# Patient Record
Sex: FEMALE | Race: BLACK OR AFRICAN AMERICAN | Employment: UNEMPLOYED | URBAN - METROPOLITAN AREA
[De-identification: names, ages, dates, MRNs, and addresses within clinical notes are randomized per-mention and may not be internally consistent; named-entity substitution may affect disease eponyms.]

---

## 2017-01-01 ENCOUNTER — PATIENT OUTREACH (OUTPATIENT)
Dept: FAMILY MEDICINE CLINIC | Facility: CLINIC | Age: 62
End: 2017-01-01

## 2017-01-01 ENCOUNTER — APPOINTMENT (OUTPATIENT)
Dept: GENERAL RADIOLOGY | Age: 62
DRG: 853 | End: 2017-01-01
Attending: PHYSICIAN ASSISTANT
Payer: MEDICARE

## 2017-01-01 ENCOUNTER — ANESTHESIA EVENT (OUTPATIENT)
Dept: EMERGENCY DEPT | Age: 62
DRG: 853 | End: 2017-01-01
Payer: MEDICARE

## 2017-01-01 ENCOUNTER — ANESTHESIA (OUTPATIENT)
Dept: EMERGENCY DEPT | Age: 62
DRG: 853 | End: 2017-01-01
Payer: MEDICARE

## 2017-01-01 ENCOUNTER — HOSPITAL ENCOUNTER (INPATIENT)
Age: 62
LOS: 2 days | Discharge: HOME | DRG: 565 | End: 2017-01-22
Attending: EMERGENCY MEDICINE | Admitting: HOSPITALIST
Payer: MEDICARE

## 2017-01-01 ENCOUNTER — APPOINTMENT (OUTPATIENT)
Dept: GENERAL RADIOLOGY | Age: 62
DRG: 565 | End: 2017-01-01
Attending: NURSE PRACTITIONER
Payer: MEDICARE

## 2017-01-01 ENCOUNTER — PATIENT OUTREACH (OUTPATIENT)
Dept: INTERNAL MEDICINE CLINIC | Age: 62
End: 2017-01-01

## 2017-01-01 ENCOUNTER — APPOINTMENT (OUTPATIENT)
Dept: CT IMAGING | Age: 62
DRG: 853 | End: 2017-01-01
Attending: INTERNAL MEDICINE
Payer: MEDICARE

## 2017-01-01 ENCOUNTER — APPOINTMENT (OUTPATIENT)
Dept: GENERAL RADIOLOGY | Age: 62
DRG: 853 | End: 2017-01-01
Attending: INTERNAL MEDICINE
Payer: MEDICARE

## 2017-01-01 ENCOUNTER — HOSPITAL ENCOUNTER (INPATIENT)
Age: 62
LOS: 25 days | Discharge: NURSING, REHAB OR LONG-TERM CARE | DRG: 853 | End: 2017-03-07
Attending: EMERGENCY MEDICINE | Admitting: HOSPITALIST
Payer: MEDICARE

## 2017-01-01 ENCOUNTER — ANESTHESIA (OUTPATIENT)
Dept: SURGERY | Age: 62
DRG: 853 | End: 2017-01-01
Payer: MEDICARE

## 2017-01-01 ENCOUNTER — APPOINTMENT (OUTPATIENT)
Dept: GENERAL RADIOLOGY | Age: 62
DRG: 853 | End: 2017-01-01
Attending: EMERGENCY MEDICINE
Payer: MEDICARE

## 2017-01-01 ENCOUNTER — ANESTHESIA EVENT (OUTPATIENT)
Dept: SURGERY | Age: 62
DRG: 853 | End: 2017-01-01
Payer: MEDICARE

## 2017-01-01 VITALS
OXYGEN SATURATION: 98 % | BODY MASS INDEX: 45.99 KG/M2 | TEMPERATURE: 99.1 F | WEIGHT: 293 LBS | HEART RATE: 90 BPM | HEIGHT: 67 IN | SYSTOLIC BLOOD PRESSURE: 120 MMHG | RESPIRATION RATE: 20 BRPM | DIASTOLIC BLOOD PRESSURE: 88 MMHG

## 2017-01-01 VITALS
RESPIRATION RATE: 16 BRPM | DIASTOLIC BLOOD PRESSURE: 50 MMHG | TEMPERATURE: 98.8 F | BODY MASS INDEX: 48.82 KG/M2 | WEIGHT: 293 LBS | HEIGHT: 65 IN | HEART RATE: 104 BPM | SYSTOLIC BLOOD PRESSURE: 130 MMHG | OXYGEN SATURATION: 99 %

## 2017-01-01 DIAGNOSIS — R65.21 SEPTIC SHOCK (HCC): ICD-10-CM

## 2017-01-01 DIAGNOSIS — D72.829 LEUKOCYTOSIS, UNSPECIFIED TYPE: ICD-10-CM

## 2017-01-01 DIAGNOSIS — E66.01 MORBID OBESITY, UNSPECIFIED OBESITY TYPE (HCC): ICD-10-CM

## 2017-01-01 DIAGNOSIS — A41.9 SEPTIC SHOCK (HCC): ICD-10-CM

## 2017-01-01 DIAGNOSIS — A41.9 SEPSIS, DUE TO UNSPECIFIED ORGANISM: Primary | ICD-10-CM

## 2017-01-01 DIAGNOSIS — R53.81 DEBILITY: ICD-10-CM

## 2017-01-01 DIAGNOSIS — L89.150 SACRAL DECUBITUS ULCER, UNSTAGEABLE: ICD-10-CM

## 2017-01-01 DIAGNOSIS — T79.6XXA TRAUMATIC RHABDOMYOLYSIS, INITIAL ENCOUNTER (HCC): Primary | ICD-10-CM

## 2017-01-01 DIAGNOSIS — J96.00 ACUTE RESPIRATORY FAILURE, UNSPECIFIED WHETHER WITH HYPOXIA OR HYPERCAPNIA (HCC): ICD-10-CM

## 2017-01-01 LAB
ABO + RH BLD: NORMAL
ALBUMIN SERPL BCP-MCNC: 1.3 G/DL (ref 3.4–5)
ALBUMIN SERPL BCP-MCNC: 1.3 G/DL (ref 3.4–5)
ALBUMIN SERPL BCP-MCNC: 1.4 G/DL (ref 3.4–5)
ALBUMIN SERPL BCP-MCNC: 1.5 G/DL (ref 3.4–5)
ALBUMIN SERPL BCP-MCNC: 1.6 G/DL (ref 3.4–5)
ALBUMIN SERPL BCP-MCNC: 1.7 G/DL (ref 3.4–5)
ALBUMIN SERPL BCP-MCNC: 2.1 G/DL (ref 3.4–5)
ALBUMIN SERPL BCP-MCNC: 2.3 G/DL (ref 3.4–5)
ALBUMIN SERPL BCP-MCNC: 2.5 G/DL (ref 3.4–5)
ALBUMIN/GLOB SERPL: 0.3 {RATIO} (ref 0.8–1.7)
ALBUMIN/GLOB SERPL: 0.4 {RATIO} (ref 0.8–1.7)
ALBUMIN/GLOB SERPL: 0.6 {RATIO} (ref 0.8–1.7)
ALBUMIN/GLOB SERPL: 0.7 {RATIO} (ref 0.8–1.7)
ALBUMIN/GLOB SERPL: 0.7 {RATIO} (ref 0.8–1.7)
ALP SERPL-CCNC: 114 U/L (ref 45–117)
ALP SERPL-CCNC: 141 U/L (ref 45–117)
ALP SERPL-CCNC: 143 U/L (ref 45–117)
ALP SERPL-CCNC: 80 U/L (ref 45–117)
ALP SERPL-CCNC: 83 U/L (ref 45–117)
ALP SERPL-CCNC: 85 U/L (ref 45–117)
ALP SERPL-CCNC: 87 U/L (ref 45–117)
ALP SERPL-CCNC: 89 U/L (ref 45–117)
ALP SERPL-CCNC: 92 U/L (ref 45–117)
ALP SERPL-CCNC: 93 U/L (ref 45–117)
ALP SERPL-CCNC: 93 U/L (ref 45–117)
ALP SERPL-CCNC: 94 U/L (ref 45–117)
ALP SERPL-CCNC: 94 U/L (ref 45–117)
ALT SERPL-CCNC: 17 U/L (ref 13–56)
ALT SERPL-CCNC: 17 U/L (ref 13–56)
ALT SERPL-CCNC: 18 U/L (ref 13–56)
ALT SERPL-CCNC: 21 U/L (ref 13–56)
ALT SERPL-CCNC: 23 U/L (ref 13–56)
ALT SERPL-CCNC: 24 U/L (ref 13–56)
ALT SERPL-CCNC: 26 U/L (ref 13–56)
ALT SERPL-CCNC: 26 U/L (ref 13–56)
ALT SERPL-CCNC: 27 U/L (ref 13–56)
ALT SERPL-CCNC: 27 U/L (ref 13–56)
ALT SERPL-CCNC: 85 U/L (ref 13–56)
AMMONIA PLAS-SCNC: 13 UMOL/L (ref 11–32)
AMMONIA PLAS-SCNC: 168 UMOL/L (ref 11–32)
AMMONIA PLAS-SCNC: 41 UMOL/L (ref 11–32)
AMORPH CRY URNS QL MICRO: ABNORMAL
AMPHET UR QL SCN: NEGATIVE
AMPHET UR QL SCN: NEGATIVE
AMYLASE SERPL-CCNC: 66 U/L (ref 25–115)
ANION GAP BLD CALC-SCNC: 10 MMOL/L (ref 3–18)
ANION GAP BLD CALC-SCNC: 11 MMOL/L (ref 3–18)
ANION GAP BLD CALC-SCNC: 12 MMOL/L (ref 3–18)
ANION GAP BLD CALC-SCNC: 12 MMOL/L (ref 3–18)
ANION GAP BLD CALC-SCNC: 13 MMOL/L (ref 3–18)
ANION GAP BLD CALC-SCNC: 15 MMOL/L (ref 3–18)
ANION GAP BLD CALC-SCNC: 16 MMOL/L (ref 3–18)
ANION GAP BLD CALC-SCNC: 21 MMOL/L (ref 10–20)
ANION GAP BLD CALC-SCNC: 8 MMOL/L (ref 3–18)
ANION GAP BLD CALC-SCNC: 8 MMOL/L (ref 3–18)
ANION GAP BLD CALC-SCNC: 9 MMOL/L (ref 3–18)
APPEARANCE UR: ABNORMAL
APPEARANCE UR: CLEAR
ARTERIAL PATENCY WRIST A: ABNORMAL
ARTERIAL PATENCY WRIST A: YES
AST SERPL W P-5'-P-CCNC: 18 U/L (ref 15–37)
AST SERPL W P-5'-P-CCNC: 18 U/L (ref 15–37)
AST SERPL W P-5'-P-CCNC: 19 U/L (ref 15–37)
AST SERPL W P-5'-P-CCNC: 22 U/L (ref 15–37)
AST SERPL W P-5'-P-CCNC: 24 U/L (ref 15–37)
AST SERPL W P-5'-P-CCNC: 250 U/L (ref 15–37)
AST SERPL W P-5'-P-CCNC: 27 U/L (ref 15–37)
AST SERPL W P-5'-P-CCNC: 31 U/L (ref 15–37)
AST SERPL W P-5'-P-CCNC: 31 U/L (ref 15–37)
AST SERPL W P-5'-P-CCNC: 32 U/L (ref 15–37)
AST SERPL W P-5'-P-CCNC: 32 U/L (ref 15–37)
AST SERPL W P-5'-P-CCNC: 34 U/L (ref 15–37)
AST SERPL W P-5'-P-CCNC: 42 U/L (ref 15–37)
ATRIAL RATE: 105 BPM
ATRIAL RATE: 109 BPM
ATRIAL RATE: 110 BPM
ATRIAL RATE: 111 BPM
ATRIAL RATE: 113 BPM
ATRIAL RATE: 140 BPM
ATRIAL RATE: 163 BPM
BACTERIA SPEC CULT: ABNORMAL
BACTERIA SPEC CULT: NORMAL
BACTERIA URNS QL MICRO: ABNORMAL /HPF
BACTERIA URNS QL MICRO: NEGATIVE /HPF
BARBITURATES UR QL SCN: NEGATIVE
BARBITURATES UR QL SCN: NEGATIVE
BASE DEFICIT BLD-SCNC: 10 MMOL/L
BASE DEFICIT BLD-SCNC: 11 MMOL/L
BASE DEFICIT BLD-SCNC: 13 MMOL/L
BASE DEFICIT BLD-SCNC: 14 MMOL/L
BASE DEFICIT BLD-SCNC: 6 MMOL/L
BASE DEFICIT BLD-SCNC: 7 MMOL/L
BASE DEFICIT BLD-SCNC: 8 MMOL/L
BASE DEFICIT BLD-SCNC: 9 MMOL/L
BASE DEFICIT BLD-SCNC: 9 MMOL/L
BASOPHILS # BLD AUTO: 0 K/UL (ref 0–0.06)
BASOPHILS # BLD AUTO: 0 K/UL (ref 0–0.1)
BASOPHILS # BLD AUTO: 0.1 K/UL (ref 0–0.06)
BASOPHILS # BLD AUTO: 0.1 K/UL (ref 0–0.1)
BASOPHILS # BLD AUTO: 0.1 K/UL (ref 0–0.1)
BASOPHILS # BLD: 0 % (ref 0–2)
BASOPHILS # BLD: 0 % (ref 0–3)
BASOPHILS # BLD: 1 % (ref 0–2)
BASOPHILS # BLD: 1 % (ref 0–3)
BASOPHILS # BLD: 2 % (ref 0–3)
BDY SITE: ABNORMAL
BENZODIAZ UR QL: NEGATIVE
BENZODIAZ UR QL: NEGATIVE
BILIRUB DIRECT SERPL-MCNC: 0.2 MG/DL (ref 0–0.2)
BILIRUB SERPL-MCNC: 0.3 MG/DL (ref 0.2–1)
BILIRUB SERPL-MCNC: 0.4 MG/DL (ref 0.2–1)
BILIRUB SERPL-MCNC: 0.4 MG/DL (ref 0.2–1)
BILIRUB SERPL-MCNC: 0.6 MG/DL (ref 0.2–1)
BILIRUB SERPL-MCNC: 1 MG/DL (ref 0.2–1)
BILIRUB SERPL-MCNC: 1.2 MG/DL (ref 0.2–1)
BILIRUB SERPL-MCNC: 1.5 MG/DL (ref 0.2–1)
BILIRUB SERPL-MCNC: 1.6 MG/DL (ref 0.2–1)
BILIRUB SERPL-MCNC: 1.7 MG/DL (ref 0.2–1)
BILIRUB UR QL: ABNORMAL
BILIRUB UR QL: ABNORMAL
BLD PROD TYP BPU: NORMAL
BLOOD GROUP ANTIBODIES SERPL: NORMAL
BNP SERPL-MCNC: 935 PG/ML (ref 0–900)
BNP SERPL-MCNC: 97 PG/ML (ref 0–900)
BODY TEMPERATURE: 37.4
BODY TEMPERATURE: 93.2
BODY TEMPERATURE: 97
BODY TEMPERATURE: 97.2
BODY TEMPERATURE: 98
BODY TEMPERATURE: 98.5
BODY TEMPERATURE: 98.8
BODY TEMPERATURE: 98.8
BODY TEMPERATURE: 99.7
BPU ID: NORMAL
BUN BLD-MCNC: 71 MG/DL (ref 7–18)
BUN SERPL-MCNC: 12 MG/DL (ref 7–18)
BUN SERPL-MCNC: 16 MG/DL (ref 7–18)
BUN SERPL-MCNC: 17 MG/DL (ref 7–18)
BUN SERPL-MCNC: 18 MG/DL (ref 7–18)
BUN SERPL-MCNC: 19 MG/DL (ref 7–18)
BUN SERPL-MCNC: 20 MG/DL (ref 7–18)
BUN SERPL-MCNC: 21 MG/DL (ref 7–18)
BUN SERPL-MCNC: 21 MG/DL (ref 7–18)
BUN SERPL-MCNC: 22 MG/DL (ref 7–18)
BUN SERPL-MCNC: 22 MG/DL (ref 7–18)
BUN SERPL-MCNC: 24 MG/DL (ref 7–18)
BUN SERPL-MCNC: 29 MG/DL (ref 7–18)
BUN SERPL-MCNC: 42 MG/DL (ref 7–18)
BUN SERPL-MCNC: 5 MG/DL (ref 7–18)
BUN SERPL-MCNC: 7 MG/DL (ref 7–18)
BUN SERPL-MCNC: 79 MG/DL (ref 7–18)
BUN SERPL-MCNC: 86 MG/DL (ref 7–18)
BUN SERPL-MCNC: 92 MG/DL (ref 7–18)
BUN/CREAT SERPL: 10 (ref 12–20)
BUN/CREAT SERPL: 14 (ref 12–20)
BUN/CREAT SERPL: 15 (ref 12–20)
BUN/CREAT SERPL: 16 (ref 12–20)
BUN/CREAT SERPL: 17 (ref 12–20)
BUN/CREAT SERPL: 17 (ref 12–20)
BUN/CREAT SERPL: 18 (ref 12–20)
BUN/CREAT SERPL: 19 (ref 12–20)
BUN/CREAT SERPL: 20 (ref 12–20)
BUN/CREAT SERPL: 25 (ref 12–20)
BUN/CREAT SERPL: 28 (ref 12–20)
BUN/CREAT SERPL: 28 (ref 12–20)
BUN/CREAT SERPL: 34 (ref 12–20)
BUN/CREAT SERPL: 36 (ref 12–20)
BUN/CREAT SERPL: 40 (ref 12–20)
BUN/CREAT SERPL: 51 (ref 12–20)
BUN/CREAT SERPL: 59 (ref 12–20)
BUN/CREAT SERPL: 59 (ref 12–20)
BUN/CREAT SERPL: 67 (ref 12–20)
CA-I BLD-MCNC: 1.17 MMOL/L (ref 1.12–1.32)
CA-I SERPL-SCNC: 1.08 MMOL/L (ref 1.12–1.32)
CA-I SERPL-SCNC: 1.1 MMOL/L (ref 1.12–1.32)
CA-I SERPL-SCNC: 1.1 MMOL/L (ref 1.12–1.32)
CA-I SERPL-SCNC: 1.19 MMOL/L (ref 1.12–1.32)
CALCIUM SERPL-MCNC: 7.2 MG/DL (ref 8.5–10.1)
CALCIUM SERPL-MCNC: 7.3 MG/DL (ref 8.5–10.1)
CALCIUM SERPL-MCNC: 7.6 MG/DL (ref 8.5–10.1)
CALCIUM SERPL-MCNC: 7.7 MG/DL (ref 8.5–10.1)
CALCIUM SERPL-MCNC: 7.7 MG/DL (ref 8.5–10.1)
CALCIUM SERPL-MCNC: 7.8 MG/DL (ref 8.5–10.1)
CALCIUM SERPL-MCNC: 7.9 MG/DL (ref 8.5–10.1)
CALCIUM SERPL-MCNC: 8 MG/DL (ref 8.5–10.1)
CALCIUM SERPL-MCNC: 8.1 MG/DL (ref 8.5–10.1)
CALCIUM SERPL-MCNC: 8.2 MG/DL (ref 8.5–10.1)
CALCIUM SERPL-MCNC: 8.3 MG/DL (ref 8.5–10.1)
CALCIUM SERPL-MCNC: 8.4 MG/DL (ref 8.5–10.1)
CALCIUM SERPL-MCNC: 8.4 MG/DL (ref 8.5–10.1)
CALCIUM SERPL-MCNC: 8.8 MG/DL (ref 8.5–10.1)
CALCULATED P AXIS, ECG09: 29 DEGREES
CALCULATED P AXIS, ECG09: 59 DEGREES
CALCULATED P AXIS, ECG09: 66 DEGREES
CALCULATED P AXIS, ECG09: 70 DEGREES
CALCULATED P AXIS, ECG09: 77 DEGREES
CALCULATED P AXIS, ECG09: 80 DEGREES
CALCULATED R AXIS, ECG10: 45 DEGREES
CALCULATED R AXIS, ECG10: 46 DEGREES
CALCULATED R AXIS, ECG10: 56 DEGREES
CALCULATED R AXIS, ECG10: 56 DEGREES
CALCULATED R AXIS, ECG10: 68 DEGREES
CALCULATED R AXIS, ECG10: 70 DEGREES
CALCULATED R AXIS, ECG10: 72 DEGREES
CALCULATED T AXIS, ECG11: -28 DEGREES
CALCULATED T AXIS, ECG11: -63 DEGREES
CALCULATED T AXIS, ECG11: -84 DEGREES
CALCULATED T AXIS, ECG11: -95 DEGREES
CALCULATED T AXIS, ECG11: -97 DEGREES
CALCULATED T AXIS, ECG11: 0 DEGREES
CALCULATED T AXIS, ECG11: 26 DEGREES
CALLED TO:,BCALL1: NORMAL
CALLED TO:,BCALL1: NORMAL
CALLED TO:,BCALL2: NORMAL
CANNABINOIDS UR QL SCN: NEGATIVE
CANNABINOIDS UR QL SCN: NEGATIVE
CHLORIDE BLD-SCNC: 117 MMOL/L (ref 100–108)
CHLORIDE SERPL-SCNC: 105 MMOL/L (ref 100–108)
CHLORIDE SERPL-SCNC: 106 MMOL/L (ref 100–108)
CHLORIDE SERPL-SCNC: 110 MMOL/L (ref 100–108)
CHLORIDE SERPL-SCNC: 112 MMOL/L (ref 100–108)
CHLORIDE SERPL-SCNC: 115 MMOL/L (ref 100–108)
CHLORIDE SERPL-SCNC: 115 MMOL/L (ref 100–108)
CHLORIDE SERPL-SCNC: 116 MMOL/L (ref 100–108)
CHLORIDE SERPL-SCNC: 117 MMOL/L (ref 100–108)
CHLORIDE SERPL-SCNC: 118 MMOL/L (ref 100–108)
CHLORIDE SERPL-SCNC: 118 MMOL/L (ref 100–108)
CHLORIDE SERPL-SCNC: 119 MMOL/L (ref 100–108)
CHLORIDE SERPL-SCNC: 119 MMOL/L (ref 100–108)
CHLORIDE SERPL-SCNC: 120 MMOL/L (ref 100–108)
CHLORIDE SERPL-SCNC: 121 MMOL/L (ref 100–108)
CHLORIDE SERPL-SCNC: 122 MMOL/L (ref 100–108)
CHLORIDE SERPL-SCNC: 122 MMOL/L (ref 100–108)
CHLORIDE SERPL-SCNC: 124 MMOL/L (ref 100–108)
CHLORIDE SERPL-SCNC: 125 MMOL/L (ref 100–108)
CHLORIDE SERPL-SCNC: 126 MMOL/L (ref 100–108)
CHLORIDE SERPL-SCNC: 127 MMOL/L (ref 100–108)
CHLORIDE SERPL-SCNC: 128 MMOL/L (ref 100–108)
CHLORIDE SERPL-SCNC: 128 MMOL/L (ref 100–108)
CHLORIDE SERPL-SCNC: 129 MMOL/L (ref 100–108)
CK MB CFR SERPL CALC: 0.2 % (ref 0–4)
CK MB CFR SERPL CALC: 3 % (ref 0–4)
CK MB SERPL-MCNC: 15.5 NG/ML (ref 0.5–3.6)
CK MB SERPL-MCNC: 5.4 NG/ML (ref 0.5–3.6)
CK SERPL-CCNC: 178 U/L (ref 26–192)
CK SERPL-CCNC: 5313 U/L (ref 26–192)
CK SERPL-CCNC: 9190 U/L (ref 26–192)
CK SERPL-CCNC: 9269 U/L (ref 26–192)
CO2 BLD-SCNC: 13 MMOL/L (ref 19–24)
CO2 SERPL-SCNC: 13 MMOL/L (ref 21–32)
CO2 SERPL-SCNC: 13 MMOL/L (ref 21–32)
CO2 SERPL-SCNC: 15 MMOL/L (ref 21–32)
CO2 SERPL-SCNC: 16 MMOL/L (ref 21–32)
CO2 SERPL-SCNC: 17 MMOL/L (ref 21–32)
CO2 SERPL-SCNC: 18 MMOL/L (ref 21–32)
CO2 SERPL-SCNC: 19 MMOL/L (ref 21–32)
CO2 SERPL-SCNC: 20 MMOL/L (ref 21–32)
CO2 SERPL-SCNC: 20 MMOL/L (ref 21–32)
CO2 SERPL-SCNC: 23 MMOL/L (ref 21–32)
CO2 SERPL-SCNC: 26 MMOL/L (ref 21–32)
CO2 SERPL-SCNC: 27 MMOL/L (ref 21–32)
COCAINE UR QL SCN: NEGATIVE
COCAINE UR QL SCN: NEGATIVE
COLOR UR: ABNORMAL
COLOR UR: ABNORMAL
CORTIS SERPL-MCNC: 29.6 UG/DL (ref 3.09–22.4)
CREAT SERPL-MCNC: 0.48 MG/DL (ref 0.6–1.3)
CREAT SERPL-MCNC: 0.49 MG/DL (ref 0.6–1.3)
CREAT SERPL-MCNC: 0.61 MG/DL (ref 0.6–1.3)
CREAT SERPL-MCNC: 0.67 MG/DL (ref 0.6–1.3)
CREAT SERPL-MCNC: 0.68 MG/DL (ref 0.6–1.3)
CREAT SERPL-MCNC: 0.72 MG/DL (ref 0.6–1.3)
CREAT SERPL-MCNC: 0.76 MG/DL (ref 0.6–1.3)
CREAT SERPL-MCNC: 0.78 MG/DL (ref 0.6–1.3)
CREAT SERPL-MCNC: 0.93 MG/DL (ref 0.6–1.3)
CREAT SERPL-MCNC: 0.93 MG/DL (ref 0.6–1.3)
CREAT SERPL-MCNC: 0.99 MG/DL (ref 0.6–1.3)
CREAT SERPL-MCNC: 1 MG/DL (ref 0.6–1.3)
CREAT SERPL-MCNC: 1.05 MG/DL (ref 0.6–1.3)
CREAT SERPL-MCNC: 1.08 MG/DL (ref 0.6–1.3)
CREAT SERPL-MCNC: 1.12 MG/DL (ref 0.6–1.3)
CREAT SERPL-MCNC: 1.14 MG/DL (ref 0.6–1.3)
CREAT SERPL-MCNC: 1.15 MG/DL (ref 0.6–1.3)
CREAT SERPL-MCNC: 1.16 MG/DL (ref 0.6–1.3)
CREAT SERPL-MCNC: 1.21 MG/DL (ref 0.6–1.3)
CREAT SERPL-MCNC: 1.23 MG/DL (ref 0.6–1.3)
CREAT SERPL-MCNC: 1.25 MG/DL (ref 0.6–1.3)
CREAT SERPL-MCNC: 1.25 MG/DL (ref 0.6–1.3)
CREAT SERPL-MCNC: 1.29 MG/DL (ref 0.6–1.3)
CREAT SERPL-MCNC: 1.29 MG/DL (ref 0.6–1.3)
CREAT SERPL-MCNC: 1.33 MG/DL (ref 0.6–1.3)
CREAT SERPL-MCNC: 1.57 MG/DL (ref 0.6–1.3)
CREAT UR-MCNC: 1.3 MG/DL (ref 0.6–1.3)
CROSSMATCH RESULT,%XM: NORMAL
DATE LAST DOSE: 0
DATE LAST DOSE: ABNORMAL
DATE LAST DOSE: NORMAL
DIAGNOSIS, 93000: NORMAL
DIFFERENTIAL METHOD BLD: ABNORMAL
EOSINOPHIL # BLD: 0 K/UL (ref 0–0.4)
EOSINOPHIL # BLD: 0.1 K/UL (ref 0–0.4)
EOSINOPHIL # BLD: 0.3 K/UL (ref 0–0.4)
EOSINOPHIL # BLD: 0.5 K/UL (ref 0–0.4)
EOSINOPHIL # BLD: 0.6 K/UL (ref 0–0.4)
EOSINOPHIL # BLD: 0.7 K/UL (ref 0–0.4)
EOSINOPHIL # BLD: 0.8 K/UL (ref 0–0.4)
EOSINOPHIL # BLD: 0.9 K/UL (ref 0–0.4)
EOSINOPHIL # BLD: 0.9 K/UL (ref 0–0.4)
EOSINOPHIL # BLD: 1 K/UL (ref 0–0.4)
EOSINOPHIL # BLD: 1 K/UL (ref 0–0.4)
EOSINOPHIL # BLD: 1.1 K/UL (ref 0–0.4)
EOSINOPHIL # BLD: 1.2 K/UL (ref 0–0.4)
EOSINOPHIL NFR BLD: 0 % (ref 0–5)
EOSINOPHIL NFR BLD: 1 % (ref 0–5)
EOSINOPHIL NFR BLD: 10 % (ref 0–5)
EOSINOPHIL NFR BLD: 12 % (ref 0–5)
EOSINOPHIL NFR BLD: 12 % (ref 0–5)
EOSINOPHIL NFR BLD: 15 % (ref 0–5)
EOSINOPHIL NFR BLD: 16 % (ref 0–5)
EOSINOPHIL NFR BLD: 17 % (ref 0–5)
EOSINOPHIL NFR BLD: 2 % (ref 0–5)
EOSINOPHIL NFR BLD: 3 % (ref 0–5)
EOSINOPHIL NFR BLD: 3 % (ref 0–5)
EOSINOPHIL NFR BLD: 4 % (ref 0–5)
EOSINOPHIL NFR BLD: 4 % (ref 0–5)
EOSINOPHIL NFR BLD: 5 % (ref 0–5)
EOSINOPHIL NFR BLD: 5 % (ref 0–5)
EOSINOPHIL NFR BLD: 6 % (ref 0–5)
EOSINOPHIL NFR BLD: 7 % (ref 0–5)
EOSINOPHIL NFR BLD: 8 % (ref 0–5)
EOSINOPHIL NFR BLD: 8 % (ref 0–5)
EPITH CASTS URNS QL MICRO: ABNORMAL /LPF (ref 0–5)
EPITH CASTS URNS QL MICRO: NORMAL /LPF (ref 0–5)
ERYTHROCYTE [DISTWIDTH] IN BLOOD BY AUTOMATED COUNT: 13.9 % (ref 11.6–14.5)
ERYTHROCYTE [DISTWIDTH] IN BLOOD BY AUTOMATED COUNT: 14.3 % (ref 11.6–14.5)
ERYTHROCYTE [DISTWIDTH] IN BLOOD BY AUTOMATED COUNT: 14.3 % (ref 11.6–14.5)
ERYTHROCYTE [DISTWIDTH] IN BLOOD BY AUTOMATED COUNT: 14.5 % (ref 11.6–14.5)
ERYTHROCYTE [DISTWIDTH] IN BLOOD BY AUTOMATED COUNT: 14.5 % (ref 11.6–14.5)
ERYTHROCYTE [DISTWIDTH] IN BLOOD BY AUTOMATED COUNT: 14.6 % (ref 11.6–14.5)
ERYTHROCYTE [DISTWIDTH] IN BLOOD BY AUTOMATED COUNT: 14.8 % (ref 11.6–14.5)
ERYTHROCYTE [DISTWIDTH] IN BLOOD BY AUTOMATED COUNT: 14.9 % (ref 11.6–14.5)
ERYTHROCYTE [DISTWIDTH] IN BLOOD BY AUTOMATED COUNT: 15.1 % (ref 11.6–14.5)
ERYTHROCYTE [DISTWIDTH] IN BLOOD BY AUTOMATED COUNT: 15.2 % (ref 11.6–14.5)
ERYTHROCYTE [DISTWIDTH] IN BLOOD BY AUTOMATED COUNT: 15.3 % (ref 11.6–14.5)
ERYTHROCYTE [DISTWIDTH] IN BLOOD BY AUTOMATED COUNT: 15.6 % (ref 11.6–14.5)
ERYTHROCYTE [DISTWIDTH] IN BLOOD BY AUTOMATED COUNT: 16.4 % (ref 11.6–14.5)
ERYTHROCYTE [DISTWIDTH] IN BLOOD BY AUTOMATED COUNT: 16.7 % (ref 11.6–14.5)
ERYTHROCYTE [DISTWIDTH] IN BLOOD BY AUTOMATED COUNT: 16.9 % (ref 11.6–14.5)
ERYTHROCYTE [DISTWIDTH] IN BLOOD BY AUTOMATED COUNT: 18 % (ref 11.6–14.5)
ERYTHROCYTE [DISTWIDTH] IN BLOOD BY AUTOMATED COUNT: 18.5 % (ref 11.6–14.5)
ERYTHROCYTE [DISTWIDTH] IN BLOOD BY AUTOMATED COUNT: 19 % (ref 11.6–14.5)
ERYTHROCYTE [DISTWIDTH] IN BLOOD BY AUTOMATED COUNT: 20 % (ref 11.6–14.5)
ERYTHROCYTE [DISTWIDTH] IN BLOOD BY AUTOMATED COUNT: 20.3 % (ref 11.6–14.5)
ERYTHROCYTE [DISTWIDTH] IN BLOOD BY AUTOMATED COUNT: 20.3 % (ref 11.6–14.5)
ERYTHROCYTE [DISTWIDTH] IN BLOOD BY AUTOMATED COUNT: 20.4 % (ref 11.6–14.5)
ERYTHROCYTE [DISTWIDTH] IN BLOOD BY AUTOMATED COUNT: 20.7 % (ref 11.6–14.5)
ERYTHROCYTE [DISTWIDTH] IN BLOOD BY AUTOMATED COUNT: 20.8 % (ref 11.6–14.5)
ERYTHROCYTE [DISTWIDTH] IN BLOOD BY AUTOMATED COUNT: 20.8 % (ref 11.6–14.5)
ERYTHROCYTE [DISTWIDTH] IN BLOOD BY AUTOMATED COUNT: 21.2 % (ref 11.6–14.5)
EST. AVERAGE GLUCOSE BLD GHB EST-MCNC: 111 MG/DL
EST. AVERAGE GLUCOSE BLD GHB EST-MCNC: 111 MG/DL
GAS FLOW.O2 O2 DELIVERY SYS: ABNORMAL L/MIN
GAS FLOW.O2 SETTING OXYMISER: 10 BPM
GAS FLOW.O2 SETTING OXYMISER: 10 L/M
GAS FLOW.O2 SETTING OXYMISER: 15 BPM
GLOBULIN SER CALC-MCNC: 3.1 G/DL (ref 2–4)
GLOBULIN SER CALC-MCNC: 3.4 G/DL (ref 2–4)
GLOBULIN SER CALC-MCNC: 3.6 G/DL (ref 2–4)
GLOBULIN SER CALC-MCNC: 3.8 G/DL (ref 2–4)
GLOBULIN SER CALC-MCNC: 3.8 G/DL (ref 2–4)
GLOBULIN SER CALC-MCNC: 3.9 G/DL (ref 2–4)
GLOBULIN SER CALC-MCNC: 3.9 G/DL (ref 2–4)
GLOBULIN SER CALC-MCNC: 4 G/DL (ref 2–4)
GLOBULIN SER CALC-MCNC: 4.2 G/DL (ref 2–4)
GLOBULIN SER CALC-MCNC: 4.3 G/DL (ref 2–4)
GLOBULIN SER CALC-MCNC: 4.4 G/DL (ref 2–4)
GLOBULIN SER CALC-MCNC: 4.4 G/DL (ref 2–4)
GLOBULIN SER CALC-MCNC: 5.1 G/DL (ref 2–4)
GLUCOSE BLD STRIP.AUTO-MCNC: 100 MG/DL (ref 70–110)
GLUCOSE BLD STRIP.AUTO-MCNC: 101 MG/DL (ref 70–110)
GLUCOSE BLD STRIP.AUTO-MCNC: 103 MG/DL (ref 70–110)
GLUCOSE BLD STRIP.AUTO-MCNC: 104 MG/DL (ref 70–110)
GLUCOSE BLD STRIP.AUTO-MCNC: 105 MG/DL (ref 70–110)
GLUCOSE BLD STRIP.AUTO-MCNC: 107 MG/DL (ref 70–110)
GLUCOSE BLD STRIP.AUTO-MCNC: 107 MG/DL (ref 70–110)
GLUCOSE BLD STRIP.AUTO-MCNC: 108 MG/DL (ref 70–110)
GLUCOSE BLD STRIP.AUTO-MCNC: 109 MG/DL (ref 70–110)
GLUCOSE BLD STRIP.AUTO-MCNC: 111 MG/DL (ref 70–110)
GLUCOSE BLD STRIP.AUTO-MCNC: 112 MG/DL (ref 70–110)
GLUCOSE BLD STRIP.AUTO-MCNC: 112 MG/DL (ref 70–110)
GLUCOSE BLD STRIP.AUTO-MCNC: 113 MG/DL (ref 70–110)
GLUCOSE BLD STRIP.AUTO-MCNC: 113 MG/DL (ref 70–110)
GLUCOSE BLD STRIP.AUTO-MCNC: 115 MG/DL (ref 70–110)
GLUCOSE BLD STRIP.AUTO-MCNC: 117 MG/DL (ref 70–110)
GLUCOSE BLD STRIP.AUTO-MCNC: 118 MG/DL (ref 70–110)
GLUCOSE BLD STRIP.AUTO-MCNC: 119 MG/DL (ref 70–110)
GLUCOSE BLD STRIP.AUTO-MCNC: 120 MG/DL (ref 70–110)
GLUCOSE BLD STRIP.AUTO-MCNC: 122 MG/DL (ref 70–110)
GLUCOSE BLD STRIP.AUTO-MCNC: 123 MG/DL (ref 70–110)
GLUCOSE BLD STRIP.AUTO-MCNC: 124 MG/DL (ref 70–110)
GLUCOSE BLD STRIP.AUTO-MCNC: 124 MG/DL (ref 70–110)
GLUCOSE BLD STRIP.AUTO-MCNC: 125 MG/DL (ref 70–110)
GLUCOSE BLD STRIP.AUTO-MCNC: 126 MG/DL (ref 70–110)
GLUCOSE BLD STRIP.AUTO-MCNC: 127 MG/DL (ref 70–110)
GLUCOSE BLD STRIP.AUTO-MCNC: 128 MG/DL (ref 70–110)
GLUCOSE BLD STRIP.AUTO-MCNC: 132 MG/DL (ref 70–110)
GLUCOSE BLD STRIP.AUTO-MCNC: 133 MG/DL (ref 70–110)
GLUCOSE BLD STRIP.AUTO-MCNC: 135 MG/DL (ref 70–110)
GLUCOSE BLD STRIP.AUTO-MCNC: 136 MG/DL (ref 70–110)
GLUCOSE BLD STRIP.AUTO-MCNC: 136 MG/DL (ref 70–110)
GLUCOSE BLD STRIP.AUTO-MCNC: 138 MG/DL (ref 70–110)
GLUCOSE BLD STRIP.AUTO-MCNC: 139 MG/DL (ref 70–110)
GLUCOSE BLD STRIP.AUTO-MCNC: 142 MG/DL (ref 70–110)
GLUCOSE BLD STRIP.AUTO-MCNC: 144 MG/DL (ref 70–110)
GLUCOSE BLD STRIP.AUTO-MCNC: 145 MG/DL (ref 70–110)
GLUCOSE BLD STRIP.AUTO-MCNC: 145 MG/DL (ref 70–110)
GLUCOSE BLD STRIP.AUTO-MCNC: 149 MG/DL (ref 70–110)
GLUCOSE BLD STRIP.AUTO-MCNC: 149 MG/DL (ref 70–110)
GLUCOSE BLD STRIP.AUTO-MCNC: 150 MG/DL (ref 70–110)
GLUCOSE BLD STRIP.AUTO-MCNC: 150 MG/DL (ref 70–110)
GLUCOSE BLD STRIP.AUTO-MCNC: 150 MG/DL (ref 74–106)
GLUCOSE BLD STRIP.AUTO-MCNC: 154 MG/DL (ref 70–110)
GLUCOSE BLD STRIP.AUTO-MCNC: 161 MG/DL (ref 70–110)
GLUCOSE BLD STRIP.AUTO-MCNC: 176 MG/DL (ref 70–110)
GLUCOSE BLD STRIP.AUTO-MCNC: 66 MG/DL (ref 70–110)
GLUCOSE BLD STRIP.AUTO-MCNC: 68 MG/DL (ref 70–110)
GLUCOSE BLD STRIP.AUTO-MCNC: 68 MG/DL (ref 70–110)
GLUCOSE BLD STRIP.AUTO-MCNC: 71 MG/DL (ref 70–110)
GLUCOSE BLD STRIP.AUTO-MCNC: 71 MG/DL (ref 70–110)
GLUCOSE BLD STRIP.AUTO-MCNC: 72 MG/DL (ref 70–110)
GLUCOSE BLD STRIP.AUTO-MCNC: 72 MG/DL (ref 70–110)
GLUCOSE BLD STRIP.AUTO-MCNC: 73 MG/DL (ref 70–110)
GLUCOSE BLD STRIP.AUTO-MCNC: 73 MG/DL (ref 70–110)
GLUCOSE BLD STRIP.AUTO-MCNC: 75 MG/DL (ref 70–110)
GLUCOSE BLD STRIP.AUTO-MCNC: 75 MG/DL (ref 70–110)
GLUCOSE BLD STRIP.AUTO-MCNC: 76 MG/DL (ref 70–110)
GLUCOSE BLD STRIP.AUTO-MCNC: 77 MG/DL (ref 70–110)
GLUCOSE BLD STRIP.AUTO-MCNC: 80 MG/DL (ref 70–110)
GLUCOSE BLD STRIP.AUTO-MCNC: 80 MG/DL (ref 70–110)
GLUCOSE BLD STRIP.AUTO-MCNC: 81 MG/DL (ref 70–110)
GLUCOSE BLD STRIP.AUTO-MCNC: 82 MG/DL (ref 70–110)
GLUCOSE BLD STRIP.AUTO-MCNC: 84 MG/DL (ref 70–110)
GLUCOSE BLD STRIP.AUTO-MCNC: 85 MG/DL (ref 70–110)
GLUCOSE BLD STRIP.AUTO-MCNC: 86 MG/DL (ref 70–110)
GLUCOSE BLD STRIP.AUTO-MCNC: 87 MG/DL (ref 70–110)
GLUCOSE BLD STRIP.AUTO-MCNC: 88 MG/DL (ref 70–110)
GLUCOSE BLD STRIP.AUTO-MCNC: 89 MG/DL (ref 70–110)
GLUCOSE BLD STRIP.AUTO-MCNC: 89 MG/DL (ref 70–110)
GLUCOSE BLD STRIP.AUTO-MCNC: 90 MG/DL (ref 70–110)
GLUCOSE BLD STRIP.AUTO-MCNC: 90 MG/DL (ref 70–110)
GLUCOSE BLD STRIP.AUTO-MCNC: 92 MG/DL (ref 70–110)
GLUCOSE BLD STRIP.AUTO-MCNC: 93 MG/DL (ref 70–110)
GLUCOSE BLD STRIP.AUTO-MCNC: 93 MG/DL (ref 70–110)
GLUCOSE BLD STRIP.AUTO-MCNC: 94 MG/DL (ref 70–110)
GLUCOSE BLD STRIP.AUTO-MCNC: 95 MG/DL (ref 70–110)
GLUCOSE BLD STRIP.AUTO-MCNC: 96 MG/DL (ref 70–110)
GLUCOSE BLD STRIP.AUTO-MCNC: 96 MG/DL (ref 70–110)
GLUCOSE BLD STRIP.AUTO-MCNC: 97 MG/DL (ref 70–110)
GLUCOSE BLD STRIP.AUTO-MCNC: 97 MG/DL (ref 70–110)
GLUCOSE BLD STRIP.AUTO-MCNC: 98 MG/DL (ref 70–110)
GLUCOSE BLD STRIP.AUTO-MCNC: 99 MG/DL (ref 70–110)
GLUCOSE SERPL-MCNC: 100 MG/DL (ref 74–99)
GLUCOSE SERPL-MCNC: 106 MG/DL (ref 74–99)
GLUCOSE SERPL-MCNC: 108 MG/DL (ref 74–99)
GLUCOSE SERPL-MCNC: 112 MG/DL (ref 74–99)
GLUCOSE SERPL-MCNC: 116 MG/DL (ref 74–99)
GLUCOSE SERPL-MCNC: 117 MG/DL (ref 74–99)
GLUCOSE SERPL-MCNC: 122 MG/DL (ref 74–99)
GLUCOSE SERPL-MCNC: 130 MG/DL (ref 74–99)
GLUCOSE SERPL-MCNC: 135 MG/DL (ref 74–99)
GLUCOSE SERPL-MCNC: 136 MG/DL (ref 74–99)
GLUCOSE SERPL-MCNC: 138 MG/DL (ref 74–99)
GLUCOSE SERPL-MCNC: 152 MG/DL (ref 74–99)
GLUCOSE SERPL-MCNC: 172 MG/DL (ref 74–99)
GLUCOSE SERPL-MCNC: 351 MG/DL (ref 74–99)
GLUCOSE SERPL-MCNC: 72 MG/DL (ref 74–99)
GLUCOSE SERPL-MCNC: 75 MG/DL (ref 74–99)
GLUCOSE SERPL-MCNC: 75 MG/DL (ref 74–99)
GLUCOSE SERPL-MCNC: 76 MG/DL (ref 74–99)
GLUCOSE SERPL-MCNC: 76 MG/DL (ref 74–99)
GLUCOSE SERPL-MCNC: 80 MG/DL (ref 74–99)
GLUCOSE SERPL-MCNC: 80 MG/DL (ref 74–99)
GLUCOSE SERPL-MCNC: 81 MG/DL (ref 74–99)
GLUCOSE SERPL-MCNC: 82 MG/DL (ref 74–99)
GLUCOSE SERPL-MCNC: 87 MG/DL (ref 74–99)
GLUCOSE SERPL-MCNC: 93 MG/DL (ref 74–99)
GLUCOSE SERPL-MCNC: 94 MG/DL (ref 74–99)
GLUCOSE SERPL-MCNC: 95 MG/DL (ref 74–99)
GLUCOSE SERPL-MCNC: 97 MG/DL (ref 74–99)
GLUCOSE UR STRIP.AUTO-MCNC: NEGATIVE MG/DL
GLUCOSE UR STRIP.AUTO-MCNC: NEGATIVE MG/DL
GRAM STN SPEC: ABNORMAL
GRAM STN SPEC: NORMAL
HBA1C MFR BLD: 5.5 % (ref 4.2–5.6)
HBA1C MFR BLD: 5.5 % (ref 4.2–5.6)
HCO3 BLD-SCNC: 11.7 MMOL/L (ref 22–26)
HCO3 BLD-SCNC: 13.3 MMOL/L (ref 22–26)
HCO3 BLD-SCNC: 14.8 MMOL/L (ref 22–26)
HCO3 BLD-SCNC: 15.5 MMOL/L (ref 22–26)
HCO3 BLD-SCNC: 15.6 MMOL/L (ref 22–26)
HCO3 BLD-SCNC: 15.7 MMOL/L (ref 22–26)
HCO3 BLD-SCNC: 16.5 MMOL/L (ref 22–26)
HCO3 BLD-SCNC: 16.9 MMOL/L (ref 22–26)
HCO3 BLD-SCNC: 16.9 MMOL/L (ref 22–26)
HCO3 BLD-SCNC: 17.4 MMOL/L (ref 22–26)
HCO3 BLD-SCNC: 18.3 MMOL/L (ref 22–26)
HCT VFR BLD AUTO: 19.6 % (ref 35–45)
HCT VFR BLD AUTO: 20 % (ref 35–45)
HCT VFR BLD AUTO: 20.7 % (ref 35–45)
HCT VFR BLD AUTO: 21.3 % (ref 35–45)
HCT VFR BLD AUTO: 21.6 % (ref 35–45)
HCT VFR BLD AUTO: 21.6 % (ref 35–45)
HCT VFR BLD AUTO: 21.7 % (ref 35–45)
HCT VFR BLD AUTO: 21.8 % (ref 35–45)
HCT VFR BLD AUTO: 22.1 % (ref 35–45)
HCT VFR BLD AUTO: 22.1 % (ref 35–45)
HCT VFR BLD AUTO: 22.3 % (ref 35–45)
HCT VFR BLD AUTO: 22.7 % (ref 35–45)
HCT VFR BLD AUTO: 22.9 % (ref 35–45)
HCT VFR BLD AUTO: 23 % (ref 35–45)
HCT VFR BLD AUTO: 23.2 % (ref 35–45)
HCT VFR BLD AUTO: 23.3 % (ref 35–45)
HCT VFR BLD AUTO: 23.9 % (ref 35–45)
HCT VFR BLD AUTO: 24.3 % (ref 35–45)
HCT VFR BLD AUTO: 24.6 % (ref 35–45)
HCT VFR BLD AUTO: 24.8 % (ref 35–45)
HCT VFR BLD AUTO: 25.1 % (ref 35–45)
HCT VFR BLD AUTO: 25.3 % (ref 35–45)
HCT VFR BLD AUTO: 28.2 % (ref 35–45)
HCT VFR BLD AUTO: 29.1 % (ref 35–45)
HCT VFR BLD AUTO: 31.2 % (ref 35–45)
HCT VFR BLD AUTO: 33.8 % (ref 35–45)
HCT VFR BLD CALC: 33 % (ref 36–49)
HDSCOM,HDSCOM: NORMAL
HDSCOM,HDSCOM: NORMAL
HGB BLD-MCNC: 10 G/DL (ref 12–16)
HGB BLD-MCNC: 10 G/DL (ref 12–16)
HGB BLD-MCNC: 11.1 G/DL (ref 12–16)
HGB BLD-MCNC: 11.2 G/DL (ref 12–16)
HGB BLD-MCNC: 11.9 G/DL (ref 12–16)
HGB BLD-MCNC: 6.6 G/DL (ref 12–16)
HGB BLD-MCNC: 6.6 G/DL (ref 12–16)
HGB BLD-MCNC: 6.7 G/DL (ref 12–16)
HGB BLD-MCNC: 6.9 G/DL (ref 12–16)
HGB BLD-MCNC: 7.1 G/DL (ref 12–16)
HGB BLD-MCNC: 7.2 G/DL (ref 12–16)
HGB BLD-MCNC: 7.3 G/DL (ref 12–16)
HGB BLD-MCNC: 7.4 G/DL (ref 12–16)
HGB BLD-MCNC: 7.5 G/DL (ref 12–16)
HGB BLD-MCNC: 7.5 G/DL (ref 12–16)
HGB BLD-MCNC: 7.6 G/DL (ref 12–16)
HGB BLD-MCNC: 7.7 G/DL (ref 12–16)
HGB BLD-MCNC: 7.7 G/DL (ref 12–16)
HGB BLD-MCNC: 7.8 G/DL (ref 12–16)
HGB BLD-MCNC: 7.8 G/DL (ref 12–16)
HGB BLD-MCNC: 7.9 G/DL (ref 12–16)
HGB BLD-MCNC: 7.9 G/DL (ref 12–16)
HGB BLD-MCNC: 8 G/DL (ref 12–16)
HGB UR QL STRIP: ABNORMAL
HGB UR QL STRIP: NEGATIVE
INR PPP: 1.3 (ref 0.8–1.2)
INR PPP: 1.4 (ref 0.8–1.2)
INSPIRATION.DURATION SETTING TIME VENT: 1 SEC
KETONES UR QL STRIP.AUTO: ABNORMAL MG/DL
KETONES UR QL STRIP.AUTO: ABNORMAL MG/DL
LACTATE BLD-SCNC: 1.3 MMOL/L (ref 0.4–2)
LACTATE BLD-SCNC: 4.8 MMOL/L (ref 0.4–2)
LACTATE BLD-SCNC: 4.9 MMOL/L (ref 0.4–2)
LACTATE SERPL-SCNC: 1.6 MMOL/L (ref 0.4–2)
LACTATE SERPL-SCNC: 3.5 MMOL/L (ref 0.4–2)
LEUKOCYTE ESTERASE UR QL STRIP.AUTO: ABNORMAL
LEUKOCYTE ESTERASE UR QL STRIP.AUTO: ABNORMAL
LIPASE SERPL-CCNC: 147 U/L (ref 73–393)
LIPASE SERPL-CCNC: 171 U/L (ref 73–393)
LYMPHOCYTES # BLD AUTO: 11 % (ref 20–51)
LYMPHOCYTES # BLD AUTO: 12 % (ref 21–52)
LYMPHOCYTES # BLD AUTO: 13 % (ref 21–52)
LYMPHOCYTES # BLD AUTO: 14 % (ref 20–51)
LYMPHOCYTES # BLD AUTO: 15 % (ref 20–51)
LYMPHOCYTES # BLD AUTO: 15 % (ref 21–52)
LYMPHOCYTES # BLD AUTO: 16 % (ref 21–52)
LYMPHOCYTES # BLD AUTO: 17 % (ref 21–52)
LYMPHOCYTES # BLD AUTO: 17 % (ref 21–52)
LYMPHOCYTES # BLD AUTO: 18 % (ref 20–51)
LYMPHOCYTES # BLD AUTO: 19 % (ref 20–51)
LYMPHOCYTES # BLD AUTO: 19 % (ref 21–52)
LYMPHOCYTES # BLD AUTO: 19 % (ref 21–52)
LYMPHOCYTES # BLD AUTO: 22 % (ref 20–51)
LYMPHOCYTES # BLD AUTO: 24 % (ref 20–51)
LYMPHOCYTES # BLD AUTO: 25 % (ref 21–52)
LYMPHOCYTES # BLD AUTO: 26 % (ref 21–52)
LYMPHOCYTES # BLD AUTO: 5 % (ref 20–51)
LYMPHOCYTES # BLD AUTO: 9 % (ref 21–52)
LYMPHOCYTES # BLD: 1.1 K/UL (ref 0.9–3.6)
LYMPHOCYTES # BLD: 1.2 K/UL (ref 0.8–3.5)
LYMPHOCYTES # BLD: 1.2 K/UL (ref 0.9–3.6)
LYMPHOCYTES # BLD: 1.3 K/UL (ref 0.8–3.5)
LYMPHOCYTES # BLD: 1.3 K/UL (ref 0.9–3.6)
LYMPHOCYTES # BLD: 1.4 K/UL (ref 0.8–3.5)
LYMPHOCYTES # BLD: 1.6 K/UL (ref 0.9–3.6)
LYMPHOCYTES # BLD: 1.7 K/UL (ref 0.9–3.6)
LYMPHOCYTES # BLD: 1.7 K/UL (ref 0.9–3.6)
LYMPHOCYTES # BLD: 1.8 K/UL (ref 0.8–3.5)
LYMPHOCYTES # BLD: 1.8 K/UL (ref 0.9–3.6)
LYMPHOCYTES # BLD: 1.9 K/UL (ref 0.9–3.6)
LYMPHOCYTES # BLD: 2 K/UL (ref 0.9–3.6)
LYMPHOCYTES # BLD: 2.1 K/UL (ref 0.8–3.5)
LYMPHOCYTES # BLD: 2.1 K/UL (ref 0.8–3.5)
LYMPHOCYTES # BLD: 2.2 K/UL (ref 0.9–3.6)
LYMPHOCYTES # BLD: 2.5 K/UL (ref 0.8–3.5)
LYMPHOCYTES # BLD: 2.5 K/UL (ref 0.9–3.6)
LYMPHOCYTES # BLD: 2.7 K/UL (ref 0.8–3.5)
MAGNESIUM SERPL-MCNC: 1.7 MG/DL (ref 1.8–2.4)
MAGNESIUM SERPL-MCNC: 1.8 MG/DL (ref 1.8–2.4)
MAGNESIUM SERPL-MCNC: 1.8 MG/DL (ref 1.8–2.4)
MAGNESIUM SERPL-MCNC: 1.9 MG/DL (ref 1.8–2.4)
MAGNESIUM SERPL-MCNC: 2 MG/DL (ref 1.8–2.4)
MAGNESIUM SERPL-MCNC: 2.1 MG/DL (ref 1.8–2.4)
MAGNESIUM SERPL-MCNC: 2.2 MG/DL (ref 1.8–2.4)
MAGNESIUM SERPL-MCNC: 2.7 MG/DL (ref 1.8–2.4)
MAGNESIUM SERPL-MCNC: 3.3 MG/DL (ref 1.8–2.4)
MCH RBC QN AUTO: 27.7 PG (ref 24–34)
MCH RBC QN AUTO: 27.7 PG (ref 24–34)
MCH RBC QN AUTO: 27.9 PG (ref 24–34)
MCH RBC QN AUTO: 27.9 PG (ref 24–34)
MCH RBC QN AUTO: 28 PG (ref 24–34)
MCH RBC QN AUTO: 28 PG (ref 24–34)
MCH RBC QN AUTO: 28.1 PG (ref 24–34)
MCH RBC QN AUTO: 28.1 PG (ref 24–34)
MCH RBC QN AUTO: 28.2 PG (ref 24–34)
MCH RBC QN AUTO: 28.2 PG (ref 24–34)
MCH RBC QN AUTO: 28.3 PG (ref 24–34)
MCH RBC QN AUTO: 28.5 PG (ref 24–34)
MCH RBC QN AUTO: 28.7 PG (ref 24–34)
MCH RBC QN AUTO: 28.7 PG (ref 24–34)
MCH RBC QN AUTO: 28.8 PG (ref 24–34)
MCH RBC QN AUTO: 28.8 PG (ref 24–34)
MCH RBC QN AUTO: 28.9 PG (ref 24–34)
MCH RBC QN AUTO: 28.9 PG (ref 24–34)
MCH RBC QN AUTO: 29 PG (ref 24–34)
MCH RBC QN AUTO: 29.2 PG (ref 24–34)
MCH RBC QN AUTO: 29.2 PG (ref 24–34)
MCH RBC QN AUTO: 29.3 PG (ref 24–34)
MCH RBC QN AUTO: 29.5 PG (ref 24–34)
MCHC RBC AUTO-ENTMCNC: 30.4 G/DL (ref 31–37)
MCHC RBC AUTO-ENTMCNC: 31.5 G/DL (ref 31–37)
MCHC RBC AUTO-ENTMCNC: 31.8 G/DL (ref 31–37)
MCHC RBC AUTO-ENTMCNC: 31.9 G/DL (ref 31–37)
MCHC RBC AUTO-ENTMCNC: 31.9 G/DL (ref 31–37)
MCHC RBC AUTO-ENTMCNC: 32.1 G/DL (ref 31–37)
MCHC RBC AUTO-ENTMCNC: 32.2 G/DL (ref 31–37)
MCHC RBC AUTO-ENTMCNC: 32.3 G/DL (ref 31–37)
MCHC RBC AUTO-ENTMCNC: 32.5 G/DL (ref 31–37)
MCHC RBC AUTO-ENTMCNC: 32.6 G/DL (ref 31–37)
MCHC RBC AUTO-ENTMCNC: 33 G/DL (ref 31–37)
MCHC RBC AUTO-ENTMCNC: 33.3 G/DL (ref 31–37)
MCHC RBC AUTO-ENTMCNC: 33.3 G/DL (ref 31–37)
MCHC RBC AUTO-ENTMCNC: 33.5 G/DL (ref 31–37)
MCHC RBC AUTO-ENTMCNC: 33.5 G/DL (ref 31–37)
MCHC RBC AUTO-ENTMCNC: 33.7 G/DL (ref 31–37)
MCHC RBC AUTO-ENTMCNC: 33.9 G/DL (ref 31–37)
MCHC RBC AUTO-ENTMCNC: 34.4 G/DL (ref 31–37)
MCHC RBC AUTO-ENTMCNC: 35.2 G/DL (ref 31–37)
MCHC RBC AUTO-ENTMCNC: 35.5 G/DL (ref 31–37)
MCHC RBC AUTO-ENTMCNC: 35.6 G/DL (ref 31–37)
MCV RBC AUTO: 80.2 FL (ref 74–97)
MCV RBC AUTO: 80.8 FL (ref 74–97)
MCV RBC AUTO: 82.6 FL (ref 74–97)
MCV RBC AUTO: 82.8 FL (ref 74–97)
MCV RBC AUTO: 83.3 FL (ref 74–97)
MCV RBC AUTO: 83.7 FL (ref 74–97)
MCV RBC AUTO: 83.8 FL (ref 74–97)
MCV RBC AUTO: 83.9 FL (ref 74–97)
MCV RBC AUTO: 84.8 FL (ref 74–97)
MCV RBC AUTO: 84.8 FL (ref 74–97)
MCV RBC AUTO: 85.8 FL (ref 74–97)
MCV RBC AUTO: 86.4 FL (ref 74–97)
MCV RBC AUTO: 86.6 FL (ref 74–97)
MCV RBC AUTO: 87 FL (ref 74–97)
MCV RBC AUTO: 87.7 FL (ref 74–97)
MCV RBC AUTO: 89.2 FL (ref 74–97)
MCV RBC AUTO: 89.3 FL (ref 74–97)
MCV RBC AUTO: 89.5 FL (ref 74–97)
MCV RBC AUTO: 89.5 FL (ref 74–97)
MCV RBC AUTO: 89.7 FL (ref 74–97)
MCV RBC AUTO: 90.5 FL (ref 74–97)
MCV RBC AUTO: 90.6 FL (ref 74–97)
MCV RBC AUTO: 90.8 FL (ref 74–97)
MCV RBC AUTO: 90.8 FL (ref 74–97)
MCV RBC AUTO: 91 FL (ref 74–97)
MCV RBC AUTO: 92.3 FL (ref 74–97)
METAMYELOCYTES NFR BLD MANUAL: 1 %
METAMYELOCYTES NFR BLD MANUAL: 4 %
METHADONE UR QL: NEGATIVE
METHADONE UR QL: NEGATIVE
MONOCYTES # BLD: 0.2 K/UL (ref 0–1)
MONOCYTES # BLD: 0.3 K/UL (ref 0–1)
MONOCYTES # BLD: 0.4 K/UL (ref 0–1)
MONOCYTES # BLD: 0.6 K/UL (ref 0.05–1.2)
MONOCYTES # BLD: 0.6 K/UL (ref 0.05–1.2)
MONOCYTES # BLD: 0.6 K/UL (ref 0–1)
MONOCYTES # BLD: 0.7 K/UL (ref 0.05–1.2)
MONOCYTES # BLD: 0.7 K/UL (ref 0.05–1.2)
MONOCYTES # BLD: 0.8 K/UL (ref 0.05–1.2)
MONOCYTES # BLD: 0.8 K/UL (ref 0.05–1.2)
MONOCYTES # BLD: 0.8 K/UL (ref 0–1)
MONOCYTES # BLD: 0.9 K/UL (ref 0.05–1.2)
MONOCYTES # BLD: 0.9 K/UL (ref 0.05–1.2)
MONOCYTES # BLD: 1 K/UL (ref 0.05–1.2)
MONOCYTES # BLD: 1 K/UL (ref 0.05–1.2)
MONOCYTES # BLD: 1 K/UL (ref 0–1)
MONOCYTES # BLD: 1.1 K/UL (ref 0–1)
MONOCYTES # BLD: 1.2 K/UL (ref 0.05–1.2)
MONOCYTES # BLD: 1.2 K/UL (ref 0.05–1.2)
MONOCYTES # BLD: 1.4 K/UL (ref 0.05–1.2)
MONOCYTES # BLD: 1.7 K/UL (ref 0–1)
MONOCYTES NFR BLD AUTO: 10 % (ref 2–9)
MONOCYTES NFR BLD AUTO: 10 % (ref 3–10)
MONOCYTES NFR BLD AUTO: 11 % (ref 3–10)
MONOCYTES NFR BLD AUTO: 12 % (ref 3–10)
MONOCYTES NFR BLD AUTO: 12 % (ref 3–10)
MONOCYTES NFR BLD AUTO: 13 % (ref 3–10)
MONOCYTES NFR BLD AUTO: 3 % (ref 2–9)
MONOCYTES NFR BLD AUTO: 4 % (ref 2–9)
MONOCYTES NFR BLD AUTO: 6 % (ref 3–10)
MONOCYTES NFR BLD AUTO: 7 % (ref 2–9)
MONOCYTES NFR BLD AUTO: 7 % (ref 2–9)
MONOCYTES NFR BLD AUTO: 7 % (ref 3–10)
MONOCYTES NFR BLD AUTO: 7 % (ref 3–10)
MONOCYTES NFR BLD AUTO: 8 % (ref 2–9)
MONOCYTES NFR BLD AUTO: 8 % (ref 3–10)
MONOCYTES NFR BLD AUTO: 8 % (ref 3–10)
MONOCYTES NFR BLD AUTO: 9 % (ref 3–10)
MYELOCYTES NFR BLD MANUAL: 1 %
NEUTS BAND NFR BLD MANUAL: 1 % (ref 0–5)
NEUTS BAND NFR BLD MANUAL: 1 % (ref 0–5)
NEUTS BAND NFR BLD MANUAL: 2 % (ref 0–5)
NEUTS BAND NFR BLD MANUAL: 3 % (ref 0–5)
NEUTS BAND NFR BLD MANUAL: 5 % (ref 0–5)
NEUTS BAND NFR BLD MANUAL: 8 % (ref 0–5)
NEUTS BAND NFR BLD MANUAL: 9 % (ref 0–5)
NEUTS SEG # BLD: 10 K/UL (ref 1.8–8)
NEUTS SEG # BLD: 11.2 K/UL (ref 1.8–8)
NEUTS SEG # BLD: 11.5 K/UL (ref 1.8–8)
NEUTS SEG # BLD: 12.5 K/UL (ref 1.8–8)
NEUTS SEG # BLD: 13.1 K/UL (ref 1.8–8)
NEUTS SEG # BLD: 25 K/UL (ref 1.8–8)
NEUTS SEG # BLD: 3.1 K/UL (ref 1.8–8)
NEUTS SEG # BLD: 3.1 K/UL (ref 1.8–8)
NEUTS SEG # BLD: 3.3 K/UL (ref 1.8–8)
NEUTS SEG # BLD: 3.8 K/UL (ref 1.8–8)
NEUTS SEG # BLD: 4.7 K/UL (ref 1.8–8)
NEUTS SEG # BLD: 4.8 K/UL (ref 1.8–8)
NEUTS SEG # BLD: 7 K/UL (ref 1.8–8)
NEUTS SEG # BLD: 7 K/UL (ref 1.8–8)
NEUTS SEG # BLD: 7.1 K/UL (ref 1.8–8)
NEUTS SEG # BLD: 7.2 K/UL (ref 1.8–8)
NEUTS SEG # BLD: 7.4 K/UL (ref 1.8–8)
NEUTS SEG # BLD: 7.6 K/UL (ref 1.8–8)
NEUTS SEG # BLD: 8 K/UL (ref 1.8–8)
NEUTS SEG # BLD: 8.4 K/UL (ref 1.8–8)
NEUTS SEG # BLD: 9.4 K/UL (ref 1.8–8)
NEUTS SEG NFR BLD AUTO: 46 % (ref 40–73)
NEUTS SEG NFR BLD AUTO: 48 % (ref 40–73)
NEUTS SEG NFR BLD AUTO: 48 % (ref 42–75)
NEUTS SEG NFR BLD AUTO: 52 % (ref 40–73)
NEUTS SEG NFR BLD AUTO: 60 % (ref 40–73)
NEUTS SEG NFR BLD AUTO: 63 % (ref 40–73)
NEUTS SEG NFR BLD AUTO: 63 % (ref 42–75)
NEUTS SEG NFR BLD AUTO: 64 % (ref 42–75)
NEUTS SEG NFR BLD AUTO: 66 % (ref 40–73)
NEUTS SEG NFR BLD AUTO: 66 % (ref 40–73)
NEUTS SEG NFR BLD AUTO: 68 % (ref 42–75)
NEUTS SEG NFR BLD AUTO: 69 % (ref 40–73)
NEUTS SEG NFR BLD AUTO: 69 % (ref 40–73)
NEUTS SEG NFR BLD AUTO: 71 % (ref 42–75)
NEUTS SEG NFR BLD AUTO: 73 % (ref 40–73)
NEUTS SEG NFR BLD AUTO: 73 % (ref 42–75)
NEUTS SEG NFR BLD AUTO: 75 % (ref 40–73)
NEUTS SEG NFR BLD AUTO: 75 % (ref 42–75)
NEUTS SEG NFR BLD AUTO: 77 % (ref 40–73)
NEUTS SEG NFR BLD AUTO: 82 % (ref 40–73)
NEUTS SEG NFR BLD AUTO: 87 % (ref 42–75)
NITRIC:PPM ISTAT,INITR: 0 PPM
NITRITE UR QL STRIP.AUTO: NEGATIVE
NITRITE UR QL STRIP.AUTO: NEGATIVE
NRBC BLD-RTO: 1 PER 100 WBC
NRBC BLD-RTO: 2 PER 100 WBC
NRBC BLD-RTO: 4 PER 100 WBC
O2/TOTAL GAS SETTING VFR VENT: 0.21 %
O2/TOTAL GAS SETTING VFR VENT: 100 %
O2/TOTAL GAS SETTING VFR VENT: 100 %
O2/TOTAL GAS SETTING VFR VENT: 21 %
O2/TOTAL GAS SETTING VFR VENT: 30 %
O2/TOTAL GAS SETTING VFR VENT: 45 %
O2/TOTAL GAS SETTING VFR VENT: 60 %
OPIATES UR QL: NEGATIVE
OPIATES UR QL: NEGATIVE
P-R INTERVAL, ECG05: 132 MS
P-R INTERVAL, ECG05: 132 MS
P-R INTERVAL, ECG05: 136 MS
P-R INTERVAL, ECG05: 138 MS
P-R INTERVAL, ECG05: 140 MS
P-R INTERVAL, ECG05: 146 MS
PCO2 BLD: 20.3 MMHG (ref 35–45)
PCO2 BLD: 24.5 MMHG (ref 35–45)
PCO2 BLD: 26 MMHG (ref 35–45)
PCO2 BLD: 26.2 MMHG (ref 35–45)
PCO2 BLD: 26.2 MMHG (ref 35–45)
PCO2 BLD: 26.9 MMHG (ref 35–45)
PCO2 BLD: 26.9 MMHG (ref 35–45)
PCO2 BLD: 27.1 MMHG (ref 35–45)
PCO2 BLD: 28.7 MMHG (ref 35–45)
PCO2 BLD: 31.7 MMHG (ref 35–45)
PCO2 BLD: 32.5 MMHG (ref 35–45)
PCP UR QL: NEGATIVE
PCP UR QL: NEGATIVE
PEEP RESPIRATORY: 5 CMH2O
PH BLD: 7.32 [PH] (ref 7.35–7.45)
PH BLD: 7.33 [PH] (ref 7.35–7.45)
PH BLD: 7.33 [PH] (ref 7.35–7.45)
PH BLD: 7.35 [PH] (ref 7.35–7.45)
PH BLD: 7.37 [PH] (ref 7.35–7.45)
PH BLD: 7.38 [PH] (ref 7.35–7.45)
PH BLD: 7.38 [PH] (ref 7.35–7.45)
PH BLD: 7.39 [PH] (ref 7.35–7.45)
PH BLD: 7.41 [PH] (ref 7.35–7.45)
PH UR STRIP: 5 [PH] (ref 5–8)
PH UR STRIP: 5.5 [PH] (ref 5–8)
PHOSPHATE SERPL-MCNC: 1.7 MG/DL (ref 2.5–4.9)
PHOSPHATE SERPL-MCNC: 2.3 MG/DL (ref 2.5–4.9)
PHOSPHATE SERPL-MCNC: 3.1 MG/DL (ref 2.5–4.9)
PHOSPHATE SERPL-MCNC: 3.5 MG/DL (ref 2.5–4.9)
PHOSPHATE SERPL-MCNC: 3.5 MG/DL (ref 2.5–4.9)
PHOSPHATE SERPL-MCNC: 3.8 MG/DL (ref 2.5–4.9)
PHOSPHATE SERPL-MCNC: 3.9 MG/DL (ref 2.5–4.9)
PHOSPHATE SERPL-MCNC: 4.2 MG/DL (ref 2.5–4.9)
PHOSPHATE SERPL-MCNC: 4.2 MG/DL (ref 2.5–4.9)
PHOSPHATE SERPL-MCNC: 6.8 MG/DL (ref 2.5–4.9)
PIP ISTAT,IPIP: 14
PIP ISTAT,IPIP: 17
PIP ISTAT,IPIP: 20
PIP ISTAT,IPIP: 20
PLATELET # BLD AUTO: 147 K/UL (ref 135–420)
PLATELET # BLD AUTO: 159 K/UL (ref 135–420)
PLATELET # BLD AUTO: 166 K/UL (ref 135–420)
PLATELET # BLD AUTO: 181 K/UL (ref 135–420)
PLATELET # BLD AUTO: 184 K/UL (ref 135–420)
PLATELET # BLD AUTO: 207 K/UL (ref 135–420)
PLATELET # BLD AUTO: 220 K/UL (ref 135–420)
PLATELET # BLD AUTO: 231 K/UL (ref 135–420)
PLATELET # BLD AUTO: 235 K/UL (ref 135–420)
PLATELET # BLD AUTO: 243 K/UL (ref 135–420)
PLATELET # BLD AUTO: 246 K/UL (ref 135–420)
PLATELET # BLD AUTO: 268 K/UL (ref 135–420)
PLATELET # BLD AUTO: 274 K/UL (ref 135–420)
PLATELET # BLD AUTO: 278 K/UL (ref 135–420)
PLATELET # BLD AUTO: 283 K/UL (ref 135–420)
PLATELET # BLD AUTO: 292 K/UL (ref 135–420)
PLATELET # BLD AUTO: 299 K/UL (ref 135–420)
PLATELET # BLD AUTO: 300 K/UL (ref 135–420)
PLATELET # BLD AUTO: 315 K/UL (ref 135–420)
PLATELET # BLD AUTO: 346 K/UL (ref 135–420)
PLATELET # BLD AUTO: 394 K/UL (ref 135–420)
PLATELET # BLD AUTO: 397 K/UL (ref 135–420)
PLATELET # BLD AUTO: 405 K/UL (ref 135–420)
PLATELET # BLD AUTO: 496 K/UL (ref 135–420)
PLATELET # BLD AUTO: 504 K/UL (ref 135–420)
PLATELET # BLD AUTO: 93 K/UL (ref 135–420)
PLATELET COMMENTS,PCOM: ABNORMAL
PLATELET COMMENTS,PCOM: ABNORMAL
PLATELET COMMENTS,PCOM: ADEQUATE
PMV BLD AUTO: 10.1 FL (ref 9.2–11.8)
PMV BLD AUTO: 10.5 FL (ref 9.2–11.8)
PMV BLD AUTO: 8.5 FL (ref 9.2–11.8)
PMV BLD AUTO: 8.7 FL (ref 9.2–11.8)
PMV BLD AUTO: 8.8 FL (ref 9.2–11.8)
PMV BLD AUTO: 8.8 FL (ref 9.2–11.8)
PMV BLD AUTO: 8.9 FL (ref 9.2–11.8)
PMV BLD AUTO: 8.9 FL (ref 9.2–11.8)
PMV BLD AUTO: 9 FL (ref 9.2–11.8)
PMV BLD AUTO: 9 FL (ref 9.2–11.8)
PMV BLD AUTO: 9.1 FL (ref 9.2–11.8)
PMV BLD AUTO: 9.2 FL (ref 9.2–11.8)
PMV BLD AUTO: 9.3 FL (ref 9.2–11.8)
PMV BLD AUTO: 9.4 FL (ref 9.2–11.8)
PMV BLD AUTO: 9.5 FL (ref 9.2–11.8)
PMV BLD AUTO: 9.8 FL (ref 9.2–11.8)
PO2 BLD: 102 MMHG (ref 80–100)
PO2 BLD: 104 MMHG (ref 80–100)
PO2 BLD: 107 MMHG (ref 80–100)
PO2 BLD: 110 MMHG (ref 80–100)
PO2 BLD: 111 MMHG (ref 80–100)
PO2 BLD: 137 MMHG (ref 80–100)
PO2 BLD: 192 MMHG (ref 80–100)
PO2 BLD: 204 MMHG (ref 80–100)
PO2 BLD: 321 MMHG (ref 80–100)
PO2 BLD: 470 MMHG (ref 80–100)
PO2 BLD: 89 MMHG (ref 80–100)
POTASSIUM BLD-SCNC: 6.1 MMOL/L (ref 3.5–5.5)
POTASSIUM SERPL-SCNC: 2.9 MMOL/L (ref 3.5–5.5)
POTASSIUM SERPL-SCNC: 3.1 MMOL/L (ref 3.5–5.5)
POTASSIUM SERPL-SCNC: 3.1 MMOL/L (ref 3.5–5.5)
POTASSIUM SERPL-SCNC: 3.2 MMOL/L (ref 3.5–5.5)
POTASSIUM SERPL-SCNC: 3.3 MMOL/L (ref 3.5–5.5)
POTASSIUM SERPL-SCNC: 3.5 MMOL/L (ref 3.5–5.5)
POTASSIUM SERPL-SCNC: 3.6 MMOL/L (ref 3.5–5.5)
POTASSIUM SERPL-SCNC: 3.7 MMOL/L (ref 3.5–5.5)
POTASSIUM SERPL-SCNC: 3.8 MMOL/L (ref 3.5–5.5)
POTASSIUM SERPL-SCNC: 3.9 MMOL/L (ref 3.5–5.5)
POTASSIUM SERPL-SCNC: 4 MMOL/L (ref 3.5–5.5)
POTASSIUM SERPL-SCNC: 4 MMOL/L (ref 3.5–5.5)
POTASSIUM SERPL-SCNC: 4.1 MMOL/L (ref 3.5–5.5)
POTASSIUM SERPL-SCNC: 4.1 MMOL/L (ref 3.5–5.5)
POTASSIUM SERPL-SCNC: 4.2 MMOL/L (ref 3.5–5.5)
POTASSIUM SERPL-SCNC: 4.3 MMOL/L (ref 3.5–5.5)
POTASSIUM SERPL-SCNC: 4.3 MMOL/L (ref 3.5–5.5)
POTASSIUM SERPL-SCNC: 4.4 MMOL/L (ref 3.5–5.5)
POTASSIUM SERPL-SCNC: 4.9 MMOL/L (ref 3.5–5.5)
POTASSIUM SERPL-SCNC: 6.1 MMOL/L (ref 3.5–5.5)
PRESSURE SUPPORT SETTING VENT: 7 CMH2O
PRESSURE SUPPORT SETTING VENT: 7 CMH2O
PROCALCITONIN SERPL-MCNC: 2.74 NG/ML (ref 0–0.08)
PROMYELOCYTES NFR BLD MANUAL: 1 %
PROT SERPL-MCNC: 4.9 G/DL (ref 6.4–8.2)
PROT SERPL-MCNC: 5.2 G/DL (ref 6.4–8.2)
PROT SERPL-MCNC: 5.4 G/DL (ref 6.4–8.2)
PROT SERPL-MCNC: 5.5 G/DL (ref 6.4–8.2)
PROT SERPL-MCNC: 5.6 G/DL (ref 6.4–8.2)
PROT SERPL-MCNC: 5.6 G/DL (ref 6.4–8.2)
PROT SERPL-MCNC: 5.7 G/DL (ref 6.4–8.2)
PROT SERPL-MCNC: 5.8 G/DL (ref 6.4–8.2)
PROT SERPL-MCNC: 5.9 G/DL (ref 6.4–8.2)
PROT SERPL-MCNC: 6.7 G/DL (ref 6.4–8.2)
PROT SERPL-MCNC: 6.9 G/DL (ref 6.4–8.2)
PROT UR STRIP-MCNC: 30 MG/DL
PROT UR STRIP-MCNC: NEGATIVE MG/DL
PROTHROMBIN TIME: 15.7 SEC (ref 11.5–15.2)
PROTHROMBIN TIME: 16.6 SEC (ref 11.5–15.2)
Q-T INTERVAL, ECG07: 288 MS
Q-T INTERVAL, ECG07: 296 MS
Q-T INTERVAL, ECG07: 338 MS
Q-T INTERVAL, ECG07: 338 MS
Q-T INTERVAL, ECG07: 356 MS
Q-T INTERVAL, ECG07: 362 MS
Q-T INTERVAL, ECG07: 368 MS
QRS DURATION, ECG06: 82 MS
QRS DURATION, ECG06: 86 MS
QRS DURATION, ECG06: 86 MS
QRS DURATION, ECG06: 88 MS
QRS DURATION, ECG06: 90 MS
QTC CALCULATION (BEZET), ECG08: 439 MS
QTC CALCULATION (BEZET), ECG08: 457 MS
QTC CALCULATION (BEZET), ECG08: 463 MS
QTC CALCULATION (BEZET), ECG08: 470 MS
QTC CALCULATION (BEZET), ECG08: 474 MS
QTC CALCULATION (BEZET), ECG08: 487 MS
QTC CALCULATION (BEZET), ECG08: 500 MS
RBC # BLD AUTO: 2.34 M/UL (ref 4.2–5.3)
RBC # BLD AUTO: 2.36 M/UL (ref 4.2–5.3)
RBC # BLD AUTO: 2.39 M/UL (ref 4.2–5.3)
RBC # BLD AUTO: 2.45 M/UL (ref 4.2–5.3)
RBC # BLD AUTO: 2.5 M/UL (ref 4.2–5.3)
RBC # BLD AUTO: 2.53 M/UL (ref 4.2–5.3)
RBC # BLD AUTO: 2.54 M/UL (ref 4.2–5.3)
RBC # BLD AUTO: 2.57 M/UL (ref 4.2–5.3)
RBC # BLD AUTO: 2.58 M/UL (ref 4.2–5.3)
RBC # BLD AUTO: 2.6 M/UL (ref 4.2–5.3)
RBC # BLD AUTO: 2.67 M/UL (ref 4.2–5.3)
RBC # BLD AUTO: 2.72 M/UL (ref 4.2–5.3)
RBC # BLD AUTO: 2.73 M/UL (ref 4.2–5.3)
RBC # BLD AUTO: 2.74 M/UL (ref 4.2–5.3)
RBC # BLD AUTO: 2.74 M/UL (ref 4.2–5.3)
RBC # BLD AUTO: 2.76 M/UL (ref 4.2–5.3)
RBC # BLD AUTO: 2.77 M/UL (ref 4.2–5.3)
RBC # BLD AUTO: 2.77 M/UL (ref 4.2–5.3)
RBC # BLD AUTO: 2.82 M/UL (ref 4.2–5.3)
RBC # BLD AUTO: 2.84 M/UL (ref 4.2–5.3)
RBC # BLD AUTO: 3.43 M/UL (ref 4.2–5.3)
RBC # BLD AUTO: 3.49 M/UL (ref 4.2–5.3)
RBC # BLD AUTO: 3.89 M/UL (ref 4.2–5.3)
RBC # BLD AUTO: 4.03 M/UL (ref 4.2–5.3)
RBC #/AREA URNS HPF: ABNORMAL /HPF (ref 0–5)
RBC #/AREA URNS HPF: NEGATIVE /HPF (ref 0–5)
RBC MORPH BLD: ABNORMAL
REPORTED DOSE,DOSE: ABNORMAL UNITS
REPORTED DOSE,DOSE: ABNORMAL UNITS
REPORTED DOSE,DOSE: NORMAL UNITS
REPORTED DOSE/TIME,TMG: 0
REPORTED DOSE/TIME,TMG: 2000
REPORTED DOSE/TIME,TMG: 500
SAO2 % BLD: 100 % (ref 92–97)
SAO2 % BLD: 97 % (ref 92–97)
SAO2 % BLD: 98 % (ref 92–97)
SAO2 % BLD: 99 % (ref 92–97)
SAO2 % BLD: 99 % (ref 92–97)
SERVICE CMNT-IMP: ABNORMAL
SERVICE CMNT-IMP: NORMAL
SODIUM BLD-SCNC: 144 MMOL/L (ref 136–145)
SODIUM SERPL-SCNC: 140 MMOL/L (ref 136–145)
SODIUM SERPL-SCNC: 140 MMOL/L (ref 136–145)
SODIUM SERPL-SCNC: 142 MMOL/L (ref 136–145)
SODIUM SERPL-SCNC: 142 MMOL/L (ref 136–145)
SODIUM SERPL-SCNC: 143 MMOL/L (ref 136–145)
SODIUM SERPL-SCNC: 145 MMOL/L (ref 136–145)
SODIUM SERPL-SCNC: 146 MMOL/L (ref 136–145)
SODIUM SERPL-SCNC: 147 MMOL/L (ref 136–145)
SODIUM SERPL-SCNC: 148 MMOL/L (ref 136–145)
SODIUM SERPL-SCNC: 149 MMOL/L (ref 136–145)
SODIUM SERPL-SCNC: 150 MMOL/L (ref 136–145)
SODIUM SERPL-SCNC: 151 MMOL/L (ref 136–145)
SODIUM SERPL-SCNC: 151 MMOL/L (ref 136–145)
SODIUM SERPL-SCNC: 152 MMOL/L (ref 136–145)
SODIUM SERPL-SCNC: 152 MMOL/L (ref 136–145)
SODIUM SERPL-SCNC: 153 MMOL/L (ref 136–145)
SODIUM SERPL-SCNC: 154 MMOL/L (ref 136–145)
SODIUM SERPL-SCNC: 155 MMOL/L (ref 136–145)
SODIUM SERPL-SCNC: 156 MMOL/L (ref 136–145)
SODIUM SERPL-SCNC: 158 MMOL/L (ref 136–145)
SODIUM SERPL-SCNC: 159 MMOL/L (ref 136–145)
SP GR UR REFRACTOMETRY: 1.02 (ref 1–1.03)
SP GR UR REFRACTOMETRY: 1.02 (ref 1–1.03)
SPECIMEN EXP DATE BLD: NORMAL
SPECIMEN TYPE: ABNORMAL
STATUS OF UNIT,%ST: NORMAL
T4 FREE SERPL-MCNC: 0.7 NG/DL (ref 0.7–1.5)
TOTAL RESP. RATE, ITRR: 14
TOTAL RESP. RATE, ITRR: 22
TOTAL RESP. RATE, ITRR: 24
TOTAL RESP. RATE, ITRR: 25
TOTAL RESP. RATE, ITRR: 28
TOTAL RESP. RATE, ITRR: 30
TOTAL RESP. RATE, ITRR: 30
TOTAL RESP. RATE, ITRR: 31
TOTAL RESP. RATE, ITRR: 32
TROPONIN I BLD-MCNC: <0.04 NG/ML (ref 0–0.08)
TROPONIN I SERPL-MCNC: <0.02 NG/ML (ref 0–0.04)
TSH SERPL DL<=0.05 MIU/L-ACNC: 2.36 UIU/ML (ref 0.36–3.74)
TSH SERPL DL<=0.05 MIU/L-ACNC: 2.41 UIU/ML (ref 0.36–3.74)
UNIT DIVISION, %UDIV: 0
UROBILINOGEN UR QL STRIP.AUTO: 1 EU/DL (ref 0.2–1)
UROBILINOGEN UR QL STRIP.AUTO: 1 EU/DL (ref 0.2–1)
VANCOMYCIN TROUGH SERPL-MCNC: 16.8 UG/ML (ref 10–20)
VANCOMYCIN TROUGH SERPL-MCNC: 20.9 UG/ML (ref 10–20)
VANCOMYCIN TROUGH SERPL-MCNC: 22.7 UG/ML (ref 10–20)
VENTILATION MODE VENT: ABNORMAL
VENTRICULAR RATE, ECG03: 105 BPM
VENTRICULAR RATE, ECG03: 109 BPM
VENTRICULAR RATE, ECG03: 110 BPM
VENTRICULAR RATE, ECG03: 111 BPM
VENTRICULAR RATE, ECG03: 113 BPM
VENTRICULAR RATE, ECG03: 140 BPM
VENTRICULAR RATE, ECG03: 154 BPM
VOLUME CONTROL PLUS IVLCP: YES
VT SETTING VENT: 450 ML
VT SETTING VENT: 500 ML
VT SETTING VENT: 600 ML
VT SETTING VENT: 600 ML
WBC # BLD AUTO: 10 K/UL (ref 4.6–13.2)
WBC # BLD AUTO: 10.4 K/UL (ref 4.6–13.2)
WBC # BLD AUTO: 10.6 K/UL (ref 4.6–13.2)
WBC # BLD AUTO: 11.1 K/UL (ref 4.6–13.2)
WBC # BLD AUTO: 11.2 K/UL (ref 4.6–13.2)
WBC # BLD AUTO: 11.3 K/UL (ref 4.6–13.2)
WBC # BLD AUTO: 11.5 K/UL (ref 4.6–13.2)
WBC # BLD AUTO: 12.5 K/UL (ref 4.6–13.2)
WBC # BLD AUTO: 13.7 K/UL (ref 4.6–13.2)
WBC # BLD AUTO: 14.2 K/UL (ref 4.6–13.2)
WBC # BLD AUTO: 14.7 K/UL (ref 4.6–13.2)
WBC # BLD AUTO: 15.1 K/UL (ref 4.6–13.2)
WBC # BLD AUTO: 16.7 K/UL (ref 4.6–13.2)
WBC # BLD AUTO: 16.9 K/UL (ref 4.6–13.2)
WBC # BLD AUTO: 21.5 K/UL (ref 4.6–13.2)
WBC # BLD AUTO: 25.3 K/UL (ref 4.6–13.2)
WBC # BLD AUTO: 28.7 K/UL (ref 4.6–13.2)
WBC # BLD AUTO: 35.9 K/UL (ref 4.6–13.2)
WBC # BLD AUTO: 6.2 K/UL (ref 4.6–13.2)
WBC # BLD AUTO: 6.3 K/UL (ref 4.6–13.2)
WBC # BLD AUTO: 6.4 K/UL (ref 4.6–13.2)
WBC # BLD AUTO: 6.7 K/UL (ref 4.6–13.2)
WBC # BLD AUTO: 6.7 K/UL (ref 4.6–13.2)
WBC # BLD AUTO: 6.9 K/UL (ref 4.6–13.2)
WBC # BLD AUTO: 7 K/UL (ref 4.6–13.2)
WBC # BLD AUTO: 9.6 K/UL (ref 4.6–13.2)
WBC MORPH BLD: ABNORMAL
WBC URNS QL MICRO: ABNORMAL /HPF (ref 0–4)
WBC URNS QL MICRO: NORMAL /HPF (ref 0–4)
YEAST URNS QL MICRO: ABNORMAL

## 2017-01-01 PROCEDURE — 77030018836 HC SOL IRR NACL ICUM -A

## 2017-01-01 PROCEDURE — 74011250637 HC RX REV CODE- 250/637: Performed by: NURSE PRACTITIONER

## 2017-01-01 PROCEDURE — 74011000258 HC RX REV CODE- 258: Performed by: INTERNAL MEDICINE

## 2017-01-01 PROCEDURE — 77030033263 HC DRSG MEPILEX 16-48IN BORD MOLN -B

## 2017-01-01 PROCEDURE — 77030010520

## 2017-01-01 PROCEDURE — 85025 COMPLETE CBC W/AUTO DIFF WBC: CPT | Performed by: HOSPITALIST

## 2017-01-01 PROCEDURE — 74011250636 HC RX REV CODE- 250/636: Performed by: INTERNAL MEDICINE

## 2017-01-01 PROCEDURE — 74011000250 HC RX REV CODE- 250: Performed by: PHYSICIAN ASSISTANT

## 2017-01-01 PROCEDURE — 82962 GLUCOSE BLOOD TEST: CPT

## 2017-01-01 PROCEDURE — 36415 COLL VENOUS BLD VENIPUNCTURE: CPT | Performed by: INTERNAL MEDICINE

## 2017-01-01 PROCEDURE — 90471 IMMUNIZATION ADMIN: CPT

## 2017-01-01 PROCEDURE — 74011000250 HC RX REV CODE- 250

## 2017-01-01 PROCEDURE — 77030011256 HC DRSG MEPILEX <16IN NO BORD MOLN -A

## 2017-01-01 PROCEDURE — 80048 BASIC METABOLIC PNL TOTAL CA: CPT | Performed by: NURSE PRACTITIONER

## 2017-01-01 PROCEDURE — 82140 ASSAY OF AMMONIA: CPT | Performed by: INTERNAL MEDICINE

## 2017-01-01 PROCEDURE — 65660000000 HC RM CCU STEPDOWN

## 2017-01-01 PROCEDURE — 85025 COMPLETE CBC W/AUTO DIFF WBC: CPT | Performed by: INTERNAL MEDICINE

## 2017-01-01 PROCEDURE — 36600 WITHDRAWAL OF ARTERIAL BLOOD: CPT

## 2017-01-01 PROCEDURE — 74011250637 HC RX REV CODE- 250/637: Performed by: PHYSICIAN ASSISTANT

## 2017-01-01 PROCEDURE — 71010 XR CHEST PORT: CPT

## 2017-01-01 PROCEDURE — 77030032435

## 2017-01-01 PROCEDURE — 94640 AIRWAY INHALATION TREATMENT: CPT

## 2017-01-01 PROCEDURE — 83605 ASSAY OF LACTIC ACID: CPT | Performed by: INTERNAL MEDICINE

## 2017-01-01 PROCEDURE — 84443 ASSAY THYROID STIM HORMONE: CPT | Performed by: INTERNAL MEDICINE

## 2017-01-01 PROCEDURE — 86900 BLOOD TYPING SEROLOGIC ABO: CPT | Performed by: PHYSICIAN ASSISTANT

## 2017-01-01 PROCEDURE — 87077 CULTURE AEROBIC IDENTIFY: CPT | Performed by: HOSPITALIST

## 2017-01-01 PROCEDURE — 84100 ASSAY OF PHOSPHORUS: CPT | Performed by: PHYSICIAN ASSISTANT

## 2017-01-01 PROCEDURE — 36415 COLL VENOUS BLD VENIPUNCTURE: CPT | Performed by: NURSE PRACTITIONER

## 2017-01-01 PROCEDURE — 36415 COLL VENOUS BLD VENIPUNCTURE: CPT | Performed by: HOSPITALIST

## 2017-01-01 PROCEDURE — 84484 ASSAY OF TROPONIN QUANT: CPT | Performed by: INTERNAL MEDICINE

## 2017-01-01 PROCEDURE — 77030020365 HC SOL INJ SOD CL 0.9% 50ML

## 2017-01-01 PROCEDURE — 74011000250 HC RX REV CODE- 250: Performed by: INTERNAL MEDICINE

## 2017-01-01 PROCEDURE — 85025 COMPLETE CBC W/AUTO DIFF WBC: CPT | Performed by: NURSE PRACTITIONER

## 2017-01-01 PROCEDURE — 85027 COMPLETE CBC AUTOMATED: CPT | Performed by: HOSPITALIST

## 2017-01-01 PROCEDURE — 96366 THER/PROPH/DIAG IV INF ADDON: CPT

## 2017-01-01 PROCEDURE — 77030018719 HC DRSG PTCH ANTIMIC J&J -A

## 2017-01-01 PROCEDURE — 65610000006 HC RM INTENSIVE CARE

## 2017-01-01 PROCEDURE — 76060000034 HC ANESTHESIA 1.5 TO 2 HR: Performed by: SURGERY

## 2017-01-01 PROCEDURE — 83735 ASSAY OF MAGNESIUM: CPT | Performed by: NURSE PRACTITIONER

## 2017-01-01 PROCEDURE — 74011636637 HC RX REV CODE- 636/637: Performed by: INTERNAL MEDICINE

## 2017-01-01 PROCEDURE — 77030012935 HC DRSG AQUACEL BMS -B

## 2017-01-01 PROCEDURE — 74011250636 HC RX REV CODE- 250/636

## 2017-01-01 PROCEDURE — 74011250637 HC RX REV CODE- 250/637: Performed by: HOSPITALIST

## 2017-01-01 PROCEDURE — 87040 BLOOD CULTURE FOR BACTERIA: CPT | Performed by: EMERGENCY MEDICINE

## 2017-01-01 PROCEDURE — 85610 PROTHROMBIN TIME: CPT | Performed by: PHYSICIAN ASSISTANT

## 2017-01-01 PROCEDURE — 84295 ASSAY OF SERUM SODIUM: CPT | Performed by: INTERNAL MEDICINE

## 2017-01-01 PROCEDURE — 77030022704 HC SUT VLOC COVD -B: Performed by: SURGERY

## 2017-01-01 PROCEDURE — 85610 PROTHROMBIN TIME: CPT | Performed by: INTERNAL MEDICINE

## 2017-01-01 PROCEDURE — 82803 BLOOD GASES ANY COMBINATION: CPT

## 2017-01-01 PROCEDURE — 80053 COMPREHEN METABOLIC PANEL: CPT | Performed by: HOSPITALIST

## 2017-01-01 PROCEDURE — 87493 C DIFF AMPLIFIED PROBE: CPT | Performed by: INTERNAL MEDICINE

## 2017-01-01 PROCEDURE — 36592 COLLECT BLOOD FROM PICC: CPT

## 2017-01-01 PROCEDURE — C9113 INJ PANTOPRAZOLE SODIUM, VIA: HCPCS | Performed by: INTERNAL MEDICINE

## 2017-01-01 PROCEDURE — 77010033678 HC OXYGEN DAILY

## 2017-01-01 PROCEDURE — 97530 THERAPEUTIC ACTIVITIES: CPT

## 2017-01-01 PROCEDURE — 83735 ASSAY OF MAGNESIUM: CPT | Performed by: PHYSICIAN ASSISTANT

## 2017-01-01 PROCEDURE — 77030034850

## 2017-01-01 PROCEDURE — 94003 VENT MGMT INPAT SUBQ DAY: CPT

## 2017-01-01 PROCEDURE — 80048 BASIC METABOLIC PNL TOTAL CA: CPT | Performed by: SURGERY

## 2017-01-01 PROCEDURE — 76010000875 HC OR TIME 1.5 TO 2HR INTENSV - TIER 2: Performed by: SURGERY

## 2017-01-01 PROCEDURE — 74011000258 HC RX REV CODE- 258: Performed by: EMERGENCY MEDICINE

## 2017-01-01 PROCEDURE — 85027 COMPLETE CBC AUTOMATED: CPT | Performed by: INTERNAL MEDICINE

## 2017-01-01 PROCEDURE — 97162 PT EVAL MOD COMPLEX 30 MIN: CPT

## 2017-01-01 PROCEDURE — A9270 NON-COVERED ITEM OR SERVICE: HCPCS | Performed by: EMERGENCY MEDICINE

## 2017-01-01 PROCEDURE — 74011250637 HC RX REV CODE- 250/637: Performed by: INTERNAL MEDICINE

## 2017-01-01 PROCEDURE — 97168 OT RE-EVAL EST PLAN CARE: CPT

## 2017-01-01 PROCEDURE — 74011250636 HC RX REV CODE- 250/636: Performed by: PHYSICIAN ASSISTANT

## 2017-01-01 PROCEDURE — 36415 COLL VENOUS BLD VENIPUNCTURE: CPT | Performed by: ANESTHESIOLOGY

## 2017-01-01 PROCEDURE — 87186 SC STD MICRODIL/AGAR DIL: CPT | Performed by: PHYSICIAN ASSISTANT

## 2017-01-01 PROCEDURE — 85025 COMPLETE CBC W/AUTO DIFF WBC: CPT | Performed by: PHYSICIAN ASSISTANT

## 2017-01-01 PROCEDURE — 84100 ASSAY OF PHOSPHORUS: CPT | Performed by: SURGERY

## 2017-01-01 PROCEDURE — 93005 ELECTROCARDIOGRAM TRACING: CPT

## 2017-01-01 PROCEDURE — 96360 HYDRATION IV INFUSION INIT: CPT

## 2017-01-01 PROCEDURE — 36430 TRANSFUSION BLD/BLD COMPNT: CPT

## 2017-01-01 PROCEDURE — 97110 THERAPEUTIC EXERCISES: CPT

## 2017-01-01 PROCEDURE — 80053 COMPREHEN METABOLIC PANEL: CPT | Performed by: EMERGENCY MEDICINE

## 2017-01-01 PROCEDURE — 83735 ASSAY OF MAGNESIUM: CPT | Performed by: SURGERY

## 2017-01-01 PROCEDURE — 77030020254 HC SOL INJ D5LR LACTATED RINGER

## 2017-01-01 PROCEDURE — 74011250636 HC RX REV CODE- 250/636: Performed by: HOSPITALIST

## 2017-01-01 PROCEDURE — 82140 ASSAY OF AMMONIA: CPT | Performed by: PHYSICIAN ASSISTANT

## 2017-01-01 PROCEDURE — 80076 HEPATIC FUNCTION PANEL: CPT | Performed by: NURSE PRACTITIONER

## 2017-01-01 PROCEDURE — 92526 ORAL FUNCTION THERAPY: CPT

## 2017-01-01 PROCEDURE — 77030008683 HC TU ET CUF COVD -A

## 2017-01-01 PROCEDURE — 77030020250 HC SOL INJ D 5% LFCR 1000ML BG LF

## 2017-01-01 PROCEDURE — 87077 CULTURE AEROBIC IDENTIFY: CPT | Performed by: NURSE PRACTITIONER

## 2017-01-01 PROCEDURE — 90686 IIV4 VACC NO PRSV 0.5 ML IM: CPT | Performed by: FAMILY MEDICINE

## 2017-01-01 PROCEDURE — 83605 ASSAY OF LACTIC ACID: CPT

## 2017-01-01 PROCEDURE — 77030012048 HC BG OST DRN W/F BMS -A: Performed by: SURGERY

## 2017-01-01 PROCEDURE — 77030018846 HC SOL IRR STRL H20 ICUM -A

## 2017-01-01 PROCEDURE — 77030011255 HC DSG AQUACEL AG BMS -A

## 2017-01-01 PROCEDURE — 77030029684 HC NEB SM VOL KT MONA -A

## 2017-01-01 PROCEDURE — 65270000029 HC RM PRIVATE

## 2017-01-01 PROCEDURE — 74011250636 HC RX REV CODE- 250/636: Performed by: FAMILY MEDICINE

## 2017-01-01 PROCEDURE — L4396 STATIC OR DYNAMI AFO PRE CST: HCPCS

## 2017-01-01 PROCEDURE — 51702 INSERT TEMP BLADDER CATH: CPT

## 2017-01-01 PROCEDURE — 94760 N-INVAS EAR/PLS OXIMETRY 1: CPT

## 2017-01-01 PROCEDURE — 77030008683 HC TU ET CUF COVD -A: Performed by: ANESTHESIOLOGY

## 2017-01-01 PROCEDURE — 84484 ASSAY OF TROPONIN QUANT: CPT | Performed by: EMERGENCY MEDICINE

## 2017-01-01 PROCEDURE — 77030035051: Performed by: SURGERY

## 2017-01-01 PROCEDURE — 86920 COMPATIBILITY TEST SPIN: CPT | Performed by: PHYSICIAN ASSISTANT

## 2017-01-01 PROCEDURE — 86921 COMPATIBILITY TEST INCUBATE: CPT | Performed by: ANESTHESIOLOGY

## 2017-01-01 PROCEDURE — 5A1955Z RESPIRATORY VENTILATION, GREATER THAN 96 CONSECUTIVE HOURS: ICD-10-PCS | Performed by: HOSPITALIST

## 2017-01-01 PROCEDURE — 85027 COMPLETE CBC AUTOMATED: CPT | Performed by: NURSE PRACTITIONER

## 2017-01-01 PROCEDURE — 76450000000

## 2017-01-01 PROCEDURE — 83735 ASSAY OF MAGNESIUM: CPT | Performed by: INTERNAL MEDICINE

## 2017-01-01 PROCEDURE — 80048 BASIC METABOLIC PNL TOTAL CA: CPT | Performed by: PHYSICIAN ASSISTANT

## 2017-01-01 PROCEDURE — 74011258636 HC RX REV CODE- 258/636: Performed by: PHYSICIAN ASSISTANT

## 2017-01-01 PROCEDURE — 83690 ASSAY OF LIPASE: CPT | Performed by: NURSE PRACTITIONER

## 2017-01-01 PROCEDURE — 74011250636 HC RX REV CODE- 250/636: Performed by: NURSE PRACTITIONER

## 2017-01-01 PROCEDURE — 80202 ASSAY OF VANCOMYCIN: CPT | Performed by: INTERNAL MEDICINE

## 2017-01-01 PROCEDURE — 77030031139 HC SUT VCRL2 J&J -A: Performed by: SURGERY

## 2017-01-01 PROCEDURE — P9016 RBC LEUKOCYTES REDUCED: HCPCS | Performed by: PHYSICIAN ASSISTANT

## 2017-01-01 PROCEDURE — 74011250637 HC RX REV CODE- 250/637: Performed by: FAMILY MEDICINE

## 2017-01-01 PROCEDURE — 36569 INSJ PICC 5 YR+ W/O IMAGING: CPT | Performed by: PHYSICIAN ASSISTANT

## 2017-01-01 PROCEDURE — 83036 HEMOGLOBIN GLYCOSYLATED A1C: CPT | Performed by: INTERNAL MEDICINE

## 2017-01-01 PROCEDURE — C1751 CATH, INF, PER/CENT/MIDLINE: HCPCS

## 2017-01-01 PROCEDURE — 87186 SC STD MICRODIL/AGAR DIL: CPT | Performed by: NURSE PRACTITIONER

## 2017-01-01 PROCEDURE — 31500 INSERT EMERGENCY AIRWAY: CPT

## 2017-01-01 PROCEDURE — 80048 BASIC METABOLIC PNL TOTAL CA: CPT | Performed by: FAMILY MEDICINE

## 2017-01-01 PROCEDURE — 74011250636 HC RX REV CODE- 250/636: Performed by: EMERGENCY MEDICINE

## 2017-01-01 PROCEDURE — 96365 THER/PROPH/DIAG IV INF INIT: CPT

## 2017-01-01 PROCEDURE — 77030003028 HC SUT VCRL J&J -A: Performed by: SURGERY

## 2017-01-01 PROCEDURE — 85025 COMPLETE CBC W/AUTO DIFF WBC: CPT | Performed by: FAMILY MEDICINE

## 2017-01-01 PROCEDURE — 77030005534 HC CATH URETH FOL TEMP SENS SIMS -B

## 2017-01-01 PROCEDURE — 82550 ASSAY OF CK (CPK): CPT | Performed by: EMERGENCY MEDICINE

## 2017-01-01 PROCEDURE — 87077 CULTURE AEROBIC IDENTIFY: CPT | Performed by: PHYSICIAN ASSISTANT

## 2017-01-01 PROCEDURE — 85018 HEMOGLOBIN: CPT | Performed by: PHYSICIAN ASSISTANT

## 2017-01-01 PROCEDURE — 70450 CT HEAD/BRAIN W/O DYE: CPT

## 2017-01-01 PROCEDURE — 74011636637 HC RX REV CODE- 636/637: Performed by: EMERGENCY MEDICINE

## 2017-01-01 PROCEDURE — 83735 ASSAY OF MAGNESIUM: CPT | Performed by: HOSPITALIST

## 2017-01-01 PROCEDURE — 74011258636 HC RX REV CODE- 258/636: Performed by: INTERNAL MEDICINE

## 2017-01-01 PROCEDURE — 77030020263 HC SOL INJ SOD CL0.9% LFCR 1000ML

## 2017-01-01 PROCEDURE — 36415 COLL VENOUS BLD VENIPUNCTURE: CPT | Performed by: PHYSICIAN ASSISTANT

## 2017-01-01 PROCEDURE — 76937 US GUIDE VASCULAR ACCESS: CPT | Performed by: PHYSICIAN ASSISTANT

## 2017-01-01 PROCEDURE — 84100 ASSAY OF PHOSPHORUS: CPT | Performed by: HOSPITALIST

## 2017-01-01 PROCEDURE — 82330 ASSAY OF CALCIUM: CPT | Performed by: INTERNAL MEDICINE

## 2017-01-01 PROCEDURE — 76210000017 HC OR PH I REC 1.5 TO 2 HR: Performed by: SURGERY

## 2017-01-01 PROCEDURE — 73060 X-RAY EXAM OF HUMERUS: CPT

## 2017-01-01 PROCEDURE — 77030013079 HC BLNKT BAIR HGGR 3M -A

## 2017-01-01 PROCEDURE — 77030020291 HC FLEXSEAL FMS BMS -C

## 2017-01-01 PROCEDURE — 77030008771 HC TU NG SALEM SUMP -A

## 2017-01-01 PROCEDURE — 77030010540 HC BG OST DRN BMS -A

## 2017-01-01 PROCEDURE — 84439 ASSAY OF FREE THYROXINE: CPT | Performed by: INTERNAL MEDICINE

## 2017-01-01 PROCEDURE — 96375 TX/PRO/DX INJ NEW DRUG ADDON: CPT

## 2017-01-01 PROCEDURE — G8988 SELF CARE GOAL STATUS: HCPCS

## 2017-01-01 PROCEDURE — 75810000137 HC PLCMT CENT VENOUS CATH

## 2017-01-01 PROCEDURE — 99285 EMERGENCY DEPT VISIT HI MDM: CPT

## 2017-01-01 PROCEDURE — 77030018842 HC SOL IRR SOD CL 9% BAXT -A: Performed by: SURGERY

## 2017-01-01 PROCEDURE — 96367 TX/PROPH/DG ADDL SEQ IV INF: CPT

## 2017-01-01 PROCEDURE — 77030008768 HC TU NG VYGC -A

## 2017-01-01 PROCEDURE — 84443 ASSAY THYROID STIM HORMONE: CPT | Performed by: FAMILY MEDICINE

## 2017-01-01 PROCEDURE — 77030021678 HC GLIDESCP STAT DISP VERT -B: Performed by: ANESTHESIOLOGY

## 2017-01-01 PROCEDURE — 0D1L4Z4 BYPASS TRANSVERSE COLON TO CUTANEOUS, PERCUTANEOUS ENDOSCOPIC APPROACH: ICD-10-PCS | Performed by: SURGERY

## 2017-01-01 PROCEDURE — 83036 HEMOGLOBIN GLYCOSYLATED A1C: CPT | Performed by: FAMILY MEDICINE

## 2017-01-01 PROCEDURE — 87106 FUNGI IDENTIFICATION YEAST: CPT | Performed by: EMERGENCY MEDICINE

## 2017-01-01 PROCEDURE — 80048 BASIC METABOLIC PNL TOTAL CA: CPT | Performed by: INTERNAL MEDICINE

## 2017-01-01 PROCEDURE — 80307 DRUG TEST PRSMV CHEM ANLYZR: CPT | Performed by: PHYSICIAN ASSISTANT

## 2017-01-01 PROCEDURE — 84100 ASSAY OF PHOSPHORUS: CPT | Performed by: NURSE PRACTITIONER

## 2017-01-01 PROCEDURE — 77030008477 HC STYL SATN SLP COVD -A: Performed by: ANESTHESIOLOGY

## 2017-01-01 PROCEDURE — 77030010507 HC ADH SKN DERMBND J&J -B: Performed by: SURGERY

## 2017-01-01 PROCEDURE — G8987 SELF CARE CURRENT STATUS: HCPCS

## 2017-01-01 PROCEDURE — 92610 EVALUATE SWALLOWING FUNCTION: CPT

## 2017-01-01 PROCEDURE — 87070 CULTURE OTHR SPECIMN AEROBIC: CPT | Performed by: HOSPITALIST

## 2017-01-01 PROCEDURE — 97166 OT EVAL MOD COMPLEX 45 MIN: CPT

## 2017-01-01 PROCEDURE — 77030032523 HC RELD STPL PK ENDORS INTU -C: Performed by: SURGERY

## 2017-01-01 PROCEDURE — 81001 URINALYSIS AUTO W/SCOPE: CPT | Performed by: EMERGENCY MEDICINE

## 2017-01-01 PROCEDURE — G8979 MOBILITY GOAL STATUS: HCPCS

## 2017-01-01 PROCEDURE — 77030035488 HC SEAL UNIV DISP INTU -C: Performed by: SURGERY

## 2017-01-01 PROCEDURE — 86920 COMPATIBILITY TEST SPIN: CPT | Performed by: INTERNAL MEDICINE

## 2017-01-01 PROCEDURE — 80053 COMPREHEN METABOLIC PANEL: CPT | Performed by: SURGERY

## 2017-01-01 PROCEDURE — 85025 COMPLETE CBC W/AUTO DIFF WBC: CPT | Performed by: EMERGENCY MEDICINE

## 2017-01-01 PROCEDURE — 83690 ASSAY OF LIPASE: CPT | Performed by: PHYSICIAN ASSISTANT

## 2017-01-01 PROCEDURE — 84100 ASSAY OF PHOSPHORUS: CPT | Performed by: INTERNAL MEDICINE

## 2017-01-01 PROCEDURE — 77030011251 HC DRSG HYDRCOIL S&N -A

## 2017-01-01 PROCEDURE — 83880 ASSAY OF NATRIURETIC PEPTIDE: CPT | Performed by: NURSE PRACTITIONER

## 2017-01-01 PROCEDURE — 87186 SC STD MICRODIL/AGAR DIL: CPT | Performed by: HOSPITALIST

## 2017-01-01 PROCEDURE — 80053 COMPREHEN METABOLIC PANEL: CPT | Performed by: INTERNAL MEDICINE

## 2017-01-01 PROCEDURE — 0JB70ZZ EXCISION OF BACK SUBCUTANEOUS TISSUE AND FASCIA, OPEN APPROACH: ICD-10-PCS | Performed by: SURGERY

## 2017-01-01 PROCEDURE — 81001 URINALYSIS AUTO W/SCOPE: CPT | Performed by: NURSE PRACTITIONER

## 2017-01-01 PROCEDURE — 02HV33Z INSERTION OF INFUSION DEVICE INTO SUPERIOR VENA CAVA, PERCUTANEOUS APPROACH: ICD-10-PCS | Performed by: HOSPITALIST

## 2017-01-01 PROCEDURE — 97164 PT RE-EVAL EST PLAN CARE: CPT

## 2017-01-01 PROCEDURE — 36415 COLL VENOUS BLD VENIPUNCTURE: CPT | Performed by: FAMILY MEDICINE

## 2017-01-01 PROCEDURE — 30233N1 TRANSFUSION OF NONAUTOLOGOUS RED BLOOD CELLS INTO PERIPHERAL VEIN, PERCUTANEOUS APPROACH: ICD-10-PCS | Performed by: HOSPITALIST

## 2017-01-01 PROCEDURE — 96361 HYDRATE IV INFUSION ADD-ON: CPT

## 2017-01-01 PROCEDURE — 97535 SELF CARE MNGMENT TRAINING: CPT

## 2017-01-01 PROCEDURE — 77030035279 HC SEAL VSL ENDOWR XI INTU -I2: Performed by: SURGERY

## 2017-01-01 PROCEDURE — P9016 RBC LEUKOCYTES REDUCED: HCPCS | Performed by: INTERNAL MEDICINE

## 2017-01-01 PROCEDURE — 80048 BASIC METABOLIC PNL TOTAL CA: CPT | Performed by: HOSPITALIST

## 2017-01-01 PROCEDURE — 94002 VENT MGMT INPAT INIT DAY: CPT

## 2017-01-01 PROCEDURE — 85027 COMPLETE CBC AUTOMATED: CPT | Performed by: PHYSICIAN ASSISTANT

## 2017-01-01 PROCEDURE — 99218 HC RM OBSERVATION: CPT

## 2017-01-01 PROCEDURE — 80047 BASIC METABLC PNL IONIZED CA: CPT

## 2017-01-01 PROCEDURE — 77030027491 HC SHR ENDOSC COVD -B: Performed by: SURGERY

## 2017-01-01 PROCEDURE — 84132 ASSAY OF SERUM POTASSIUM: CPT | Performed by: PHYSICIAN ASSISTANT

## 2017-01-01 PROCEDURE — P9047 ALBUMIN (HUMAN), 25%, 50ML: HCPCS | Performed by: PHYSICIAN ASSISTANT

## 2017-01-01 PROCEDURE — 86870 RBC ANTIBODY IDENTIFICATION: CPT | Performed by: ANESTHESIOLOGY

## 2017-01-01 PROCEDURE — 86900 BLOOD TYPING SEROLOGIC ABO: CPT | Performed by: INTERNAL MEDICINE

## 2017-01-01 PROCEDURE — 87040 BLOOD CULTURE FOR BACTERIA: CPT | Performed by: NURSE PRACTITIONER

## 2017-01-01 PROCEDURE — 77030011894 HC TY FOL URIMTR BARD -B: Performed by: SURGERY

## 2017-01-01 PROCEDURE — 77030020262 HC SOL INJ SOD CL 0.9% 100ML

## 2017-01-01 PROCEDURE — 87086 URINE CULTURE/COLONY COUNT: CPT | Performed by: EMERGENCY MEDICINE

## 2017-01-01 PROCEDURE — 77030032490 HC SLV COMPR SCD KNE COVD -B: Performed by: SURGERY

## 2017-01-01 PROCEDURE — 84145 PROCALCITONIN (PCT): CPT | Performed by: INTERNAL MEDICINE

## 2017-01-01 PROCEDURE — 77030035277 HC OBTRTR BLDELSS DISP INTU -B: Performed by: SURGERY

## 2017-01-01 PROCEDURE — 86900 BLOOD TYPING SEROLOGIC ABO: CPT | Performed by: ANESTHESIOLOGY

## 2017-01-01 PROCEDURE — 77030018870 HC TY PARCNT BD -B

## 2017-01-01 PROCEDURE — 84132 ASSAY OF SERUM POTASSIUM: CPT | Performed by: INTERNAL MEDICINE

## 2017-01-01 PROCEDURE — 85025 COMPLETE CBC W/AUTO DIFF WBC: CPT | Performed by: SURGERY

## 2017-01-01 PROCEDURE — 86922 COMPATIBILITY TEST ANTIGLOB: CPT | Performed by: ANESTHESIOLOGY

## 2017-01-01 PROCEDURE — 82533 TOTAL CORTISOL: CPT | Performed by: PHYSICIAN ASSISTANT

## 2017-01-01 PROCEDURE — 87070 CULTURE OTHR SPECIMN AEROBIC: CPT | Performed by: PHYSICIAN ASSISTANT

## 2017-01-01 PROCEDURE — 88305 TISSUE EXAM BY PATHOLOGIST: CPT | Performed by: SURGERY

## 2017-01-01 PROCEDURE — 36591 DRAW BLOOD OFF VENOUS DEVICE: CPT

## 2017-01-01 PROCEDURE — 82550 ASSAY OF CK (CPK): CPT | Performed by: NURSE PRACTITIONER

## 2017-01-01 PROCEDURE — 93306 TTE W/DOPPLER COMPLETE: CPT

## 2017-01-01 PROCEDURE — 74011000250 HC RX REV CODE- 250: Performed by: EMERGENCY MEDICINE

## 2017-01-01 PROCEDURE — 96368 THER/DIAG CONCURRENT INF: CPT

## 2017-01-01 PROCEDURE — 77030032522 HC SHT STPL PK ENDOWR INTU -B: Performed by: SURGERY

## 2017-01-01 PROCEDURE — 0BH17EZ INSERTION OF ENDOTRACHEAL AIRWAY INTO TRACHEA, VIA NATURAL OR ARTIFICIAL OPENING: ICD-10-PCS | Performed by: HOSPITALIST

## 2017-01-01 PROCEDURE — 83880 ASSAY OF NATRIURETIC PEPTIDE: CPT | Performed by: INTERNAL MEDICINE

## 2017-01-01 PROCEDURE — 80202 ASSAY OF VANCOMYCIN: CPT | Performed by: EMERGENCY MEDICINE

## 2017-01-01 PROCEDURE — 87070 CULTURE OTHR SPECIMN AEROBIC: CPT | Performed by: NURSE PRACTITIONER

## 2017-01-01 PROCEDURE — 74011000258 HC RX REV CODE- 258: Performed by: HOSPITALIST

## 2017-01-01 PROCEDURE — 82140 ASSAY OF AMMONIA: CPT | Performed by: EMERGENCY MEDICINE

## 2017-01-01 PROCEDURE — 82150 ASSAY OF AMYLASE: CPT | Performed by: PHYSICIAN ASSISTANT

## 2017-01-01 PROCEDURE — 82330 ASSAY OF CALCIUM: CPT | Performed by: HOSPITALIST

## 2017-01-01 PROCEDURE — 82550 ASSAY OF CK (CPK): CPT | Performed by: FAMILY MEDICINE

## 2017-01-01 PROCEDURE — 77030020249 HC SOL INJ D 5% 250ML BG LF

## 2017-01-01 PROCEDURE — 80307 DRUG TEST PRSMV CHEM ANLYZR: CPT | Performed by: NURSE PRACTITIONER

## 2017-01-01 PROCEDURE — 86920 COMPATIBILITY TEST SPIN: CPT | Performed by: ANESTHESIOLOGY

## 2017-01-01 PROCEDURE — 77030011640 HC PAD GRND REM COVD -A: Performed by: SURGERY

## 2017-01-01 PROCEDURE — G0008 ADMIN INFLUENZA VIRUS VAC: HCPCS

## 2017-01-01 PROCEDURE — 77030032490 HC SLV COMPR SCD KNE COVD -B

## 2017-01-01 PROCEDURE — 82550 ASSAY OF CK (CPK): CPT | Performed by: HOSPITALIST

## 2017-01-01 PROCEDURE — 87086 URINE CULTURE/COLONY COUNT: CPT | Performed by: NURSE PRACTITIONER

## 2017-01-01 PROCEDURE — 84132 ASSAY OF SERUM POTASSIUM: CPT | Performed by: HOSPITALIST

## 2017-01-01 PROCEDURE — G8978 MOBILITY CURRENT STATUS: HCPCS

## 2017-01-01 RX ORDER — GLYCOPYRROLATE 0.2 MG/ML
INJECTION INTRAMUSCULAR; INTRAVENOUS AS NEEDED
Status: DISCONTINUED | OUTPATIENT
Start: 2017-01-01 | End: 2017-01-01 | Stop reason: HOSPADM

## 2017-01-01 RX ORDER — SODIUM CHLORIDE 0.9 % (FLUSH) 0.9 %
10-40 SYRINGE (ML) INJECTION EVERY 8 HOURS
Status: DISCONTINUED | OUTPATIENT
Start: 2017-01-01 | End: 2017-01-01 | Stop reason: HOSPADM

## 2017-01-01 RX ORDER — METOPROLOL TARTRATE 25 MG/1
12.5 TABLET, FILM COATED ORAL ONCE
Status: COMPLETED | OUTPATIENT
Start: 2017-01-01 | End: 2017-01-01

## 2017-01-01 RX ORDER — NOREPINEPHRINE BITARTRATE 1 MG/ML
INJECTION, SOLUTION INTRAVENOUS
Status: DISPENSED
Start: 2017-01-01 | End: 2017-01-01

## 2017-01-01 RX ORDER — SODIUM CHLORIDE, SODIUM LACTATE, POTASSIUM CHLORIDE, CALCIUM CHLORIDE 600; 310; 30; 20 MG/100ML; MG/100ML; MG/100ML; MG/100ML
INJECTION, SOLUTION INTRAVENOUS
Status: DISCONTINUED | OUTPATIENT
Start: 2017-01-01 | End: 2017-01-01 | Stop reason: HOSPADM

## 2017-01-01 RX ORDER — ROCURONIUM BROMIDE 10 MG/ML
INJECTION, SOLUTION INTRAVENOUS
Status: DISPENSED
Start: 2017-01-01 | End: 2017-01-01

## 2017-01-01 RX ORDER — ONDANSETRON 2 MG/ML
4 INJECTION INTRAMUSCULAR; INTRAVENOUS ONCE
Status: DISCONTINUED | OUTPATIENT
Start: 2017-01-01 | End: 2017-01-01 | Stop reason: HOSPADM

## 2017-01-01 RX ORDER — DEXTROSE MONOHYDRATE 50 MG/ML
200 INJECTION, SOLUTION INTRAVENOUS CONTINUOUS
Status: DISCONTINUED | OUTPATIENT
Start: 2017-01-01 | End: 2017-01-01

## 2017-01-01 RX ORDER — METOPROLOL TARTRATE 25 MG/1
25 TABLET, FILM COATED ORAL EVERY 12 HOURS
Status: DISCONTINUED | OUTPATIENT
Start: 2017-01-01 | End: 2017-01-01

## 2017-01-01 RX ORDER — SODIUM CHLORIDE 0.9 % (FLUSH) 0.9 %
5-10 SYRINGE (ML) INJECTION AS NEEDED
Status: DISCONTINUED | OUTPATIENT
Start: 2017-01-01 | End: 2017-01-01 | Stop reason: HOSPADM

## 2017-01-01 RX ORDER — ROCURONIUM BROMIDE 10 MG/ML
INJECTION, SOLUTION INTRAVENOUS AS NEEDED
Status: DISCONTINUED | OUTPATIENT
Start: 2017-01-01 | End: 2017-01-01 | Stop reason: HOSPADM

## 2017-01-01 RX ORDER — METOPROLOL TARTRATE 25 MG/1
12.5 TABLET, FILM COATED ORAL 2 TIMES DAILY
Status: DISCONTINUED | OUTPATIENT
Start: 2017-01-01 | End: 2017-01-01

## 2017-01-01 RX ORDER — ACETAMINOPHEN 325 MG/1
650 TABLET ORAL
Status: COMPLETED | OUTPATIENT
Start: 2017-01-01 | End: 2017-01-01

## 2017-01-01 RX ORDER — SODIUM CHLORIDE 9 MG/ML
250 INJECTION, SOLUTION INTRAVENOUS AS NEEDED
Status: DISCONTINUED | OUTPATIENT
Start: 2017-01-01 | End: 2017-01-01 | Stop reason: HOSPADM

## 2017-01-01 RX ORDER — POTASSIUM CHLORIDE 7.45 MG/ML
10 INJECTION INTRAVENOUS ONCE
Status: COMPLETED | OUTPATIENT
Start: 2017-01-01 | End: 2017-01-01

## 2017-01-01 RX ORDER — TRAMADOL HYDROCHLORIDE 50 MG/1
50 TABLET ORAL
Status: DISCONTINUED | OUTPATIENT
Start: 2017-01-01 | End: 2017-01-01 | Stop reason: HOSPADM

## 2017-01-01 RX ORDER — ENOXAPARIN SODIUM 100 MG/ML
40 INJECTION SUBCUTANEOUS EVERY 24 HOURS
Status: DISCONTINUED | OUTPATIENT
Start: 2017-01-01 | End: 2017-01-01 | Stop reason: HOSPADM

## 2017-01-01 RX ORDER — GUAIFENESIN 100 MG/5ML
81 LIQUID (ML) ORAL DAILY
Status: DISCONTINUED | OUTPATIENT
Start: 2017-01-01 | End: 2017-01-01 | Stop reason: HOSPADM

## 2017-01-01 RX ORDER — PROPOFOL 10 MG/ML
INJECTION, EMULSION INTRAVENOUS AS NEEDED
Status: DISCONTINUED | OUTPATIENT
Start: 2017-01-01 | End: 2017-01-01 | Stop reason: HOSPADM

## 2017-01-01 RX ORDER — PROPOFOL 10 MG/ML
150 INJECTION, EMULSION INTRAVENOUS
Status: DISPENSED | OUTPATIENT
Start: 2017-01-01 | End: 2017-01-01

## 2017-01-01 RX ORDER — THERA TABS 400 MCG
1 TAB ORAL DAILY
Status: DISCONTINUED | OUTPATIENT
Start: 2017-01-01 | End: 2017-01-01 | Stop reason: HOSPADM

## 2017-01-01 RX ORDER — IPRATROPIUM BROMIDE AND ALBUTEROL SULFATE 2.5; .5 MG/3ML; MG/3ML
3 SOLUTION RESPIRATORY (INHALATION)
Status: DISCONTINUED | OUTPATIENT
Start: 2017-01-01 | End: 2017-01-01 | Stop reason: HOSPADM

## 2017-01-01 RX ORDER — POTASSIUM CHLORIDE 1.5 G/1.77G
10 POWDER, FOR SOLUTION ORAL
Status: COMPLETED | OUTPATIENT
Start: 2017-01-01 | End: 2017-01-01

## 2017-01-01 RX ORDER — POTASSIUM CHLORIDE 7.45 MG/ML
10 INJECTION INTRAVENOUS
Status: COMPLETED | OUTPATIENT
Start: 2017-01-01 | End: 2017-01-01

## 2017-01-01 RX ORDER — DEXTROSE AND POTASSIUM CHLORIDE 5; .15 G/100ML; G/100ML
SOLUTION INTRAVENOUS CONTINUOUS
Status: DISCONTINUED | OUTPATIENT
Start: 2017-01-01 | End: 2017-01-01

## 2017-01-01 RX ORDER — INSULIN LISPRO 100 [IU]/ML
INJECTION, SOLUTION INTRAVENOUS; SUBCUTANEOUS EVERY 6 HOURS
Status: DISCONTINUED | OUTPATIENT
Start: 2017-01-01 | End: 2017-01-01

## 2017-01-01 RX ORDER — MAGNESIUM SULFATE HEPTAHYDRATE 40 MG/ML
2 INJECTION, SOLUTION INTRAVENOUS ONCE
Status: COMPLETED | OUTPATIENT
Start: 2017-01-01 | End: 2017-01-01

## 2017-01-01 RX ORDER — ROCURONIUM BROMIDE 10 MG/ML
0.6 INJECTION, SOLUTION INTRAVENOUS
Status: DISPENSED | OUTPATIENT
Start: 2017-01-01 | End: 2017-01-01

## 2017-01-01 RX ORDER — FENTANYL CITRATE 50 UG/ML
25 INJECTION, SOLUTION INTRAMUSCULAR; INTRAVENOUS
Status: DISCONTINUED | OUTPATIENT
Start: 2017-01-01 | End: 2017-01-01 | Stop reason: HOSPADM

## 2017-01-01 RX ORDER — SODIUM CHLORIDE 0.9 % (FLUSH) 0.9 %
10 SYRINGE (ML) INJECTION EVERY 24 HOURS
Status: DISCONTINUED | OUTPATIENT
Start: 2017-01-01 | End: 2017-01-01 | Stop reason: HOSPADM

## 2017-01-01 RX ORDER — SODIUM CHLORIDE 9 MG/ML
200 INJECTION, SOLUTION INTRAVENOUS CONTINUOUS
Status: DISCONTINUED | OUTPATIENT
Start: 2017-01-01 | End: 2017-01-01

## 2017-01-01 RX ORDER — ERGOCALCIFEROL 1.25 MG/1
50000 CAPSULE ORAL
COMMUNITY
End: 2017-01-01

## 2017-01-01 RX ORDER — SODIUM CHLORIDE 450 MG/100ML
150 INJECTION, SOLUTION INTRAVENOUS CONTINUOUS
Status: DISCONTINUED | OUTPATIENT
Start: 2017-01-01 | End: 2017-01-01 | Stop reason: HOSPADM

## 2017-01-01 RX ORDER — CHLORHEXIDINE GLUCONATE 1.2 MG/ML
10 RINSE ORAL EVERY 12 HOURS
Status: DISCONTINUED | OUTPATIENT
Start: 2017-01-01 | End: 2017-01-01

## 2017-01-01 RX ORDER — ENOXAPARIN SODIUM 100 MG/ML
INJECTION SUBCUTANEOUS
Status: COMPLETED
Start: 2017-01-01 | End: 2017-01-01

## 2017-01-01 RX ORDER — INSULIN LISPRO 100 [IU]/ML
INJECTION, SOLUTION INTRAVENOUS; SUBCUTANEOUS
Status: DISCONTINUED | OUTPATIENT
Start: 2017-01-01 | End: 2017-01-01 | Stop reason: HOSPADM

## 2017-01-01 RX ORDER — HYDROCHLOROTHIAZIDE 25 MG/1
25 TABLET ORAL DAILY
Status: DISCONTINUED | OUTPATIENT
Start: 2017-01-01 | End: 2017-01-01 | Stop reason: HOSPADM

## 2017-01-01 RX ORDER — NEOSTIGMINE METHYLSULFATE 5 MG/5 ML
SYRINGE (ML) INTRAVENOUS AS NEEDED
Status: DISCONTINUED | OUTPATIENT
Start: 2017-01-01 | End: 2017-01-01 | Stop reason: HOSPADM

## 2017-01-01 RX ORDER — GLIMEPIRIDE 2 MG/1
2 TABLET ORAL
COMMUNITY
End: 2017-01-01

## 2017-01-01 RX ORDER — MAGNESIUM SULFATE 100 %
4 CRYSTALS MISCELLANEOUS AS NEEDED
Status: DISCONTINUED | OUTPATIENT
Start: 2017-01-01 | End: 2017-01-01 | Stop reason: HOSPADM

## 2017-01-01 RX ORDER — TRAMADOL HYDROCHLORIDE 50 MG/1
50 TABLET ORAL
COMMUNITY
End: 2017-01-01

## 2017-01-01 RX ORDER — FLUCONAZOLE 2 MG/ML
200 INJECTION, SOLUTION INTRAVENOUS EVERY 24 HOURS
Status: DISCONTINUED | OUTPATIENT
Start: 2017-01-01 | End: 2017-01-01

## 2017-01-01 RX ORDER — PROPOFOL 10 MG/ML
0-50 VIAL (ML) INTRAVENOUS
Status: DISCONTINUED | OUTPATIENT
Start: 2017-01-01 | End: 2017-01-01

## 2017-01-01 RX ORDER — MAGNESIUM SULFATE 1 G/100ML
1 INJECTION INTRAVENOUS ONCE
Status: COMPLETED | OUTPATIENT
Start: 2017-01-01 | End: 2017-01-01

## 2017-01-01 RX ORDER — NEBIVOLOL 5 MG/1
5 TABLET ORAL DAILY
COMMUNITY
End: 2017-01-01

## 2017-01-01 RX ORDER — ESMOLOL HYDROCHLORIDE 10 MG/ML
INJECTION INTRAVENOUS AS NEEDED
Status: DISCONTINUED | OUTPATIENT
Start: 2017-01-01 | End: 2017-01-01 | Stop reason: HOSPADM

## 2017-01-01 RX ORDER — DEXTROSE, SODIUM CHLORIDE, SODIUM LACTATE, POTASSIUM CHLORIDE, AND CALCIUM CHLORIDE 5; .6; .31; .03; .02 G/100ML; G/100ML; G/100ML; G/100ML; G/100ML
75 INJECTION, SOLUTION INTRAVENOUS CONTINUOUS
Status: DISCONTINUED | OUTPATIENT
Start: 2017-01-01 | End: 2017-01-01

## 2017-01-01 RX ORDER — POTASSIUM CHLORIDE 7.45 MG/ML
INJECTION INTRAVENOUS
Status: COMPLETED
Start: 2017-01-01 | End: 2017-01-01

## 2017-01-01 RX ORDER — LEVOFLOXACIN 5 MG/ML
750 INJECTION, SOLUTION INTRAVENOUS EVERY 24 HOURS
Status: DISCONTINUED | OUTPATIENT
Start: 2017-01-01 | End: 2017-01-01

## 2017-01-01 RX ORDER — METOPROLOL TARTRATE 50 MG/1
50 TABLET ORAL EVERY 12 HOURS
Status: DISCONTINUED | OUTPATIENT
Start: 2017-01-01 | End: 2017-01-01

## 2017-01-01 RX ORDER — DEXTROSE, SODIUM CHLORIDE, SODIUM LACTATE, POTASSIUM CHLORIDE, AND CALCIUM CHLORIDE 5; .6; .31; .03; .02 G/100ML; G/100ML; G/100ML; G/100ML; G/100ML
100 INJECTION, SOLUTION INTRAVENOUS CONTINUOUS
Status: DISCONTINUED | OUTPATIENT
Start: 2017-01-01 | End: 2017-01-01

## 2017-01-01 RX ORDER — DEXTROSE MONOHYDRATE AND SODIUM CHLORIDE 5; .9 G/100ML; G/100ML
25 INJECTION, SOLUTION INTRAVENOUS CONTINUOUS
Status: DISCONTINUED | OUTPATIENT
Start: 2017-01-01 | End: 2017-01-01 | Stop reason: HOSPADM

## 2017-01-01 RX ORDER — ACETAMINOPHEN 325 MG/1
650 TABLET ORAL
Status: DISCONTINUED | OUTPATIENT
Start: 2017-01-01 | End: 2017-01-01 | Stop reason: HOSPADM

## 2017-01-01 RX ORDER — DEXTROSE 50 % IN WATER (D50W) INTRAVENOUS SYRINGE
25-50 AS NEEDED
Status: DISCONTINUED | OUTPATIENT
Start: 2017-01-01 | End: 2017-01-01 | Stop reason: HOSPADM

## 2017-01-01 RX ORDER — ENOXAPARIN SODIUM 100 MG/ML
40 INJECTION SUBCUTANEOUS EVERY 12 HOURS
Status: DISCONTINUED | OUTPATIENT
Start: 2017-01-01 | End: 2017-01-01 | Stop reason: HOSPADM

## 2017-01-01 RX ORDER — ALBUMIN HUMAN 250 G/1000ML
25 SOLUTION INTRAVENOUS EVERY 6 HOURS
Status: COMPLETED | OUTPATIENT
Start: 2017-01-01 | End: 2017-01-01

## 2017-01-01 RX ORDER — CHLORHEXIDINE GLUCONATE 1.2 MG/ML
10 RINSE ORAL ONCE
Status: DISCONTINUED | OUTPATIENT
Start: 2017-01-01 | End: 2017-01-01

## 2017-01-01 RX ORDER — ONDANSETRON 2 MG/ML
4 INJECTION INTRAMUSCULAR; INTRAVENOUS
Status: DISCONTINUED | OUTPATIENT
Start: 2017-01-01 | End: 2017-01-01 | Stop reason: HOSPADM

## 2017-01-01 RX ORDER — POTASSIUM CHLORIDE 1.5 G/1.77G
40 POWDER, FOR SOLUTION ORAL
Status: COMPLETED | OUTPATIENT
Start: 2017-01-01 | End: 2017-01-01

## 2017-01-01 RX ORDER — ESCITALOPRAM OXALATE 20 MG/1
20 TABLET ORAL DAILY
Qty: 30 TAB | Refills: 5 | Status: SHIPPED | OUTPATIENT
Start: 2017-01-01

## 2017-01-01 RX ORDER — THERA TABS 400 MCG
1 TAB ORAL DAILY
Qty: 30 TAB | Refills: 5 | Status: SHIPPED | OUTPATIENT
Start: 2017-01-01

## 2017-01-01 RX ORDER — SODIUM CHLORIDE 0.9 % (FLUSH) 0.9 %
10-30 SYRINGE (ML) INJECTION AS NEEDED
Status: DISCONTINUED | OUTPATIENT
Start: 2017-01-01 | End: 2017-01-01 | Stop reason: HOSPADM

## 2017-01-01 RX ORDER — METOPROLOL TARTRATE 25 MG/1
12.5 TABLET, FILM COATED ORAL EVERY 12 HOURS
Status: DISCONTINUED | OUTPATIENT
Start: 2017-01-01 | End: 2017-01-01

## 2017-01-01 RX ORDER — MAGNESIUM SULFATE 100 %
16 CRYSTALS MISCELLANEOUS AS NEEDED
Status: DISCONTINUED | OUTPATIENT
Start: 2017-01-01 | End: 2017-01-01 | Stop reason: HOSPADM

## 2017-01-01 RX ORDER — DEXTROSE MONOHYDRATE 50 MG/ML
75 INJECTION, SOLUTION INTRAVENOUS CONTINUOUS
Status: DISCONTINUED | OUTPATIENT
Start: 2017-01-01 | End: 2017-01-01

## 2017-01-01 RX ORDER — GUAIFENESIN 100 MG/5ML
81 LIQUID (ML) ORAL DAILY
Qty: 100 TAB | Refills: 5 | Status: SHIPPED | OUTPATIENT
Start: 2017-01-01

## 2017-01-01 RX ORDER — MORPHINE SULFATE 2 MG/ML
2 INJECTION, SOLUTION INTRAMUSCULAR; INTRAVENOUS
Status: DISCONTINUED | OUTPATIENT
Start: 2017-01-01 | End: 2017-01-01 | Stop reason: HOSPADM

## 2017-01-01 RX ORDER — SUCCINYLCHOLINE CHLORIDE 20 MG/ML
INJECTION INTRAMUSCULAR; INTRAVENOUS AS NEEDED
Status: DISCONTINUED | OUTPATIENT
Start: 2017-01-01 | End: 2017-01-01 | Stop reason: HOSPADM

## 2017-01-01 RX ORDER — FUROSEMIDE 20 MG/1
20 TABLET ORAL DAILY
COMMUNITY
End: 2017-01-01

## 2017-01-01 RX ORDER — FENTANYL CITRATE 50 UG/ML
INJECTION, SOLUTION INTRAMUSCULAR; INTRAVENOUS AS NEEDED
Status: DISCONTINUED | OUTPATIENT
Start: 2017-01-01 | End: 2017-01-01 | Stop reason: HOSPADM

## 2017-01-01 RX ORDER — METOPROLOL TARTRATE 5 MG/5ML
INJECTION INTRAVENOUS AS NEEDED
Status: DISCONTINUED | OUTPATIENT
Start: 2017-01-01 | End: 2017-01-01 | Stop reason: HOSPADM

## 2017-01-01 RX ORDER — HYDROCHLOROTHIAZIDE 25 MG/1
25 TABLET ORAL DAILY
COMMUNITY
End: 2017-01-01

## 2017-01-01 RX ORDER — PANTOPRAZOLE SODIUM 40 MG/1
40 TABLET, DELAYED RELEASE ORAL DAILY
Status: DISCONTINUED | OUTPATIENT
Start: 2017-01-01 | End: 2017-01-01 | Stop reason: HOSPADM

## 2017-01-01 RX ORDER — PANTOPRAZOLE SODIUM 40 MG/1
40 TABLET, DELAYED RELEASE ORAL DAILY
Qty: 30 TAB | Refills: 5 | Status: SHIPPED | OUTPATIENT
Start: 2017-01-01

## 2017-01-01 RX ORDER — POTASSIUM CHLORIDE 7.45 MG/ML
10 INJECTION INTRAVENOUS
Status: DISCONTINUED | OUTPATIENT
Start: 2017-01-01 | End: 2017-01-01

## 2017-01-01 RX ORDER — ENOXAPARIN SODIUM 100 MG/ML
40 INJECTION SUBCUTANEOUS EVERY 12 HOURS
Qty: 30 SYRINGE | Refills: 5 | Status: SHIPPED | OUTPATIENT
Start: 2017-01-01

## 2017-01-01 RX ORDER — METOPROLOL TARTRATE 75 MG/1
75 TABLET, FILM COATED ORAL EVERY 12 HOURS
Qty: 60 TAB | Refills: 5 | Status: SHIPPED | OUTPATIENT
Start: 2017-01-01

## 2017-01-01 RX ORDER — SODIUM CHLORIDE 0.9 % (FLUSH) 0.9 %
10 SYRINGE (ML) INJECTION AS NEEDED
Status: DISCONTINUED | OUTPATIENT
Start: 2017-01-01 | End: 2017-01-01 | Stop reason: HOSPADM

## 2017-01-01 RX ORDER — ESCITALOPRAM OXALATE 10 MG/1
10 TABLET ORAL DAILY
Status: DISCONTINUED | OUTPATIENT
Start: 2017-01-01 | End: 2017-01-01

## 2017-01-01 RX ORDER — SODIUM CHLORIDE, SODIUM LACTATE, POTASSIUM CHLORIDE, CALCIUM CHLORIDE 600; 310; 30; 20 MG/100ML; MG/100ML; MG/100ML; MG/100ML
50 INJECTION, SOLUTION INTRAVENOUS CONTINUOUS
Status: DISCONTINUED | OUTPATIENT
Start: 2017-01-01 | End: 2017-01-01 | Stop reason: HOSPADM

## 2017-01-01 RX ORDER — POTASSIUM CHLORIDE 20 MEQ/1
40 TABLET, EXTENDED RELEASE ORAL
Status: COMPLETED | OUTPATIENT
Start: 2017-01-01 | End: 2017-01-01

## 2017-01-01 RX ORDER — FENTANYL CITRATE 50 UG/ML
50 INJECTION, SOLUTION INTRAMUSCULAR; INTRAVENOUS
Status: DISCONTINUED | OUTPATIENT
Start: 2017-01-01 | End: 2017-01-01 | Stop reason: HOSPADM

## 2017-01-01 RX ORDER — HYDROMORPHONE HYDROCHLORIDE 1 MG/ML
INJECTION, SOLUTION INTRAMUSCULAR; INTRAVENOUS; SUBCUTANEOUS AS NEEDED
Status: DISCONTINUED | OUTPATIENT
Start: 2017-01-01 | End: 2017-01-01 | Stop reason: HOSPADM

## 2017-01-01 RX ORDER — CEPHALEXIN 500 MG/1
500 CAPSULE ORAL 4 TIMES DAILY
Qty: 28 CAP | Refills: 0 | Status: SHIPPED | OUTPATIENT
Start: 2017-01-01 | End: 2017-01-01

## 2017-01-01 RX ORDER — INSULIN LISPRO 100 [IU]/ML
INJECTION, SOLUTION INTRAVENOUS; SUBCUTANEOUS
Qty: 1 VIAL | Refills: 5 | Status: SHIPPED | OUTPATIENT
Start: 2017-01-01

## 2017-01-01 RX ORDER — INSULIN LISPRO 100 [IU]/ML
INJECTION, SOLUTION INTRAVENOUS; SUBCUTANEOUS ONCE
Status: DISCONTINUED | OUTPATIENT
Start: 2017-01-01 | End: 2017-01-01 | Stop reason: HOSPADM

## 2017-01-01 RX ORDER — VASOPRESSIN 20 U/ML
INJECTION PARENTERAL
Status: DISPENSED
Start: 2017-01-01 | End: 2017-01-01

## 2017-01-01 RX ORDER — ONDANSETRON 2 MG/ML
INJECTION INTRAMUSCULAR; INTRAVENOUS AS NEEDED
Status: DISCONTINUED | OUTPATIENT
Start: 2017-01-01 | End: 2017-01-01 | Stop reason: HOSPADM

## 2017-01-01 RX ORDER — BACITRACIN 500 UNIT/G
1 PACKET (EA) TOPICAL AS NEEDED
Status: DISCONTINUED | OUTPATIENT
Start: 2017-01-01 | End: 2017-01-01 | Stop reason: HOSPADM

## 2017-01-01 RX ORDER — MIDAZOLAM HYDROCHLORIDE 1 MG/ML
2 INJECTION, SOLUTION INTRAMUSCULAR; INTRAVENOUS
Status: DISCONTINUED | OUTPATIENT
Start: 2017-01-01 | End: 2017-01-01

## 2017-01-01 RX ORDER — LANOLIN ALCOHOL/MO/W.PET/CERES
400 CREAM (GRAM) TOPICAL ONCE
Status: COMPLETED | OUTPATIENT
Start: 2017-01-01 | End: 2017-01-01

## 2017-01-01 RX ORDER — SODIUM BICARBONATE 84 MG/ML
50 INJECTION, SOLUTION INTRAVENOUS
Status: COMPLETED | OUTPATIENT
Start: 2017-01-01 | End: 2017-01-01

## 2017-01-01 RX ORDER — LORAZEPAM 2 MG/ML
1 INJECTION INTRAMUSCULAR 2 TIMES DAILY
Status: DISCONTINUED | OUTPATIENT
Start: 2017-01-01 | End: 2017-01-01 | Stop reason: HOSPADM

## 2017-01-01 RX ORDER — LIDOCAINE HYDROCHLORIDE 20 MG/ML
INJECTION, SOLUTION EPIDURAL; INFILTRATION; INTRACAUDAL; PERINEURAL AS NEEDED
Status: DISCONTINUED | OUTPATIENT
Start: 2017-01-01 | End: 2017-01-01 | Stop reason: HOSPADM

## 2017-01-01 RX ORDER — OXYCODONE AND ACETAMINOPHEN 5; 325 MG/1; MG/1
1 TABLET ORAL
Status: DISCONTINUED | OUTPATIENT
Start: 2017-01-01 | End: 2017-01-01 | Stop reason: HOSPADM

## 2017-01-01 RX ORDER — ESCITALOPRAM OXALATE 10 MG/1
20 TABLET ORAL DAILY
Status: DISCONTINUED | OUTPATIENT
Start: 2017-01-01 | End: 2017-01-01 | Stop reason: HOSPADM

## 2017-01-01 RX ORDER — HEPARIN SODIUM 5000 [USP'U]/ML
5000 INJECTION, SOLUTION INTRAVENOUS; SUBCUTANEOUS EVERY 8 HOURS
Status: DISCONTINUED | OUTPATIENT
Start: 2017-01-01 | End: 2017-01-01

## 2017-01-01 RX ORDER — SODIUM CHLORIDE 9 MG/ML
250 INJECTION, SOLUTION INTRAVENOUS AS NEEDED
Status: DISCONTINUED | OUTPATIENT
Start: 2017-01-01 | End: 2017-01-01

## 2017-01-01 RX ORDER — PROPOFOL 10 MG/ML
INJECTION, EMULSION INTRAVENOUS
Status: DISPENSED
Start: 2017-01-01 | End: 2017-01-01

## 2017-01-01 RX ORDER — NEBIVOLOL 5 MG/1
5 TABLET ORAL DAILY
Status: DISCONTINUED | OUTPATIENT
Start: 2017-01-01 | End: 2017-01-01 | Stop reason: HOSPADM

## 2017-01-01 RX ORDER — DEXTROSE 50 % IN WATER (D50W) INTRAVENOUS SYRINGE
25
Status: COMPLETED | OUTPATIENT
Start: 2017-01-01 | End: 2017-01-01

## 2017-01-01 RX ORDER — NOREPINEPHRINE BITARTRATE/D5W 8 MG/250ML
0-20 PLASTIC BAG, INJECTION (ML) INTRAVENOUS
Status: DISCONTINUED | OUTPATIENT
Start: 2017-01-01 | End: 2017-01-01

## 2017-01-01 RX ORDER — IPRATROPIUM BROMIDE AND ALBUTEROL SULFATE 2.5; .5 MG/3ML; MG/3ML
3 SOLUTION RESPIRATORY (INHALATION)
Qty: 30 NEBULE | Refills: 5 | Status: SHIPPED | OUTPATIENT
Start: 2017-01-01

## 2017-01-01 RX ORDER — SODIUM CHLORIDE 9 MG/ML
150 INJECTION, SOLUTION INTRAVENOUS CONTINUOUS
Status: DISCONTINUED | OUTPATIENT
Start: 2017-01-01 | End: 2017-01-01

## 2017-01-01 RX ADMIN — IPRATROPIUM BROMIDE AND ALBUTEROL SULFATE 3 ML: .5; 3 SOLUTION RESPIRATORY (INHALATION) at 08:07

## 2017-01-01 RX ADMIN — OXYCODONE HYDROCHLORIDE AND ACETAMINOPHEN 1 TABLET: 5; 325 TABLET ORAL at 10:58

## 2017-01-01 RX ADMIN — PIPERACILLIN SODIUM,TAZOBACTAM SODIUM 4.5 G: 4; .5 INJECTION, POWDER, FOR SOLUTION INTRAVENOUS at 17:33

## 2017-01-01 RX ADMIN — ENOXAPARIN SODIUM 40 MG: 40 INJECTION SUBCUTANEOUS at 17:54

## 2017-01-01 RX ADMIN — MIDAZOLAM HYDROCHLORIDE 2 MG: 1 INJECTION, SOLUTION INTRAMUSCULAR; INTRAVENOUS at 18:15

## 2017-01-01 RX ADMIN — ONDANSETRON 4 MG: 2 INJECTION INTRAMUSCULAR; INTRAVENOUS at 09:17

## 2017-01-01 RX ADMIN — IPRATROPIUM BROMIDE AND ALBUTEROL SULFATE 3 ML: .5; 3 SOLUTION RESPIRATORY (INHALATION) at 08:54

## 2017-01-01 RX ADMIN — PANTOPRAZOLE SODIUM 40 MG: 40 TABLET, DELAYED RELEASE ORAL at 08:50

## 2017-01-01 RX ADMIN — ASPIRIN 81 MG CHEWABLE TABLET 81 MG: 81 TABLET CHEWABLE at 11:43

## 2017-01-01 RX ADMIN — OXYCODONE HYDROCHLORIDE AND ACETAMINOPHEN 1 TABLET: 5; 325 TABLET ORAL at 15:49

## 2017-01-01 RX ADMIN — IPRATROPIUM BROMIDE AND ALBUTEROL SULFATE 3 ML: .5; 3 SOLUTION RESPIRATORY (INHALATION) at 13:30

## 2017-01-01 RX ADMIN — METOPROLOL TARTRATE 12.5 MG: 25 TABLET ORAL at 23:15

## 2017-01-01 RX ADMIN — OXYCODONE HYDROCHLORIDE AND ACETAMINOPHEN 1 TABLET: 5; 325 TABLET ORAL at 16:35

## 2017-01-01 RX ADMIN — SODIUM CHLORIDE 2000 MG: 900 INJECTION, SOLUTION INTRAVENOUS at 12:36

## 2017-01-01 RX ADMIN — POTASSIUM CHLORIDE 10 MEQ: 10 INJECTION, SOLUTION INTRAVENOUS at 22:03

## 2017-01-01 RX ADMIN — SODIUM CHLORIDE 1250 MG: 900 INJECTION, SOLUTION INTRAVENOUS at 20:06

## 2017-01-01 RX ADMIN — IPRATROPIUM BROMIDE AND ALBUTEROL SULFATE 3 ML: .5; 3 SOLUTION RESPIRATORY (INHALATION) at 13:50

## 2017-01-01 RX ADMIN — CHLORHEXIDINE GLUCONATE 10 ML: 1.2 RINSE ORAL at 20:42

## 2017-01-01 RX ADMIN — MAGNESIUM SULFATE HEPTAHYDRATE 2 G: 40 INJECTION, SOLUTION INTRAVENOUS at 12:03

## 2017-01-01 RX ADMIN — PIPERACILLIN SODIUM,TAZOBACTAM SODIUM 3.38 G: 3; .375 INJECTION, POWDER, FOR SOLUTION INTRAVENOUS at 05:59

## 2017-01-01 RX ADMIN — LEVOFLOXACIN 750 MG: 5 INJECTION, SOLUTION INTRAVENOUS at 17:14

## 2017-01-01 RX ADMIN — IPRATROPIUM BROMIDE AND ALBUTEROL SULFATE 3 ML: .5; 3 SOLUTION RESPIRATORY (INHALATION) at 15:03

## 2017-01-01 RX ADMIN — PIPERACILLIN SODIUM,TAZOBACTAM SODIUM 3.38 G: 3; .375 INJECTION, POWDER, FOR SOLUTION INTRAVENOUS at 18:00

## 2017-01-01 RX ADMIN — SODIUM CHLORIDE, SODIUM LACTATE, POTASSIUM CHLORIDE, CALCIUM CHLORIDE: 600; 310; 30; 20 INJECTION, SOLUTION INTRAVENOUS at 08:38

## 2017-01-01 RX ADMIN — IPRATROPIUM BROMIDE AND ALBUTEROL SULFATE 3 ML: .5; 3 SOLUTION RESPIRATORY (INHALATION) at 20:19

## 2017-01-01 RX ADMIN — ENOXAPARIN SODIUM 40 MG: 40 INJECTION SUBCUTANEOUS at 19:30

## 2017-01-01 RX ADMIN — CHLORHEXIDINE GLUCONATE 10 ML: 1.2 RINSE ORAL at 21:15

## 2017-01-01 RX ADMIN — IPRATROPIUM BROMIDE AND ALBUTEROL SULFATE 3 ML: .5; 3 SOLUTION RESPIRATORY (INHALATION) at 08:35

## 2017-01-01 RX ADMIN — VASOPRESSIN 0.01 UNITS/MIN: 20 INJECTION INTRAVENOUS at 05:33

## 2017-01-01 RX ADMIN — IPRATROPIUM BROMIDE AND ALBUTEROL SULFATE 3 ML: .5; 3 SOLUTION RESPIRATORY (INHALATION) at 20:36

## 2017-01-01 RX ADMIN — ENOXAPARIN SODIUM 40 MG: 40 INJECTION SUBCUTANEOUS at 19:04

## 2017-01-01 RX ADMIN — THERA TABS 1 TABLET: TAB at 09:42

## 2017-01-01 RX ADMIN — PANTOPRAZOLE SODIUM 40 MG: 40 TABLET, DELAYED RELEASE ORAL at 10:52

## 2017-01-01 RX ADMIN — POTASSIUM CHLORIDE 10 MEQ: 10 INJECTION, SOLUTION INTRAVENOUS at 16:14

## 2017-01-01 RX ADMIN — Medication 20 MCG/MIN: at 09:27

## 2017-01-01 RX ADMIN — SODIUM CHLORIDE 1000 MG: 900 INJECTION, SOLUTION INTRAVENOUS at 08:38

## 2017-01-01 RX ADMIN — MORPHINE SULFATE 2 MG: 2 INJECTION, SOLUTION INTRAMUSCULAR; INTRAVENOUS at 16:56

## 2017-01-01 RX ADMIN — INSULIN LISPRO 3 UNITS: 100 INJECTION, SOLUTION INTRAVENOUS; SUBCUTANEOUS at 18:05

## 2017-01-01 RX ADMIN — IPRATROPIUM BROMIDE AND ALBUTEROL SULFATE 3 ML: .5; 3 SOLUTION RESPIRATORY (INHALATION) at 08:42

## 2017-01-01 RX ADMIN — IPRATROPIUM BROMIDE AND ALBUTEROL SULFATE 3 ML: .5; 3 SOLUTION RESPIRATORY (INHALATION) at 09:09

## 2017-01-01 RX ADMIN — OXYCODONE HYDROCHLORIDE AND ACETAMINOPHEN 1 TABLET: 5; 325 TABLET ORAL at 00:22

## 2017-01-01 RX ADMIN — MIDAZOLAM HYDROCHLORIDE 2 MG: 1 INJECTION, SOLUTION INTRAMUSCULAR; INTRAVENOUS at 21:12

## 2017-01-01 RX ADMIN — IPRATROPIUM BROMIDE AND ALBUTEROL SULFATE 3 ML: .5; 3 SOLUTION RESPIRATORY (INHALATION) at 14:45

## 2017-01-01 RX ADMIN — PROPOFOL 50 MCG/KG/MIN: 10 INJECTION, EMULSION INTRAVENOUS at 14:18

## 2017-01-01 RX ADMIN — IPRATROPIUM BROMIDE AND ALBUTEROL SULFATE 3 ML: .5; 3 SOLUTION RESPIRATORY (INHALATION) at 01:49

## 2017-01-01 RX ADMIN — PROPOFOL 35 MCG/KG/MIN: 10 INJECTION, EMULSION INTRAVENOUS at 02:15

## 2017-01-01 RX ADMIN — PANTOPRAZOLE SODIUM 40 MG: 40 TABLET, DELAYED RELEASE ORAL at 08:16

## 2017-01-01 RX ADMIN — CHLORHEXIDINE GLUCONATE 10 ML: 1.2 RINSE ORAL at 22:04

## 2017-01-01 RX ADMIN — THERA TABS 1 TABLET: TAB at 09:26

## 2017-01-01 RX ADMIN — SODIUM BICARBONATE 50 MEQ: 84 INJECTION, SOLUTION INTRAVENOUS at 19:24

## 2017-01-01 RX ADMIN — IPRATROPIUM BROMIDE AND ALBUTEROL SULFATE 3 ML: .5; 3 SOLUTION RESPIRATORY (INHALATION) at 20:46

## 2017-01-01 RX ADMIN — ACETAMINOPHEN 650 MG: 325 TABLET, FILM COATED ORAL at 11:09

## 2017-01-01 RX ADMIN — PIPERACILLIN SODIUM,TAZOBACTAM SODIUM 3.38 G: 3; .375 INJECTION, POWDER, FOR SOLUTION INTRAVENOUS at 05:22

## 2017-01-01 RX ADMIN — ENOXAPARIN SODIUM 40 MG: 40 INJECTION SUBCUTANEOUS at 05:43

## 2017-01-01 RX ADMIN — Medication 10 ML: at 17:00

## 2017-01-01 RX ADMIN — SODIUM CHLORIDE 1000 MG: 900 INJECTION, SOLUTION INTRAVENOUS at 20:32

## 2017-01-01 RX ADMIN — DEXTROSE MONOHYDRATE 75 ML/HR: 5 INJECTION, SOLUTION INTRAVENOUS at 18:03

## 2017-01-01 RX ADMIN — DEXTROSE MONOHYDRATE 75 ML/HR: 5 INJECTION, SOLUTION INTRAVENOUS at 04:37

## 2017-01-01 RX ADMIN — MAGNESIUM SULFATE IN DEXTROSE 1 G: 10 INJECTION, SOLUTION INTRAVENOUS at 18:03

## 2017-01-01 RX ADMIN — Medication 10 ML: at 00:17

## 2017-01-01 RX ADMIN — PIPERACILLIN SODIUM,TAZOBACTAM SODIUM 3.38 G: 3; .375 INJECTION, POWDER, FOR SOLUTION INTRAVENOUS at 05:44

## 2017-01-01 RX ADMIN — PIPERACILLIN SODIUM,TAZOBACTAM SODIUM 3.38 G: 3; .375 INJECTION, POWDER, FOR SOLUTION INTRAVENOUS at 21:15

## 2017-01-01 RX ADMIN — DEXTROSE MONOHYDRATE AND POTASSIUM CHLORIDE: 5; .149 INJECTION, SOLUTION INTRAVENOUS at 13:01

## 2017-01-01 RX ADMIN — Medication 10 ML: at 06:06

## 2017-01-01 RX ADMIN — PROPOFOL 35 MCG/KG/MIN: 10 INJECTION, EMULSION INTRAVENOUS at 04:55

## 2017-01-01 RX ADMIN — HEPARIN SODIUM 5000 UNITS: 5000 INJECTION, SOLUTION INTRAVENOUS; SUBCUTANEOUS at 12:38

## 2017-01-01 RX ADMIN — Medication 20 ML: at 14:52

## 2017-01-01 RX ADMIN — INSULIN HUMAN 10 UNITS: 100 INJECTION, SOLUTION PARENTERAL at 19:29

## 2017-01-01 RX ADMIN — IPRATROPIUM BROMIDE AND ALBUTEROL SULFATE 3 ML: .5; 3 SOLUTION RESPIRATORY (INHALATION) at 02:18

## 2017-01-01 RX ADMIN — PIPERACILLIN SODIUM,TAZOBACTAM SODIUM 4.5 G: 4; .5 INJECTION, POWDER, FOR SOLUTION INTRAVENOUS at 12:45

## 2017-01-01 RX ADMIN — PROPOFOL 50 MCG/KG/MIN: 10 INJECTION, EMULSION INTRAVENOUS at 01:50

## 2017-01-01 RX ADMIN — SODIUM CHLORIDE 150 ML/HR: 450 INJECTION, SOLUTION INTRAVENOUS at 00:05

## 2017-01-01 RX ADMIN — IPRATROPIUM BROMIDE AND ALBUTEROL SULFATE 3 ML: .5; 3 SOLUTION RESPIRATORY (INHALATION) at 02:00

## 2017-01-01 RX ADMIN — PIPERACILLIN SODIUM,TAZOBACTAM SODIUM 3.38 G: 3; .375 INJECTION, POWDER, FOR SOLUTION INTRAVENOUS at 13:27

## 2017-01-01 RX ADMIN — IPRATROPIUM BROMIDE AND ALBUTEROL SULFATE 3 ML: .5; 3 SOLUTION RESPIRATORY (INHALATION) at 15:20

## 2017-01-01 RX ADMIN — MORPHINE SULFATE 2 MG: 2 INJECTION, SOLUTION INTRAMUSCULAR; INTRAVENOUS at 09:49

## 2017-01-01 RX ADMIN — Medication 10 ML: at 16:02

## 2017-01-01 RX ADMIN — POTASSIUM CHLORIDE 10 MEQ: 10 INJECTION, SOLUTION INTRAVENOUS at 14:08

## 2017-01-01 RX ADMIN — HEPARIN SODIUM 5000 UNITS: 5000 INJECTION, SOLUTION INTRAVENOUS; SUBCUTANEOUS at 12:45

## 2017-01-01 RX ADMIN — POTASSIUM CHLORIDE 10 MEQ: 20 POWDER ORAL at 22:06

## 2017-01-01 RX ADMIN — HYDROCHLOROTHIAZIDE 25 MG: 25 TABLET ORAL at 09:33

## 2017-01-01 RX ADMIN — Medication 10 ML: at 16:06

## 2017-01-01 RX ADMIN — SODIUM CHLORIDE 1000 ML: 900 INJECTION, SOLUTION INTRAVENOUS at 19:40

## 2017-01-01 RX ADMIN — PIPERACILLIN SODIUM,TAZOBACTAM SODIUM 3.38 G: 3; .375 INJECTION, POWDER, FOR SOLUTION INTRAVENOUS at 15:17

## 2017-01-01 RX ADMIN — PIPERACILLIN SODIUM,TAZOBACTAM SODIUM 3.38 G: 3; .375 INJECTION, POWDER, FOR SOLUTION INTRAVENOUS at 02:23

## 2017-01-01 RX ADMIN — IPRATROPIUM BROMIDE AND ALBUTEROL SULFATE 3 ML: .5; 3 SOLUTION RESPIRATORY (INHALATION) at 19:45

## 2017-01-01 RX ADMIN — PIPERACILLIN SODIUM,TAZOBACTAM SODIUM 4.5 G: 4; .5 INJECTION, POWDER, FOR SOLUTION INTRAVENOUS at 23:24

## 2017-01-01 RX ADMIN — SODIUM CHLORIDE 40 MG: 9 INJECTION INTRAMUSCULAR; INTRAVENOUS; SUBCUTANEOUS at 09:34

## 2017-01-01 RX ADMIN — LEVOFLOXACIN 750 MG: 5 INJECTION, SOLUTION INTRAVENOUS at 18:04

## 2017-01-01 RX ADMIN — ENOXAPARIN SODIUM 40 MG: 40 INJECTION SUBCUTANEOUS at 17:40

## 2017-01-01 RX ADMIN — CHLORHEXIDINE GLUCONATE 10 ML: 1.2 RINSE ORAL at 20:54

## 2017-01-01 RX ADMIN — HEPARIN SODIUM 5000 UNITS: 5000 INJECTION, SOLUTION INTRAVENOUS; SUBCUTANEOUS at 20:06

## 2017-01-01 RX ADMIN — DEXTROSE MONOHYDRATE 75 ML/HR: 5 INJECTION, SOLUTION INTRAVENOUS at 21:01

## 2017-01-01 RX ADMIN — ENOXAPARIN SODIUM 40 MG: 40 INJECTION SUBCUTANEOUS at 05:05

## 2017-01-01 RX ADMIN — METOPROLOL TARTRATE 25 MG: 25 TABLET ORAL at 21:58

## 2017-01-01 RX ADMIN — PROPOFOL 50 MCG/KG/MIN: 10 INJECTION, EMULSION INTRAVENOUS at 01:10

## 2017-01-01 RX ADMIN — PROPOFOL 45 MCG/KG/MIN: 10 INJECTION, EMULSION INTRAVENOUS at 19:00

## 2017-01-01 RX ADMIN — HEPARIN SODIUM 5000 UNITS: 5000 INJECTION, SOLUTION INTRAVENOUS; SUBCUTANEOUS at 13:26

## 2017-01-01 RX ADMIN — PROPOFOL 25 MCG/KG/MIN: 10 INJECTION, EMULSION INTRAVENOUS at 06:10

## 2017-01-01 RX ADMIN — PROPOFOL 50 MCG/KG/MIN: 10 INJECTION, EMULSION INTRAVENOUS at 07:11

## 2017-01-01 RX ADMIN — IPRATROPIUM BROMIDE AND ALBUTEROL SULFATE 3 ML: .5; 3 SOLUTION RESPIRATORY (INHALATION) at 21:52

## 2017-01-01 RX ADMIN — PROPOFOL 45 MCG/KG/MIN: 10 INJECTION, EMULSION INTRAVENOUS at 22:30

## 2017-01-01 RX ADMIN — Medication 10 ML: at 17:20

## 2017-01-01 RX ADMIN — ENOXAPARIN SODIUM 40 MG: 40 INJECTION SUBCUTANEOUS at 06:12

## 2017-01-01 RX ADMIN — IPRATROPIUM BROMIDE AND ALBUTEROL SULFATE 3 ML: .5; 3 SOLUTION RESPIRATORY (INHALATION) at 15:24

## 2017-01-01 RX ADMIN — SODIUM CHLORIDE, SODIUM LACTATE, POTASSIUM CHLORIDE, CALCIUM CHLORIDE, AND DEXTROSE MONOHYDRATE 100 ML/HR: 600; 310; 30; 20; 5 INJECTION, SOLUTION INTRAVENOUS at 23:20

## 2017-01-01 RX ADMIN — PANTOPRAZOLE SODIUM 40 MG: 40 TABLET, DELAYED RELEASE ORAL at 10:46

## 2017-01-01 RX ADMIN — POTASSIUM CHLORIDE 10 MEQ: 10 INJECTION, SOLUTION INTRAVENOUS at 18:49

## 2017-01-01 RX ADMIN — GLYCOPYRROLATE 0.3 MG: 0.2 INJECTION INTRAMUSCULAR; INTRAVENOUS at 10:25

## 2017-01-01 RX ADMIN — IPRATROPIUM BROMIDE AND ALBUTEROL SULFATE 3 ML: .5; 3 SOLUTION RESPIRATORY (INHALATION) at 13:26

## 2017-01-01 RX ADMIN — POTASSIUM CHLORIDE 10 MEQ: 10 INJECTION, SOLUTION INTRAVENOUS at 11:25

## 2017-01-01 RX ADMIN — IPRATROPIUM BROMIDE AND ALBUTEROL SULFATE 3 ML: .5; 3 SOLUTION RESPIRATORY (INHALATION) at 14:12

## 2017-01-01 RX ADMIN — POTASSIUM CHLORIDE 10 MEQ: 10 INJECTION, SOLUTION INTRAVENOUS at 09:15

## 2017-01-01 RX ADMIN — ALBUMIN (HUMAN) 25 G: 0.25 INJECTION, SOLUTION INTRAVENOUS at 18:04

## 2017-01-01 RX ADMIN — Medication 6 MCG/MIN: at 21:15

## 2017-01-01 RX ADMIN — PANTOPRAZOLE SODIUM 40 MG: 40 TABLET, DELAYED RELEASE ORAL at 10:06

## 2017-01-01 RX ADMIN — PROPOFOL 100 MG: 10 INJECTION, EMULSION INTRAVENOUS at 20:04

## 2017-01-01 RX ADMIN — POTASSIUM CHLORIDE 40 MEQ: 20 TABLET, EXTENDED RELEASE ORAL at 13:10

## 2017-01-01 RX ADMIN — DEXTROSE MONOHYDRATE AND POTASSIUM CHLORIDE: 5; .149 INJECTION, SOLUTION INTRAVENOUS at 20:52

## 2017-01-01 RX ADMIN — CHLORHEXIDINE GLUCONATE 10 ML: 1.2 RINSE ORAL at 23:28

## 2017-01-01 RX ADMIN — OXYCODONE HYDROCHLORIDE AND ACETAMINOPHEN 1 TABLET: 5; 325 TABLET ORAL at 04:08

## 2017-01-01 RX ADMIN — METOPROLOL TARTRATE 75 MG: 50 TABLET ORAL at 10:46

## 2017-01-01 RX ADMIN — IPRATROPIUM BROMIDE AND ALBUTEROL SULFATE 3 ML: .5; 3 SOLUTION RESPIRATORY (INHALATION) at 09:57

## 2017-01-01 RX ADMIN — OXYCODONE HYDROCHLORIDE AND ACETAMINOPHEN 1 TABLET: 5; 325 TABLET ORAL at 06:08

## 2017-01-01 RX ADMIN — SODIUM CHLORIDE 40 MG: 9 INJECTION INTRAMUSCULAR; INTRAVENOUS; SUBCUTANEOUS at 09:39

## 2017-01-01 RX ADMIN — POTASSIUM CHLORIDE 40 MEQ: 20 POWDER ORAL at 17:32

## 2017-01-01 RX ADMIN — PIPERACILLIN SODIUM,TAZOBACTAM SODIUM 3.38 G: 3; .375 INJECTION, POWDER, FOR SOLUTION INTRAVENOUS at 06:00

## 2017-01-01 RX ADMIN — FENTANYL CITRATE 50 MCG: 50 INJECTION, SOLUTION INTRAMUSCULAR; INTRAVENOUS at 08:44

## 2017-01-01 RX ADMIN — ENOXAPARIN SODIUM 40 MG: 40 INJECTION SUBCUTANEOUS at 01:44

## 2017-01-01 RX ADMIN — ENOXAPARIN SODIUM 40 MG: 40 INJECTION SUBCUTANEOUS at 05:30

## 2017-01-01 RX ADMIN — DEXTROSE MONOHYDRATE 75 ML/HR: 5 INJECTION, SOLUTION INTRAVENOUS at 18:26

## 2017-01-01 RX ADMIN — ENOXAPARIN SODIUM 40 MG: 40 INJECTION SUBCUTANEOUS at 17:47

## 2017-01-01 RX ADMIN — IPRATROPIUM BROMIDE AND ALBUTEROL SULFATE 3 ML: .5; 3 SOLUTION RESPIRATORY (INHALATION) at 14:32

## 2017-01-01 RX ADMIN — SODIUM CHLORIDE 1250 MG: 900 INJECTION, SOLUTION INTRAVENOUS at 22:02

## 2017-01-01 RX ADMIN — POTASSIUM CHLORIDE 10 MEQ: 10 INJECTION, SOLUTION INTRAVENOUS at 14:13

## 2017-01-01 RX ADMIN — PIPERACILLIN SODIUM,TAZOBACTAM SODIUM 4.5 G: 4; .5 INJECTION, POWDER, FOR SOLUTION INTRAVENOUS at 23:00

## 2017-01-01 RX ADMIN — Medication 10 ML: at 22:00

## 2017-01-01 RX ADMIN — IPRATROPIUM BROMIDE AND ALBUTEROL SULFATE 3 ML: .5; 3 SOLUTION RESPIRATORY (INHALATION) at 20:37

## 2017-01-01 RX ADMIN — INSULIN LISPRO 2 UNITS: 100 INJECTION, SOLUTION INTRAVENOUS; SUBCUTANEOUS at 07:27

## 2017-01-01 RX ADMIN — LORAZEPAM 1 MG: 2 INJECTION INTRAMUSCULAR at 18:23

## 2017-01-01 RX ADMIN — SODIUM CHLORIDE 40 MG: 9 INJECTION INTRAMUSCULAR; INTRAVENOUS; SUBCUTANEOUS at 09:13

## 2017-01-01 RX ADMIN — MIDAZOLAM HYDROCHLORIDE 2 MG: 1 INJECTION, SOLUTION INTRAMUSCULAR; INTRAVENOUS at 13:39

## 2017-01-01 RX ADMIN — ROCURONIUM BROMIDE 20 MG: 10 INJECTION, SOLUTION INTRAVENOUS at 08:56

## 2017-01-01 RX ADMIN — PIPERACILLIN SODIUM,TAZOBACTAM SODIUM 3.38 G: 3; .375 INJECTION, POWDER, FOR SOLUTION INTRAVENOUS at 17:58

## 2017-01-01 RX ADMIN — Medication 20 ML: at 22:06

## 2017-01-01 RX ADMIN — ASPIRIN 81 MG CHEWABLE TABLET 81 MG: 81 TABLET CHEWABLE at 10:46

## 2017-01-01 RX ADMIN — SODIUM CHLORIDE, SODIUM LACTATE, POTASSIUM CHLORIDE, CALCIUM CHLORIDE, AND DEXTROSE MONOHYDRATE 100 ML/HR: 600; 310; 30; 20; 5 INJECTION, SOLUTION INTRAVENOUS at 20:15

## 2017-01-01 RX ADMIN — IPRATROPIUM BROMIDE AND ALBUTEROL SULFATE 3 ML: .5; 3 SOLUTION RESPIRATORY (INHALATION) at 03:21

## 2017-01-01 RX ADMIN — PROPOFOL 120 MG: 10 INJECTION, EMULSION INTRAVENOUS at 08:44

## 2017-01-01 RX ADMIN — IPRATROPIUM BROMIDE AND ALBUTEROL SULFATE 3 ML: .5; 3 SOLUTION RESPIRATORY (INHALATION) at 20:53

## 2017-01-01 RX ADMIN — ENOXAPARIN SODIUM 40 MG: 40 INJECTION SUBCUTANEOUS at 08:52

## 2017-01-01 RX ADMIN — PIPERACILLIN SODIUM,TAZOBACTAM SODIUM 3.38 G: 3; .375 INJECTION, POWDER, FOR SOLUTION INTRAVENOUS at 22:06

## 2017-01-01 RX ADMIN — PANTOPRAZOLE SODIUM 40 MG: 40 TABLET, DELAYED RELEASE ORAL at 11:40

## 2017-01-01 RX ADMIN — Medication 10 ML: at 18:16

## 2017-01-01 RX ADMIN — POTASSIUM CHLORIDE 10 MEQ: 10 INJECTION, SOLUTION INTRAVENOUS at 18:00

## 2017-01-01 RX ADMIN — PROPOFOL 20 MCG/KG/MIN: 10 INJECTION, EMULSION INTRAVENOUS at 02:08

## 2017-01-01 RX ADMIN — PIPERACILLIN SODIUM,TAZOBACTAM SODIUM 4.5 G: 4; .5 INJECTION, POWDER, FOR SOLUTION INTRAVENOUS at 12:36

## 2017-01-01 RX ADMIN — SODIUM CHLORIDE 1250 MG: 900 INJECTION, SOLUTION INTRAVENOUS at 09:46

## 2017-01-01 RX ADMIN — IPRATROPIUM BROMIDE AND ALBUTEROL SULFATE 3 ML: .5; 3 SOLUTION RESPIRATORY (INHALATION) at 08:15

## 2017-01-01 RX ADMIN — FENTANYL CITRATE 50 MCG: 50 INJECTION, SOLUTION INTRAMUSCULAR; INTRAVENOUS at 09:10

## 2017-01-01 RX ADMIN — PIPERACILLIN SODIUM,TAZOBACTAM SODIUM 3.38 G: 3; .375 INJECTION, POWDER, FOR SOLUTION INTRAVENOUS at 22:02

## 2017-01-01 RX ADMIN — MIDAZOLAM HYDROCHLORIDE 1 MG: 1 INJECTION, SOLUTION INTRAMUSCULAR; INTRAVENOUS at 04:00

## 2017-01-01 RX ADMIN — PIPERACILLIN SODIUM,TAZOBACTAM SODIUM 3.38 G: 3; .375 INJECTION, POWDER, FOR SOLUTION INTRAVENOUS at 22:23

## 2017-01-01 RX ADMIN — METOPROLOL TARTRATE 25 MG: 25 TABLET ORAL at 00:16

## 2017-01-01 RX ADMIN — HEPARIN SODIUM 5000 UNITS: 5000 INJECTION, SOLUTION INTRAVENOUS; SUBCUTANEOUS at 19:35

## 2017-01-01 RX ADMIN — LEVOFLOXACIN 750 MG: 5 INJECTION, SOLUTION INTRAVENOUS at 18:05

## 2017-01-01 RX ADMIN — ESCITALOPRAM OXALATE 10 MG: 10 TABLET ORAL at 09:49

## 2017-01-01 RX ADMIN — IPRATROPIUM BROMIDE AND ALBUTEROL SULFATE 3 ML: .5; 3 SOLUTION RESPIRATORY (INHALATION) at 15:28

## 2017-01-01 RX ADMIN — ESCITALOPRAM OXALATE 10 MG: 10 TABLET ORAL at 09:41

## 2017-01-01 RX ADMIN — PROPOFOL 50 MCG/KG/MIN: 10 INJECTION, EMULSION INTRAVENOUS at 00:17

## 2017-01-01 RX ADMIN — PANTOPRAZOLE SODIUM 40 MG: 40 TABLET, DELAYED RELEASE ORAL at 09:41

## 2017-01-01 RX ADMIN — ESMOLOL HYDROCHLORIDE 10 MG: 10 INJECTION INTRAVENOUS at 09:13

## 2017-01-01 RX ADMIN — MIDAZOLAM HYDROCHLORIDE 2 MG: 1 INJECTION, SOLUTION INTRAMUSCULAR; INTRAVENOUS at 01:06

## 2017-01-01 RX ADMIN — HEPARIN SODIUM 5000 UNITS: 5000 INJECTION, SOLUTION INTRAVENOUS; SUBCUTANEOUS at 12:20

## 2017-01-01 RX ADMIN — ASPIRIN 81 MG CHEWABLE TABLET 81 MG: 81 TABLET CHEWABLE at 10:05

## 2017-01-01 RX ADMIN — SODIUM CHLORIDE 150 ML/HR: 900 INJECTION, SOLUTION INTRAVENOUS at 20:54

## 2017-01-01 RX ADMIN — IPRATROPIUM BROMIDE AND ALBUTEROL SULFATE 3 ML: .5; 3 SOLUTION RESPIRATORY (INHALATION) at 02:51

## 2017-01-01 RX ADMIN — THERA TABS 1 TABLET: TAB at 10:06

## 2017-01-01 RX ADMIN — Medication 10 ML: at 22:32

## 2017-01-01 RX ADMIN — PIPERACILLIN SODIUM,TAZOBACTAM SODIUM 4.5 G: 4; .5 INJECTION, POWDER, FOR SOLUTION INTRAVENOUS at 23:08

## 2017-01-01 RX ADMIN — ENOXAPARIN SODIUM 40 MG: 40 INJECTION SUBCUTANEOUS at 06:48

## 2017-01-01 RX ADMIN — HYDROCHLOROTHIAZIDE 25 MG: 25 TABLET ORAL at 12:19

## 2017-01-01 RX ADMIN — IPRATROPIUM BROMIDE AND ALBUTEROL SULFATE 3 ML: .5; 3 SOLUTION RESPIRATORY (INHALATION) at 20:56

## 2017-01-01 RX ADMIN — SODIUM CHLORIDE 150 ML/HR: 900 INJECTION, SOLUTION INTRAVENOUS at 10:04

## 2017-01-01 RX ADMIN — POTASSIUM CHLORIDE 10 MEQ: 10 INJECTION, SOLUTION INTRAVENOUS at 08:50

## 2017-01-01 RX ADMIN — METOPROLOL TARTRATE 50 MG: 50 TABLET ORAL at 00:16

## 2017-01-01 RX ADMIN — IPRATROPIUM BROMIDE AND ALBUTEROL SULFATE 3 ML: .5; 3 SOLUTION RESPIRATORY (INHALATION) at 13:52

## 2017-01-01 RX ADMIN — IPRATROPIUM BROMIDE AND ALBUTEROL SULFATE 3 ML: .5; 3 SOLUTION RESPIRATORY (INHALATION) at 20:11

## 2017-01-01 RX ADMIN — IPRATROPIUM BROMIDE AND ALBUTEROL SULFATE 3 ML: .5; 3 SOLUTION RESPIRATORY (INHALATION) at 22:31

## 2017-01-01 RX ADMIN — IPRATROPIUM BROMIDE AND ALBUTEROL SULFATE 3 ML: .5; 3 SOLUTION RESPIRATORY (INHALATION) at 09:01

## 2017-01-01 RX ADMIN — ENOXAPARIN SODIUM 40 MG: 40 INJECTION SUBCUTANEOUS at 00:03

## 2017-01-01 RX ADMIN — THERA TABS 1 TABLET: TAB at 10:52

## 2017-01-01 RX ADMIN — MORPHINE SULFATE 2 MG: 2 INJECTION, SOLUTION INTRAMUSCULAR; INTRAVENOUS at 05:49

## 2017-01-01 RX ADMIN — Medication 10 ML: at 06:00

## 2017-01-01 RX ADMIN — CHLORHEXIDINE GLUCONATE 10 ML: 1.2 RINSE ORAL at 09:34

## 2017-01-01 RX ADMIN — LEVOFLOXACIN 750 MG: 5 INJECTION, SOLUTION INTRAVENOUS at 18:47

## 2017-01-01 RX ADMIN — PIPERACILLIN SODIUM,TAZOBACTAM SODIUM 3.38 G: 3; .375 INJECTION, POWDER, FOR SOLUTION INTRAVENOUS at 22:00

## 2017-01-01 RX ADMIN — POTASSIUM CHLORIDE 40 MEQ: 20 TABLET, EXTENDED RELEASE ORAL at 12:01

## 2017-01-01 RX ADMIN — METOPROLOL TARTRATE 75 MG: 50 TABLET ORAL at 21:46

## 2017-01-01 RX ADMIN — PIPERACILLIN SODIUM,TAZOBACTAM SODIUM 3.38 G: 3; .375 INJECTION, POWDER, FOR SOLUTION INTRAVENOUS at 10:13

## 2017-01-01 RX ADMIN — SODIUM CHLORIDE 40 MG: 9 INJECTION INTRAMUSCULAR; INTRAVENOUS; SUBCUTANEOUS at 08:18

## 2017-01-01 RX ADMIN — ACETAMINOPHEN 650 MG: 325 TABLET, FILM COATED ORAL at 19:10

## 2017-01-01 RX ADMIN — Medication 10 ML: at 21:14

## 2017-01-01 RX ADMIN — POTASSIUM CHLORIDE 10 MEQ: 10 INJECTION, SOLUTION INTRAVENOUS at 09:29

## 2017-01-01 RX ADMIN — ENOXAPARIN SODIUM 40 MG: 40 INJECTION SUBCUTANEOUS at 18:54

## 2017-01-01 RX ADMIN — PIPERACILLIN SODIUM,TAZOBACTAM SODIUM 4.5 G: 4; .5 INJECTION, POWDER, FOR SOLUTION INTRAVENOUS at 17:03

## 2017-01-01 RX ADMIN — PROPOFOL 35 MCG/KG/MIN: 10 INJECTION, EMULSION INTRAVENOUS at 03:00

## 2017-01-01 RX ADMIN — OXYCODONE HYDROCHLORIDE AND ACETAMINOPHEN 1 TABLET: 5; 325 TABLET ORAL at 04:53

## 2017-01-01 RX ADMIN — POTASSIUM CHLORIDE 10 MEQ: 10 INJECTION, SOLUTION INTRAVENOUS at 10:12

## 2017-01-01 RX ADMIN — HEPARIN SODIUM 5000 UNITS: 5000 INJECTION, SOLUTION INTRAVENOUS; SUBCUTANEOUS at 20:56

## 2017-01-01 RX ADMIN — PIPERACILLIN SODIUM,TAZOBACTAM SODIUM 3.38 G: 3; .375 INJECTION, POWDER, FOR SOLUTION INTRAVENOUS at 02:00

## 2017-01-01 RX ADMIN — SODIUM CHLORIDE 2000 ML: 900 INJECTION, SOLUTION INTRAVENOUS at 17:29

## 2017-01-01 RX ADMIN — SODIUM CHLORIDE 1000 MG: 900 INJECTION, SOLUTION INTRAVENOUS at 19:46

## 2017-01-01 RX ADMIN — IPRATROPIUM BROMIDE AND ALBUTEROL SULFATE 3 ML: .5; 3 SOLUTION RESPIRATORY (INHALATION) at 08:30

## 2017-01-01 RX ADMIN — PROPOFOL 45 MCG/KG/MIN: 10 INJECTION, EMULSION INTRAVENOUS at 13:32

## 2017-01-01 RX ADMIN — ESMOLOL HYDROCHLORIDE 20 MG: 10 INJECTION INTRAVENOUS at 08:45

## 2017-01-01 RX ADMIN — IPRATROPIUM BROMIDE AND ALBUTEROL SULFATE 3 ML: .5; 3 SOLUTION RESPIRATORY (INHALATION) at 02:28

## 2017-01-01 RX ADMIN — ESCITALOPRAM OXALATE 10 MG: 10 TABLET ORAL at 11:40

## 2017-01-01 RX ADMIN — PROPOFOL 50 MCG/KG/MIN: 10 INJECTION, EMULSION INTRAVENOUS at 08:42

## 2017-01-01 RX ADMIN — PIPERACILLIN SODIUM,TAZOBACTAM SODIUM 3.38 G: 3; .375 INJECTION, POWDER, FOR SOLUTION INTRAVENOUS at 06:19

## 2017-01-01 RX ADMIN — DEXTROSE MONOHYDRATE 75 ML/HR: 5 INJECTION, SOLUTION INTRAVENOUS at 15:19

## 2017-01-01 RX ADMIN — ASPIRIN 81 MG CHEWABLE TABLET 81 MG: 81 TABLET CHEWABLE at 09:49

## 2017-01-01 RX ADMIN — PANTOPRAZOLE SODIUM 40 MG: 40 TABLET, DELAYED RELEASE ORAL at 11:43

## 2017-01-01 RX ADMIN — MORPHINE SULFATE 2 MG: 2 INJECTION, SOLUTION INTRAMUSCULAR; INTRAVENOUS at 01:58

## 2017-01-01 RX ADMIN — Medication 10 MCG/MIN: at 14:00

## 2017-01-01 RX ADMIN — OXYCODONE HYDROCHLORIDE AND ACETAMINOPHEN 1 TABLET: 5; 325 TABLET ORAL at 05:36

## 2017-01-01 RX ADMIN — Medication 10 ML: at 14:00

## 2017-01-01 RX ADMIN — POTASSIUM CHLORIDE 10 MEQ: 10 INJECTION, SOLUTION INTRAVENOUS at 20:53

## 2017-01-01 RX ADMIN — IPRATROPIUM BROMIDE AND ALBUTEROL SULFATE 3 ML: .5; 3 SOLUTION RESPIRATORY (INHALATION) at 20:50

## 2017-01-01 RX ADMIN — ENOXAPARIN SODIUM 40 MG: 40 INJECTION SUBCUTANEOUS at 06:16

## 2017-01-01 RX ADMIN — IPRATROPIUM BROMIDE AND ALBUTEROL SULFATE 3 ML: .5; 3 SOLUTION RESPIRATORY (INHALATION) at 23:28

## 2017-01-01 RX ADMIN — METOPROLOL TARTRATE 12.5 MG: 25 TABLET ORAL at 01:58

## 2017-01-01 RX ADMIN — IPRATROPIUM BROMIDE AND ALBUTEROL SULFATE 3 ML: .5; 3 SOLUTION RESPIRATORY (INHALATION) at 08:50

## 2017-01-01 RX ADMIN — IPRATROPIUM BROMIDE AND ALBUTEROL SULFATE 3 ML: .5; 3 SOLUTION RESPIRATORY (INHALATION) at 20:06

## 2017-01-01 RX ADMIN — PROPOFOL 25 MCG/KG/MIN: 10 INJECTION, EMULSION INTRAVENOUS at 04:20

## 2017-01-01 RX ADMIN — LIDOCAINE HYDROCHLORIDE 60 MG: 20 INJECTION, SOLUTION EPIDURAL; INFILTRATION; INTRACAUDAL; PERINEURAL at 08:44

## 2017-01-01 RX ADMIN — HEPARIN SODIUM 5000 UNITS: 5000 INJECTION, SOLUTION INTRAVENOUS; SUBCUTANEOUS at 11:35

## 2017-01-01 RX ADMIN — ENOXAPARIN SODIUM 40 MG: 40 INJECTION SUBCUTANEOUS at 19:10

## 2017-01-01 RX ADMIN — Medication 10 ML: at 19:30

## 2017-01-01 RX ADMIN — SODIUM CHLORIDE, SODIUM LACTATE, POTASSIUM CHLORIDE, CALCIUM CHLORIDE, AND DEXTROSE MONOHYDRATE 100 ML/HR: 600; 310; 30; 20; 5 INJECTION, SOLUTION INTRAVENOUS at 23:50

## 2017-01-01 RX ADMIN — PANTOPRAZOLE SODIUM 40 MG: 40 TABLET, DELAYED RELEASE ORAL at 09:28

## 2017-01-01 RX ADMIN — SODIUM CHLORIDE, SODIUM LACTATE, POTASSIUM CHLORIDE, CALCIUM CHLORIDE, AND DEXTROSE MONOHYDRATE 75 ML/HR: 600; 310; 30; 20; 5 INJECTION, SOLUTION INTRAVENOUS at 19:31

## 2017-01-01 RX ADMIN — IPRATROPIUM BROMIDE AND ALBUTEROL SULFATE 3 ML: .5; 3 SOLUTION RESPIRATORY (INHALATION) at 09:30

## 2017-01-01 RX ADMIN — SODIUM CHLORIDE 1250 MG: 900 INJECTION, SOLUTION INTRAVENOUS at 10:32

## 2017-01-01 RX ADMIN — ROCURONIUM BROMIDE 10 MG: 10 INJECTION, SOLUTION INTRAVENOUS at 09:45

## 2017-01-01 RX ADMIN — PROPOFOL 50 MCG/KG/MIN: 10 INJECTION, EMULSION INTRAVENOUS at 10:26

## 2017-01-01 RX ADMIN — Medication 10 ML: at 18:02

## 2017-01-01 RX ADMIN — TRAMADOL HYDROCHLORIDE 50 MG: 50 TABLET, FILM COATED ORAL at 09:33

## 2017-01-01 RX ADMIN — IPRATROPIUM BROMIDE AND ALBUTEROL SULFATE 3 ML: .5; 3 SOLUTION RESPIRATORY (INHALATION) at 02:49

## 2017-01-01 RX ADMIN — OXYCODONE HYDROCHLORIDE AND ACETAMINOPHEN 1 TABLET: 5; 325 TABLET ORAL at 13:58

## 2017-01-01 RX ADMIN — SODIUM CHLORIDE, SODIUM LACTATE, POTASSIUM CHLORIDE, CALCIUM CHLORIDE, AND DEXTROSE MONOHYDRATE 100 ML/HR: 600; 310; 30; 20; 5 INJECTION, SOLUTION INTRAVENOUS at 11:17

## 2017-01-01 RX ADMIN — ENOXAPARIN SODIUM 40 MG: 40 INJECTION SUBCUTANEOUS at 18:47

## 2017-01-01 RX ADMIN — IPRATROPIUM BROMIDE AND ALBUTEROL SULFATE 3 ML: .5; 3 SOLUTION RESPIRATORY (INHALATION) at 01:11

## 2017-01-01 RX ADMIN — METOPROLOL TARTRATE 12.5 MG: 25 TABLET ORAL at 09:26

## 2017-01-01 RX ADMIN — ALBUMIN (HUMAN) 25 G: 0.25 INJECTION, SOLUTION INTRAVENOUS at 23:45

## 2017-01-01 RX ADMIN — PROPOFOL 45 MCG/KG/MIN: 10 INJECTION, EMULSION INTRAVENOUS at 15:19

## 2017-01-01 RX ADMIN — Medication 10 ML: at 19:02

## 2017-01-01 RX ADMIN — IPRATROPIUM BROMIDE AND ALBUTEROL SULFATE 3 ML: .5; 3 SOLUTION RESPIRATORY (INHALATION) at 01:44

## 2017-01-01 RX ADMIN — CHLORHEXIDINE GLUCONATE 10 ML: 1.2 RINSE ORAL at 09:45

## 2017-01-01 RX ADMIN — METOPROLOL TARTRATE 1 MG: 5 INJECTION INTRAVENOUS at 09:57

## 2017-01-01 RX ADMIN — IPRATROPIUM BROMIDE AND ALBUTEROL SULFATE 3 ML: .5; 3 SOLUTION RESPIRATORY (INHALATION) at 01:24

## 2017-01-01 RX ADMIN — Medication 18 MCG/MIN: at 20:37

## 2017-01-01 RX ADMIN — HYDROMORPHONE HYDROCHLORIDE 0.4 MG: 1 INJECTION, SOLUTION INTRAMUSCULAR; INTRAVENOUS; SUBCUTANEOUS at 09:34

## 2017-01-01 RX ADMIN — HEPARIN SODIUM 5000 UNITS: 5000 INJECTION, SOLUTION INTRAVENOUS; SUBCUTANEOUS at 04:45

## 2017-01-01 RX ADMIN — PROPOFOL 25 MCG/KG/MIN: 10 INJECTION, EMULSION INTRAVENOUS at 20:51

## 2017-01-01 RX ADMIN — Medication 10 ML: at 23:19

## 2017-01-01 RX ADMIN — IPRATROPIUM BROMIDE AND ALBUTEROL SULFATE 3 ML: .5; 3 SOLUTION RESPIRATORY (INHALATION) at 15:05

## 2017-01-01 RX ADMIN — CALCIUM GLUCONATE 2 G: 94 INJECTION, SOLUTION INTRAVENOUS at 19:03

## 2017-01-01 RX ADMIN — SODIUM CHLORIDE 200 ML/HR: 900 INJECTION, SOLUTION INTRAVENOUS at 02:10

## 2017-01-01 RX ADMIN — SODIUM CHLORIDE 40 MG: 9 INJECTION INTRAMUSCULAR; INTRAVENOUS; SUBCUTANEOUS at 08:44

## 2017-01-01 RX ADMIN — IPRATROPIUM BROMIDE AND ALBUTEROL SULFATE 3 ML: .5; 3 SOLUTION RESPIRATORY (INHALATION) at 08:25

## 2017-01-01 RX ADMIN — IPRATROPIUM BROMIDE AND ALBUTEROL SULFATE 3 ML: .5; 3 SOLUTION RESPIRATORY (INHALATION) at 08:10

## 2017-01-01 RX ADMIN — DEXTROSE MONOHYDRATE 75 ML/HR: 5 INJECTION, SOLUTION INTRAVENOUS at 20:54

## 2017-01-01 RX ADMIN — Medication 10 ML: at 18:48

## 2017-01-01 RX ADMIN — PROPOFOL 50 MCG/KG/MIN: 10 INJECTION, EMULSION INTRAVENOUS at 21:07

## 2017-01-01 RX ADMIN — THERA TABS 1 TABLET: TAB at 11:43

## 2017-01-01 RX ADMIN — DEXTROSE MONOHYDRATE 25 G: 25 INJECTION, SOLUTION INTRAVENOUS at 19:27

## 2017-01-01 RX ADMIN — PROPOFOL 25 MCG/KG/MIN: 10 INJECTION, EMULSION INTRAVENOUS at 00:41

## 2017-01-01 RX ADMIN — CHLORHEXIDINE GLUCONATE 10 ML: 1.2 RINSE ORAL at 20:31

## 2017-01-01 RX ADMIN — IPRATROPIUM BROMIDE AND ALBUTEROL SULFATE 3 ML: .5; 3 SOLUTION RESPIRATORY (INHALATION) at 01:00

## 2017-01-01 RX ADMIN — METOPROLOL TARTRATE 12.5 MG: 25 TABLET ORAL at 10:52

## 2017-01-01 RX ADMIN — IPRATROPIUM BROMIDE AND ALBUTEROL SULFATE 3 ML: .5; 3 SOLUTION RESPIRATORY (INHALATION) at 01:30

## 2017-01-01 RX ADMIN — CHLORHEXIDINE GLUCONATE 10 ML: 1.2 RINSE ORAL at 08:47

## 2017-01-01 RX ADMIN — Medication 20 MCG/MIN: at 03:00

## 2017-01-01 RX ADMIN — PROPOFOL 45 MCG/KG/MIN: 10 INJECTION, EMULSION INTRAVENOUS at 17:04

## 2017-01-01 RX ADMIN — OXYCODONE HYDROCHLORIDE AND ACETAMINOPHEN 1 TABLET: 5; 325 TABLET ORAL at 11:16

## 2017-01-01 RX ADMIN — SODIUM CHLORIDE 1000 ML: 900 INJECTION, SOLUTION INTRAVENOUS at 19:15

## 2017-01-01 RX ADMIN — Medication 10 ML: at 05:58

## 2017-01-01 RX ADMIN — METOPROLOL TARTRATE 50 MG: 50 TABLET ORAL at 08:16

## 2017-01-01 RX ADMIN — ESCITALOPRAM OXALATE 10 MG: 10 TABLET ORAL at 11:42

## 2017-01-01 RX ADMIN — CHLORHEXIDINE GLUCONATE 10 ML: 1.2 RINSE ORAL at 08:45

## 2017-01-01 RX ADMIN — Medication 10 ML: at 19:09

## 2017-01-01 RX ADMIN — IPRATROPIUM BROMIDE AND ALBUTEROL SULFATE 3 ML: .5; 3 SOLUTION RESPIRATORY (INHALATION) at 13:08

## 2017-01-01 RX ADMIN — CALCIUM GLUCONATE 2 G: 94 INJECTION, SOLUTION INTRAVENOUS at 10:27

## 2017-01-01 RX ADMIN — PROPOFOL 25 MCG/KG/MIN: 10 INJECTION, EMULSION INTRAVENOUS at 18:08

## 2017-01-01 RX ADMIN — IPRATROPIUM BROMIDE AND ALBUTEROL SULFATE 3 ML: .5; 3 SOLUTION RESPIRATORY (INHALATION) at 03:23

## 2017-01-01 RX ADMIN — Medication 20 ML: at 06:48

## 2017-01-01 RX ADMIN — IPRATROPIUM BROMIDE AND ALBUTEROL SULFATE 3 ML: .5; 3 SOLUTION RESPIRATORY (INHALATION) at 02:38

## 2017-01-01 RX ADMIN — CHLORHEXIDINE GLUCONATE 10 ML: 1.2 RINSE ORAL at 15:00

## 2017-01-01 RX ADMIN — HEPARIN SODIUM 5000 UNITS: 5000 INJECTION, SOLUTION INTRAVENOUS; SUBCUTANEOUS at 21:16

## 2017-01-01 RX ADMIN — ENOXAPARIN SODIUM 40 MG: 40 INJECTION SUBCUTANEOUS at 05:15

## 2017-01-01 RX ADMIN — METOPROLOL TARTRATE 50 MG: 50 TABLET ORAL at 11:43

## 2017-01-01 RX ADMIN — THERA TABS 1 TABLET: TAB at 08:16

## 2017-01-01 RX ADMIN — PIPERACILLIN SODIUM,TAZOBACTAM SODIUM 3.38 G: 3; .375 INJECTION, POWDER, FOR SOLUTION INTRAVENOUS at 18:03

## 2017-01-01 RX ADMIN — HEPARIN SODIUM 5000 UNITS: 5000 INJECTION, SOLUTION INTRAVENOUS; SUBCUTANEOUS at 04:41

## 2017-01-01 RX ADMIN — Medication 400 MG: at 12:01

## 2017-01-01 RX ADMIN — ENOXAPARIN SODIUM 40 MG: 40 INJECTION SUBCUTANEOUS at 06:15

## 2017-01-01 RX ADMIN — Medication 10 ML: at 22:31

## 2017-01-01 RX ADMIN — COLLAGENASE SANTYL: 250 OINTMENT TOPICAL at 09:00

## 2017-01-01 RX ADMIN — PROPOFOL 25 MCG/KG/MIN: 10 INJECTION, EMULSION INTRAVENOUS at 06:45

## 2017-01-01 RX ADMIN — SODIUM CHLORIDE 40 MG: 9 INJECTION INTRAMUSCULAR; INTRAVENOUS; SUBCUTANEOUS at 09:08

## 2017-01-01 RX ADMIN — DEXTROSE MONOHYDRATE AND POTASSIUM CHLORIDE: 5; .149 INJECTION, SOLUTION INTRAVENOUS at 08:50

## 2017-01-01 RX ADMIN — POTASSIUM CHLORIDE 10 MEQ: 10 INJECTION, SOLUTION INTRAVENOUS at 08:13

## 2017-01-01 RX ADMIN — DEXTROSE MONOHYDRATE 200 ML/HR: 5 INJECTION, SOLUTION INTRAVENOUS at 14:50

## 2017-01-01 RX ADMIN — ENOXAPARIN SODIUM 40 MG: 40 INJECTION SUBCUTANEOUS at 18:14

## 2017-01-01 RX ADMIN — LORAZEPAM 1 MG: 2 INJECTION INTRAMUSCULAR at 09:34

## 2017-01-01 RX ADMIN — PROPOFOL 50 MCG/KG/MIN: 10 INJECTION, EMULSION INTRAVENOUS at 03:45

## 2017-01-01 RX ADMIN — IPRATROPIUM BROMIDE AND ALBUTEROL SULFATE 3 ML: .5; 3 SOLUTION RESPIRATORY (INHALATION) at 02:09

## 2017-01-01 RX ADMIN — ACETAMINOPHEN 650 MG: 325 TABLET, FILM COATED ORAL at 17:20

## 2017-01-01 RX ADMIN — PIPERACILLIN SODIUM,TAZOBACTAM SODIUM 4.5 G: 4; .5 INJECTION, POWDER, FOR SOLUTION INTRAVENOUS at 11:12

## 2017-01-01 RX ADMIN — PROPOFOL 25 MCG/KG/MIN: 10 INJECTION, EMULSION INTRAVENOUS at 17:02

## 2017-01-01 RX ADMIN — PIPERACILLIN SODIUM,TAZOBACTAM SODIUM 4.5 G: 4; .5 INJECTION, POWDER, FOR SOLUTION INTRAVENOUS at 05:00

## 2017-01-01 RX ADMIN — PROPOFOL 40 MCG/KG/MIN: 10 INJECTION, EMULSION INTRAVENOUS at 22:30

## 2017-01-01 RX ADMIN — IPRATROPIUM BROMIDE AND ALBUTEROL SULFATE 3 ML: .5; 3 SOLUTION RESPIRATORY (INHALATION) at 20:01

## 2017-01-01 RX ADMIN — PIPERACILLIN SODIUM,TAZOBACTAM SODIUM 3.38 G: 3; .375 INJECTION, POWDER, FOR SOLUTION INTRAVENOUS at 14:39

## 2017-01-01 RX ADMIN — SODIUM CHLORIDE 150 ML/HR: 900 INJECTION, SOLUTION INTRAVENOUS at 03:37

## 2017-01-01 RX ADMIN — Medication 10 ML: at 06:43

## 2017-01-01 RX ADMIN — PROPOFOL 50 MCG/KG/MIN: 10 INJECTION, EMULSION INTRAVENOUS at 12:30

## 2017-01-01 RX ADMIN — IPRATROPIUM BROMIDE AND ALBUTEROL SULFATE 3 ML: .5; 3 SOLUTION RESPIRATORY (INHALATION) at 02:08

## 2017-01-01 RX ADMIN — Medication 10 ML: at 14:15

## 2017-01-01 RX ADMIN — MORPHINE SULFATE 2 MG: 2 INJECTION, SOLUTION INTRAMUSCULAR; INTRAVENOUS at 01:45

## 2017-01-01 RX ADMIN — SODIUM CHLORIDE 1000 MG: 900 INJECTION, SOLUTION INTRAVENOUS at 08:05

## 2017-01-01 RX ADMIN — PIPERACILLIN SODIUM,TAZOBACTAM SODIUM 4.5 G: 4; .5 INJECTION, POWDER, FOR SOLUTION INTRAVENOUS at 23:45

## 2017-01-01 RX ADMIN — SODIUM CHLORIDE 1000 MG: 900 INJECTION, SOLUTION INTRAVENOUS at 07:42

## 2017-01-01 RX ADMIN — HEPARIN SODIUM 5000 UNITS: 5000 INJECTION, SOLUTION INTRAVENOUS; SUBCUTANEOUS at 23:07

## 2017-01-01 RX ADMIN — ESCITALOPRAM OXALATE 10 MG: 10 TABLET ORAL at 09:26

## 2017-01-01 RX ADMIN — CHLORHEXIDINE GLUCONATE 10 ML: 1.2 RINSE ORAL at 20:06

## 2017-01-01 RX ADMIN — IPRATROPIUM BROMIDE AND ALBUTEROL SULFATE 3 ML: .5; 3 SOLUTION RESPIRATORY (INHALATION) at 02:36

## 2017-01-01 RX ADMIN — INSULIN LISPRO 2 UNITS: 100 INJECTION, SOLUTION INTRAVENOUS; SUBCUTANEOUS at 06:00

## 2017-01-01 RX ADMIN — MIDAZOLAM HYDROCHLORIDE 2 MG: 1 INJECTION, SOLUTION INTRAMUSCULAR; INTRAVENOUS at 02:43

## 2017-01-01 RX ADMIN — HEPARIN SODIUM 5000 UNITS: 5000 INJECTION, SOLUTION INTRAVENOUS; SUBCUTANEOUS at 04:18

## 2017-01-01 RX ADMIN — PIPERACILLIN SODIUM,TAZOBACTAM SODIUM 3.38 G: 3; .375 INJECTION, POWDER, FOR SOLUTION INTRAVENOUS at 21:56

## 2017-01-01 RX ADMIN — HEPARIN SODIUM 5000 UNITS: 5000 INJECTION, SOLUTION INTRAVENOUS; SUBCUTANEOUS at 06:19

## 2017-01-01 RX ADMIN — SODIUM CHLORIDE 150 ML/HR: 900 INJECTION, SOLUTION INTRAVENOUS at 01:00

## 2017-01-01 RX ADMIN — SODIUM CHLORIDE 1250 MG: 900 INJECTION, SOLUTION INTRAVENOUS at 09:12

## 2017-01-01 RX ADMIN — Medication 10 ML: at 15:18

## 2017-01-01 RX ADMIN — INFLUENZA A VIRUS A/CALIFORNIA/7/2009 X-179A (H1N1) ANTIGEN (FORMALDEHYDE INACTIVATED), INFLUENZA A VIRUS A/HONG KONG/4801/2014 X-263B (H3N2) ANTIGEN (FORMALDEHYDE INACTIVATED), INFLUENZA B VIRUS B/PHUKET/3073/2013 ANTIGEN (FORMALDEHYDE INACTIVATED), AND INFLUENZA B VIRUS B/BRISBANE/60/2008 ANTIGEN (FORMALDEHYDE INACTIVATED) 0.5 ML: 15; 15; 15; 15 INJECTION, SUSPENSION INTRAMUSCULAR at 13:11

## 2017-01-01 RX ADMIN — IPRATROPIUM BROMIDE AND ALBUTEROL SULFATE 3 ML: .5; 3 SOLUTION RESPIRATORY (INHALATION) at 20:05

## 2017-01-01 RX ADMIN — ENOXAPARIN SODIUM 40 MG: 40 INJECTION SUBCUTANEOUS at 05:38

## 2017-01-01 RX ADMIN — HEPARIN SODIUM 5000 UNITS: 5000 INJECTION, SOLUTION INTRAVENOUS; SUBCUTANEOUS at 04:00

## 2017-01-01 RX ADMIN — Medication 20 ML: at 19:09

## 2017-01-01 RX ADMIN — IPRATROPIUM BROMIDE AND ALBUTEROL SULFATE 3 ML: .5; 3 SOLUTION RESPIRATORY (INHALATION) at 19:35

## 2017-01-01 RX ADMIN — ENOXAPARIN SODIUM 40 MG: 40 INJECTION SUBCUTANEOUS at 18:00

## 2017-01-01 RX ADMIN — IPRATROPIUM BROMIDE AND ALBUTEROL SULFATE 3 ML: .5; 3 SOLUTION RESPIRATORY (INHALATION) at 09:06

## 2017-01-01 RX ADMIN — IPRATROPIUM BROMIDE AND ALBUTEROL SULFATE 3 ML: .5; 3 SOLUTION RESPIRATORY (INHALATION) at 14:01

## 2017-01-01 RX ADMIN — CHLORHEXIDINE GLUCONATE 10 ML: 1.2 RINSE ORAL at 20:50

## 2017-01-01 RX ADMIN — METOPROLOL TARTRATE 12.5 MG: 25 TABLET ORAL at 20:52

## 2017-01-01 RX ADMIN — THERA TABS 1 TABLET: TAB at 08:52

## 2017-01-01 RX ADMIN — SODIUM CHLORIDE 40 MG: 9 INJECTION INTRAMUSCULAR; INTRAVENOUS; SUBCUTANEOUS at 08:37

## 2017-01-01 RX ADMIN — ESMOLOL HYDROCHLORIDE 10 MG: 10 INJECTION INTRAVENOUS at 09:30

## 2017-01-01 RX ADMIN — Medication 30 ML: at 06:00

## 2017-01-01 RX ADMIN — THERA TABS 1 TABLET: TAB at 11:40

## 2017-01-01 RX ADMIN — ENOXAPARIN SODIUM 40 MG: 40 INJECTION SUBCUTANEOUS at 19:29

## 2017-01-01 RX ADMIN — CHLORHEXIDINE GLUCONATE 10 ML: 1.2 RINSE ORAL at 08:18

## 2017-01-01 RX ADMIN — LORAZEPAM 1 MG: 2 INJECTION INTRAMUSCULAR at 12:19

## 2017-01-01 RX ADMIN — DEXTROSE MONOHYDRATE AND POTASSIUM CHLORIDE: 5; .149 INJECTION, SOLUTION INTRAVENOUS at 21:42

## 2017-01-01 RX ADMIN — HEPARIN SODIUM 5000 UNITS: 5000 INJECTION, SOLUTION INTRAVENOUS; SUBCUTANEOUS at 22:04

## 2017-01-01 RX ADMIN — LEVOFLOXACIN 750 MG: 5 INJECTION, SOLUTION INTRAVENOUS at 17:31

## 2017-01-01 RX ADMIN — IPRATROPIUM BROMIDE AND ALBUTEROL SULFATE 3 ML: .5; 3 SOLUTION RESPIRATORY (INHALATION) at 20:51

## 2017-01-01 RX ADMIN — MORPHINE SULFATE 2 MG: 2 INJECTION, SOLUTION INTRAMUSCULAR; INTRAVENOUS at 12:14

## 2017-01-01 RX ADMIN — PROPOFOL 50 MCG/KG/MIN: 10 INJECTION, EMULSION INTRAVENOUS at 19:01

## 2017-01-01 RX ADMIN — PANTOPRAZOLE SODIUM 40 MG: 40 TABLET, DELAYED RELEASE ORAL at 09:31

## 2017-01-01 RX ADMIN — PROPOFOL 50 MCG/KG/MIN: 10 INJECTION, EMULSION INTRAVENOUS at 15:48

## 2017-01-01 RX ADMIN — ACETAMINOPHEN 650 MG: 325 TABLET, FILM COATED ORAL at 15:14

## 2017-01-01 RX ADMIN — ESCITALOPRAM OXALATE 10 MG: 10 TABLET ORAL at 10:05

## 2017-01-01 RX ADMIN — SODIUM CHLORIDE 40 MG: 9 INJECTION INTRAMUSCULAR; INTRAVENOUS; SUBCUTANEOUS at 08:51

## 2017-01-01 RX ADMIN — MORPHINE SULFATE 2 MG: 2 INJECTION, SOLUTION INTRAMUSCULAR; INTRAVENOUS at 22:32

## 2017-01-01 RX ADMIN — ENOXAPARIN SODIUM 40 MG: 40 INJECTION SUBCUTANEOUS at 06:43

## 2017-01-01 RX ADMIN — PROPOFOL 35 MCG/KG/MIN: 10 INJECTION, EMULSION INTRAVENOUS at 07:52

## 2017-01-01 RX ADMIN — Medication 10 ML: at 19:29

## 2017-01-01 RX ADMIN — IPRATROPIUM BROMIDE AND ALBUTEROL SULFATE 3 ML: .5; 3 SOLUTION RESPIRATORY (INHALATION) at 14:20

## 2017-01-01 RX ADMIN — CHLORHEXIDINE GLUCONATE 10 ML: 1.2 RINSE ORAL at 09:13

## 2017-01-01 RX ADMIN — MIDAZOLAM HYDROCHLORIDE 2 MG: 1 INJECTION, SOLUTION INTRAMUSCULAR; INTRAVENOUS at 16:13

## 2017-01-01 RX ADMIN — SODIUM CHLORIDE 150 ML/HR: 900 INJECTION, SOLUTION INTRAVENOUS at 18:19

## 2017-01-01 RX ADMIN — IPRATROPIUM BROMIDE AND ALBUTEROL SULFATE 3 ML: .5; 3 SOLUTION RESPIRATORY (INHALATION) at 03:00

## 2017-01-01 RX ADMIN — VASOPRESSIN 0.04 UNITS/MIN: 20 INJECTION INTRAVENOUS at 03:50

## 2017-01-01 RX ADMIN — IPRATROPIUM BROMIDE AND ALBUTEROL SULFATE 3 ML: .5; 3 SOLUTION RESPIRATORY (INHALATION) at 02:21

## 2017-01-01 RX ADMIN — DEXTROSE MONOHYDRATE 75 ML/HR: 5 INJECTION, SOLUTION INTRAVENOUS at 10:34

## 2017-01-01 RX ADMIN — SODIUM CHLORIDE 2000 MG: 900 INJECTION, SOLUTION INTRAVENOUS at 04:41

## 2017-01-01 RX ADMIN — THERA TABS 1 TABLET: TAB at 10:46

## 2017-01-01 RX ADMIN — SODIUM CHLORIDE 150 ML/HR: 900 INJECTION, SOLUTION INTRAVENOUS at 12:34

## 2017-01-01 RX ADMIN — Medication 15 MCG/MIN: at 12:40

## 2017-01-01 RX ADMIN — Medication 10 ML: at 09:34

## 2017-01-01 RX ADMIN — Medication 10 ML: at 23:29

## 2017-01-01 RX ADMIN — NEBIVOLOL HYDROCHLORIDE 5 MG: 5 TABLET ORAL at 09:33

## 2017-01-01 RX ADMIN — ENOXAPARIN SODIUM 40 MG: 40 INJECTION SUBCUTANEOUS at 07:35

## 2017-01-01 RX ADMIN — MORPHINE SULFATE 2 MG: 2 INJECTION, SOLUTION INTRAMUSCULAR; INTRAVENOUS at 11:58

## 2017-01-01 RX ADMIN — ENOXAPARIN SODIUM 40 MG: 40 INJECTION SUBCUTANEOUS at 19:02

## 2017-01-01 RX ADMIN — ASPIRIN 81 MG CHEWABLE TABLET 81 MG: 81 TABLET CHEWABLE at 08:16

## 2017-01-01 RX ADMIN — IPRATROPIUM BROMIDE AND ALBUTEROL SULFATE 3 ML: .5; 3 SOLUTION RESPIRATORY (INHALATION) at 20:02

## 2017-01-01 RX ADMIN — ALBUMIN (HUMAN) 25 G: 0.25 INJECTION, SOLUTION INTRAVENOUS at 11:29

## 2017-01-01 RX ADMIN — PROPOFOL 15 MCG/KG/MIN: 10 INJECTION, EMULSION INTRAVENOUS at 05:49

## 2017-01-01 RX ADMIN — POTASSIUM CHLORIDE 10 MEQ: 10 INJECTION, SOLUTION INTRAVENOUS at 17:44

## 2017-01-01 RX ADMIN — IPRATROPIUM BROMIDE AND ALBUTEROL SULFATE 3 ML: .5; 3 SOLUTION RESPIRATORY (INHALATION) at 13:44

## 2017-01-01 RX ADMIN — CHLORHEXIDINE GLUCONATE 10 ML: 1.2 RINSE ORAL at 21:06

## 2017-01-01 RX ADMIN — PIPERACILLIN SODIUM,TAZOBACTAM SODIUM 3.38 G: 3; .375 INJECTION, POWDER, FOR SOLUTION INTRAVENOUS at 05:39

## 2017-01-01 RX ADMIN — SODIUM CHLORIDE 40 MG: 9 INJECTION INTRAMUSCULAR; INTRAVENOUS; SUBCUTANEOUS at 08:20

## 2017-01-01 RX ADMIN — METOPROLOL TARTRATE 50 MG: 50 TABLET ORAL at 21:52

## 2017-01-01 RX ADMIN — IPRATROPIUM BROMIDE AND ALBUTEROL SULFATE 3 ML: .5; 3 SOLUTION RESPIRATORY (INHALATION) at 13:54

## 2017-01-01 RX ADMIN — IPRATROPIUM BROMIDE AND ALBUTEROL SULFATE 3 ML: .5; 3 SOLUTION RESPIRATORY (INHALATION) at 13:24

## 2017-01-01 RX ADMIN — HEPARIN SODIUM 5000 UNITS: 5000 INJECTION, SOLUTION INTRAVENOUS; SUBCUTANEOUS at 11:53

## 2017-01-01 RX ADMIN — PROPOFOL 50 MCG/KG/MIN: 10 INJECTION, EMULSION INTRAVENOUS at 17:31

## 2017-01-01 RX ADMIN — LEVOFLOXACIN 750 MG: 5 INJECTION, SOLUTION INTRAVENOUS at 17:53

## 2017-01-01 RX ADMIN — CHLORHEXIDINE GLUCONATE 10 ML: 1.2 RINSE ORAL at 08:50

## 2017-01-01 RX ADMIN — OXYCODONE HYDROCHLORIDE AND ACETAMINOPHEN 1 TABLET: 5; 325 TABLET ORAL at 23:12

## 2017-01-01 RX ADMIN — IPRATROPIUM BROMIDE AND ALBUTEROL SULFATE 3 ML: .5; 3 SOLUTION RESPIRATORY (INHALATION) at 14:29

## 2017-01-01 RX ADMIN — IPRATROPIUM BROMIDE AND ALBUTEROL SULFATE 3 ML: .5; 3 SOLUTION RESPIRATORY (INHALATION) at 02:37

## 2017-01-01 RX ADMIN — ENOXAPARIN SODIUM 40 MG: 40 INJECTION SUBCUTANEOUS at 05:55

## 2017-01-01 RX ADMIN — IPRATROPIUM BROMIDE AND ALBUTEROL SULFATE 3 ML: .5; 3 SOLUTION RESPIRATORY (INHALATION) at 20:47

## 2017-01-01 RX ADMIN — SODIUM CHLORIDE 2000 ML: 900 INJECTION, SOLUTION INTRAVENOUS at 17:50

## 2017-01-01 RX ADMIN — IPRATROPIUM BROMIDE AND ALBUTEROL SULFATE 3 ML: .5; 3 SOLUTION RESPIRATORY (INHALATION) at 14:09

## 2017-01-01 RX ADMIN — SODIUM CHLORIDE 150 ML/HR: 900 INJECTION, SOLUTION INTRAVENOUS at 06:30

## 2017-01-01 RX ADMIN — SODIUM CHLORIDE, SODIUM LACTATE, POTASSIUM CHLORIDE, CALCIUM CHLORIDE, AND DEXTROSE MONOHYDRATE 75 ML/HR: 600; 310; 30; 20; 5 INJECTION, SOLUTION INTRAVENOUS at 21:16

## 2017-01-01 RX ADMIN — HEPARIN SODIUM 5000 UNITS: 5000 INJECTION, SOLUTION INTRAVENOUS; SUBCUTANEOUS at 12:02

## 2017-01-01 RX ADMIN — SODIUM CHLORIDE, SODIUM LACTATE, POTASSIUM CHLORIDE, CALCIUM CHLORIDE, AND DEXTROSE MONOHYDRATE 100 ML/HR: 600; 310; 30; 20; 5 INJECTION, SOLUTION INTRAVENOUS at 06:20

## 2017-01-01 RX ADMIN — Medication 10 ML: at 05:45

## 2017-01-01 RX ADMIN — THERA TABS 1 TABLET: TAB at 09:31

## 2017-01-01 RX ADMIN — Medication 20 ML: at 14:00

## 2017-01-01 RX ADMIN — METOPROLOL TARTRATE 25 MG: 25 TABLET ORAL at 21:26

## 2017-01-01 RX ADMIN — ESCITALOPRAM OXALATE 10 MG: 10 TABLET ORAL at 10:52

## 2017-01-01 RX ADMIN — METOPROLOL TARTRATE 12.5 MG: 25 TABLET ORAL at 22:31

## 2017-01-01 RX ADMIN — HEPARIN SODIUM 5000 UNITS: 5000 INJECTION, SOLUTION INTRAVENOUS; SUBCUTANEOUS at 22:01

## 2017-01-01 RX ADMIN — PIPERACILLIN SODIUM,TAZOBACTAM SODIUM 3.38 G: 3; .375 INJECTION, POWDER, FOR SOLUTION INTRAVENOUS at 14:16

## 2017-01-01 RX ADMIN — PIPERACILLIN SODIUM,TAZOBACTAM SODIUM 3.38 G: 3; .375 INJECTION, POWDER, FOR SOLUTION INTRAVENOUS at 15:00

## 2017-01-01 RX ADMIN — PANTOPRAZOLE SODIUM 40 MG: 40 TABLET, DELAYED RELEASE ORAL at 09:49

## 2017-01-01 RX ADMIN — HEPARIN SODIUM 5000 UNITS: 5000 INJECTION, SOLUTION INTRAVENOUS; SUBCUTANEOUS at 20:26

## 2017-01-01 RX ADMIN — IPRATROPIUM BROMIDE AND ALBUTEROL SULFATE 3 ML: .5; 3 SOLUTION RESPIRATORY (INHALATION) at 21:57

## 2017-01-01 RX ADMIN — PIPERACILLIN SODIUM,TAZOBACTAM SODIUM 3.38 G: 3; .375 INJECTION, POWDER, FOR SOLUTION INTRAVENOUS at 13:26

## 2017-01-01 RX ADMIN — SODIUM CHLORIDE 40 MG: 9 INJECTION INTRAMUSCULAR; INTRAVENOUS; SUBCUTANEOUS at 09:45

## 2017-01-01 RX ADMIN — ENOXAPARIN SODIUM 40 MG: 40 INJECTION SUBCUTANEOUS at 05:57

## 2017-01-01 RX ADMIN — SODIUM CHLORIDE 6 MMOL: 900 INJECTION, SOLUTION INTRAVENOUS at 23:12

## 2017-01-01 RX ADMIN — Medication 10 ML: at 22:39

## 2017-01-01 RX ADMIN — METOPROLOL TARTRATE 1 MG: 5 INJECTION INTRAVENOUS at 09:42

## 2017-01-01 RX ADMIN — SODIUM CHLORIDE 1250 MG: 900 INJECTION, SOLUTION INTRAVENOUS at 21:15

## 2017-01-01 RX ADMIN — HEPARIN SODIUM 5000 UNITS: 5000 INJECTION, SOLUTION INTRAVENOUS; SUBCUTANEOUS at 15:17

## 2017-01-01 RX ADMIN — METOPROLOL TARTRATE 25 MG: 25 TABLET ORAL at 09:41

## 2017-01-01 RX ADMIN — HEPARIN SODIUM 5000 UNITS: 5000 INJECTION, SOLUTION INTRAVENOUS; SUBCUTANEOUS at 20:20

## 2017-01-01 RX ADMIN — PIPERACILLIN SODIUM,TAZOBACTAM SODIUM 4.5 G: 4; .5 INJECTION, POWDER, FOR SOLUTION INTRAVENOUS at 05:42

## 2017-01-01 RX ADMIN — ENOXAPARIN SODIUM 40 MG: 40 INJECTION SUBCUTANEOUS at 18:52

## 2017-01-01 RX ADMIN — DEXTROSE MONOHYDRATE 200 ML/HR: 5 INJECTION, SOLUTION INTRAVENOUS at 08:19

## 2017-01-01 RX ADMIN — ESCITALOPRAM OXALATE 20 MG: 10 TABLET ORAL at 10:46

## 2017-01-01 RX ADMIN — INSULIN LISPRO 2 UNITS: 100 INJECTION, SOLUTION INTRAVENOUS; SUBCUTANEOUS at 00:27

## 2017-01-01 RX ADMIN — INSULIN LISPRO 2 UNITS: 100 INJECTION, SOLUTION INTRAVENOUS; SUBCUTANEOUS at 05:59

## 2017-01-01 RX ADMIN — OXYCODONE HYDROCHLORIDE AND ACETAMINOPHEN 1 TABLET: 5; 325 TABLET ORAL at 00:16

## 2017-01-01 RX ADMIN — PROPOFOL 35 MCG/KG/MIN: 10 INJECTION, EMULSION INTRAVENOUS at 00:20

## 2017-01-01 RX ADMIN — PIPERACILLIN SODIUM,TAZOBACTAM SODIUM 4.5 G: 4; .5 INJECTION, POWDER, FOR SOLUTION INTRAVENOUS at 05:35

## 2017-01-01 RX ADMIN — SODIUM CHLORIDE 150 ML/HR: 450 INJECTION, SOLUTION INTRAVENOUS at 09:00

## 2017-01-01 RX ADMIN — ACETAMINOPHEN 650 MG: 325 TABLET, FILM COATED ORAL at 06:15

## 2017-01-01 RX ADMIN — SODIUM CHLORIDE 200 ML/HR: 900 INJECTION, SOLUTION INTRAVENOUS at 06:51

## 2017-01-01 RX ADMIN — MIDAZOLAM HYDROCHLORIDE 2 MG: 1 INJECTION, SOLUTION INTRAMUSCULAR; INTRAVENOUS at 15:27

## 2017-01-01 RX ADMIN — METOPROLOL TARTRATE 25 MG: 25 TABLET ORAL at 09:49

## 2017-01-01 RX ADMIN — IPRATROPIUM BROMIDE AND ALBUTEROL SULFATE 3 ML: .5; 3 SOLUTION RESPIRATORY (INHALATION) at 02:15

## 2017-01-01 RX ADMIN — OXYCODONE HYDROCHLORIDE AND ACETAMINOPHEN 1 TABLET: 5; 325 TABLET ORAL at 02:50

## 2017-01-01 RX ADMIN — METOPROLOL TARTRATE 25 MG: 25 TABLET ORAL at 10:06

## 2017-01-01 RX ADMIN — PIPERACILLIN SODIUM,TAZOBACTAM SODIUM 3.38 G: 3; .375 INJECTION, POWDER, FOR SOLUTION INTRAVENOUS at 22:11

## 2017-01-01 RX ADMIN — PROPOFOL 50 MCG/KG/MIN: 10 INJECTION, EMULSION INTRAVENOUS at 05:00

## 2017-01-01 RX ADMIN — PIPERACILLIN SODIUM,TAZOBACTAM SODIUM 3.38 G: 3; .375 INJECTION, POWDER, FOR SOLUTION INTRAVENOUS at 14:49

## 2017-01-01 RX ADMIN — Medication 10 ML: at 04:37

## 2017-01-01 RX ADMIN — PROPOFOL 20 MCG/KG/MIN: 10 INJECTION, EMULSION INTRAVENOUS at 21:54

## 2017-01-01 RX ADMIN — IPRATROPIUM BROMIDE AND ALBUTEROL SULFATE 3 ML: .5; 3 SOLUTION RESPIRATORY (INHALATION) at 08:51

## 2017-01-01 RX ADMIN — Medication 3 MG: at 10:25

## 2017-01-01 RX ADMIN — SODIUM CHLORIDE 1500 MG: 900 INJECTION, SOLUTION INTRAVENOUS at 19:32

## 2017-01-01 RX ADMIN — DEXTROSE MONOHYDRATE AND POTASSIUM CHLORIDE: 5; .149 INJECTION, SOLUTION INTRAVENOUS at 14:01

## 2017-01-01 RX ADMIN — HEPARIN SODIUM 5000 UNITS: 5000 INJECTION, SOLUTION INTRAVENOUS; SUBCUTANEOUS at 05:43

## 2017-01-01 RX ADMIN — PANTOPRAZOLE SODIUM 40 MG: 40 TABLET, DELAYED RELEASE ORAL at 08:52

## 2017-01-01 RX ADMIN — THERA TABS 1 TABLET: TAB at 09:49

## 2017-01-01 RX ADMIN — SODIUM CHLORIDE 2000 MG: 900 INJECTION, SOLUTION INTRAVENOUS at 17:07

## 2017-01-01 RX ADMIN — Medication 20 MCG/MIN: at 05:53

## 2017-01-01 RX ADMIN — Medication 10 ML: at 22:02

## 2017-01-01 RX ADMIN — SODIUM CHLORIDE, SODIUM LACTATE, POTASSIUM CHLORIDE, CALCIUM CHLORIDE, AND DEXTROSE MONOHYDRATE 100 ML/HR: 600; 310; 30; 20; 5 INJECTION, SOLUTION INTRAVENOUS at 10:05

## 2017-01-01 RX ADMIN — Medication 10 ML: at 19:07

## 2017-01-01 RX ADMIN — HEPARIN SODIUM 5000 UNITS: 5000 INJECTION, SOLUTION INTRAVENOUS; SUBCUTANEOUS at 12:37

## 2017-01-01 RX ADMIN — ROCURONIUM BROMIDE 10 MG: 10 INJECTION, SOLUTION INTRAVENOUS at 09:02

## 2017-01-01 RX ADMIN — IPRATROPIUM BROMIDE AND ALBUTEROL SULFATE 3 ML: .5; 3 SOLUTION RESPIRATORY (INHALATION) at 20:40

## 2017-01-01 RX ADMIN — POTASSIUM CHLORIDE 10 MEQ: 7.46 INJECTION, SOLUTION INTRAVENOUS at 13:11

## 2017-01-01 RX ADMIN — DEXTROSE MONOHYDRATE AND SODIUM CHLORIDE 25 ML/HR: 5; .9 INJECTION, SOLUTION INTRAVENOUS at 19:40

## 2017-01-01 RX ADMIN — IPRATROPIUM BROMIDE AND ALBUTEROL SULFATE 3 ML: .5; 3 SOLUTION RESPIRATORY (INHALATION) at 03:55

## 2017-01-01 RX ADMIN — SODIUM CHLORIDE 40 MG: 9 INJECTION INTRAMUSCULAR; INTRAVENOUS; SUBCUTANEOUS at 08:46

## 2017-01-01 RX ADMIN — Medication 10 MCG/MIN: at 19:42

## 2017-01-01 RX ADMIN — SODIUM CHLORIDE 40 MG: 9 INJECTION INTRAMUSCULAR; INTRAVENOUS; SUBCUTANEOUS at 08:45

## 2017-01-01 RX ADMIN — Medication 4 MCG/MIN: at 18:51

## 2017-01-01 RX ADMIN — POTASSIUM CHLORIDE 10 MEQ: 10 INJECTION, SOLUTION INTRAVENOUS at 19:03

## 2017-01-01 RX ADMIN — SUCCINYLCHOLINE CHLORIDE 140 MG: 20 INJECTION INTRAMUSCULAR; INTRAVENOUS at 08:45

## 2017-01-01 RX ADMIN — NEBIVOLOL HYDROCHLORIDE 5 MG: 5 TABLET ORAL at 12:19

## 2017-01-01 RX ADMIN — POTASSIUM CHLORIDE 10 MEQ: 7.46 INJECTION, SOLUTION INTRAVENOUS at 12:05

## 2017-01-01 RX ADMIN — ALBUMIN (HUMAN) 25 G: 0.25 INJECTION, SOLUTION INTRAVENOUS at 05:00

## 2017-01-01 RX ADMIN — IPRATROPIUM BROMIDE AND ALBUTEROL SULFATE 3 ML: .5; 3 SOLUTION RESPIRATORY (INHALATION) at 15:14

## 2017-01-01 RX ADMIN — ENOXAPARIN SODIUM 40 MG: 40 INJECTION SUBCUTANEOUS at 18:02

## 2017-01-01 RX ADMIN — SODIUM CHLORIDE 1500 MG: 900 INJECTION, SOLUTION INTRAVENOUS at 23:43

## 2017-01-01 RX ADMIN — PIPERACILLIN SODIUM,TAZOBACTAM SODIUM 4.5 G: 4; .5 INJECTION, POWDER, FOR SOLUTION INTRAVENOUS at 17:51

## 2017-01-01 RX ADMIN — METOPROLOL TARTRATE 1 MG: 5 INJECTION INTRAVENOUS at 09:37

## 2017-01-01 RX ADMIN — IPRATROPIUM BROMIDE AND ALBUTEROL SULFATE 3 ML: .5; 3 SOLUTION RESPIRATORY (INHALATION) at 14:28

## 2017-01-01 RX ADMIN — TRAMADOL HYDROCHLORIDE 50 MG: 50 TABLET, FILM COATED ORAL at 00:03

## 2017-01-01 RX ADMIN — ENOXAPARIN SODIUM 40 MG: 40 INJECTION SUBCUTANEOUS at 06:02

## 2017-01-01 RX ADMIN — ENOXAPARIN SODIUM 40 MG: 40 INJECTION SUBCUTANEOUS at 05:59

## 2017-01-01 RX ADMIN — PIPERACILLIN SODIUM,TAZOBACTAM SODIUM 4.5 G: 4; .5 INJECTION, POWDER, FOR SOLUTION INTRAVENOUS at 06:09

## 2017-01-01 RX ADMIN — IPRATROPIUM BROMIDE AND ALBUTEROL SULFATE 3 ML: .5; 3 SOLUTION RESPIRATORY (INHALATION) at 08:45

## 2017-01-01 RX ADMIN — PIPERACILLIN SODIUM,TAZOBACTAM SODIUM 3.38 G: 3; .375 INJECTION, POWDER, FOR SOLUTION INTRAVENOUS at 13:35

## 2017-01-01 RX ADMIN — IPRATROPIUM BROMIDE AND ALBUTEROL SULFATE 3 ML: .5; 3 SOLUTION RESPIRATORY (INHALATION) at 02:50

## 2017-01-01 RX ADMIN — Medication 30 ML: at 15:04

## 2017-01-01 RX ADMIN — IPRATROPIUM BROMIDE AND ALBUTEROL SULFATE 3 ML: .5; 3 SOLUTION RESPIRATORY (INHALATION) at 08:36

## 2017-01-20 PROBLEM — M62.82 RHABDOMYOLYSIS: Status: ACTIVE | Noted: 2017-01-01

## 2017-01-20 NOTE — ED TRIAGE NOTES
Patient states she fell out of the bed and stayed on the floor for the past 4 days, states she was unable to get herself up, a [employer] friend called EMS, states she has had diarrhea for one week    No LOC when she fell

## 2017-01-20 NOTE — ED PROVIDER NOTES
HPI Comments: Luis Hughes is a 64year old female who presents to the ED after being brought in by [facility]. The call went out for a fall, and unable to get up off the floor. When EMS arrived at the residence they found the Pt in bed. She reports to have fallen and laid on the floor for four days. Her son lives with her at home, but was unable to get her up and off the floor. When he did get her off the floor, the skin of the Pt's buttocks was stuck to the fllor and a great deal of epidermal skin was peeled off in the process of picking her up off the floor. Pt also states she has had diarrhea for a week. Patient is a 64 y.o. female presenting with other event. The history is provided by the patient and the EMS personnel. History limited by: no communication barrier. Other   This is a new problem. The current episode started more than 2 days ago. The problem occurs constantly. The problem has not changed since onset. Nothing aggravates the symptoms. Nothing relieves the symptoms. She has tried nothing for the symptoms. The treatment provided no relief.         Past Medical History:   Diagnosis Date    Anemia     Anxiety 2016    Arthritis     Cancer ([address]      pre cancerous cells in the uterus    Chronic pain      knees    Chronic pain of both knees 2016    Cigarette nicotine dependence in remission 2016    Diabetes ([address]     Fatty liver 2016    Headache     Hypertension     Liver disease      fatty liver    Lymphedema 2016    Morbid obesity ([address] 2016    Psychotic disorder      anxiety and panic attacks    Thyroid disease        Past Surgical History:   Procedure Laterality Date    Hx cholecystectomy      Hx gyn  2006     hysterectomy    Hx  section           Family History:   Problem Relation Age of Onset    Diabetes Mother     Hypertension Mother     Lung Disease Father        Social History     Social History    Marital status:      Spouse name: N/A    Number of children: N/A    Years of education: N/A     Occupational History    Not on file. Social History Main Topics    Smoking status: Never Smoker    Smokeless tobacco: Never Used    Alcohol use No    Drug use: No    Sexual activity: No     Other Topics Concern    Not on file     Social History Narrative         ALLERGIES: Ace inhibitors; Hydralazine; Ibuprofen; Norvasc [amlodipine]; Sulfa (sulfonamide antibiotics); and Tramadol    Review of Systems   Constitutional: Negative. HENT: Negative. Eyes: Negative. Respiratory: Negative. Cardiovascular: Negative. Gastrointestinal: Positive for diarrhea. Endocrine: Negative. Genitourinary: Negative. Musculoskeletal: Negative. Skin: Positive for wound (skin ecoriation on the bilateral buttocks. ). Allergic/Immunologic: Negative. Neurological: Negative. Hematological: Negative. Psychiatric/Behavioral: Negative. Vitals:    01/20/17 1254   BP: 118/63   Pulse: (!) 116   Resp: 18   Temp: 98 °F (36.7 °C)   SpO2: 98%   Weight: (!) 204.1 kg (450 lb)   Height: 5' 6.5\" (1.689 m)            Physical Exam   Constitutional: She is oriented to person, place, and time. She appears well-developed and well-nourished. No distress. HENT:   Head: Normocephalic and atraumatic. Eyes: EOM are normal. Pupils are equal, round, and reactive to light. Neck: Normal range of motion. Neck supple. Cardiovascular: Normal rate, regular rhythm and normal heart sounds. Pulmonary/Chest: Effort normal and breath sounds normal. No respiratory distress. She has no wheezes. She has no rales. Abdominal: Soft. Bowel sounds are normal. She exhibits distension. There is no tenderness. Genitourinary:   Genitourinary Comments: NE   Musculoskeletal: Normal range of motion. Neurological: She is alert and oriented to person, place, and time. No cranial nerve deficit. She exhibits normal muscle tone.  Coordination normal.   Skin: Skin is warm and dry. There are excoriation on a large majority of the bilateral buttocks. The area are not decubitus like, but are torn away. No bleeding. Psychiatric: She has a normal mood and affect. Nursing note and vitals reviewed. MDM  Number of Diagnoses or Management Options  Leukocytosis, unspecified type:   Traumatic rhabdomyolysis, initial encounter [facility]):   Diagnosis management comments: PROGRESS NOTE:  CVare of the Pt has been delayed as the staff has been unable to get an IV started. The nurses and techs have made multipe attempts at her arms and hands. I have tried to place an EJ on each side of her neck. Dr Mp Perez is going to try and start and ultrasound guided IV Lucy Milian NP  4:42 PM    7:00 PM Assumed care of patient from [facility] NP-C. Went to evaluate patient. Assited with obando placement. Approx 15 minutes after obando placement noted increase in heart rate. To mid 130s. Patient denies SOB, CP. Patient very warm to touch, ordered additional fluids and tylenol, avoiding NSAIDs due to rhabdo. POC lactic also ordered. Chest xray - negative, trace leuks, mirco pending. 8:18 PM POC lactic acid is normal.  UA - no evidence of UTI. Elevated WBC 14.2. CPK is 9190, CKMB is 15.5. AST and ALT are elevated. Heart rate is down to 113. Will consult internal medicine for admission. Patient agrees with admission plan. CONSULT NOTE:   9:19 PM  I spoke with Dr Taco Prajapati   Specialty: Internal medicine  Discussed pt's hx, disposition, and available diagnostic and imaging results. Reviewed care plans. Consulting physician agrees with plans as outlined. Patient to be admitted to hospital .   Written by John Coleman NP-C      ED Course       Procedures    PROGRESS NOTE:  Care of the Pt has been turned over to [facility] NP-C at time of shift change.   Lucy Milian NP  6:51 PM           9:20 PM   DISPOSITION - Admitted by Dr Taco Prajapati

## 2017-01-20 NOTE — IP AVS SNAPSHOT
[address] [place] [address] 
[phone] Patient: Nicolas Mosley MRN: [id number] OQT:3/0/3585 You are allergic to the following Allergen Reactions Ace Inhibitors Nausea and Vomiting Hydralazine Nausea and Vomiting Ibuprofen Shortness of Breath Norvasc (Amlodipine) Swelling Sulfa (Sulfonamide Antibiotics) Itching Tramadol Shortness of Breath Immunizations Administered for This Admission Name Date Influenza Vaccine (Quad) PF 1/22/2017 Recent Documentation Height Weight BMI OB Status Smoking Status 1.689 m (!) 206.7 kg 72.45 kg/m2 Hysterectomy Never Smoker Unresulted Labs Order Current Status CULTURE, BLOOD Preliminary result CULTURE, BLOOD Preliminary result CULTURE, WOUND W GRAM STAIN Preliminary result Emergency Contacts Name Discharge Info Relation Home Work Mobile Mohit Lozada  Spouse [3] [phone] About your hospitalization You were admitted on:  January 20, 2017 You last received care in the:  [address] [place] [address] Road You were discharged on:  January 22, 2017 Unit phone number:  [phone] Why you were hospitalized Your primary diagnosis was:  Not on File Your diagnoses also included:  Rhabdomyolysis Providers Seen During Your Hospitalizations Provider Role Specialty Primary office phone Osvaldo Christina DO Attending Provider Emergency Medicine [phone] Lynda Carballo MD Attending Provider [facility] [phone] Gurpreet Giles MD Attending Provider Internal Medicine [phone] Your Primary Care Physician (PCP) Primary Care Physician Office Phone Office Fax Kenzie Stock [phone] Follow-up Information Follow up With Details Comments Contact Info Aroldo Alvarez MD   [id number] [facility] [address] [address] 
[phone] Current Discharge Medication List  
  
START taking these medications Dose & Instructions Dispensing Information Comments Morning Noon Evening Bedtime  
 cephALEXin 500 mg capsule Commonly known as:  Alysha Craze Your next dose is:  Tomorrow Dose:  500 mg Take 1 Cap by mouth four (4) times daily. Quantity:  28 Cap Refills:  0 CONTINUE these medications which have NOT CHANGED Dose & Instructions Dispensing Information Comments Morning Noon Evening Bedtime BYSTOLIC 5 mg tablet Generic drug:  nebivolol Your next dose is:  Tomorrow Dose:  5 mg Take 5 mg by mouth daily. Refills:  0  
     
  
   
   
   
  
 glimepiride 2 mg tablet Commonly known as:  AMARYL Your next dose is: Today Dose:  2 mg Take 2 mg by mouth every morning. Refills:  0  
     
   
   
  
   
  
 hydroCHLOROthiazide 25 mg tablet Commonly known as:  HYDRODIURIL Your next dose is:  Tomorrow Dose:  25 mg Take 25 mg by mouth daily. Refills:  0  
     
  
   
   
   
  
 loratadine 10 mg tablet Commonly known as:  Juan Eliazar Dose:  10 mg  
10 mg. Refills:  0  
     
   
   
   
  
 losartan 25 mg tablet Commonly known as:  COZAAR Your next dose is: Today Dose:  25 mg Take 1 Tab by mouth every evening. Quantity:  90 Tab Refills:  3  
     
   
   
  
   
  
 metoprolol succinate 25 mg XL tablet Commonly known as:  TOPROL-XL Your next dose is:  Tomorrow Dose:  25 mg Take 1 Tab by mouth daily. Quantity:  90 Tab Refills:  3  
     
  
   
   
   
  
 traMADol 50 mg tablet Commonly known as:  ULTRAM  
   
 Dose:  50 mg Take 50 mg by mouth every six (6) hours as needed for Pain. Refills:  0  
     
   
   
   
  
 VITAMIN D2 50,000 unit capsule Generic drug:  ergocalciferol Dose:  98885 Units Take 50,000 Units by mouth. Refills:  0 STOP taking these medications   
 furosemide 20 mg tablet Commonly known as:  LASIX Where to Get Your Medications Information on where to get these meds will be given to you by the nurse or doctor. ! Ask your nurse or doctor about these medications  
  cephALEXin 500 mg capsule Discharge Instructions DISCHARGE SUMMARY from Nurse The following personal items are in your possession at time of discharge: 
 
Dental Appliances: None Visual Aid: Glasses, At bedside Home Medications: Sent to pharmacy Jewelry: Browne Fraction Clothing: At bedside Other Valuables: None PATIENT INSTRUCTIONS: 
 
After general anesthesia or intravenous sedation, for 24 hours or while taking prescription Narcotics: · Limit your activities · Do not drive and operate hazardous machinery · Do not make important personal or business decisions · Do  not drink alcoholic beverages · If you have not urinated within 8 hours after discharge, please contact your surgeon on call. What to do at Home: 
Recommended activity: Activity as tolerated, or as recommended by PT or PCP If you experience any of the following symptoms listed under RHABDOMYOLYSIS, please follow up with your PCP. *  Please give a list of your current medications to your Primary Care Provider. *  Please update this list whenever your medications are discontinued, doses are 
    changed, or new medications (including over-the-counter products) are added. *  Please carry medication information at all times in case of emergency situations. These are general instructions for a healthy lifestyle: No smoking/ No tobacco products/ Avoid exposure to second hand smoke Surgeon General's Warning:  Quitting smoking now greatly reduces serious risk to your health.  
 
Obesity, smoking, and sedentary lifestyle greatly increases your risk for illness A healthy diet, regular physical exercise & weight monitoring are important for maintaining a healthy lifestyle You may be retaining fluid if you have a history of heart failure or if you experience any of the following symptoms:  Weight gain of 3 pounds or more overnight or 5 pounds in a week, increased swelling in our hands or feet or shortness of breath while lying flat in bed. Please call your doctor as soon as you notice any of these symptoms; do not wait until your next office visit. Recognize signs and symptoms of STROKE: 
 
F-face looks uneven A-arms unable to move or move unevenly S-speech slurred or non-existent T-time-call 911 as soon as signs and symptoms begin-DO NOT go Back to bed or wait to see if you get better-TIME IS BRAIN. Warning Signs of HEART ATTACK Call 911 if you have these symptoms: 
? Chest discomfort. Most heart attacks involve discomfort in the center of the chest that lasts more than a few minutes, or that goes away and comes back. It can feel like uncomfortable pressure, squeezing, fullness, or pain. ? Discomfort in other areas of the upper body. Symptoms can include pain or discomfort in one or both arms, the back, neck, jaw, or stomach. ? Shortness of breath with or without chest discomfort. ? Other signs may include breaking out in a cold sweat, nausea, or lightheadedness. Don't wait more than five minutes to call [address]! Fast action can save your life. Calling 911 is almost always the fastest way to get lifesaving treatment. Emergency Medical Services staff can begin treatment when they arrive  up to an hour sooner than if someone gets to the hospital by car. The discharge information has been reviewed with the patient. The patient verbalized understanding. Discharge medications reviewed with the patient and appropriate educational materials and side effects teaching were provided. Patient armband removed and shredded Rhabdomyolysis: Care Instructions Your Care Instructions When you have rhabdomyolysis (say \"smk-ile-vn-AH-ga-suss\"), dying muscle cells cause toxins to build up in the blood. If not treated, it can cause life-threatening damage to the body's organs. It can be caused by many things, such as severe muscle injury, some medicines (like statins), the flu, and certain blood infections. Symptoms may include weak muscles, pain, stiffness, fever, and nausea. Your urine may also be dark. You will get treatment in the hospital. If possible, the doctor will stop the cause of muscle cell death. The doctor will take steps to protect your organs. You may have to stop taking certain medicines if they are the cause of the problem. You will also get treatment to help the kidneys remove the toxins from your blood. This includes plenty of fluids. You may get fluids through a vein (by IV). You may also need dialysis. Follow-up care is a key part of your treatment and safety. Be sure to make and go to all appointments, and call your doctor if you are having problems. It's also a good idea to know your test results and keep a list of the medicines you take. How can you care for yourself at home? · Take pain medicines exactly as directed. ¨ If the doctor gave you a prescription medicine for pain, take it as prescribed. ¨ If you are not taking a prescription pain medicine, ask your doctor if you can take an over-the-counter medicine. · Talk to your doctor about whether you need to stop taking any medicines. Follow your doctor's instructions about stopping medicines. · Drink plenty of fluids, enough so that your urine is light yellow or clear like water. If you have kidney, heart, or liver disease and have to limit fluids, talk with your doctor before you increase the amount of fluids you drink. When should you call for help? Call your doctor now or seek immediate medical care if: 
· You have new or worse muscle pain. · You have less urine than normal or no urine. · You have new swelling in your arms or feet. · You have blood in your urine. Watch closely for changes in your health, and be sure to contact your doctor if you do not get better as expected. Where can you learn more? Go to http://jeb-fritz.info/. Enter F129 in the search box to learn more about \"Rhabdomyolysis: Care Instructions. \" Current as of: November 20, 2015 Content Version: 11.1 © 9162-4103 [employer]. Care instructions adapted under license by [employer] (which disclaims liability or warranty for this information). If you have questions about a medical condition or this instruction, always ask your healthcare professional. [address] any warranty or liability for your use of this information. Discharge Instructions Attachments/References CEPHALEXIN (BY MOUTH) (ENGLISH) Discharge Orders None [employer] Announcement We are excited to announce that we are making your provider's discharge notes available to you in [employer]. You will see these notes when they are completed and signed by the physician that discharged you from your recent hospital stay. If you have any questions or concerns about any information you see in [employer], please call the Health Information Department where you were seen or reach out to your Primary Care Provider for more information about your plan of care. Introducing [facility]! Marielle Robledo introduces [employer] patient portal. Now you can access parts of your medical record, email your doctor's office, and request medication refills online. 1. In your internet browser, go to [email or web address] 2. Click on the First Time User? Click Here link in the Sign In box.  You will see the New Member Sign Up page. 3. Enter your [employer] Access Code exactly as it appears below. You will not need to use this code after youve completed the sign-up process. If you do not sign up before the expiration date, you must request a new code. · [employer] Access Code: [email or web address] Expires: 4/20/2017  1:09 PM 
 
4. Enter the last four digits of your Social Security Number (xxxx) and Date of Birth (mm/dd/yyyy) as indicated and click Submit. You will be taken to the next sign-up page. 5. Create a [employer] ID. This will be your [employer] login ID and cannot be changed, so think of one that is secure and easy to remember. 6. Create a [employer] password. You can change your password at any time. 7. Enter your Password Reset Question and Answer. This can be used at a later time if you forget your password. 8. Enter your e-mail address. You will receive e-mail notification when new information is available in [address] 9. Click Sign Up. You can now view and download portions of your medical record. 10. Click the Download Summary menu link to download a portable copy of your medical information. If you have questions, please visit the Frequently Asked Questions section of the [employer] website. Remember, [employer] is NOT to be used for urgent needs. For medical emergencies, dial 911. Now available from your iPhone and Android! General Information Please provide this summary of care documentation to your next provider. Patient Signature:  ____________________________________________________________ Date:  ____________________________________________________________  
  
Susan Rand Provider Signature:  ____________________________________________________________ Date:  ____________________________________________________________ More Information Cephalexin (By mouth) Cephalexin (zfw-l-ECE-in) Treats infections. This medicine is a cephalosporin antibiotic. Brand Name(s):Keflex There may be other brand names for this medicine. When This Medicine Should Not Be Used: This medicine is not right for everyone. Do not use this medicine if you had an allergic reaction to cephalexin or another cephalosporin medicine. How to Use This Medicine:  
Capsule, Tablet, Tablet for Suspension, Liquid · Your doctor will tell you how much medicine to use. Do not use more than directed. · Read and follow the patient instructions that come with this medicine. Talk to your doctor or pharmacist if you have any questions. · You may take your medicine with food or milk to avoid stomach upset. · Oral liquid: Shake well just before use. Measure the oral liquid medicine with a marked measuring spoon, oral syringe, or medicine cup. · Take all of the medicine in your prescription to clear up your infection, even if you feel better after the first few doses. · Missed dose: Take a dose as soon as you remember. If it is almost time for your next dose, wait until then and take a regular dose. Do not take extra medicine to make up for a missed dose. · Capsule or tablet: Store at room temperature away from heat, moisture, and direct light. · Oral liquid: Store in the refrigerator for 14 days. After 14 days, throw away any unused medicine. Do not freeze. Drugs and Foods to Avoid: Ask your doctor or pharmacist before using any other medicine, including over-the-counter medicines, vitamins, and herbal products. · Some medicines and foods can affect how cephalexin works. Tell your doctor if you are also using ¨ Metformin ¨ Probenecid Warnings While Using This Medicine: · Tell your doctor if you are pregnant or breastfeeding, or if you have kidney disease, liver disease, or a history of digestive problems, such as colitis. Tell your doctor if you are allergic to penicillin. · This medicine can cause diarrhea. Call your doctor if the diarrhea becomes severe, does not stop, or is bloody. Do not take any medicine to stop diarrhea until you have talked to your doctor. Diarrhea can occur 2 months or more after you stop taking this medicine. · Tell any doctor or dentist who treats you that you are using this medicine. This medicine may affect certain medical test results. · Call your doctor if your symptoms do not improve or if they get worse. · Keep all medicine out of the reach of children. Never share your medicine with anyone. Possible Side Effects While Using This Medicine:  
Call your doctor right away if you notice any of these side effects: · Allergic reaction: Itching or hives, swelling in your face or hands, swelling or tingling in your mouth or throat, chest tightness, trouble breathing · Blistering, peeling, red skin rash · Severe diarrhea, especially if bloody or ongoing · Severe stomach pain, vomiting If you notice these less serious side effects, talk with your doctor: · Mild diarrhea or nausea If you notice other side effects that you think are caused by this medicine, tell your doctor. Call your doctor for medical advice about side effects. You may report side effects to FDA at 8-330-FDA-5198 © 2016 [address] is for End User's use only and may not be sold, redistributed or otherwise used for commercial purposes. The above information is an  only. It is not intended as medical advice for individual conditions or treatments. Talk to your doctor, nurse or pharmacist before following any medical regimen to see if it is safe and effective for you.

## 2017-01-20 NOTE — ED NOTES
IV team called to insert a peripheral IV. ED techs tried 6x and provider tried to find an EJ on left and right side without any luck.

## 2017-01-20 NOTE — IP AVS SNAPSHOT
Current Discharge Medication List  
  
Take these medications at their scheduled times Dose & Instructions Dispensing Information Comments Morning Noon Evening Bedtime BYSTOLIC 5 mg tablet Generic drug:  nebivolol Your next dose is:  Tomorrow Dose:  5 mg Take 5 mg by mouth daily. Refills:  0  
     
  
   
   
   
  
 cephALEXin 500 mg capsule Commonly known as:  Amedeo Ronde Your next dose is:  Tomorrow Dose:  500 mg Take 1 Cap by mouth four (4) times daily. Quantity:  28 Cap Refills:  0  
     
  
   
   
   
  
 glimepiride 2 mg tablet Commonly known as:  AMARYL Your next dose is: Today Dose:  2 mg Take 2 mg by mouth every morning. Refills:  0  
     
   
   
  
   
  
 hydroCHLOROthiazide 25 mg tablet Commonly known as:  HYDRODIURIL Your next dose is:  Tomorrow Dose:  25 mg Take 25 mg by mouth daily. Refills:  0  
     
  
   
   
   
  
 losartan 25 mg tablet Commonly known as:  COZAAR Your next dose is: Today Dose:  25 mg Take 1 Tab by mouth every evening. Quantity:  90 Tab Refills:  3  
     
   
   
  
   
  
 metoprolol succinate 25 mg XL tablet Commonly known as:  TOPROL-XL Your next dose is:  Tomorrow Dose:  25 mg Take 1 Tab by mouth daily. Quantity:  90 Tab Refills:  3 Take these medications as needed Dose & Instructions Dispensing Information Comments Morning Noon Evening Bedtime  
 traMADol 50 mg tablet Commonly known as:  ULTRAM  
   
 Dose:  50 mg Take 50 mg by mouth every six (6) hours as needed for Pain. Refills:  0 Take these medications as directed Dose & Instructions Dispensing Information Comments Morning Noon Evening Bedtime  
 loratadine 10 mg tablet Commonly known as:  Ross Jang Dose:  10 mg  
10 mg. Refills:  0  
     
   
   
   
  
 VITAMIN D2 50,000 unit capsule Generic drug:  ergocalciferol Dose:  15494 Units Take 50,000 Units by mouth. Refills:  0 Where to Get Your Medications Information about where to get these medications is not yet available ! Ask your nurse or doctor about these medications  
  cephALEXin 500 mg capsule

## 2017-01-21 NOTE — PROGRESS NOTES
OT order received and chart reviewed. 5655: first attempt for OT evaluation; patient eating breakfast. Will follow up shortly. Thank you.     Sebastián Bailon MS OTR/L  Office Ext: [phone]  Pager: [phone]

## 2017-01-21 NOTE — PROGRESS NOTES
[place]   Discharge Planning/ Assessment    Reasons for Intervention: Interviewed patient, she agrees to share her discharge information with her son, see below. She lives alone and was bed bound for the most part due to her weight. She has a walker and a cane. She agrees she may need rehab. Provided a SNF list.  She has agreed to matching to rehab facilities however she states she may want to return home and is requesting a hospital bed if she elects to return home. She stats she will select a facility based on who is willing to accept her. Placed in e discharge and matched.      High Risk Criteria  [x] Yes  []No   Physician Referral  [] Yes  [x]No        Date    Nursing Referral  [] Yes  [x]No        Date    Patient/Family Request  [] Yes  [x]No        Date       Resources:    Medicare  [x] Yes  []No   Medicaid  [] Yes  [x]No   No Resources  [] Yes  [x]No   Private Insurance  [] Yes  [x]No    Name/Phone Number    Other  [] Yes  [x]No        (i.e. Workman's Comp)         Prior Services:    Prior Services  [] Yes  [x]No   Home Health  [] Yes  [x]No   [address] [facility]  [] Yes  [x]No        [facility] [address] Program  [] Yes  [x]No       Meals on Wheels  [] Yes  [x]No   Office on Aging  [] Yes  [x]No   Transportation Services  [] Yes  [x]No   Nursing Home  [] Yes  [x]No        Nursing Home Name    [address]  [] Yes  [x]No        [address] Name    Other       Information Source:      Information obtained from  [x] Patient  [] Parent   [] [address]  [] Child  [] Spouse   [] Significant Other/Partner   [] Friend      [] EMS    [] Nursing Home Chart          [x] Other: chart   Chart Review  [x] Yes  []No     Family/Support System:    Patient lives with  [x] Alone    [] Spouse   [] Significant Other  [] Children  [] Caretaker   [] Parent  [] Sibling     [] Other       Other Support System:    Is the patient responsible for care of others  [] Yes  [x]No   Information of person caring for patient on  discharge self   Managers financial affairs independently  [x] Yes  []No   If no, explain:      Status Prior to Admission:    Mental Status  [x] Awake  [x] Alert  [x] Oriented  [] Quiet/Calm [] Lethargic/Sedated   [] Disoriented  [] Restless/Anxious  [] Combative   Personal Care  [] Dependent  [] [address]  [x] Requires Assistance   Meal Preparation Ability  [] Independent   [] Standby Assistance   [] Minimal Assistance   [] Moderate Assistance  [x] Maximum Assistance     [] Total Assistance   Chores  [] Independent with Chores   [] N/A Nursing Home Resident   [x] Requires Assistance   Bowel/Bladder  [x] Continent  [] Catheter  [] Incontinent  [] Ostomy Self-Care    [] Urine Diversion Self-Care  [] Maximum Assistance     [] Total Assistance   Number of Persons needed for assistance    DME at home  [] [facility]  [x] Lucille Huynh   [] Commode    [] Bathroom/Grab Bars  [] Hospital Bed  [] Nebulizer  [] Oxygen           [] Raised Toilet Seat  [] Shower Chair  [] Side Rails for Bed   [] Tub Transfer Bench   [x] Wild Tobar  [] Arsenio Deutsch, Standard      [] Other:   Vendor      Treatment Presently Receiving:    Current Treatments  [] Chemotherapy  [] Dialysis  [] Insulin  [] IVAB [x] IVF   [] O2  [] PCA   [] PT   [] RT   [] Tube Feedings   [] Wound Care     Psychosocial Evaluation:    Verbalized Knowledge of Disease Process  [x] Patient  [x]Family   Coping with Disease Process  [x] Patient  [x]Family   Requires Further Counseling Coping with Disease Process  [] Patient  []Family     Identified Projected Needs:    Home Health Aid  [] Yes  [x]No   Transportation  [x] Yes  []No   Education  [] Yes  [x]No        Specific Education     Financial Counseling  [] Yes  [x]No   Inability to Care for Self/Will Require 24 hour care  [] Yes  [x]No   Pain Management  [] Yes  [x]No   Home Infusion Therapy  [] Yes  [x]No   Oxygen Therapy  [] Yes  [x]No   DME  [] Yes  [x]No   Long Term Care Placement  [] Yes  [x]No   Rehab  [x] Yes  []No   Physical Therapy  [x] Yes  []No   Needs Anticipated At This Time  [x] Yes  [x]No     Intra-Hospital Referral:    [address]  [x] Yes  []No     [] Yes  [x]No   Patient Representative  [] Yes  [x]No   Staff for Teaching Needs  [] Yes  [x]No   Specialty Teaching Needs     Diabetic Educator  [] Yes  [x]No   Referral for Diabetic Educator Needed  [] Yes  [x]No  If Yes, place order for Nutritionist or Diabetic Consult     Tentative Discharge Plan:    Home with No Services  [] Yes  [x]No   Home with Home Health Follow-up  [x] Yes  []No        If Yes, specify type Nursing. therapy   Home Care Program  [] Yes  [x]No        If Yes, specify type    Meals on Wheels  [] Yes  [x]No   Office of Aging  [] Yes  [x]No   NHP  [] Yes  [x]No   Return to the Nursing Home  [] Yes  [x]No   Rehab Therapy  [x] Yes  []No   Acute Rehab  [] Yes  [x]No   Subacute Rehab  [x] Yes  []No   Private Care  [] Yes  [x]No   Substance Abuse Referral  [] Yes  [x]No   Transportation  [] Yes  [x]No   Chore Service  [] Yes  [x]No   Inpatient Hospice  [] Yes  [x]No   OP RT  [] Yes  [x] No   OP Hemo  [] Yes  [x] No   OP PT  [] Yes  [x]No   Support Group  [] Yes  [x]No   Reach to Recovery  [] Yes  [x]No   OP Oncology Clinic  [] Yes  [x]No   Clinic Appointment  [] Yes  [x]No   DME  [x] Yes  [x]No   Comments    Name of D/C Planner or  Given to Patient or Family Kari Geller RN   Phone Number [phone] [facility]   Date Jan 21, 2017   Time 954 am   If you are discharged home, whom do you designate to participate in your discharge plan and receive any information needed?      Enter name of Chuy Berry  son        Phone # of omar         [place] [facility]         Updated Jan 21, 2017        Patient refused to designate any           individual

## 2017-01-21 NOTE — PROGRESS NOTES
Patient has designated her son to participate in his/her discharge plan and to receive any needed information.      Name:   Denver Monday  son   [phone]      Jan 21, 2017     Address:  Phone number:

## 2017-01-21 NOTE — ED NOTES
Pt transported to 3014 on cardiac monitor by 2 RN's and 2 ED Techs. Pt awake, alert and in NAD. Receiving RN at bedside.

## 2017-01-21 NOTE — H&P
History and Physical    Patient: Victor Hugo Paige               Sex: female          DOA: 2017       YOB: 1955      Age:  64 y.o.        LOS:  LOS: 0 days        Chief Complaint   Patient presents with    Other         HPI:     Victor Hugo Paige is a 64 y.o. female who is admitted for traumatic rhabdomyolysis. Patient is bed bound due to physical deconditioning and morbid obesity . She claims she fell out of bed 4 days ago and was unable to get up. She lives with her son who has Asperger's. She was unable to get adequate help and stayed on floor all these days. Denies hitting head or LOC. Denies fever, myalgia, nausea, vomiting. In the ED she had XR humerus no fracture. CK 9100. Patient will be admitted for IVF management and further treatment. Past Medical History   Diagnosis Date    Anemia     Anxiety 2016    Arthritis     Cancer ([facility] [address]      pre cancerous cells in the uterus    Chronic pain      knees    Chronic pain of both knees 2016    Cigarette nicotine dependence in remission 2016    Diabetes ([facility] [address]     Fatty liver 2016    Headache     Hypertension     Liver disease      fatty liver    Lymphedema 2016    Morbid obesity ([facility] [address] 2016    Psychotic disorder      anxiety and panic attacks    Thyroid disease         Past Surgical History   Procedure Laterality Date    Hx cholecystectomy      Hx gyn  2006     hysterectomy    Hx  section         No current facility-administered medications on file prior to encounter. Current Outpatient Prescriptions on File Prior to Encounter   Medication Sig Dispense Refill    metoprolol succinate (TOPROL-XL) 25 mg XL tablet Take 1 Tab by mouth daily. 90 Tab 3    loratadine (CLARITIN) 10 mg tablet 10 mg.      losartan (COZAAR) 25 mg tablet Take 1 Tab by mouth every evening.  90 Tab 3         Social History:   Tobacco use: no   Alcohol use: no   Occupation: not employed       Family History:   DM - Mother  htn - mother      Review of Systems    Constitutional:  No fever or weight loss  HEENT:  No headache or visual changes  Cardiovascular:  No chest pain or diaphoresis  Respiratory:  No coughing, wheezing, or shortness of breath. GI:  No nausea or vomiting. No diarrhea  :  No hematuria or dysuria  Skin:  No rashes or moles  Neuro:  No seizures or syncope  Hematological:  No bruising or bleeding  Endocrine:  No diabetes or thyroid disease        Physical Exam:      Visit Vitals    /72    Pulse (!) 113    Temp 100.2 °F (37.9 °C)    Resp 28    Ht 5' 6.5\" (1.689 m)    Wt (!) 204.1 kg (450 lb)    SpO2 98%    BMI 71.54 kg/m2       Physical Exam:    Gen:  No distress, alert  HEENT:  Normal cephalic atraumatic, extra-occular movements are intact. Neck:  Supple, No JVD  Lungs:  Clear bilaterally, no wheeze, no rales, normal effort  Heart:  Regular Rate and Rhythm, normal S1 and S2, no edema  Abdomen:  Soft, non tender, normal bowel sounds, no guarding. Extremities:  Well perfused, no cyanosis or edema  Neurological:  Awake and alert, CN's are intact, normal strength throughout extremities  Skin:  No rashes or moles Chronic Bilateral lymphedema with dermatosis   Mental Status:  Normal thought process, does appears anxious    Laboratory Studies:    Recent Results (from the past 24 hour(s))   CBC WITH AUTOMATED DIFF    Collection Time: 01/20/17  2:26 PM   Result Value Ref Range    WBC 14.2 (H) 4.6 - 13.2 K/uL    RBC 4.03 (L) 4.20 - 5.30 M/uL    HGB 11.9 (L) 12.0 - 16.0 g/dL    HCT 33.8 (L) 35.0 - 45.0 %    MCV 83.9 74.0 - 97.0 FL    MCH 29.5 24.0 - 34.0 PG    MCHC 35.2 31.0 - 37.0 g/dL    RDW 14.3 11.6 - 14.5 %    PLATELET 335 205 - 189 K/uL    MPV 10.5 9.2 - 11.8 FL    NEUTROPHILS 82 (H) 40 - 73 %    LYMPHOCYTES 9 (L) 21 - 52 %    MONOCYTES 7 3 - 10 %    EOSINOPHILS 2 0 - 5 %    BASOPHILS 0 0 - 2 %    ABS. NEUTROPHILS 11.5 (H) 1.8 - 8.0 K/UL    ABS.  LYMPHOCYTES 1.3 0.9 - 3.6 K/UL ABS. MONOCYTES 1.0 0.05 - 1.2 K/UL    ABS. EOSINOPHILS 0.3 0.0 - 0.4 K/UL    ABS. BASOPHILS 0.0 0.0 - 0.06 K/UL    DF AUTOMATED     METABOLIC PANEL, BASIC    Collection Time: 01/20/17  2:26 PM   Result Value Ref Range    Sodium 140 136 - 145 mmol/L    Potassium 4.1 3.5 - 5.5 mmol/L    Chloride 105 100 - 108 mmol/L    CO2 23 21 - 32 mmol/L    Anion gap 12 3.0 - 18 mmol/L    Glucose 100 (H) 74 - 99 mg/dL    BUN 12 7.0 - 18 MG/DL    Creatinine 0.68 0.6 - 1.3 MG/DL    BUN/Creatinine ratio 18 12 - 20      GFR est AA >60 >60 ml/min/1.73m2    GFR est non-AA >60 >60 ml/min/1.73m2    Calcium 8.3 (L) 8.5 - 10.1 MG/DL   CARDIAC PANEL,(CK, CKMB & TROPONIN)    Collection Time: 01/20/17  2:26 PM   Result Value Ref Range    CK 9190 (H) 26 - 192 U/L    CK - MB 15.5 (H) 0.5 - 3.6 ng/ml    CK-MB Index 0.2 0.0 - 4.0 %    Troponin-I, Qt. <0.02 0.0 - 0.045 NG/ML   HEPATIC FUNCTION PANEL    Collection Time: 01/20/17  2:26 PM   Result Value Ref Range    Protein, total 6.9 6.4 - 8.2 g/dL    Albumin 2.5 (L) 3.4 - 5.0 g/dL    Globulin 4.4 (H) 2.0 - 4.0 g/dL    A-G Ratio 0.6 (L) 0.8 - 1.7      Bilirubin, total 1.5 (H) 0.2 - 1.0 MG/DL    Bilirubin, direct 0.2 0.0 - 0.2 MG/DL    Alk.  phosphatase 83 45 - 117 U/L     (H) 15 - 37 U/L    ALT 85 (H) 13 - 56 U/L   LIPASE    Collection Time: 01/20/17  2:26 PM   Result Value Ref Range    Lipase 171 73 - 393 U/L   PRO-BNP    Collection Time: 01/20/17  2:26 PM   Result Value Ref Range    NT pro-BNP 97 0 - 900 PG/ML   EKG, 12 LEAD, INITIAL    Collection Time: 01/20/17  2:38 PM   Result Value Ref Range    Ventricular Rate 113 BPM    Atrial Rate 113 BPM    P-R Interval 136 ms    QRS Duration 82 ms    Q-T Interval 338 ms    QTC Calculation (Bezet) 463 ms    Calculated P Axis 70 degrees    Calculated R Axis 56 degrees    Calculated T Axis 0 degrees    Diagnosis       Sinus tachycardia  Otherwise normal ECG  When compared with ECG of 09-APR-2012 16:32,  premature atrial complexes are no longer present URINALYSIS W/ RFLX MICROSCOPIC    Collection Time: 01/20/17  6:31 PM   Result Value Ref Range    Color DARK YELLOW      Appearance CLEAR      Specific gravity 1.019 1.005 - 1.030      pH (UA) 5.5 5.0 - 8.0      Protein 30 (A) NEG mg/dL    Glucose NEGATIVE  NEG mg/dL    Ketone TRACE (A) NEG mg/dL    Bilirubin SMALL (A) NEG      Blood SMALL (A) NEG      Urobilinogen 1.0 0.2 - 1.0 EU/dL    Nitrites NEGATIVE  NEG      Leukocyte Esterase TRACE (A) NEG     DRUG SCREEN, URINE    Collection Time: 01/20/17  6:31 PM   Result Value Ref Range    BENZODIAZEPINE NEGATIVE  NEG      BARBITURATES NEGATIVE  NEG      THC (TH-CANNABINOL) NEGATIVE  NEG      OPIATES NEGATIVE  NEG      PCP(PHENCYCLIDINE) NEGATIVE  NEG      COCAINE NEGATIVE  NEG      AMPHETAMINE NEGATIVE  NEG      METHADONE NEGATIVE       HDSCOM (NOTE)    URINE MICROSCOPIC ONLY    Collection Time: 01/20/17  6:31 PM   Result Value Ref Range    WBC 0 to 3 0 - 4 /hpf    RBC NEGATIVE  0 - 5 /hpf    Epithelial cells FEW 0 - 5 /lpf    Bacteria NEGATIVE  NEG /hpf   POC LACTIC ACID    Collection Time: 01/20/17  7:36 PM   Result Value Ref Range    Lactic Acid (POC) 1.3 0.4 - 2.0 mmol/L       Assessment/Plan     Active Problems:    Rhabdomyolysis (1/20/2017)          Bilateral Chronic Lymphedema  Bed bound  Morbid Obesity  Deconditioning  Leukocytosis  Sinus Tachycardia   DM  HTN  Arthritis Knee   Anxiety     PLAN:    Traumatic Rhabdomyolysis  - 2/2 fall and laying on floor for 4 days  - CK 9000  - Aggressive IVF management in ED 3 L bolus. Continue IVF  - Monitor renal function. Check BMP in AM.  - Trend CK     Chronic Lymphedema   - will need out patient follow up    Deconditioning  - bed bound due to weakness. Morbidly obese  - PT /OT eval  - Social work for possible placement in SNF     Leukocytosis  - Likely reactive 2/2 trauma. No source found   - CXR,UA reviewed.      Sinus Tachycardia  - IVF   - On tele    Hypertension  - controlled  - continue home meds    DM  - SSI  - Check A1c    Chronic Arthritis Knee  - tylenol as needed  - allergic to tramadol    Obesity     Hx Anxiety  - ativan as needed   - needs eval   - Check TSH    DVT Prophylaxis   - Lovenox    Full code

## 2017-01-21 NOTE — ROUTINE PROCESS
Rec'd pt via bed from ED. PT transferred to bariatric bed X 4 staff members. Pt oriented to room and made comfortable. Assessment completed. Juarez intact and patent. Pt states diarrhea over the last few days and believes her last bm was in the morning before arrival to ED. POC reviewed with pt. Pt denies pain . Pt belongings bagged by Lilly Méndez. Home Meds collected to give to pharmacy. SR up and  Call light within reach. 0030 Skin assessment completed. Some blistering noted in skinfold on back as well as lower left leg. Bilat heels and  sacral/buttocks area cleansed with NS and dressings applied. Pt  states wounds were a result of her lying on  floor for 4 days. She states when EMS moved her she was \"stuck\" to the floor. 0400 No changes in assessment. Pt sleeping with no distress noted. 0794 Bedside verbal report given to  [facility] by PATI Jackson RN. Report consisted of SBAR, MAR, POC and I  and O.  Pt aware that meds are to be brought to pharmacy

## 2017-01-21 NOTE — PROGRESS NOTES
Medicine Progress Note    Patient: Eitan Nino   Age:  64 y.o.  DOA: 1/20/2017   Admit Dx / CC: Rhabdomyolysis  LOS:  LOS: 1 day     Assessment/Plan   Active Problems:    Rhabdomyolysis (1/20/2017)        Additional Plan notes     Traumatic Rhabdomyolysis  - 2/2 fall and laying on floor for 4 days  - CK 9000  - Aggressive IVF management in ED 3 L bolus. Continue IVF  - Monitor renal function. Check BMP in AM.  - Trend CK      Chronic Lymphedema      Deconditioning  - bed bound due to weakness. Morbidly obese  - PT /OT eval  - may benefit from snf however may not qualify as IP, will need dc planning to weigh in .     Leukocytosis  - Likely reactive 2/2 trauma. No source found      Sinus Tachycardia  - IVF   - On tele     Hypertension  - controlled  - continue home meds     DM  - SSI  - Check A1c     Morbid Obesity      Hx Anxiety  - ativan as needed   - TSH ok    DISPO     Anticipated Date of Discharge: 1/23  Anticipated Disposition (home, SNF) : home    Subjective:   Patient seen and examined. No complaints today is feeling some better    Objective:     Visit Vitals    /59 (BP 1 Location: Left arm, BP Patient Position: At rest)    Pulse (!) 103    Temp 98.3 °F (36.8 °C)    Resp 20    Ht 5' 6.5\" (1.689 m)    Wt (!) 206.7 kg (455 lb 11.1 oz)    SpO2 99%    BMI 72.45 kg/m2       Physical Exam:  General appearance: alert, cooperative, no distress, Morbid obesity  Head: Normocephalic, without obvious abnormality, atraumatic  Neck: supple, trachea midline  Lungs: clear to auscultation bilaterally  Heart: regular rate and rhythm, S1, S2 normal, no murmur, click, rub or gallop  Abdomen: soft, non-tender.  Bowel sounds normal. No masses,  no organomegaly  Extremities: extremities normal, atraumatic, no cyanosis or edema  Skin: Skin color, texture, turgor normal. No rashes or lesions  Neurologic: Grossly normal  PSY: Mood and affect normal, appropriately behaved    Intake and Output:  Current Shift: Last three shifts:  01/19 1901 - 01/21 0700  In: 1130 [I.V.:1130]  Out: 1750 [Urine:1750]    Lab/Data Reviewed:  CMP:   Lab Results   Component Value Date/Time     (H) 01/21/2017 06:00 AM    K 3.1 (L) 01/21/2017 06:00 AM     (H) 01/21/2017 06:00 AM    CO2 27 01/21/2017 06:00 AM    AGAP 9 01/21/2017 06:00 AM    GLU 82 01/21/2017 06:00 AM    BUN 7 01/21/2017 06:00 AM    CREA 0.49 (L) 01/21/2017 06:00 AM    GFRAA >60 01/21/2017 06:00 AM    GFRNA >60 01/21/2017 06:00 AM    CA 7.8 (L) 01/21/2017 06:00 AM    ALB 2.5 (L) 01/20/2017 02:26 PM    TP 6.9 01/20/2017 02:26 PM    GLOB 4.4 (H) 01/20/2017 02:26 PM    AGRAT 0.6 (L) 01/20/2017 02:26 PM    SGOT 250 (H) 01/20/2017 02:26 PM    ALT 85 (H) 01/20/2017 02:26 PM     CBC:   Lab Results   Component Value Date/Time    WBC 10.0 01/21/2017 06:00 AM    HGB 10.0 (L) 01/21/2017 06:00 AM    HCT 29.1 (L) 01/21/2017 06:00 AM     01/21/2017 06:00 AM     All Cardiac Markers in the last 24 hours:   Lab Results   Component Value Date/Time    CPK 9269 (H) 01/21/2017 06:00 AM    CPK 9190 (H) 01/20/2017 02:26 PM    CKMB 15.5 (H) 01/20/2017 02:26 PM    CKND1 0.2 01/20/2017 02:26 PM    TROIQ <0.02 01/20/2017 02:26 PM       Medications Reviewed:  Current Facility-Administered Medications   Medication Dose Route Frequency    influenza vaccine 2016-17 (36mos+)(PF) (FLUZONE/FLUARIX/FLULAVAL QUAD) injection 0.5 mL  0.5 mL IntraMUSCular PRIOR TO DISCHARGE    0.45% sodium chloride infusion  150 mL/hr IntraVENous CONTINUOUS    acetaminophen (TYLENOL) tablet 650 mg  650 mg Oral Q4H PRN    enoxaparin (LOVENOX) injection 40 mg  40 mg SubCUTAneous Q24H    insulin lispro (HUMALOG) injection   SubCUTAneous AC&HS    glucose chewable tablet 16 g  16 g Oral PRN    glucagon (GLUCAGEN) injection 1 mg  1 mg IntraMUSCular PRN    dextrose (D50W) injection syrg 12.5-25 g  25-50 mL IntraVENous PRN    hydroCHLOROthiazide (HYDRODIURIL) tablet 25 mg  25 mg Oral DAILY    nebivolol (BYSTOLIC) tablet 5 mg  5 mg Oral DAILY    LORazepam (ATIVAN) injection 1 mg  1 mg Oral BID       Raphael Flores MD    January 21, 2017

## 2017-01-21 NOTE — PROGRESS NOTES
Problem: Mobility Impaired (Adult and Pediatric)  Goal: *Acute Goals and Plan of Care (Insert Text)  Physical Therapy Goals  Initiated 1/21/2017 and to be accomplished within 7 day(s)  1. Patient will move from supine to sit and sit to supine , scoot up and down and roll side to side in bed with minimal assistance/contact guard assist.   2. Patient will transfer from bed to chair and chair to bed with moderate assistance using the least restrictive device. 3. Patient will perform sit to stand with moderate assistance . 4. Patient will ambulate with minimal assistance/contact guard assist for 150 feet with the least restrictive device. 5. Patient will ascend/descend 3 stairs with B handrail(s) with moderate assistance . David Paris Comments:   PHYSICAL THERAPY EVALUATION     Patient: Cira Perez (6 y.o. female)  Date: 1/21/2017  Primary Diagnosis: Rhabdomyolysis        Precautions:          ASSESSMENT :  Based on the objective data described below, the patient presents with impaired mobility and ADL tolerance. Patient with grossly decreased BLE AROM/PROM/strength. Patient with c/o stiffness in BLE, also presents with lymphedema. Patient refused trial of supine to long sitting, max A x3 with rolling. Patient reports previously amb with rollator. Patient will benefit from skilled intervention to address the above impairments.   Patients rehabilitation potential is considered to be Fair  Factors which may influence rehabilitation potential include:   [ ]         None noted  [ ]         Mental ability/status  [X]         Medical condition  [X]         Home/family situation and support systems  [ ]         Safety awareness  [X]         Pain tolerance/management  [ ]         Other:      Recommendations for nursing:  Written on communication board:   Verbally communicated to: [facility]       PLAN :  Recommendations and Planned Interventions:  [X]           Bed Mobility Training             [X]    Neuromuscular Re-Education  [X]           Transfer Training                   [ ]    Orthotic/Prosthetic Training  [X]           Gait Training                          [ ]    Modalities  [X]           Therapeutic Exercises          [X]    Edema Management/Control  [X]           Therapeutic Activities            [X]    Patient and Family Training/Education  [ ]           Other (comment):     Frequency/Duration: Patient will be followed by physical therapy 1-2 times per day/4-7 days per week to address goals. Discharge Recommendations: To Be Determined  Further Equipment Recommendations for Discharge: TBD       SUBJECTIVE:   Patient stated my legs are stiff, I don't think I can move them. 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    Chronic pain         knees    Chronic pain of both knees 2016    Cigarette nicotine dependence in remission 2016    Diabetes ([facility] [address]      Fatty liver 2016    Headache      Hypertension      Liver disease         fatty liver    Lymphedema 2016    Morbid obesity ([facility] [address] 2016    Psychotic disorder         anxiety and panic attacks    Thyroid disease       Past Surgical History   Procedure Laterality Date    Hx cholecystectomy       Hx gyn   2006       hysterectomy    Hx  section        Barriers to Learning/Limitations: None  Compensate with: visual, verbal, tactile, kinesthetic cues/model     G CODE:Mobility  Current  CM= 80-99%   Goal  CK= 40-59%.   The severity rating is based on the Other level of assist     Eval Complexity: History: HIGH Complexity :3+ comorbidities / personal factors will impact the outcome/ POC Exam:HIGH Complexity : 4+ Standardized tests and measures addressing body structure, function, activity limitation and / or participation in recreation  Presentation: MEDIUM Complexity : Evolving with changing characteristics  Clinical Decision Making:High Complexity   Overall Complexity:MEDIUM     Prior Level of Function/Home Situation:   Home Situation  Home Environment: Private residence  # Steps to Enter: 3  Rails to Enter: Yes  Hand Rails : Bilateral  One/Two Story Residence: One story  Living Alone: No  Support Systems: Child(pau) (son with aspergers)  Patient Expects to be Discharged to[de-identified] Private residence  Current DME Used/Available at Home: Colonel Jest, rollator  Tub or Shower Type: Tub/Shower combination (nonfunctioning; sponge bathes)  Critical Behavior:  Neurologic State: Alert  Orientation Level: Oriented X4  Cognition: Appropriate decision making; Follows commands  Safety/Judgement: Awareness of environment;Home safety  Psychosocial  Patient Behaviors: Calm; Cooperative  Needs Expressed: Educational;Emotional  Purposeful Interaction: Yes  Pt Identified Daily Priority: Clinical issues (comment)  Caritas Process: Nurture loving kindness;Establish trust;Teaching/learning  Caring Interventions: Therapeutic modalities  Skin Condition/Temp: Warm;Dry     Skin Integrity: Wound (add Wound LDA) (bilat feet extremely dry, cracked and flaking)  Skin Integumentary  Skin Color: Appropriate for ethnicity  Skin Condition/Temp: Warm;Dry  Skin Integrity: Wound (add Wound LDA) (bilat feet extremely dry, cracked and flaking)  Turgor: Non-tenting  Hair Growth: Present  Varicosities: Absent  Wound Heel Left;Right-Periwound Skin Condition: Erythema, blanchable  Wound Buttocks-Periwound Skin Condition: Erythema, blanchable  Wound Sacral/coccyx Lateral-Periwound Skin Condition: Macerated  Strength:    Strength: Generally decreased, functional  Tone & Sensation:   Sensation: Impaired  Range Of Motion:  AROM: Grossly decreased, non-functional (BLE)  PROM: Grossly decreased, non-functional (BLE)  Functional Mobility:  Bed Mobility:  Rolling: Maximum assistance; Total assistance; Other (comment) (Assist x3)  Supine to Sit: (patient refused)  Pain:  Pre treatment pain level:  Post treatment pain level:  Pain Scale 1: Numeric (0 - 10)  Pain Intensity 1: 8  Pain Location 1: Leg  Activity Tolerance:   fair  Please refer to the flowsheet for vital signs taken during this treatment. After treatment:   [ ]         Patient left in no apparent distress sitting up in chair  [X]         Patient left in no apparent distress in bed, with OT  [ ]         Call bell left within reach  [X]         Nursing notified  [ ]         Caregiver present  [ ]         Bed alarm activated      COMMUNICATION/EDUCATION:   [X]         Fall prevention education was provided and the patient/caregiver indicated understanding. [X]         Patient/family have participated as able in goal setting and plan of care. [X]         Patient/family agree to work toward stated goals and plan of care. [ ]         Patient understands intent and goals of therapy, but is neutral about his/her participation. [ ]         Patient is unable to participate in goal setting and plan of care.      Thank you for this referral.  Krystina Gore, PT   Time Calculation: 18 mins

## 2017-01-21 NOTE — PROGRESS NOTES
Problem: Self Care Deficits Care Plan (Adult)  Goal: *Acute Goals and Plan of Care (Insert Text)  Occupational Therapy Goals  Initiated 1/21/2017 within 7day(s). 1. Patient will perform grooming tasks sitting at EOB with maximum assistance. 2. Patient will perform bathing while supine/at EOB minimal assistance/contact guard assist.  3. Patient will perform lower body dressing with moderate assistance . 4. Patient will participate in upper extremity therapeutic exercise/activities with minimal assistance/contact guard assist for 8 minutes to increase endurance/strength of BUEs for self care tasks. 5. Patient will utilize energy conservation techniques during functional activities with verbal cues. 6. Patient will engage in functional task while sitting at EOB for 5 minutes with maximum assistance to increase endurance for ADLs. Outcome: Progressing Towards Goal  OCCUPATIONAL THERAPY EVALUATION     Patient: Martir Oviedo (4 y.o. female)  Date: 1/21/2017  Primary Diagnosis: Rhabdomyolysis        Precautions:  Fall      ASSESSMENT :  Based on the objective data described below, the patient presents with fair functional use of BUEs, decreased endurance, and decreased independence in ADLs complicated by morbid obesity, pain, and diabetes. Patient demonstrates approx 3/4 AROM of RUE, but almost no active shoulder movement of LUE (has full elbow AROM) which impairs independence in ADLs. Patient required max Ax3/total A to roll to L and R side. Patient verbalized that she sponge bathed and was able to perform UB/LB dressing PTA (difficulty with socks/shoes and resorted to slip-on shoes). Patient lives with son, however, per patient report, he is unable to lift patient if needed. Patient engaged in facial/oral hygiene at bed level (secondary to total A for bed mobility) with supervision. Patient educated on role of OT and POC, she verbalized understanding.  Also educated on BUE therapeutic exercises to maintain/increase strength of BUEs to assist in functional transfers and ADLs; patient verbalized understanding. Patient will benefit from skilled OT services during acute care stay to increase endurance/ROM/strength of BUEs and to increase independence in mobility in preparation for ADLs. Patient left supine in bed with all needs within reach; Scarlett Silvaiter made aware. Patient will benefit from skilled intervention to address the above impairments. Patients rehabilitation potential is considered to be Fair  Factors which may influence rehabilitation potential include:   [ ]             None noted  [ ]             Mental ability/status  [X]             Medical condition  [ ]             Home/family situation and support systems  [ ]             Safety awareness  [ ]             Pain tolerance/management  [ ]             Other:      Recommendations for nursing:  Verbally communicated to: Rubi Saldivar RN          PLAN :  Recommendations and Planned Interventions:  [X]               Self Care Training                  [X]        Therapeutic Activities  [X]               Functional Mobility Training    [ ]        Cognitive Retraining  [X]               Therapeutic Exercises           [X]        Endurance Activities  [X]               Balance Training                   [ ]        Neuromuscular Re-Education  [ ]               Visual/Perceptual Training     [X]   Home Safety Training  [X]               Patient Education                 [X]        Family Training/Education  [ ]               Other (comment):     Frequency/Duration: Patient will be followed by occupational therapy 1-2 times per day/4-7 days per week to address goals. Discharge Recommendations: [place] vs. TBD  Further Equipment Recommendations for Discharge: TBD       SUBJECTIVE:   Patient stated Feels so good to brush my teeth. \"      OBJECTIVE DATA SUMMARY:       Past Medical History   Diagnosis Date    Anemia      Anxiety 5/19/2016    Arthritis      Cancer ([facility] [address]         pre cancerous cells in the uterus    Chronic pain         knees    Chronic pain of both knees 2016    Cigarette nicotine dependence in remission 2016    Diabetes ([facility] [address]      Fatty liver 2016    Headache      Hypertension      Liver disease         fatty liver    Lymphedema 2016    Morbid obesity ([facility] [address] 2016    Psychotic disorder         anxiety and panic attacks    Thyroid disease       Past Surgical History   Procedure Laterality Date    Hx cholecystectomy   2012    Hx gyn   2006       hysterectomy    Hx  section        Barriers to Learning/Limitations: None  Compensate with: visual, verbal, tactile, kinesthetic cues/model     GCODES (GO)Self Care  Current  CL= 60-79%   Goal  CJ= 20-39%. The severity rating is based on the Other Functional Assessment, MMT, ROM     Eval Complexity: History: MEDIUM Complexity : Expanded review of history including physical, cognitive and psychosocial  history ; Examination: MEDIUM Complexity : 3-5 performance deficits relating to physical, cognitive , or psychosocial skils that result in activity limitations and / or participation restrictions; Decision Making:HIGH Complexity : Patient presents with comorbidities that affect occupational performance. Signifigant modification of tasks or assistance (eg, physical or verbal) with assessment (s) is necessary to enable patient to complete evaluation      Prior Level of Function/Home Situation: Per patient, patient was independent in basic self care tasks and functional mobility PTA. She reports that she did have difficulty putting on socks and shoes; typically resorting to slip on shoes. Patient reported that her shower is not functioning at the moment and that she typically sponge bathed PTA.        Home Situation  Home Environment: Private residence  # Steps to Enter: 3  Rails to Enter: Yes  Hand Rails : Bilateral  One/Two Story Residence: One story  Living Alone: No  Support Systems: Child(pau) (son with aspergers)  Patient Expects to be Discharged to[de-identified] Private residence  Current DME Used/Available at Home: Lana Jordan, robator  Tub or Shower Type: Tub/Shower combination (per patient report, does not use)  [X]  Right hand dominant          [ ]  Left hand dominant  Cognitive/Behavioral Status:  Neurologic State: Alert  Orientation Level: Oriented X4  Cognition: Appropriate decision making; Appropriate for age attention/concentration; Appropriate safety awareness; Follows commands  Safety/Judgement: Awareness of environment      Skin: Intact (BUEs)  Edema: None noted (BUEs)     Vision/Perceptual:  Within functional limits     Coordination:  Coordination: Within functional limits (BUEs)  Fine Motor Skills-Upper: Right Intact; Left Intact    Gross Motor Skills-Upper: Right Intact; Left Intact     Strength:  Strength: Generally decreased, functional (BUEs)     Tone & Sensation:  Tone: Normal (BUEs)  Sensation: Intact (BUEs)     Range of Motion:  AROM: Generally decreased, functional (BUEs; RUE: 3/4 range; LUE: full elbow, no shoulder AROM)  PROM: Generally decreased, functional (BUEs; RUE: full range; LUE: approx 1/2 range shoulder flex)     Functional Mobility and Transfers for ADLs:  Bed Mobility:  Rolling: Maximum assistance; Total assistance; Other (comment) (Assist x3)  Supine to Sit:  (not assessed)     Transfers: Toilet Transfer :  (not assessed; obando catheter & max/total assist)                ADL Assessment:  Feeding: Independent (patient finishing breakfast upon arrival)  Oral Facial Hygiene/Grooming: Setup;Supervision (while supine)  Bathing: Moderate assistance  Upper Body Dressing: Minimum assistance  Lower Body Dressing: Maximum assistance  Toileting:  Total assistance (obando catheter)     ADL Intervention:  Grooming  Grooming Assistance: Supervision/set up  Washing Face: Supervision/set-up  Brushing Teeth: Supervision/set-up     While supine in bed, with set-up/supervision, patient engaged in facial hygiene with wet washcloth and performed oral hygiene. Patient demonstrated good coordination while opening toothpaste tube; utilize RUE to brush teeth. Cognitive Retraining  Safety/Judgement: Awareness of environment     Therapeutic Exercise:  Patient educated on BUE therapeutic exercises to increase strength/endurance of BUEs. Patient verbalized understanding. Pain:  Pre treatment pain level: 8/10  Post treatment pain level: 8/10  Pain Scale 1: Numeric (0 - 10)  Pain Intensity 1: 8  Pain Location 1: Leg;Shoulder  Pain Description 1: Tightness; Other (comment) (stiffness: in L shoulder)     Activity Tolerance:  Fair  Please refer to the flowsheet for vital signs taken during this treatment. After treatment:   [ ] Patient left in no apparent distress sitting up in chair  [X] Patient left in no apparent distress in bed  [X] Call bell left within reach  [X] Nursing notified  [ ] Caregiver present  [ ] Bed alarm activated      COMMUNICATION/EDUCATION: Patient educated on role of OT and POC. She verbalized understanding.   [X] Home safety education was provided and the patient/caregiver indicated understanding. [X] Patient/family have participated as able in goal setting and plan of care. [X] Patient/family agree to work toward stated goals and plan of care. [ ] Patient understands intent and goals of therapy, but is neutral about his/her participation. [ ] Patient is unable to participate in goal setting and plan of care.      Thank you for this referral.     Radha Lima MS OTR/L  Time Calculation: 27 mins

## 2017-01-21 NOTE — PROGRESS NOTES
conducted an initial consultation and Spiritual Assessment for Adenike Hdz, who is a 64 y.o.,female. Patients Primary Language is: [place]. According to the patients EMR Caodaism Affiliation is: [place]. The reason the Patient came to the hospital is:   Patient Active Problem List    Diagnosis Date Noted    Rhabdomyolysis 01/20/2017    Fatty liver 05/19/2016    Morbid obesity ([address] 05/19/2016    Chronic pain of both knees 05/19/2016    Cigarette nicotine dependence in remission 05/19/2016    Anxiety 05/19/2016    Lymphedema 05/19/2016        The  provided the following Interventions:  Initiated a relationship of care and support. Explored issues of roe, belief, spirituality and Pentecostalism/ritual needs while hospitalized. Listened empathically. Provided information about Spiritual Care Services. Offered prayer and assurance of continued prayers on patient's behalf. Chart reviewed. The following outcomes were achieved:  Patient shared limited information about both their medical narrative and spiritual journey/beliefs. Patient processed feeling about current hospitalization. Patient expressed gratitude for 's visit. Assessment:  Patient does not have any Pentecostalism/cultural needs that will affect patients preferences in health care. There are no further spiritual or Pentecostalism issues which require intervention at this time. Plan:  Chaplains will continue to follow and will provide pastoral care on an as needed/requested basis.  recommends bedside caregivers page  on duty if patient shows signs of acute spiritual or emotional distress. Leticia Turner M.Div.   [facility]  [phone]

## 2017-01-21 NOTE — PROGRESS NOTES
Speciality bed received pending pt arrival from ED, bed placed in pt's room. Received pt from ED, pt transferred to speciality bed, per pt, she was on the floor for four days until found by her Couch's wife who stopped by visiting. Pt is alert and oriented, in no apparent discomfort; she is able to communicate her needs. Wound dressing done by attending nurse, Babita Camp RN with the assistance of three staff members, pt tolerated well. FSBS 72, attending nurse Jesse Jackson RN informed, crackers and diet ginger-jimbo given to pt.

## 2017-01-22 NOTE — DISCHARGE INSTRUCTIONS
DISCHARGE SUMMARY from Nurse    The following personal items are in your possession at time of discharge:    Dental Appliances: None  Visual Aid: Glasses, At bedside     Home Medications: Sent to pharmacy  Jewelry: Earrings  Clothing: At bedside  Other Valuables: None     PATIENT INSTRUCTIONS:    After general anesthesia or intravenous sedation, for 24 hours or while taking prescription Narcotics:  · Limit your activities  · Do not drive and operate hazardous machinery  · Do not make important personal or business decisions  · Do  not drink alcoholic beverages  · If you have not urinated within 8 hours after discharge, please contact your surgeon on call. What to do at Home:  Recommended activity: Activity as tolerated, or as recommended by PT or PCP    If you experience any of the following symptoms listed under RHABDOMYOLYSIS, please follow up with your PCP. *  Please give a list of your current medications to your Primary Care Provider. *  Please update this list whenever your medications are discontinued, doses are      changed, or new medications (including over-the-counter products) are added. *  Please carry medication information at all times in case of emergency situations. These are general instructions for a healthy lifestyle:    No smoking/ No tobacco products/ Avoid exposure to second hand smoke    Surgeon General's Warning:  Quitting smoking now greatly reduces serious risk to your health. Obesity, smoking, and sedentary lifestyle greatly increases your risk for illness    A healthy diet, regular physical exercise & weight monitoring are important for maintaining a healthy lifestyle    You may be retaining fluid if you have a history of heart failure or if you experience any of the following symptoms:  Weight gain of 3 pounds or more overnight or 5 pounds in a week, increased swelling in our hands or feet or shortness of breath while lying flat in bed.   Please call your doctor as soon as you notice any of these symptoms; do not wait until your next office visit. Recognize signs and symptoms of STROKE:    F-face looks uneven    A-arms unable to move or move unevenly    S-speech slurred or non-existent    T-time-call 911 as soon as signs and symptoms begin-DO NOT go       Back to bed or wait to see if you get better-TIME IS BRAIN. Warning Signs of HEART ATTACK     Call 911 if you have these symptoms:   Chest discomfort. Most heart attacks involve discomfort in the center of the chest that lasts more than a few minutes, or that goes away and comes back. It can feel like uncomfortable pressure, squeezing, fullness, or pain.  Discomfort in other areas of the upper body. Symptoms can include pain or discomfort in one or both arms, the back, neck, jaw, or stomach.  Shortness of breath with or without chest discomfort.  Other signs may include breaking out in a cold sweat, nausea, or lightheadedness. Don't wait more than five minutes to call 911 - MINUTES MATTER! Fast action can save your life. Calling 911 is almost always the fastest way to get lifesaving treatment. Emergency Medical Services staff can begin treatment when they arrive -- up to an hour sooner than if someone gets to the hospital by car. The discharge information has been reviewed with the patient. The patient verbalized understanding. Discharge medications reviewed with the patient and appropriate educational materials and side effects teaching were provided. Patient armband removed and shredded           Rhabdomyolysis: Care Instructions  Your Care Instructions  When you have rhabdomyolysis (say \"ymx-tzb-nu-AH-ga-suss\"), dying muscle cells cause toxins to build up in the blood. If not treated, it can cause life-threatening damage to the body's organs. It can be caused by many things, such as severe muscle injury, some medicines (like statins), the flu, and certain blood infections.   Symptoms may include weak muscles, pain, stiffness, fever, and nausea. Your urine may also be dark. You will get treatment in the hospital. If possible, the doctor will stop the cause of muscle cell death. The doctor will take steps to protect your organs. You may have to stop taking certain medicines if they are the cause of the problem. You will also get treatment to help the kidneys remove the toxins from your blood. This includes plenty of fluids. You may get fluids through a vein (by IV). You may also need dialysis. Follow-up care is a key part of your treatment and safety. Be sure to make and go to all appointments, and call your doctor if you are having problems. It's also a good idea to know your test results and keep a list of the medicines you take. How can you care for yourself at home? · Take pain medicines exactly as directed. ¨ If the doctor gave you a prescription medicine for pain, take it as prescribed. ¨ If you are not taking a prescription pain medicine, ask your doctor if you can take an over-the-counter medicine. · Talk to your doctor about whether you need to stop taking any medicines. Follow your doctor's instructions about stopping medicines. · Drink plenty of fluids, enough so that your urine is light yellow or clear like water. If you have kidney, heart, or liver disease and have to limit fluids, talk with your doctor before you increase the amount of fluids you drink. When should you call for help? Call your doctor now or seek immediate medical care if:  · You have new or worse muscle pain. · You have less urine than normal or no urine. · You have new swelling in your arms or feet. · You have blood in your urine. Watch closely for changes in your health, and be sure to contact your doctor if you do not get better as expected. Where can you learn more? Go to http://jeb-fritz.info/.   Enter F129 in the search box to learn more about \"Rhabdomyolysis: Care Instructions. \"  Current as of: November 20, 2015  Content Version: 11.1  © 6007-8796 [employer], [facility]. Care instructions adapted under license by [employer] (which disclaims liability or warranty for this information). If you have questions about a medical condition or this instruction, always ask your healthcare professional. [place] [address] any warranty or liability for your use of this information.

## 2017-01-22 NOTE — PROGRESS NOTES
Bedside and Verbal shift change report given to,Shira Fermin RN (oncoming nurse) by Maria Alejandra Gomez RN (offgoing nurse). Report included the following information SBAR, Kardex, MAR and Recent Results. 1950 shift assessment done,pt has no signs of distress,call bell within reach. 2340 reassessment done,dressings needs to be changed,    0003,due medications given,dressings changed,pt bathed and made comfortable in bed,it  took 2hrs and  five people to turn patient for care to be given . 0335 reassessment done patient has no signs of distress. Bedside and Verbal shift change report given to Pura Cleaning RN (oncoming nurse) by Adriel Garces RN (offgoing nurse). Report included the following information SBAR, Kardex, MAR and Recent Results.

## 2017-01-22 NOTE — PROGRESS NOTES
Assumed care; Pt resting in bed; no distress noted; Pt denies pain; bed in low position; Side rails up x 3; Call light and personal belonging within reach. Will continue to monitor. 0930: Wound care to sacrum, posterior legs and Bilateral heel.   1600: Juarez removed. 1630: Discharge instruction and Discharge medication reviewed with pt. No question or concerns at this time. Vitals stable; No change in complex assessment; Dressing to Sacrum intact. Will continue to monitor. 1710: Incontinence bedpad changed; Valuables returned to pt. Will continue to monitor. 1720: Discharge home with [facility]. Reported vitals, electrolytes, and narcotics received during shift.

## 2017-01-22 NOTE — PROGRESS NOTES
Patient discharged,  Medical transport arranged with [facility] for 445 pm   . PCS form to nurses station. Faxed orders with face sheet to [facility] phone [phone],  Fax [phone]. Will leave a message with Ifeoma Collier to alert her to bed order.    Phoenix Lipscomb RN

## 2017-01-23 NOTE — PROGRESS NOTES
Patient admitted to [facility] on 1/20/17-1/22/17 for Traumatic Rhabdomyolysis  Patient has a history of Medical History:       Past Medical History   Diagnosis Date    Anemia     Anxiety 5/19/2016    Arthritis     Cancer ([facility] [address]      pre cancerous cells in the uterus    Chronic pain      knees    Chronic pain of both knees 5/19/2016    Cigarette nicotine dependence in remission 5/19/2016    Diabetes ([facility] [address]     Fatty liver 5/19/2016    Headache     Hypertension     Liver disease      fatty liver    Lymphedema 5/19/2016    Morbid obesity ([facility] [address] 5/19/2016    Psychotic disorder      anxiety and panic attacks    Thyroid disease      Contacted patient for hospital follow up. Introduced self, role and reason for call. Verified 2 patient identifiers (Name and Date of Birth). Patient reported:  Patient reported that she is feeling better. Patient denied:  Patient denied fever, chills, chest pain, shortness of breath, muscle pain, new swelling, blood in urine and decrease in urine. Medication Reconciliation completed: yes. New medications at discharge include:   START taking these medications     Details   cephALEXin (KEFLEX) 500 mg capsule Take 1 Cap by mouth four (4) times daily. Qty: 28 Cap, Refills: 0               Changed Medication at Discharge Include: no change medication noted. Stop  Medications at discharge include :   STOP taking these medications         furosemide (LASIX) 20 mg tablet Comments:   Reason for Stopping:                        DME  Cane, walker and hospital bed. Advance Medical Directive on file in EMR? No,  not on file. Barriers to care  Transportation: attempted to give list of transportation. Patient declined and states that hospital gave her transportation list.     Adherence to previous treatment and likelihood for follow-up:  Patient verbalized understanding of discharge instructions and special follow up.      Educated patient to monitor and report the following Red flags: chest pain, shortness of breath , less urine, blood in urine, severe muscle pain, new swelling any new or concerning symptoms. Advised patient to seek medical attention with patient provider, urgent care or return to the emergency department if any of the following symptoms  occur after being discharged from the hospital:  fever >101.5F,  chest pain, shortness of breath, leg swelling/pain, and/or return of the symptoms which initially resulted in hospitalization. Patient verbalized understanding of information discussed and is aware of  when to seek medical attention from PCP, urgent care or ED. Reconciled home medications and reviewed allergies. Instructed to bring all medications with her to next appointment. Opportunity to ask questions was provided. Contact information was provided for future reference, assistance, concerns, or further questions. Patient refused to schedule a follow up appointment at this time. Patient states that she has problem with transportation. Attempted to give I-ride phone number patient refused I ride. Attempted to give other transportation phone numbers patient stated \" The hospital gave me all the list for transportation\". Strongly encourage hospital follow up appointment. Patient states that she will call the office once she finds transportation. Patient states that she is not sure if she has a home health nurse. Attempted to call hospital care coordinator to ask if patient was sent home with home health nurse. Unable to reach. Left a text on CC pager with office contact information. Plan of Care/Goals:  Patient will attend follow up appointment  Patient will be free from post hospital complications    Will continue to follow.

## 2017-02-02 NOTE — PROGRESS NOTES
Receive call from the pt requesting hospital bed. Call to Dr. Brooks Roberto requesting verbal order. Verbal order entered for Dr. Brooks Roberto in [facility] and sent to [facility] via [place]. Informed the pt she must meet criteria for the home hospital bed by [facility]/ Medicare. If approved, it will be deivered in 3-5 business days. Requires additional time for delivery of a [facility] hospital bed.

## 2017-02-03 NOTE — PROGRESS NOTES
Patient admitted to [address] [place] [address] on 17-17 for Traumatic Rhabdomyolysis    Attempt to contact patient via telephone on 2/3/17 for post hospital follow up. Reached patient on phone and verified identity using name and . Introduced self/role and purpose of call. Patient  stated \"My heel hurts\" Patient reports chronic heel and feet discomfort. Offered appointment, patient refused. Patient denied any chest pain, shortness of breath, fever, chills, blood in urine, new swelling, less urine and muscle pain. Patient stated \" You know I can't follow up with you guys yet, I have visiting Physician coming next week\". Patient states that she has Home Health Nurse following. Patient states she will find next week  About the name of the Gopi Mccollum. No barriers to care and needs verbalized by patient  at this time. Educated patient to monitor and report the following Red flags: chest pain, shortness of breath, blood in urine, decrease in urine or any new or concerning symptoms. Advised patient to seek medical attention with patient provider, urgent care or return to the emergency department if any of the following symptoms  occur after being discharged from the hospital:  fever >101.5F,  chest pain, shortness of breath, leg swelling/pain, and/or return of the symptoms which initially resulted in hospitalization. Patient verbalized understanding of information discussed and is aware of  when to seek medical attention from PCP, urgent care or ED. Opportunity to ask questions was provided. Contact information was provided for future reference, assistance, concerns, or further questions. No concerns, assistance and questions at this time as per patient. Will continue to follow.

## 2017-02-10 PROBLEM — A41.9 SEPTIC SHOCK (HCC): Status: ACTIVE | Noted: 2017-01-01

## 2017-02-10 PROBLEM — R65.21 SEPTIC SHOCK (HCC): Status: ACTIVE | Noted: 2017-01-01

## 2017-02-10 NOTE — ED NOTES
1724-Axillary temp obtained. md Deb Robles aware rectal not obtainable at this time. Once cvl placed, rectal temp and obando will be attempted. Pt skin cold. Not a good pleth or sat able to be observed. 1728- Md attempting CVL placement. 1735 bs of 79 reported Dr Kailyn Block aware.

## 2017-02-10 NOTE — ED NOTES
Pt rolled to attempt to get her off the ems tarp. Stretcher not steady with 4 ppl. More help arrives. With 6 staff members. Pt rolled and wiped off quickely. Very large tunneling decubitus ulcer present 8inches high x approx 1.5 feet across was area that was visible. Md aware.

## 2017-02-10 NOTE — ED TRIAGE NOTES
Per ems report. Pt found altered mental status and sob. Thought to be down for 3 days. Care givers are mentally challenged. Pt very odiferous of feces.

## 2017-02-10 NOTE — ED PROVIDER NOTES
HPI Comments: 5:36 PM Bess Lawrence is a 64 y.o. female with h/o DM, HTN, liver disease, morbid obesity, and lymphedema who presents to ED via EMS for evaluation of AMS. Per EMS, patient's caregiver is mentally challenged. EMS also report that patient's caregiver says she has been down for about 3 days. EMS state they found her covered in feces and bed bugs with extreme cellulitis to both legs. EMS personnel say this is the second time they have picked her up this month. The first time they saw her she was talking, and state she is not currently at her baseline. EMS report blood sugar of 100. No other concerns or symptoms at this time. Caity Dodge PCP: Paul Doan MD    The history is provided by the EMS personnel. Past Medical History:   Diagnosis Date    Anemia     Anxiety 2016    Arthritis     Cancer ([facility] [address]      pre cancerous cells in the uterus    Chronic pain      knees    Chronic pain of both knees 2016    Cigarette nicotine dependence in remission 2016    Diabetes ([facility] [address]     Fatty liver 2016    Headache     Hypertension     Liver disease      fatty liver    Lymphedema 2016    Morbid obesity ([address] 2016    Psychotic disorder      anxiety and panic attacks    Thyroid disease        Past Surgical History:   Procedure Laterality Date    Hx cholecystectomy      Hx gyn  2006     hysterectomy    Hx  section           Family History:   Problem Relation Age of Onset    Diabetes Mother     Hypertension Mother     Lung Disease Father        Social History     Social History    Marital status:      Spouse name: N/A    Number of children: N/A    Years of education: N/A     Occupational History    Not on file.      Social History Main Topics    Smoking status: Never Smoker    Smokeless tobacco: Never Used    Alcohol use No    Drug use: No    Sexual activity: No     Other Topics Concern    Not on file     Social History Narrative ALLERGIES: Ace inhibitors; Hydralazine; Ibuprofen; Norvasc [amlodipine]; Sulfa (sulfonamide antibiotics); and Tramadol    Review of Systems   Unable to perform ROS: Acuity of condition       Vitals:    02/10/17 1815 02/10/17 1820 02/10/17 1822 02/10/17 1830   BP: (!) 77/32 (!) 74/58  (!) 122/93   Pulse: (!) 108 (!) 106 (!) 106 (!) 104   Resp: (!) 32 (!) 39 13 16   Temp:       SpO2:   95%    Weight:       Height:                Physical Exam   Constitutional: She appears well-developed and well-nourished. She appears distressed. Face mask in place. 64year old Morbidly obese  female in severe distress. Patient is covered in feces. HENT:   Head: Normocephalic and atraumatic. Right Ear: External ear normal.   Left Ear: External ear normal.   Mouth/Throat: Mucous membranes are dry (extremely). Eyes: Conjunctivae are normal. Pupils are equal, round, and reactive to light. No scleral icterus. Neck: Normal range of motion. Neck supple. JVD present. Cardiovascular: Intact distal pulses. Exam reveals no gallop and no friction rub. No murmur heard. Tachycardia, S1/S2, Capillary refill < 3 seconds   Pulmonary/Chest: Tachypnea noted. She is in respiratory distress. She exhibits no tenderness. Patient is maintaining her own airway. Abdominal: Soft. Bowel sounds are normal. She exhibits no distension and no mass. There is no tenderness. There is no rebound and no guarding. Genitourinary:   Genitourinary Comments: Large unstageable sacral decubitus ulcer, left medial gluteal ulcer which tunnels and contains a large amount of stool untstageable. Musculoskeletal: Normal range of motion. She exhibits no edema. Chronic venous stasis dermatitis, chronic edema. Stage II left heel ulcer. Stage II right heel ulcer. Lymphadenopathy:     She has no cervical adenopathy. Neurological: She is alert. No cranial nerve deficit. Patient responds to stimuli.  Eyes are open with leftward gaze preference. Skin: Skin is dry. Skin is very dry. Chronic lymphedema noted in bilateral lower extremities. Severe venous stasis in bilateral lower extremities. Skin breakdown at the left elbow. Tunneling along the sacral area and L medial gluteal area; skin flapping 12.5 in wide; muscle and bone exposed  Multiple decubitus ulcers on R thigh    Psychiatric:   Unable to assess   Nursing note and vitals reviewed. MDM  Number of Diagnoses or Management Options  Sacral decubitus ulcer, unstageable ([address]):   Sepsis, due to unspecified organism [facility]):   Septic shock [facility]):   Diagnosis management comments: Patient arrived in severe distress requiring my immediate attention at bedside. Severe Sepsis order set initiated. Differential includes: Sepsis, UTI, Colitis, Acute renal failure, Pneumonia, metabolic derangement, dehydration. Unable to perform CT  Head and CT chest abdomen and pelvis due to the patient weight.       Cental line placed, blood drawn         Amount and/or Complexity of Data Reviewed  Clinical lab tests: ordered and reviewed  Tests in the radiology section of CPT®: ordered and reviewed  Tests in the medicine section of CPT®: ordered and reviewed  Discussion of test results with the performing providers: yes  Decide to obtain previous medical records or to obtain history from someone other than the patient: yes  Obtain history from someone other than the patient: yes  Review and summarize past medical records: yes  Independent visualization of images, tracings, or specimens: yes    Risk of Complications, Morbidity, and/or Mortality  Presenting problems: high  Diagnostic procedures: high  Management options: high    Critical Care  Total time providing critical care:  minutes    ED Course       Central Line  Date/Time: 2/10/2017 5:44 PM  Performed by: Lelo Chan by: Sukumar Zelaya     Consent:     Consent obtained:  Emergent situation    Alternatives discussed: No treatment  Pre-procedure details:     Hand hygiene: Hand hygiene performed prior to insertion      Sterile barrier technique: All elements of maximal sterile technique followed      Skin preparation:  ChloraPrep (Used chlroprep twice. One large one and one that was in the central line kit.)    Skin preparation agent: Skin preparation agent completely dried prior to procedure    Anesthesia (see MAR for exact dosages): Anesthesia method:  Local infiltration    Local anesthetic:  Lidocaine 1% w/o epi  Procedure details:     Location:  R internal jugular    Patient position:  Flat    Procedural supplies:  Triple lumen    Catheter size:  7 Fr    Landmarks identified: yes      Ultrasound guidance: yes      Sterile ultrasound techniques: Sterile gel and sterile probe covers were used      Number of attempts:  1    Successful placement: yes    Post-procedure details:     Post-procedure:  Dressing applied and line sutured    Assessment:  Blood return through all ports, free fluid flow, no pneumothorax on x-ray and placement verified by x-ray    Patient tolerance of procedure: Tolerated well, no immediate complications  ABG  Date/Time: 2/10/2017 6:08 PM  Performed by: Lily Banegas by: Hermes Harley     Consent:     Consent obtained:  Emergent situation    Alternatives discussed:  No treatment  Anesthesia (see MAR for exact dosages): Anesthesia method:  None  Procedure details:     Location:  L radial    Neville's test performed: yes      Number of attempts:  1  Post-procedure details:     Dressing applied: yes      Bleeding:  Hemostasis achieved    Circulation, movement, and sensation:  Normal    Patient tolerance of procedure:   Tolerated well, no immediate complications        Vitals:  Patient Vitals for the past 12 hrs:   Temp Pulse Resp BP SpO2   02/10/17 1830 - (!) 104 16 (!) 122/93 -   02/10/17 1822 - (!) 106 13 - 95 %   02/10/17 1820 - (!) 106 (!) 39 (!) 74/58 -   02/10/17 1815 - (!) 108 (!) 32 (!) 77/32 -   02/10/17 1810 - (!) 110 29 133/51 -   02/10/17 1805 - (!) 109 (!) 43 119/55 -   02/10/17 1800 - (!) 108 (!) 42 112/76 -   02/10/17 1757 - (!) 109 (!) 42 93/49 -   02/10/17 1735 - (!) 112 (!) 34 - -   02/10/17 1730 - (!) 112 (!) 34 116/84 (!) 84 %   02/10/17 1725 97 °F (36.1 °C) - - - -         Medications ordered:   Medications   sodium chloride (NS) flush 5-10 mL (not administered)   levoFLOXacin (LEVAQUIN) 750 mg in D5W IVPB (750 mg IntraVENous New Bag 2/10/17 1847)   vancomycin (VANCOCIN) 1,500 mg in 0.9% sodium chloride 500 mL IVPB (not administered)   NOREPINephrine (LEVOPHED) 8,000 mcg in dextrose 5% 250 mL infusion (4 mcg/min IntraVENous New Bag 2/10/17 1851)   sodium bicarbonate (8.4%) injection 50 mEq (not administered)   insulin regular (NOVOLIN R, HUMULIN R) injection 10 Units (not administered)   dextrose (D50W) injection syrg 25 g (not administered)   piperacillin-tazobactam (ZOSYN) 3.375 g in 0.9% sodium chloride (MBP/ADV) 100 mL MBP (0 g IntraVENous IV Completed 2/10/17 1828)   sodium chloride 0.9 % bolus infusion 2,000 mL (2,000 mL IntraVENous New Bag 2/10/17 1729)         Lab findings:  Recent Results (from the past 12 hour(s))   EKG, 12 LEAD, INITIAL    Collection Time: 02/10/17  5:29 PM   Result Value Ref Range    Ventricular Rate 111 BPM    Atrial Rate 111 BPM    P-R Interval 132 ms    QRS Duration 90 ms    Q-T Interval 368 ms    QTC Calculation (Bezet) 500 ms    Calculated P Axis 66 degrees    Calculated R Axis 68 degrees    Calculated T Axis -95 degrees    Diagnosis       Sinus tachycardia  Marked ST abnormality, possible inferior subendocardial injury  Abnormal ECG  When compared with ECG of 20-JAN-2017 23:00,  ST now depressed in Inferior leads  Inverted T waves have replaced nonspecific T wave abnormality in Inferior   leads     GLUCOSE, POC    Collection Time: 02/10/17  5:36 PM   Result Value Ref Range    Glucose (POC) 72 70 - 989 mg/dL   METABOLIC PANEL, COMPREHENSIVE Collection Time: 02/10/17  5:40 PM   Result Value Ref Range    Sodium 143 136 - 145 mmol/L    Potassium 6.1 (HH) 3.5 - 5.5 mmol/L    Chloride 115 (H) 100 - 108 mmol/L    CO2 13 (L) 21 - 32 mmol/L    Anion gap 15 3.0 - 18 mmol/L    Glucose 138 (H) 74 - 99 mg/dL    BUN 92 (H) 7.0 - 18 MG/DL    Creatinine 1.57 (H) 0.6 - 1.3 MG/DL    BUN/Creatinine ratio 59 (H) 12 - 20      GFR est AA 41 (L) >60 ml/min/1.73m2    GFR est non-AA 33 (L) >60 ml/min/1.73m2    Calcium 8.8 8.5 - 10.1 MG/DL    Bilirubin, total 1.6 (H) 0.2 - 1.0 MG/DL    ALT (SGPT) 21 13 - 56 U/L    AST (SGOT) 34 15 - 37 U/L    Alk. phosphatase 141 (H) 45 - 117 U/L    Protein, total 6.7 6.4 - 8.2 g/dL    Albumin 1.6 (L) 3.4 - 5.0 g/dL    Globulin 5.1 (H) 2.0 - 4.0 g/dL    A-G Ratio 0.3 (L) 0.8 - 1.7     CBC WITH AUTOMATED DIFF    Collection Time: 02/10/17  5:40 PM   Result Value Ref Range    WBC 28.7 (H) 4.6 - 13.2 K/uL    RBC 3.89 (L) 4.20 - 5.30 M/uL    HGB 11.1 (L) 12.0 - 16.0 g/dL    HCT 31.2 (L) 35.0 - 45.0 %    MCV 80.2 74.0 - 97.0 FL    MCH 28.5 24.0 - 34.0 PG    MCHC 35.6 31.0 - 37.0 g/dL    RDW 13.9 11.6 - 14.5 %    PLATELET 459 321 - 983 K/uL    NEUTROPHILS 87 (H) 42 - 75 %    BAND NEUTROPHILS 2 0 - 5 %    LYMPHOCYTES 5 (L) 20 - 51 %    MONOCYTES 4 2 - 9 %    EOSINOPHILS 0 0 - 5 %    BASOPHILS 0 0 - 3 %    MYELOCYTES 1 (H) 0 %    PROMYELOCYTES 1 (H) 0 %    ABS. NEUTROPHILS 25.0 (H) 1.8 - 8.0 K/UL    ABS. LYMPHOCYTES 1.4 0.8 - 3.5 K/UL    ABS. MONOCYTES 1.1 (H) 0 - 1.0 K/UL    ABS. EOSINOPHILS 0.0 0.0 - 0.4 K/UL    ABS.  BASOPHILS 0.0 0.0 - 0.1 K/UL    PLATELET COMMENTS ADEQUATE      RBC COMMENTS NORMOCYTIC, NORMOCHROMIC      DF MANUAL     CARDIAC PANEL,(CK, CKMB & TROPONIN)    Collection Time: 02/10/17  5:40 PM   Result Value Ref Range     26 - 192 U/L    CK - MB 5.4 (H) 0.5 - 3.6 ng/ml    CK-MB Index 3.0 0.0 - 4.0 %    Troponin-I, Qt. <0.02 0.0 - 0.045 NG/ML   AMMONIA    Collection Time: 02/10/17  5:40 PM   Result Value Ref Range    Ammonia 168 (H) 11 - 32 UMOL/L   POC TROPONIN-I    Collection Time: 02/10/17  5:51 PM   Result Value Ref Range    Troponin-I (POC) <0.04 0.00 - 0.08 ng/mL   POC CHEM8    Collection Time: 02/10/17  5:52 PM   Result Value Ref Range    CO2 (POC) 13 (L) 19 - 24 MMOL/L    Glucose (POC) 150 (H) 74 - 106 MG/DL    BUN (POC) 71 (H) 7 - 18 MG/DL    Creatinine (POC) 1.3 0.6 - 1.3 MG/DL    GFR-AA (POC) 50 (L) >60 ml/min/1.73m2    GFR, non-AA (POC) 42 (L) >60 ml/min/1.73m2    Sodium (POC) 144 136 - 145 MMOL/L    Potassium (POC) 6.1 (HH) 3.5 - 5.5 MMOL/L    Calcium, ionized (POC) 1.17 1.12 - 1.32 MMOL/L    Chloride (POC) 117 (H) 100 - 108 MMOL/L    Anion gap (POC) 21 (H) 10 - 20      Hematocrit (POC) 33 (L) 36 - 49 %    Hemoglobin (POC) 11.2 (L) 12 - 16 G/DL   POC LACTIC ACID    Collection Time: 02/10/17  5:54 PM   Result Value Ref Range    Lactic Acid (POC) 4.9 (HH) 0.4 - 2.0 mmol/L   POC G3    Collection Time: 02/10/17  6:06 PM   Result Value Ref Range    Device: Non rebreather      Flow rate (POC) 10 L/M    FIO2 (POC) 100 %    pH (POC) 7.366 7.35 - 7.45      pCO2 (POC) 20.3 (L) 35.0 - 45.0 MMHG    pO2 (POC) 204 (H) 80 - 100 MMHG    HCO3 (POC) 11.7 (L) 22 - 26 MMOL/L    sO2 (POC) 100 (H) 92 - 97 %    Base deficit (POC) 14 mmol/L    Allens test (POC) N/A      Total resp. rate 22      Site LEFT RADIAL      Patient temp.  97.0      Specimen type (POC) ARTERIAL      Performed by Roberto Lee    URINALYSIS W/ RFLX MICROSCOPIC    Collection Time: 02/10/17  6:15 PM   Result Value Ref Range    Color DARK YELLOW      Appearance CLOUDY      Specific gravity 1.018 1.005 - 1.030      pH (UA) 5.0 5.0 - 8.0      Protein NEGATIVE  NEG mg/dL    Glucose NEGATIVE  NEG mg/dL    Ketone TRACE (A) NEG mg/dL    Bilirubin SMALL (A) NEG      Blood NEGATIVE  NEG      Urobilinogen 1.0 0.2 - 1.0 EU/dL    Nitrites NEGATIVE  NEG      Leukocyte Esterase TRACE (A) NEG     URINE MICROSCOPIC ONLY    Collection Time: 02/10/17  6:15 PM   Result Value Ref Range    WBC 4 to 10 0 - 4 /hpf    RBC NONE 0 - 5 /hpf    Epithelial cells 1+ 0 - 5 /lpf    Bacteria 1+ (A) NEG /hpf    Amorphous Crystals 2+ (A) NEG    Yeast 3+ (A) NEG       EKG interpretation by ED Physician:   17:29. Sinus tachycardia. Rate 111 bpm; normal QRS duration; normal axis; some artifact. No ST elevation. No T wave inversion. Interpreted by Alessandro Or, DO.    X-Ray, CT or other radiology findings or impressions:  XR CHEST PORT    (Results Pending)       Orders:  Orders Placed This Encounter    SEVERE SEPSIS AND SEPTIC SHOCK BUNDLE INITIATED     Standing Status:   Standing     Number of Occurrences:   1    CENTRAL VENOUS LINE INSERTION     This order was created via procedure documentation     Standing Status:   Standing     Number of Occurrences:   1    CULTURE, BLOOD     Standing Status:   Standing     Number of Occurrences:   1    CULTURE, BLOOD     Standing Status:   Standing     Number of Occurrences:   1    CULTURE, URINE     Standing Status:   Standing     Number of Occurrences:   1     Order Specific Question:   Reason for Culture     Answer:   Eval of sepsis without a clear source    XR CHEST PORT     Standing Status:   Standing     Number of Occurrences:   1     Order Specific Question:   Reason for Exam     Answer:   Sepsis    LACTIC ACID, PLASMA     If initial lactate level is greater than or equal to 2, draw second lactate in 4 hours.      Standing Status:   Standing     Number of Occurrences:   2    URINALYSIS W/ RFLX MICROSCOPIC     Standing Status:   Standing     Number of Occurrences:   1    METABOLIC PANEL, COMPREHENSIVE     Standing Status:   Standing     Number of Occurrences:   1    CBC WITH AUTOMATED DIFF     Standing Status:   Standing     Number of Occurrences:   1    CARDIAC PANEL,(CK, CKMB & TROPONIN)     Standing Status:   Standing     Number of Occurrences:   1    AMMONIA     Standing Status:   Standing     Number of Occurrences:   1    BLOOD GAS, ARTERIAL     Standing Status: Standing     Number of Occurrences:   1    URINE MICROSCOPIC ONLY     Standing Status:   Standing     Number of Occurrences:   1    VITAL SIGNS - PER UNIT ROUTINE     PER UNIT ROUTINE     Standing Status:   Standing     Number of Occurrences:   1    STRICT I & O     Standing Status:   Standing     Number of Occurrences:   1    NEUROLOGIC STATUS ASSESSMENT - PER UNIT ROUTINE     PER UNIT ROUTINE     Standing Status:   Standing     Number of Occurrences:   1    NOTIFY PROVIDER: SPECIFY Notify provider within one hour to start vasopressors if patient is unable to maintain a MAP of greater than or equal to 65 mmHg despite fluid resuscitation CONTINUOUS STAT     Standing Status:   Standing     Number of Occurrences:   1     Order Specific Question:   Please describe the test or procedure you would like to order. Answer:   Notify provider within one hour to start vasopressors if patient is unable to maintain a MAP of greater than or equal to 65 mmHg despite fluid resuscitation    POC LACTIC ACID     Standing Status:   Standing     Number of Occurrences:   1    CARDIAC MONITORING     Standing Status:   Standing     Number of Occurrences:   1     Order Specific Question:   Type: Answer:   Bedside    ARTERIAL BLOOD GAS (ASAP IMMANUEL)     This order was created via procedure documentation     Standing Status:   Standing     Number of Occurrences:   1    IP CONSULT TO INTENSIVIST     Standing Status:   Standing     Number of Occurrences:   1     Order Specific Question:   Reason for Consult: Answer:   Septic shock     Order Specific Question:   Did you call or speak to the consulting provider? Answer:   No     Order Specific Question:   Consult To     Answer:    Broderick Penaloza    POC CHEM8     Standing Status:   Standing     Number of Occurrences:   1    POC LACTIC ACID     Standing Status:   Standing     Number of Occurrences:   1    GLUCOSE, POC     Standing Status:   Standing     Number of Occurrences: 1    POC TROPONIN-I     Standing Status:   Standing     Number of Occurrences:   1    POC G3     Standing Status:   Standing     Number of Occurrences:   1    EKG, 12 LEAD, INITIAL     Standing Status:   Standing     Number of Occurrences:   1     Order Specific Question:   Reason for Exam:     Answer:   Sepsis    SALINE LOCK IV ONE TIME STAT     Standing Status:   Standing     Number of Occurrences:   1    sodium chloride (NS) flush 5-10 mL    piperacillin-tazobactam (ZOSYN) 3.375 g in 0.9% sodium chloride (MBP/ADV) 100 mL MBP     Order Specific Question:   Antibiotic Indications     Answer:   Sepsis of Unknown Etiology    sodium chloride 0.9 % bolus infusion 2,000 mL    DISCONTD: vancomycin (VANCOCIN) 1,000 mg in 0.9% sodium chloride (MBP/ADV) 250 mL adv     Order Specific Question:   Antibiotic Indications     Answer:   Sepsis of Unknown Etiology    levoFLOXacin (LEVAQUIN) 750 mg in D5W IVPB     Order Specific Question:   Antibiotic Indications     Answer:   Sepsis of Unknown Etiology    vancomycin (VANCOCIN) 1,500 mg in 0.9% sodium chloride 500 mL IVPB     Order Specific Question:   Antibiotic Indications     Answer:   Sepsis of Unknown Etiology    DISCONTD: NOREPINephrine (LEVOPHED) 4,000 mcg in dextrose 5% 250 mL infusion     Order Specific Question:   Titrate Infusion? Answer:   Yes     Order Specific Question:   Initial Infusion Rate: Answer:   4 mcg/min     Order Specific Question:   Titrate Every 2-5 Minutes In Increments of: Answer:   1-5 mcg/min     Order Specific Question:   Goal of Therapy is: Answer:   MAP greater than 65 mmHg     Order Specific Question:   Contact Physician for: Answer:   Patient is not achieving goal and is at max rate    NOREPINephrine (LEVOPHED) 8,000 mcg in dextrose 5% 250 mL infusion     Order Specific Question:   Titrate Infusion? Answer:   Yes     Order Specific Question:   Initial Infusion Rate:      Answer:   4 mcg/min     Order Specific Question:   Titrate Every 2-5 Minutes In Increments of: Answer:   1-5 mcg/min     Order Specific Question:   Goal of Therapy is: Answer:   MAP greater than 65 mmHg     Order Specific Question:   Contact Physician for: Answer:   Patient is not achieving goal and is at max rate    sodium bicarbonate (8.4%) injection 50 mEq    insulin regular (NOVOLIN R, HUMULIN R) injection 10 Units    dextrose (D50W) injection syrg 25 g    IP CONSULT TO HOSPITALIST     Standing Status:   Standing     Number of Occurrences:   1     Order Specific Question:   Reason for Consult: Answer:   ALTERED MENTAL STATUS     Order Specific Question:   Did you call or speak to the consulting provider? Answer:   No     Order Specific Question:   Consult To     Answer:   Dr. Chitra Rivera Specific Question:   Schedule When? Answer:   TODAY    [facility] to dose vancomycin after once dose. Standing Status:   Standing     Number of Occurrences:   1     Order Specific Question:   Antibiotic Indications     Answer:   Sepsis of Unknown Etiology       Progress notes, Consult notes, or additional Procedure notes:   6:23 PM Patient's blood pressure noted to be 74/58. Will start levo. Sepsis 3-6 hour reevaluation and exam:       Reevaluation:  Cardiopulmonary assessment:  RESP:Tachypnea, Patient is intubated  CV: Tachycardia, S1/S2 WNL; No murmurs, gallops or rubs. Capillary refill: <3 seconds  Peripheral pulse:   Tready    Skin exam:  Skin color: Warm  Skin Turgor: decrease turgor    Sepsis 3-6 hour reevaluation and exam performed at 8:45 pm.        6:44 PM, 2/10/2017   Consult:  Discussed care with Dr. León Hurst, hospitalist. Standard discussion; including history of patients chief complaint, available diagnostic results, and treatment course.  Will notify the oncoming hospitalist, Dr. Ju Romero, of pt's need for admission     7:14 PM, 2/10/2017   Consult:  Discussed care with Patrica Vanessa [place] intensivist. Standard discussion; including history of patients chief complaint, available diagnostic results, and treatment course. States Dr. Dorian Marcelo will be down to evaluate the pt           Disposition:  Diagnosis:   1. Sepsis, due to unspecified organism ([facility] [address]    2. Septic shock ([address]    3. Sacral decubitus ulcer, unstageable ([facility] [address]        Disposition:     Follow-up Information     None           Patient's Medications   Start Taking    No medications on file   Continue Taking    CEPHALEXIN (KEFLEX) 500 MG CAPSULE    Take 1 Cap by mouth four (4) times daily. ERGOCALCIFEROL (VITAMIN D2) 50,000 UNIT CAPSULE    Take 50,000 Units by mouth. GLIMEPIRIDE (AMARYL) 2 MG TABLET    Take 2 mg by mouth every morning. HYDROCHLOROTHIAZIDE (HYDRODIURIL) 25 MG TABLET    Take 25 mg by mouth daily. LORATADINE (CLARITIN) 10 MG TABLET    10 mg. LOSARTAN (COZAAR) 25 MG TABLET    Take 1 Tab by mouth every evening. METOPROLOL SUCCINATE (TOPROL-XL) 25 MG XL TABLET    Take 1 Tab by mouth daily. NEBIVOLOL (BYSTOLIC) 5 MG TABLET    Take 5 mg by mouth daily. TRAMADOL (ULTRAM) 50 MG TABLET    Take 50 mg by mouth every six (6) hours as needed for Pain. These Medications have changed    No medications on file   Stop Taking    No medications on file       [id number] [facility] for and in the presence of Crissy Flores DO (02/10/17)      Physician Attestation  I personally performed the services described in this documentation, reviewed and edited the documentation which was dictated to the scribe in my presence, and it accurately records my words and actions.     Crissy Flores DO (02/10/17)      Signed by: Faith Betts, February 10, 2017 at 6:24 PM       Scribe Attestation:   I, Crystal Dean am scribing for and in the presence of Crissy Flores DO on this day 02/10/17 at 6:40 PM   faith Miller    Provider Attestation:  I personally performed the services described in the documentation, reviewed the documentation, as recorded by the scribe in my presence, and it accurately and completely records my words and actions.   Brandi Wilcox DO. 6:40 PM      Signed by: Jeanette Major, 6:40 PM

## 2017-02-10 NOTE — ED NOTES
Per EMS house is needs to be condemned. They are initiating that from their end. They stated they have contacted aps multiple times over patients living conditions with no results   Patient is unable to care for self. Her son who is mentally handicapped takes care of her and she has a neighbor that checks on her from time to time. All this information is coming from EMS.

## 2017-02-11 NOTE — PROGRESS NOTES
[facility]   Discharge Planning/ Assessment    Reasons for Intervention: Patient is intubated, on the ventilator. I called and spoke to the patients son. I asked if she had a hospital bed as her records indicated that a bed was ordered on discharge during her last admission. He stated, \"She doesn't have a bed. \"  I pressed and asked again and he stated, \" Well she had a bed but she fell and the  came, so now I have parts of the bed I want to get rid of\". I explained the role of care manager and asked if he was ok being alone, he said, \" yes. \" APS was notified in the Ed according to the patient nurse. Discharge plan is dependent upon her successful extubation. Edna GARCIA mailed above.      High Risk Criteria  [x] Yes  []No   Physician Referral  [] Yes  [x]No        Date    Nursing Referral  [] Yes  [x]No        Date    Patient/Family Request  [] Yes  [x]No        Date       Resources:    Medicare  [x] Yes  []No   Medicaid  [] Yes  [x]No   No Resources  [] Yes  [x]No   Private Insurance  [] Yes  [x]No    Name/Phone Number    Other  [] Yes  [x]No        (i.e. Workman's Comp)         Prior Services:    Prior Services  [] Yes  [x]No   Home Health  [] Yes  [x]No   [address] [place]  [] Yes  [x]No        Number of [address]  [] Yes  [x]No       Meals on Wheels  [] Yes  [x]No   Office on Aging  [] Yes  [x]No   Transportation Services  [] Yes  [x]No   Nursing Home  [] Yes  [x]No        Nursing Home Name    [address]  [] Yes  [x]No        [address] Name    Other       Information Source:      Information obtained from  [] Patient  [] Parent   [] Guardian  [x] Child  [] Spouse   [] Significant Other/Partner   [] Friend      [] EMS    [] Nursing Home Chart          [x] Other: chart   Chart Review  [x] Yes  []No     Family/Support System:    Patient lives with  [] Alone    [] Spouse   [] Significant Other  [x] Children  [] Caretaker [] Parent  [] Sibling     [] Other       Other Support System:    Is the patient responsible for care of others  [] Yes  [x]No   Information of person caring for patient on  discharge Her son   Managers financial affairs independently  [x] Yes  []No   If no, explain:      Status Prior to Admission:    Mental Status  [x] Awake  [x] Alert  [x] Oriented  [] Quiet/Calm [] Lethargic/Sedated   [] Disoriented  [] Restless/Anxious  [] Combative   Personal Care  [] Dependent  [x] [address]  [] Requires Assistance   Meal Preparation Ability  [x] Independent   [] Standby Assistance   [] Minimal Assistance   [] Moderate Assistance  [] Maximum Assistance     [] Total Assistance   Chores  [x] Independent with Chores   [] [address] [place] Resident   [] Requires Assistance   Bowel/Bladder  [x] Continent  [] Catheter  [] Incontinent  [] Ostomy Self-Care    [] Urine Diversion Self-Care  [] Maximum Assistance     [] Total Assistance   Number of Persons needed for assistance    DME at home  [] Jose R Duong  [] Lucille Duong   [] Commode    [] Bathroom/Grab Bars  [x] Hospital Bed ?   [] Nebulizer  [] Oxygen           [] Raised Toilet Seat  [] Shower Chair  [] Side Rails for Bed   [] Tub Transfer Bench   [] Narinder Sheerer  [] Walker, Standard      [] Other:   Vendor      Treatment Presently Receiving:    Current Treatments  [] Chemotherapy  [] Dialysis  [] Insulin  [] IVAB [x] IVF   [] O2  [] PCA   [] PT   [] RT   [] Tube Feedings   [] Wound Care     Psychosocial Evaluation:    Verbalized Knowledge of Disease Process  [x] Patient  [x]Family   Coping with Disease Process  [x] Patient  [x]Family   Requires Further Counseling Coping with Disease Process  [] Patient  []Family     Identified Projected Needs:    Home Health Aid  [] Yes  [x]No   Transportation  [] Yes  [x]No   Education  [] Yes  [x]No        Specific Education     Financial Counseling  [] Yes  [x]No   Inability to Care for Self/Will Require 24 hour care  [] Yes [x]No   Pain Management  [] Yes  [x]No   Home Infusion Therapy  [] Yes  [x]No   Oxygen Therapy  [] Yes  [x]No   DME  [] Yes  [x]No   Long Term Care Placement  [] Yes  [x]No   Rehab  [] Yes  [x]No   Physical Therapy  [] Yes  [x]No   Needs Anticipated At This Time  [] Yes  [x]No     Intra-Hospital Referral:    [address] [facility]  [] Yes  [x]No     [] Yes  [x]No   Patient Representative  [] Yes  [x]No   Staff for Teaching Needs  [] Yes  [x]No   Specialty Teaching Needs     Diabetic Educator  [] Yes  [x]No   Referral for Diabetic Educator Needed  [] Yes  [x]No  If Yes, place order for Nutritionist or Diabetic Consult     Tentative Discharge Plan:    Home with No Services  [x] Yes  []No   Home with Home Health Follow-up  [] Yes  [x]No        If Yes, specify type    Home Care Program  [] Yes  [x]No        If Yes, specify type    Meals on Wheels  [] Yes  [x]No   Office of Aging  [] Yes  [x]No   NHP  [] Yes  [x]No   Return to the Nursing Home  [] Yes  [x]No   Rehab Therapy  [] Yes  [x]No   Acute Rehab  [] Yes  [x]No   Subacute Rehab  [] Yes  [x]No   Private Care  [] Yes  [x]No   Substance Abuse Referral  [] Yes  [x]No   Transportation  [] Yes  [x]No   Chore Service  [] Yes  [x]No   Inpatient Hospice  [] Yes  [x]No   OP RT  [] Yes  [x] No   OP Hemo  [] Yes  [x] No   OP PT  [] Yes  [x]No   Support Group  [] Yes  [x]No   Reach to Recovery  [] Yes  [x]No   OP Oncology Clinic  [] Yes  [x]No   Clinic Appointment  [] Yes  [x]No   DME  [] Yes  [x]No   Comments    Name of D/C Planner or  Given to Patient or Family Alexsandra Ramires RN   Phone Number [phone] extension [phone]   Date Feb 11, 2017   Time 751 am   If you are discharged home, whom do you designate to participate in your discharge plan and receive any information needed?      Enter name of Gloria Sick   son        Phone # of designee [phone]        Address of [address], [place], [address] [place] [address]        Updated Feb 11, 2017        Patient refused to designate any           individual

## 2017-02-11 NOTE — PROGRESS NOTES
Intubation Note    Referring physician:      Called to bedside secondary to  respiratory failure. The patient was pre-oxygenated with 100% oxygen. Yankauer Suction was available. A rapid sequence induction was performed with Propofol 100mg and None 0mg. Cricoid pressure was applied. Direct Laryngoscopy was performed x 1 with video laryngoscope. A 7.5 Regular/Maverick endotracheal tube was passed easily. Bilateral breath sounds were auscultated, chest rise was noted, and color change was present on the CO2 detector. The tube was secured at 22 cm at the teeth by the respiratory therapist.     Visit Vitals    BP (!) 76/61    Pulse (!) 123    Temp 36.1 °C (97 °F)    Resp (!) 40    Ht 5' 5\" (1.651 m)    Wt (!) 206.4 kg (455 lb)    SpO2 100%    BMI 75.72 kg/m2       Portable chest X-Ray is pending.         Lucila Quintero CRNA

## 2017-02-11 NOTE — ED NOTES
Bilateral soft wrist restraints placed to ensure safety of patient and integrity of central line and ET tube.

## 2017-02-11 NOTE — PROGRESS NOTES
Called to Ed. Pt in respiratory distress. Anesthesia at bedside. Pt intubated with 7.5 tube at 22 @ lip on the right side. BBS equal and CO2 good color change. Pt initially placed on 15/600/100% and 5 PEEP  ABG done and given to Dr Estefania Galeana and Dr Latasha Baldwin.

## 2017-02-11 NOTE — PROGRESS NOTES
Rec'd bedside report pt observed intubated/sedated restraints in use bilaterally. Pt appears comfortable no signs of distress noted. 08:00 assessment done pt has multiple areas of skin sheared off to back of body and including left buttock extending  to open wound to perineal area culture sent. Bilateral pigmentation to heels black. Both arms also with shearing of skin to inner aspects. Inner thigh friction sharath. Pt for wound consult , call to APS from  Denise Petersen. 12:00 no changes in pt's status. Continues support from pressors,Levo and Vasopressin. 16:00 Friend came into see pt Duke Zhao [phone]. Bedside and Verbal shift change report given to [address] [place] [address] (oncoming nurse) by Lisy Smith RN (offgoing nurse). Report included the following information SBAR, Kardex, ED Summary, Intake/Output, MAR, Accordion, Recent Results, Med Rec Status and Cardiac Rhythm Sinus Tach.

## 2017-02-11 NOTE — PROGRESS NOTES
Reason for Renal Dosing:  Per Renal Dosing Policy    Patient clinical status and labs ordered/reviewed. Pt Weight Weight: (!) 206.4 kg (455 lb)     Serum Creatinine Lab Results   Component Value Date/Time    Creatinine 1.57 02/10/2017 05:40 PM    Creatinine (POC) 1.3 02/10/2017 05:52 PM       Creatinine Clearance Estimated Creatinine Clearance: 69.4 mL/min (based on Cr of 1.57). BUN Lab Results   Component Value Date/Time    BUN 92 02/10/2017 05:40 PM    BUN (POC) 71 02/10/2017 05:52 PM       WBC Lab Results   Component Value Date/Time    WBC 28.7 02/10/2017 05:40 PM      Temperature Temp: 97 °F (36.1 °C)   HR Pulse (Heart Rate): (!) 123       BP BP: (!) 76/61             Drug type: Antibiotic indicated for Sepsis of Unknown Etiology    Drug/dose: Piperacillin-Tazobactam 3.375 grams every 6 hours was adjusted to Piperacillin-Tazobactam 4.5 grams every 6 hours     Continue to monitor.     Signed Dominik Hensley, PHARMD  Date 2/10/2017  Time 7:42 PM

## 2017-02-11 NOTE — CONSULTS
[facility]  Pulmonary, Critical Care, and Sleep Medicine    Name: Milton William MRN: [id number]   : 1955 Hospital: [facility]   Date: 2/10/2017        [facility] Consult                                              Consult requesting physician: Dr. Mamie Martinez  Reason for Consult: sepsis    [x] I have reviewed the flowsheet and previous days notes. Events, vitals, medications and notes from last 24 hours reviewed. Care plan discussed with staff, patient, family and on multidisciplinary rounds. IMPRESSION:   · Septic shock requiring pressors, likely source skin with noted large tunneling sacral, L gluteal wounds  · Acute respiratory failure due to sepsis requiring MV  · Hyperkalemia, s/p tx in ED  · JOAQUIN on CKD likely due to sepsis, volume depletion  · Metabolic acidosis  · Elevated ammonia  · DM  · Morbid obesity  · Hx HTN    · Code status: FULL      RECOMMENDATIONS:   · CVS: Monitor HD. Continue pressor support to maintain MAP>65, currently on levophed. RIJ in place. Trend cardiac enzymes. ECHO ordered. · Respiratory: MV protocol/bundle. Wean FiO2 as tolerated. Daily CXR, follow ABG. Duonebs. High risk aspiration. Cont abx. · ID:  Sepsis bundle. Started on levaquin, zosyn, vanc. Await blood, urine cx. · Hematology/Oncology:  Check coags. hgb stable  · Renal:  Follow BUN, Cr, I/O. Juarez for now  · GI/:  Juarez for now. PPI. Trend LFT, ammonia   · Endocrine:  Check TSH. GLycemic control. ISS prn, avoid hypoglycemia  · Neurology/Pain/Sedation:  Propofol prn for sedation. Consider head CT. Monitor neuro exam  · Skin/Wound/Musc: Wound care consult. May need further imaging, surgical consult. · FEN: NPO. IVF. Replace lytes prn per protocol. · Prophylaxis: GI Prophylaxis with protonix. DVT Prophylaxis with heparin (pending INR).   · Restraints: B wrist  · No family available at bedside  · ·   ·   · Glycemic Control. Glucose stabilizer per ICU protocol when on insulin drip. Maintain blood glucose 140-180. · Replace electrolytes per ICU electrolyte replacement protocol  · Ventilator bundle & Sedation protocol followed. Daily sedation holiday, assessment for readiness for SBT and then re-titrate if required. Chlorhexidine mouth washes. · HOB 30 degree elevation all the time. Aggressive pulmonary toileting. Incentive spirometry when appropriate. Aspiration precautions. · Sepsis bundle and protocol followed. Follow serial lactic acid, frequent BMP check, fluid resuscitation. Follow cultures. Deescalate antibiotic when appropriate. · Obando bundle followed, remove obando catheter when not critically ill. · Central Line bundle followed, remove when not needed. · Skin & Wound care. · Weekly prealbumin, nutritional consult. · PT/OT eval and treat. OOB when appropriate. · Further recommendations will be based on the patient's response to recommended treatment and results of the investigation ordered. · Quality Care: PPI, DVT prophylaxis, HOB elevated, Infection control all reviewed and addressed. · Events and notes from last 24 hours reviewed. Care plan discussed with nursing   · CC Time: >35 min      Subjective/History of Present Illness:     Ashutosh Desai has been seen and evaluated in ICU Bed 14. Patient is a 64 y.o. female with PMHx significant for morbid obesity, chronic pain, DM, HTN, lymphedema who presented to the ED on 2/10/17 after she was found down x 3 days. PT was noted to be covered in feces and bed bugs. She was noted to have AMS and nonverbal. Pt noted to have WBC 28.7, LA 4.9 and large tunneling sacral and gluteal wounds. Pt started on sepsis bundle requiring pressor support. Pt seen in ED as consulted for sepsis and found to be tachypnic and poorly responsive, intubated for airway protection.  She will be admitted to the ICU for further evaluation and management. Review of Systems:  Review of systems not obtained due to patient factors. Allergies   Allergen Reactions    Ace Inhibitors Nausea and Vomiting    Hydralazine Nausea and Vomiting    Ibuprofen Shortness of Breath    Norvasc [Amlodipine] Swelling    Sulfa (Sulfonamide Antibiotics) Itching    Tramadol Shortness of Breath      Past Medical History   Diagnosis Date    Anemia     Anxiety 2016    Arthritis     Cancer ([facility] [address]      pre cancerous cells in the uterus    Chronic pain      knees    Chronic pain of both knees 2016    Cigarette nicotine dependence in remission 2016    Diabetes ([facility] [address]     Fatty liver 2016    Headache     Hypertension     Liver disease      fatty liver    Lymphedema 2016    Morbid obesity ([facility] [address] 2016    Psychotic disorder      anxiety and panic attacks    Thyroid disease       Past Surgical History   Procedure Laterality Date    Hx cholecystectomy      Hx gyn  2006     hysterectomy    Hx  section        Social History   Substance Use Topics    Smoking status: Never Smoker    Smokeless tobacco: Never Used    Alcohol use No      Family History   Problem Relation Age of Onset    Diabetes Mother     Hypertension Mother     Lung Disease Father       Prior to Admission medications    Medication Sig Start Date End Date Taking? Authorizing Provider   cephALEXin (KEFLEX) 500 mg capsule Take 1 Cap by mouth four (4) times daily. 17   Patricia Quinones MD   nebivolol (BYSTOLIC) 5 mg tablet Take 5 mg by mouth daily. Marino Lal MD   ergocalciferol (VITAMIN D2) 50,000 unit capsule Take 50,000 Units by mouth. Marino Lal MD   hydroCHLOROthiazide (HYDRODIURIL) 25 mg tablet Take 25 mg by mouth daily. Marino Lal MD   glimepiride (AMARYL) 2 mg tablet Take 2 mg by mouth every morning. Marino Lal MD   traMADol (ULTRAM) 50 mg tablet Take 50 mg by mouth every six (6) hours as needed for Pain.     Marino Lal MD loratadine (CLARITIN) 10 mg tablet 10 mg. 12   Historical Provider   losartan (COZAAR) 25 mg tablet Take 1 Tab by mouth every evening. 16   David Saldivar MD   metoprolol succinate (TOPROL-XL) 25 mg XL tablet Take 1 Tab by mouth daily. 16   David Saldivar MD     Current Facility-Administered Medications   Medication Dose Route Frequency    levoFLOXacin (LEVAQUIN) 750 mg in D5W IVPB  750 mg IntraVENous Q24H    vancomycin (VANCOCIN) 1,500 mg in 0.9% sodium chloride 500 mL IVPB  1,500 mg IntraVENous Q2H    NOREPINephrine (LEVOPHED) 8,000 mcg in dextrose 5% 250 mL infusion  4 mcg/min IntraVENous TITRATE    0.9% sodium chloride infusion  150 mL/hr IntraVENous CONTINUOUS    insulin lispro (HUMALOG) injection   SubCUTAneous Q6H    [START ON 2017] piperacillin-tazobactam (ZOSYN) 4.5 g in 0.9% sodium chloride (MBP/ADV) 100 mL MBP  4.5 g IntraVENous Q6H       Lines: All central lines examined by me. No signs of erythema, induration, discharge.       Central Venous Catheter:  Triple Lumen right ij cvl 02/10/17 Right Neck (Active)   Central Line Being Utilized Yes 2/10/2017  7:13 PM   Site Assessment Clean, dry, & intact 2/10/2017  7:13 PM   Dressing Status Clean, dry, & intact 2/10/2017  7:13 PM   Proximal Hub Color/Line Status White 2/10/2017  7:13 PM   Positive Blood Return (Medial Site) Yes 2/10/2017  7:13 PM   Medial Hub Color/Line Status Brown 2/10/2017  7:13 PM   Positive Blood Return (Lateral Site) Yes 2/10/2017  7:13 PM   Distal Hub Color/Line Status Blue 2/10/2017  7:13 PM   Positive Blood Return (Site #3) Yes 2/10/2017  7:13 PM          Telemetry:normal sinus rhythm    Objective:   Vital Signs:    Visit Vitals    BP (!) 76/61    Pulse (!) 123    Temp 97 °F (36.1 °C)    Resp (!) 40    Ht 5' 5\" (1.651 m)    Wt (!) 206.4 kg (455 lb)    SpO2 100%    BMI 75.72 kg/m2               Temp (24hrs), Av °F (36.1 °C), Min:97 °F (36.1 °C), Max:97 °F (36.1 °C)       Intake/Output:   Last shift:           Last 3 shifts: 02/09 0701 - 02/10 1900  In: -   Out: 100 [Urine:100]      Intake/Output Summary (Last 24 hours) at 02/10/17 1953  Last data filed at 02/10/17 1821   Gross per 24 hour   Intake                0 ml   Output              100 ml   Net             -100 ml       Last 3 Recorded Weights in this Encounter    02/10/17 1717   Weight: (!) 206.4 kg (455 lb)       Ventilator Settings:  Mode Rate Tidal Volume Pressure FiO2 PEEP                    Peak airway pressure:      Plateau pressure:     Tidal volume:    Minute ventilation:     SPO2      ARDS network Guidelines: Lung protective strategy and Plateau pressure goals less than or equal to 30      Physical Exam:     General/Neurology: Moving all extremities, awake, minimally responsive, pulling at lines, obese, morbidly obese, tachypnic  Head:   Normocephalic  Eye:   No scleral icterus  Nose/Throat:  Face mask in place, limited  Neck:   Supple  Lung:   Adequate air entry bilateral equal, no rales, no rhonchi, no wheezing. No prolonged expiration. No accessory muscle use. No dullness on percussion.   Heart:   Tachy  Abdomen:  Soft, obese  :  Juarez in place  Extremities:  BLE edema, ulceration BLE  Skin per ED notes: tunneling sacral and L medial gluteal area, muscle and bone exposed, R thigh ulcerations    Data:       Recent Results (from the past 24 hour(s))   EKG, 12 LEAD, INITIAL    Collection Time: 02/10/17  5:29 PM   Result Value Ref Range    Ventricular Rate 111 BPM    Atrial Rate 111 BPM    P-R Interval 132 ms    QRS Duration 90 ms    Q-T Interval 368 ms    QTC Calculation (Bezet) 500 ms    Calculated P Axis 66 degrees    Calculated R Axis 68 degrees    Calculated T Axis -95 degrees    Diagnosis       Sinus tachycardia  Marked ST abnormality, possible inferior subendocardial injury  Abnormal ECG  When compared with ECG of 20-JAN-2017 23:00,  ST now depressed in Inferior leads  Inverted T waves have replaced nonspecific T wave abnormality in Inferior   leads     GLUCOSE, POC    Collection Time: 02/10/17  5:36 PM   Result Value Ref Range    Glucose (POC) 72 70 - 443 mg/dL   METABOLIC PANEL, COMPREHENSIVE    Collection Time: 02/10/17  5:40 PM   Result Value Ref Range    Sodium 143 136 - 145 mmol/L    Potassium 6.1 (HH) 3.5 - 5.5 mmol/L    Chloride 115 (H) 100 - 108 mmol/L    CO2 13 (L) 21 - 32 mmol/L    Anion gap 15 3.0 - 18 mmol/L    Glucose 138 (H) 74 - 99 mg/dL    BUN 92 (H) 7.0 - 18 MG/DL    Creatinine 1.57 (H) 0.6 - 1.3 MG/DL    BUN/Creatinine ratio 59 (H) 12 - 20      GFR est AA 41 (L) >60 ml/min/1.73m2    GFR est non-AA 33 (L) >60 ml/min/1.73m2    Calcium 8.8 8.5 - 10.1 MG/DL    Bilirubin, total 1.6 (H) 0.2 - 1.0 MG/DL    ALT (SGPT) 21 13 - 56 U/L    AST (SGOT) 34 15 - 37 U/L    Alk. phosphatase 141 (H) 45 - 117 U/L    Protein, total 6.7 6.4 - 8.2 g/dL    Albumin 1.6 (L) 3.4 - 5.0 g/dL    Globulin 5.1 (H) 2.0 - 4.0 g/dL    A-G Ratio 0.3 (L) 0.8 - 1.7     CBC WITH AUTOMATED DIFF    Collection Time: 02/10/17  5:40 PM   Result Value Ref Range    WBC 28.7 (H) 4.6 - 13.2 K/uL    RBC 3.89 (L) 4.20 - 5.30 M/uL    HGB 11.1 (L) 12.0 - 16.0 g/dL    HCT 31.2 (L) 35.0 - 45.0 %    MCV 80.2 74.0 - 97.0 FL    MCH 28.5 24.0 - 34.0 PG    MCHC 35.6 31.0 - 37.0 g/dL    RDW 13.9 11.6 - 14.5 %    PLATELET 940 441 - 356 K/uL    NEUTROPHILS 87 (H) 42 - 75 %    BAND NEUTROPHILS 2 0 - 5 %    LYMPHOCYTES 5 (L) 20 - 51 %    MONOCYTES 4 2 - 9 %    EOSINOPHILS 0 0 - 5 %    BASOPHILS 0 0 - 3 %    MYELOCYTES 1 (H) 0 %    PROMYELOCYTES 1 (H) 0 %    ABS. NEUTROPHILS 25.0 (H) 1.8 - 8.0 K/UL    ABS. LYMPHOCYTES 1.4 0.8 - 3.5 K/UL    ABS. MONOCYTES 1.1 (H) 0 - 1.0 K/UL    ABS. EOSINOPHILS 0.0 0.0 - 0.4 K/UL    ABS.  BASOPHILS 0.0 0.0 - 0.1 K/UL    PLATELET COMMENTS ADEQUATE      RBC COMMENTS NORMOCYTIC, NORMOCHROMIC      DF MANUAL     CARDIAC PANEL,(CK, CKMB & TROPONIN)    Collection Time: 02/10/17  5:40 PM   Result Value Ref Range     26 - 192 U/L    CK - MB 5.4 (H) 0.5 - 3.6 ng/ml    CK-MB Index 3.0 0.0 - 4.0 %    Troponin-I, Qt. <0.02 0.0 - 0.045 NG/ML   AMMONIA    Collection Time: 02/10/17  5:40 PM   Result Value Ref Range    Ammonia 168 (H) 11 - 32 UMOL/L   PRO-BNP    Collection Time: 02/10/17  5:40 PM   Result Value Ref Range    NT pro- (H) 0 - 900 PG/ML   POC TROPONIN-I    Collection Time: 02/10/17  5:51 PM   Result Value Ref Range    Troponin-I (POC) <0.04 0.00 - 0.08 ng/mL   POC CHEM8    Collection Time: 02/10/17  5:52 PM   Result Value Ref Range    CO2 (POC) 13 (L) 19 - 24 MMOL/L    Glucose (POC) 150 (H) 74 - 106 MG/DL    BUN (POC) 71 (H) 7 - 18 MG/DL    Creatinine (POC) 1.3 0.6 - 1.3 MG/DL    GFR-AA (POC) 50 (L) >60 ml/min/1.73m2    GFR, non-AA (POC) 42 (L) >60 ml/min/1.73m2    Sodium (POC) 144 136 - 145 MMOL/L    Potassium (POC) 6.1 (HH) 3.5 - 5.5 MMOL/L    Calcium, ionized (POC) 1.17 1.12 - 1.32 MMOL/L    Chloride (POC) 117 (H) 100 - 108 MMOL/L    Anion gap (POC) 21 (H) 10 - 20      Hematocrit (POC) 33 (L) 36 - 49 %    Hemoglobin (POC) 11.2 (L) 12 - 16 G/DL   POC LACTIC ACID    Collection Time: 02/10/17  5:54 PM   Result Value Ref Range    Lactic Acid (POC) 4.9 (HH) 0.4 - 2.0 mmol/L   POC G3    Collection Time: 02/10/17  6:06 PM   Result Value Ref Range    Device: Non rebreather      Flow rate (POC) 10 L/M    FIO2 (POC) 100 %    pH (POC) 7.366 7.35 - 7.45      pCO2 (POC) 20.3 (L) 35.0 - 45.0 MMHG    pO2 (POC) 204 (H) 80 - 100 MMHG    HCO3 (POC) 11.7 (L) 22 - 26 MMOL/L    sO2 (POC) 100 (H) 92 - 97 %    Base deficit (POC) 14 mmol/L    Allens test (POC) N/A      Total resp. rate 22      Site LEFT RADIAL      Patient temp.  97.0      Specimen type (POC) ARTERIAL      Performed by Isidoro PARKS/ [facility]    Collection Time: 02/10/17  6:15 PM   Result Value Ref Range    Color DARK YELLOW      Appearance CLOUDY      Specific gravity 1.018 1.005 - 1.030      pH (UA) 5.0 5.0 - 8.0      Protein NEGATIVE  NEG mg/dL    Glucose NEGATIVE  NEG mg/dL Ketone TRACE (A) NEG mg/dL    Bilirubin SMALL (A) NEG      Blood NEGATIVE  NEG      Urobilinogen 1.0 0.2 - 1.0 EU/dL    Nitrites NEGATIVE  NEG      Leukocyte Esterase TRACE (A) NEG     URINE MICROSCOPIC ONLY    Collection Time: 02/10/17  6:15 PM   Result Value Ref Range    WBC 4 to 10 0 - 4 /hpf    RBC NONE 0 - 5 /hpf    Epithelial cells 1+ 0 - 5 /lpf    Bacteria 1+ (A) NEG /hpf    Amorphous Crystals 2+ (A) NEG    Yeast 3+ (A) NEG         Chemistry Recent Labs      02/10/17   1740   GLU  138*   NA  143   K  6.1*   CL  115*   CO2  13*   BUN  92*   CREA  1.57*   CA  8.8   AGAP  15   BUCR  59*   AP  141*   TP  6.7   ALB  1.6*   GLOB  5.1*   AGRAT  0.3*        Lactic Acid No results found for: LAC  No results for input(s): LAC in the last 72 hours. Liver Enzymes Protein, total   Date Value Ref Range Status   02/10/2017 6.7 6.4 - 8.2 g/dL Final     Albumin   Date Value Ref Range Status   02/10/2017 1.6 (L) 3.4 - 5.0 g/dL Final     Globulin   Date Value Ref Range Status   02/10/2017 5.1 (H) 2.0 - 4.0 g/dL Final     A-G Ratio   Date Value Ref Range Status   02/10/2017 0.3 (L) 0.8 - 1.7   Final     AST (SGOT)   Date Value Ref Range Status   02/10/2017 34 15 - 37 U/L Final     Alk. phosphatase   Date Value Ref Range Status   02/10/2017 141 (H) 45 - 117 U/L Final     Recent Labs      02/10/17   1740   TP  6.7   ALB  1.6*   GLOB  5.1*   AGRAT  0.3*   SGOT  34   AP  141*        CBC w/Diff Recent Labs      02/10/17   1740   WBC  28.7*   RBC  3.89*   HGB  11.1*   HCT  31.2*   PLT  397   GRANS  87*   LYMPH  5*   EOS  0        Cardiac Enzymes Lab Results   Component Value Date/Time     02/10/2017 05:40 PM    CKMB 5.4 (H) 02/10/2017 05:40 PM    CKND1 3.0 02/10/2017 05:40 PM    TROIQ <0.02 02/10/2017 05:40 PM    TNIPOC <0.04 02/10/2017 05:51 PM        BNP No results found for: BNP, BNPP, XBNPT     Coagulation No results for input(s): PTP, INR, APTT in the last 72 hours.     No lab exists for component: INREXT      Thyroid Lab Results   Component Value Date/Time    TSH 2.36 01/21/2017 06:00 AM       No results found for: T4     Lipid Panel Lab Results   Component Value Date/Time    Cholesterol, total 167 05/05/2010 08:45 AM    HDL Cholesterol 65 05/05/2010 08:45 AM    LDL, calculated 88.4 05/05/2010 08:45 AM    VLDL, calculated 13.6 05/05/2010 08:45 AM    Triglyceride 68 05/05/2010 08:45 AM    CHOL/HDL Ratio 2.6 05/05/2010 08:45 AM        ABG Recent Labs      02/10/17   1806   PHI  7.366   PCO2I  20.3*   PO2I  204*   HCO3I  11.7*   FIO2I  100        Urinalysis Lab Results   Component Value Date/Time    Color DARK YELLOW 02/10/2017 06:15 PM    Appearance CLOUDY 02/10/2017 06:15 PM    Specific gravity 1.018 02/10/2017 06:15 PM    pH (UA) 5.0 02/10/2017 06:15 PM    Protein NEGATIVE  02/10/2017 06:15 PM    Glucose NEGATIVE  02/10/2017 06:15 PM    Ketone TRACE 02/10/2017 06:15 PM    Bilirubin SMALL 02/10/2017 06:15 PM    Urobilinogen 1.0 02/10/2017 06:15 PM    Nitrites NEGATIVE  02/10/2017 06:15 PM    Leukocyte Esterase TRACE 02/10/2017 06:15 PM    Epithelial cells 1+ 02/10/2017 06:15 PM    Bacteria 1+ 02/10/2017 06:15 PM    WBC 4 to 10 02/10/2017 06:15 PM    RBC NONE 02/10/2017 06:15 PM        Micro  No results for input(s): SDES, CULT in the last 72 hours. No results for input(s): CULT in the last 72 hours. EKG  No results found for this or any previous visit. PFT  No flowsheet data found. Other  ASA reactivity:   Pre-albumin:   Ionized Calcium:   NH4:   T3, FT4:  Cortisol:  Urine Osm:  Urine Lytes:   HbA1c:      Ultrasound       LE Doppler       ECHO  No results found for this or any previous visit. CT (Most Recent)  No results found for this or any previous visit. XR (Most Recent). CXR  reviewed by me and compared with previous CXR   Results from [place] encounter on 01/20/17   XR HUMERUS LT   Narrative Examination: Left humerus 2 views. HISTORY: Left arm pain.     FINDINGS: AP portable and lateral projections of the left humerus on a total of  4 cassettes are interpreted without comparison. Osseous alignment is as expected  on the provided views. There is mild glenohumeral and ulnohumeral osteoarthritis  noted. No fracture. No retained radiopaque foreign object or soft tissue gas. Impression IMPRESSION:  1. No acute osseous abnormality or retained radiopaque foreign object. Best practice : All below core measures reviewed and addressed:   [x] Antibiotic choice. [x] Severe Sepsis bundles follwed; SIRS screen met? Yes   [x] Fluids. [x] Lactic acid ordered- initial and repeat Q6hrs if elevated till normalized. [x] Cultures drawn. [x] Antibiotic administered within 1hr-ICU and 3hrs ED. [x] Large bore IV- 2 sites        R IJ  [x] Pressors aim MAP >65mmHg. [x] Steroids. [x] Glycemic control. [x] Infection control. [x] Mech. Ventilated patients- aim to keep peak plateau pressure 59-74XX H2O. [x] HOB at >= 30 degree. [x] Stress ulcer prophylaxis. [x] DVT prophylaxis. [x] Central Line Bundle Followed. [x] Juarez Bundle Followed. [x] Ventilator Bundle Followed. (Daily sedation holiday to assess readiness for weaning from ventilator & SBT trial starting at 6.00 am, HOB 30-degree elevation, Chlorhexidine mouth washes & Routine oral care every 4 hours, Webster tube to suction at 20-30 cm H2O, Maintain Brenda tube with 5-10ml air every 4 hours, DVT prophylaxis, GI prophylaxis etc.).     The patient is: [] acutely ill Risk of deterioration: [] moderate    [x] critically ill  [x] high     [x]See my orders for details    My assessment, plan of care, findings, medications, side effects etc were discussed with:  [x] Nurse [] PT/OT    [] Respiratory therapy [x] Dr. Lacie Li, Dr Jen Canas   [] Pharmacist [x] MDR   [] Family: answered all questions to satisfaction [] Patient: answered all questions to satisfaction   [x]  [x]      [] Case & management strategies discussed today on multidisciplinary rounds    High complexity decision making was performed during the evaluation of this patient at high risk for decompensation with multiple organ involvement    [x]Total time spent on reviewing the case/medical record/data/notes/EMR/patient examination/documentation/coordinating care with nurse/consultants, exclusive of procedures with complex decision making performed >35 minutes and > 50% time spent in face to face evaluation, as mentioned above.       Christian Llanos  2/10/2017

## 2017-02-11 NOTE — PROGRESS NOTES
[facility]  Pulmonary, Critical Care, and Sleep Medicine    Name: Francie Bravo MRN: [id number]   : 1955 [facility]   Date[facility] 2017        [facility]                                              [x] I have reviewed the flowsheet and previous days notes. Events, vitals, medications and notes from last 24 hours reviewed. Care plan discussed with staff, patient, family and on multidisciplinary rounds. 2017  Remains on vaso and levophed  WBC up, cx NGTD  CT head delayed until am due to scanner being down  LA down, Na up, enzymes negative  On saúl hugger, temp normalized      IMPRESSION:   · Septic shock requiring pressors, likely source skin with noted large tunneling sacral, L gluteal wounds  · Acute respiratory failure in setting of severe sepsis requiring MV  · Acute encephalopathy, likely metabolic, sepsis  · Hyperkalemia, s/p tx in ED, improved  · Lactic acidosis, improved  · JOAQUIN on CKD likely due to sepsis, volume depletion, improved  · Metabolic acidosis, improved  · Elevated ammonia, improved  · Diarrhea, r/o c.diff  · DM  · Morbid obesity  · Bedbug infestation  · Hx HTN    · Code status: FULL      RECOMMENDATIONS:   · CVS: Monitor HD. Continue pressor support to maintain MAP>65, currently on levophed, vaso. RIJ in place. Negative enzymes. ECHO ordered. Albumin q 6 hours x 4 doses  · Respiratory: MV protocol/bundle. Wean FiO2 as tolerated. Daily CXR, follow ABG. Duonebs. High risk aspiration. Sputum cx. Cont abx. · ID:  Sepsis bundle. Continue levaquin, zosyn, vanc. Await blood, urine cx, sputum. Wound cx. Will need further evaluation of wounds, surgical consult, ?imaging when stable  · Hematology/Oncology:  Nl coags. hgb stable  · Renal:  Follow BUN, Cr, I/O. Juarez for now  · GI/:  Juarez for now. PPI. Trend LFT, ammonia. amylase and lipase ok  · Endocrine:  Nl TSH. Check cortisol. Glycemic control.  ISS prn, avoid hypoglycemia  · Neurology/Pain/Sedation:  Propofol for sedation. CT head in am, machine not available to accomodate weight earlier. Monitor neuro exam. UDS  · Skin/Wound/Musc: Wound care consult. May need further imaging, surgical consult as above. · FEN: Trickle feed. IVF. Free water. Replace lytes prn per protocol. · Prophylaxis: GI Prophylaxis with protonix. DVT Prophylaxis with heparin. · Restraints: B wrist  · Guardianship should be pursued as soon as possible given home living conditions and NOK/caretaker with MR and critical illness  ·                                                               ·   ·   · Glycemic Control. Glucose stabilizer per ICU protocol when on insulin drip. Maintain blood glucose 140-180. · Replace electrolytes per ICU electrolyte replacement protocol  · Ventilator bundle & Sedation protocol followed. Daily sedation holiday, assessment for readiness for SBT and then re-titrate if required. Chlorhexidine mouth washes. · HOB 30 degree elevation all the time. Aggressive pulmonary toileting. Incentive spirometry when appropriate. Aspiration precautions. · Sepsis bundle and protocol followed. Follow serial lactic acid, frequent BMP check, fluid resuscitation. Follow cultures. Deescalate antibiotic when appropriate. · Obando bundle followed, remove obando catheter when not critically ill. · Central Line bundle followed, remove when not needed. · Skin & Wound care. · Weekly prealbumin, nutritional consult. · PT/OT eval and treat. OOB when appropriate. · Further recommendations will be based on the patient's response to recommended treatment and results of the investigation ordered. · Quality Care: PPI, DVT prophylaxis, HOB elevated, Infection control all reviewed and addressed. · Events and notes from last 24 hours reviewed. Care plan discussed with nursing   · CC Time: >35 min    Review of Systems:  Review of systems not obtained due to patient factors.         Allergies Allergen Reactions    Ace Inhibitors Nausea and Vomiting    Hydralazine Nausea and Vomiting    Ibuprofen Shortness of Breath    Norvasc [Amlodipine] Swelling    Sulfa (Sulfonamide Antibiotics) Itching    Tramadol Shortness of Breath      Past Medical History   Diagnosis Date    Anemia     Anxiety 2016    Arthritis     Cancer ([facility] [address]      pre cancerous cells in the uterus    Chronic pain      knees    Chronic pain of both knees 2016    Cigarette nicotine dependence in remission 2016    Diabetes ([facility] [address]     Fatty liver 2016    Headache     Hypertension     Liver disease      fatty liver    Lymphedema 2016    Morbid obesity ([facility] [address] 2016    Psychotic disorder      anxiety and panic attacks    Thyroid disease       Past Surgical History   Procedure Laterality Date    Hx cholecystectomy      Hx gyn  2006     hysterectomy    Hx  section        Social History   Substance Use Topics    Smoking status: Never Smoker    Smokeless tobacco: Never Used    Alcohol use No      Family History   Problem Relation Age of Onset    Diabetes Mother     Hypertension Mother     Lung Disease Father       Prior to Admission medications    Medication Sig Start Date End Date Taking? Authorizing Provider   cephALEXin (KEFLEX) 500 mg capsule Take 1 Cap by mouth four (4) times daily. 17   Carol Brown MD   nebivolol (BYSTOLIC) 5 mg tablet Take 5 mg by mouth daily. Marino Lal MD   ergocalciferol (VITAMIN D2) 50,000 unit capsule Take 50,000 Units by mouth. Marino Lal MD   hydroCHLOROthiazide (HYDRODIURIL) 25 mg tablet Take 25 mg by mouth daily. Marino Lal MD   glimepiride (AMARYL) 2 mg tablet Take 2 mg by mouth every morning. Marino Lal MD   traMADol (ULTRAM) 50 mg tablet Take 50 mg by mouth every six (6) hours as needed for Pain.     Marino Lal MD   loratadine (CLARITIN) 10 mg tablet 10 mg. 12   Historical Provider   losartan (COZAAR) 25 mg tablet Take 1 Tab by mouth every evening. 5/19/16   Brayton Prader, MD   metoprolol succinate (TOPROL-XL) 25 mg XL tablet Take 1 Tab by mouth daily. 5/19/16   Brayton Prader, MD     Current Facility-Administered Medications   Medication Dose Route Frequency    albumin human 25% (BUMINATE) solution 25 g  25 g IntraVENous Q6H    levoFLOXacin (LEVAQUIN) 750 mg in D5W IVPB  750 mg IntraVENous Q24H    NOREPINephrine (LEVOPHED) 8,000 mcg in dextrose 5% 250 mL infusion  0-20 mcg/min IntraVENous TITRATE    0.9% sodium chloride infusion  150 mL/hr IntraVENous CONTINUOUS    insulin lispro (HUMALOG) injection   SubCUTAneous Q6H    piperacillin-tazobactam (ZOSYN) 4.5 g in 0.9% sodium chloride (MBP/ADV) 100 mL MBP  4.5 g IntraVENous Q6H    vasopressin (VASOSTRICT) 100 Units in 0.9% sodium chloride 100 mL infusion  0.01-0.04 Units/min IntraVENous TITRATE    propofol (DIPRIVAN) infusion  0-50 mcg/kg/min IntraVENous TITRATE    heparin (porcine) injection 5,000 Units  5,000 Units SubCUTAneous Q8H    pantoprazole (PROTONIX) 40 mg in sodium chloride 0.9 % 10 mL injection  40 mg IntraVENous DAILY    vancomycin (VANCOCIN) 2,000 mg in 0.9% sodium chloride 500 mL IVPB  2,000 mg IntraVENous Q18H    [START ON 2/13/2017] VANCOMYCIN INFORMATION NOTE   Other ONCE       Lines: All central lines examined by me. No signs of erythema, induration, discharge.       Central Venous Catheter:  Triple Lumen right ij cvl 02/10/17 Right Neck (Active)   Central Line Being Utilized Yes 2/10/2017  7:13 PM   Site Assessment Clean, dry, & intact 2/10/2017  7:13 PM   Dressing Status Clean, dry, & intact 2/10/2017  7:13 PM   Proximal Hub Color/Line Status White 2/10/2017  7:13 PM   Positive Blood Return (Medial Site) Yes 2/10/2017  7:13 PM   Medial Hub Color/Line Status Brown 2/10/2017  7:13 PM   Positive Blood Return (Lateral Site) Yes 2/10/2017  7:13 PM   Distal Hub Color/Line Status Blue 2/10/2017  7:13 PM   Positive Blood Return (Site #3) Yes 2/10/2017 7:13 PM          Telemetry:normal sinus rhythm    Objective:   Vital Signs:    Visit Vitals    /61    Pulse (!) 112    Temp 99 °F (37.2 °C)    Resp (!) 32    Ht 5' 5\" (1.651 m)    Wt (!) 189.3 kg (417 lb 5.3 oz)    SpO2 100%    BMI 69.45 kg/m2       O2 Device: Ventilator       Temp (24hrs), Av °F (17.8 °C), Min:33.9 °F (1.1 °C), Max:99 °F (37.2 °C)       Intake/Output:   Last shift:       07 - 1900  In: -   Out: 60 [Urine:60]    Last 3 shifts:  190 -  0700  In: 2522.3 [I.V.:2522.3]  Out: 1030 [Urine:830]      Intake/Output Summary (Last 24 hours) at 17 1049  Last data filed at 17 0800   Gross per 24 hour   Intake          2522.27 ml   Output             1090 ml   Net          1432.27 ml       Last 3 Recorded Weights in this Encounter    02/10/17 1717 17 0232   Weight: (!) 206.4 kg (455 lb) (!) 189.3 kg (417 lb 5.3 oz)       Ventilator Settings:  Mode Rate Tidal Volume Pressure FiO2 PEEP   Assist control, VC+   600 ml    60 % 5 cm H20     Peak airway pressure: 19 cm H2O    Plateau pressure:     Tidal volume:    Minute ventilation: 20.1 l/min   SPO2      ARDS network Guidelines: Lung protective strategy and Plateau pressure goals less than or equal to 30      Physical Exam:     General/Neurology: Intubated, sedated on vent  Head:   Normocephalic  Eye:   No scleral icterus  Nose/Throat:  ETT in place  Neck:   Supple, R IJ  Lung:   Adequate air entry bilateral equal, no rales, no rhonchi, no wheezing.    Heart:   Tachy  Abdomen:  Soft, morbidly obese  :  Juarez in place, clear dark urine  Extremities:  4+BLE edema, ulceration BLE, significant chronic changes  Skin per ED notes: tunneling sacral and L medial gluteal area, muscle and bone exposed    Data:       Recent Results (from the past 24 hour(s))   EKG, 12 LEAD, INITIAL    Collection Time: 02/10/17  5:29 PM   Result Value Ref Range    Ventricular Rate 111 BPM    Atrial Rate 111 BPM    P-R Interval 132 ms QRS Duration 90 ms    Q-T Interval 368 ms    QTC Calculation (Bezet) 500 ms    Calculated P Axis 66 degrees    Calculated R Axis 68 degrees    Calculated T Axis -95 degrees    Diagnosis       Sinus tachycardia  Marked ST abnormality, possible inferior subendocardial injury  Abnormal ECG  When compared with ECG of 20-JAN-2017 23:00,  ST now depressed in Inferior leads  Inverted T waves have replaced nonspecific T wave abnormality in Inferior   leads     CULTURE, BLOOD    Collection Time: 02/10/17  5:30 PM   Result Value Ref Range    Special Requests: NO SPECIAL REQUESTS      Culture result: NO GROWTH AFTER 13 HOURS     GLUCOSE, POC    Collection Time: 02/10/17  5:36 PM   Result Value Ref Range    Glucose (POC) 72 70 - 275 mg/dL   METABOLIC PANEL, COMPREHENSIVE    Collection Time: 02/10/17  5:40 PM   Result Value Ref Range    Sodium 143 136 - 145 mmol/L    Potassium 6.1 (HH) 3.5 - 5.5 mmol/L    Chloride 115 (H) 100 - 108 mmol/L    CO2 13 (L) 21 - 32 mmol/L    Anion gap 15 3.0 - 18 mmol/L    Glucose 138 (H) 74 - 99 mg/dL    BUN 92 (H) 7.0 - 18 MG/DL    Creatinine 1.57 (H) 0.6 - 1.3 MG/DL    BUN/Creatinine ratio 59 (H) 12 - 20      GFR est AA 41 (L) >60 ml/min/1.73m2    GFR est non-AA 33 (L) >60 ml/min/1.73m2    Calcium 8.8 8.5 - 10.1 MG/DL    Bilirubin, total 1.6 (H) 0.2 - 1.0 MG/DL    ALT (SGPT) 21 13 - 56 U/L    AST (SGOT) 34 15 - 37 U/L    Alk.  phosphatase 141 (H) 45 - 117 U/L    Protein, total 6.7 6.4 - 8.2 g/dL    Albumin 1.6 (L) 3.4 - 5.0 g/dL    Globulin 5.1 (H) 2.0 - 4.0 g/dL    A-G Ratio 0.3 (L) 0.8 - 1.7     CBC WITH AUTOMATED DIFF    Collection Time: 02/10/17  5:40 PM   Result Value Ref Range    WBC 28.7 (H) 4.6 - 13.2 K/uL    RBC 3.89 (L) 4.20 - 5.30 M/uL    HGB 11.1 (L) 12.0 - 16.0 g/dL    HCT 31.2 (L) 35.0 - 45.0 %    MCV 80.2 74.0 - 97.0 FL    MCH 28.5 24.0 - 34.0 PG    MCHC 35.6 31.0 - 37.0 g/dL    RDW 13.9 11.6 - 14.5 %    PLATELET 086 818 - 291 K/uL    NEUTROPHILS 87 (H) 42 - 75 %    BAND NEUTROPHILS 2 0 - 5 %    LYMPHOCYTES 5 (L) 20 - 51 %    MONOCYTES 4 2 - 9 %    EOSINOPHILS 0 0 - 5 %    BASOPHILS 0 0 - 3 %    MYELOCYTES 1 (H) 0 %    PROMYELOCYTES 1 (H) 0 %    ABS. NEUTROPHILS 25.0 (H) 1.8 - 8.0 K/UL    ABS. LYMPHOCYTES 1.4 0.8 - 3.5 K/UL    ABS. MONOCYTES 1.1 (H) 0 - 1.0 K/UL    ABS. EOSINOPHILS 0.0 0.0 - 0.4 K/UL    ABS.  BASOPHILS 0.0 0.0 - 0.1 K/UL    PLATELET COMMENTS ADEQUATE      RBC COMMENTS NORMOCYTIC, NORMOCHROMIC      DF MANUAL     CARDIAC PANEL,(CK, CKMB & TROPONIN)    Collection Time: 02/10/17  5:40 PM   Result Value Ref Range     26 - 192 U/L    CK - MB 5.4 (H) 0.5 - 3.6 ng/ml    CK-MB Index 3.0 0.0 - 4.0 %    Troponin-I, Qt. <0.02 0.0 - 0.045 NG/ML   AMMONIA    Collection Time: 02/10/17  5:40 PM   Result Value Ref Range    Ammonia 168 (H) 11 - 32 UMOL/L   PRO-BNP    Collection Time: 02/10/17  5:40 PM   Result Value Ref Range    NT pro- (H) 0 - 900 PG/ML   HEMOGLOBIN A1C WITH EAG    Collection Time: 02/10/17  5:40 PM   Result Value Ref Range    Hemoglobin A1c 5.5 4.2 - 5.6 %    Est. average glucose 111 mg/dL   TSH 3RD GENERATION    Collection Time: 02/10/17  5:40 PM   Result Value Ref Range    TSH 2.41 0.36 - 3.74 uIU/mL   T4, FREE    Collection Time: 02/10/17  5:40 PM   Result Value Ref Range    T4, Free 0.7 0.7 - 1.5 NG/DL   PROTHROMBIN TIME + INR    Collection Time: 02/10/17  5:40 PM   Result Value Ref Range    Prothrombin time 15.7 (H) 11.5 - 15.2 sec    INR 1.3 (H) 0.8 - 1.2     LIPASE    Collection Time: 02/10/17  5:40 PM   Result Value Ref Range    Lipase 147 73 - 393 U/L   AMYLASE    Collection Time: 02/10/17  5:40 PM   Result Value Ref Range    Amylase 66 25 - 115 U/L   MAGNESIUM    Collection Time: 02/10/17  5:40 PM   Result Value Ref Range    Magnesium 3.3 (H) 1.8 - 2.4 mg/dL   CULTURE, BLOOD    Collection Time: 02/10/17  5:45 PM   Result Value Ref Range    Special Requests: NO SPECIAL REQUESTS      Culture result: NO GROWTH AFTER 13 HOURS     POC TROPONIN-I    Collection Time: 02/10/17  5:51 PM   Result Value Ref Range    Troponin-I (POC) <0.04 0.00 - 0.08 ng/mL   POC CHEM8    Collection Time: 02/10/17  5:52 PM   Result Value Ref Range    CO2 (POC) 13 (L) 19 - 24 MMOL/L    Glucose (POC) 150 (H) 74 - 106 MG/DL    BUN (POC) 71 (H) 7 - 18 MG/DL    Creatinine (POC) 1.3 0.6 - 1.3 MG/DL    GFR-AA (POC) 50 (L) >60 ml/min/1.73m2    GFR, non-AA (POC) 42 (L) >60 ml/min/1.73m2    Sodium (POC) 144 136 - 145 MMOL/L    Potassium (POC) 6.1 (HH) 3.5 - 5.5 MMOL/L    Calcium, ionized (POC) 1.17 1.12 - 1.32 MMOL/L    Chloride (POC) 117 (H) 100 - 108 MMOL/L    Anion gap (POC) 21 (H) 10 - 20      Hematocrit (POC) 33 (L) 36 - 49 %    Hemoglobin (POC) 11.2 (L) 12 - 16 G/DL   POC LACTIC ACID    Collection Time: 02/10/17  5:54 PM   Result Value Ref Range    Lactic Acid (POC) 4.9 (HH) 0.4 - 2.0 mmol/L   POC G3    Collection Time: 02/10/17  6:06 PM   Result Value Ref Range    Device: Non rebreather      Flow rate (POC) 10 L/M    FIO2 (POC) 100 %    pH (POC) 7.366 7.35 - 7.45      pCO2 (POC) 20.3 (L) 35.0 - 45.0 MMHG    pO2 (POC) 204 (H) 80 - 100 MMHG    HCO3 (POC) 11.7 (L) 22 - 26 MMOL/L    sO2 (POC) 100 (H) 92 - 97 %    Base deficit (POC) 14 mmol/L    Allens test (POC) N/A      Total resp. rate 22      Site LEFT RADIAL      Patient temp.  97.0      Specimen type (POC) ARTERIAL      Performed by Madiha Laura    URINALYSIS W/ RFLX MICROSCOPIC    Collection Time: 02/10/17  6:15 PM   Result Value Ref Range    Color DARK YELLOW      Appearance CLOUDY      Specific gravity 1.018 1.005 - 1.030      pH (UA) 5.0 5.0 - 8.0      Protein NEGATIVE  NEG mg/dL    Glucose NEGATIVE  NEG mg/dL    Ketone TRACE (A) NEG mg/dL    Bilirubin SMALL (A) NEG      Blood NEGATIVE  NEG      Urobilinogen 1.0 0.2 - 1.0 EU/dL    Nitrites NEGATIVE  NEG      Leukocyte Esterase TRACE (A) NEG     CULTURE, URINE    Collection Time: 02/10/17  6:15 PM   Result Value Ref Range    Special Requests: NO SPECIAL REQUESTS      Culture result: CULTURE IN PROGRESS,FURTHER UPDATES TO FOLLOW     URINE MICROSCOPIC ONLY    Collection Time: 02/10/17  6:15 PM   Result Value Ref Range    WBC 4 to 10 0 - 4 /hpf    RBC NONE 0 - 5 /hpf    Epithelial cells 1+ 0 - 5 /lpf    Bacteria 1+ (A) NEG /hpf    Amorphous Crystals 2+ (A) NEG    Yeast 3+ (A) NEG   POC G3    Collection Time: 02/10/17  8:28 PM   Result Value Ref Range    Device: VENT      FIO2 (POC) 100 %    pH (POC) 7.327 (L) 7.35 - 7.45      pCO2 (POC) 24.5 (L) 35.0 - 45.0 MMHG    pO2 (POC) 470 (H) 80 - 100 MMHG    HCO3 (POC) 13.3 (L) 22 - 26 MMOL/L    sO2 (POC) 100 (H) 92 - 97 %    Base deficit (POC) 13 mmol/L    Mode ASSIST CONTROL      Tidal volume 600 ml    Set Rate 15 bpm    PEEP/CPAP (POC) 5 cmH2O    PIP (POC) 20      Allens test (POC) YES      Inspiratory Time 1 sec    Nitric-ppm (POC) 0 ppm    Total resp.  rate 32      Site RIGHT RADIAL      Patient temp. 93.2      Specimen type (POC) ARTERIAL      [facility]     Volume control plus YES     POC LACTIC ACID    Collection Time: 02/10/17 10:03 PM   Result Value Ref Range    Lactic Acid (POC) 4.8 (HH) 0.4 - 2.0 mmol/L   GLUCOSE, POC    Collection Time: 02/11/17  1:24 AM   Result Value Ref Range    Glucose (POC) 104 70 - 110 mg/dL   CBC W/O DIFF    Collection Time: 02/11/17  4:25 AM   Result Value Ref Range    WBC 35.9 (H) 4.6 - 13.2 K/uL    RBC 3.49 (L) 4.20 - 5.30 M/uL    HGB 10.0 (L) 12.0 - 16.0 g/dL    HCT 28.2 (L) 35.0 - 45.0 %    MCV 80.8 74.0 - 97.0 FL    MCH 28.7 24.0 - 34.0 PG    MCHC 35.5 31.0 - 37.0 g/dL    RDW 14.3 11.6 - 14.5 %    PLATELET 894 107 - 373 K/uL    MPV 9.3 9.2 - 06.7 FL   METABOLIC PANEL, COMPREHENSIVE    Collection Time: 02/11/17  4:25 AM   Result Value Ref Range    Sodium 148 (H) 136 - 145 mmol/L    Potassium 4.9 3.5 - 5.5 mmol/L    Chloride 119 (H) 100 - 108 mmol/L    CO2 13 (L) 21 - 32 mmol/L    Anion gap 16 3.0 - 18 mmol/L    Glucose 116 (H) 74 - 99 mg/dL    BUN 86 (H) 7.0 - 18 MG/DL    Creatinine 1.29 0.6 - 1.3 MG/DL BUN/Creatinine ratio 67 (H) 12 - 20      GFR est AA 51 (L) >60 ml/min/1.73m2    GFR est non-AA 42 (L) >60 ml/min/1.73m2    Calcium 8.0 (L) 8.5 - 10.1 MG/DL    Bilirubin, total 1.7 (H) 0.2 - 1.0 MG/DL    ALT (SGPT) 21 13 - 56 U/L    AST (SGOT) 42 (H) 15 - 37 U/L    Alk.  phosphatase 114 45 - 117 U/L    Protein, total 5.8 (L) 6.4 - 8.2 g/dL    Albumin 1.4 (L) 3.4 - 5.0 g/dL    Globulin 4.4 (H) 2.0 - 4.0 g/dL    A-G Ratio 0.3 (L) 0.8 - 1.7     AMMONIA    Collection Time: 02/11/17  4:25 AM   Result Value Ref Range    Ammonia 41 (H) 11 - 32 UMOL/L   TROPONIN I    Collection Time: 02/11/17  4:25 AM   Result Value Ref Range    Troponin-I, Qt. <0.02 0.0 - 0.045 NG/ML   LACTIC ACID, PLASMA    Collection Time: 02/11/17  4:25 AM   Result Value Ref Range    Lactic acid 3.5 (HH) 0.4 - 2.0 MMOL/L   CALCIUM, IONIZED    Collection Time: 02/11/17  4:25 AM   Result Value Ref Range    Ionized Calcium 1.08 (L) 1.12 - 1.32 MMOL/L   MAGNESIUM    Collection Time: 02/11/17  4:25 AM   Result Value Ref Range    Magnesium 2.7 (H) 1.8 - 2.4 mg/dL   PHOSPHORUS    Collection Time: 02/11/17  4:25 AM   Result Value Ref Range    Phosphorus 6.8 (H) 2.5 - 4.9 MG/DL   GLUCOSE, POC    Collection Time: 02/11/17  6:04 AM   Result Value Ref Range    Glucose (POC) 107 70 - 110 mg/dL   TROPONIN I    Collection Time: 02/11/17  9:10 AM   Result Value Ref Range    Troponin-I, Qt. <0.02 0.0 - 0.045 NG/ML         Chemistry Recent Labs      02/11/17   0425  02/10/17   1740   GLU  116*  138*   NA  148*  143   K  4.9  6.1*   CL  119*  115*   CO2  13*  13*   BUN  86*  92*   CREA  1.29  1.57*   CA  8.0*  8.8   MG  2.7*  3.3*   PHOS  6.8*   --    AGAP  16  15   BUCR  67*  59*   AP  114  141*   TP  5.8*  6.7   ALB  1.4*  1.6*   GLOB  4.4*  5.1*   AGRAT  0.3*  0.3*        Lactic Acid Lactic acid   Date Value Ref Range Status   02/11/2017 3.5 (HH) 0.4 - 2.0 MMOL/L Final     Comment:     CALLED TO AND CORRECTLY REPEATED BY:  HALINA KHAN RN,ICU, ON 2/11/17 AT 0546 TO JWS       Recent Labs      02/11/17   0425   LAC  3.5*        Liver Enzymes Protein, total   Date Value Ref Range Status   02/11/2017 5.8 (L) 6.4 - 8.2 g/dL Final     Albumin   Date Value Ref Range Status   02/11/2017 1.4 (L) 3.4 - 5.0 g/dL Final     Globulin   Date Value Ref Range Status   02/11/2017 4.4 (H) 2.0 - 4.0 g/dL Final     A-G Ratio   Date Value Ref Range Status   02/11/2017 0.3 (L) 0.8 - 1.7   Final     AST (SGOT)   Date Value Ref Range Status   02/11/2017 42 (H) 15 - 37 U/L Final     Alk.  phosphatase   Date Value Ref Range Status   02/11/2017 114 45 - 117 U/L Final     Recent Labs      02/11/17   0425  02/10/17   1740   TP  5.8*  6.7   ALB  1.4*  1.6*   GLOB  4.4*  5.1*   AGRAT  0.3*  0.3*   SGOT  42*  34   AP  114  141*        CBC w/Diff Recent Labs      02/11/17   0425  02/10/17   1740   WBC  35.9*  28.7*   RBC  3.49*  3.89*   HGB  10.0*  11.1*   HCT  28.2*  31.2*   PLT  346  397   GRANS   --   87*   LYMPH   --   5*   EOS   --   0        Cardiac Enzymes Lab Results   Component Value Date/Time     02/10/2017 05:40 PM    CKMB 5.4 (H) 02/10/2017 05:40 PM    CKND1 3.0 02/10/2017 05:40 PM    TROIQ <0.02 02/11/2017 09:10 AM    TROIQ <0.02 02/11/2017 04:25 AM    TROIQ <0.02 02/10/2017 05:40 PM    TNIPOC <0.04 02/10/2017 05:51 PM        BNP No results found for: BNP, BNPP, XBNPT     Coagulation Recent Labs      02/10/17   1740   PTP  15.7*   INR  1.3*         Thyroid  Lab Results   Component Value Date/Time    TSH 2.41 02/10/2017 05:40 PM       No results found for: T4     Lipid Panel Lab Results   Component Value Date/Time    Cholesterol, total 167 05/05/2010 08:45 AM    HDL Cholesterol 65 05/05/2010 08:45 AM    LDL, calculated 88.4 05/05/2010 08:45 AM    VLDL, calculated 13.6 05/05/2010 08:45 AM    Triglyceride 68 05/05/2010 08:45 AM    CHOL/HDL Ratio 2.6 05/05/2010 08:45 AM        ABG Recent Labs      02/10/17   2028  02/10/17   1806   PHI  7.327*  7.366   PCO2I  24.5*  20.3*   PO2I  470*  204* HCO3I  13.3*  11.7*   FIO2I  100  100        Urinalysis Lab Results   Component Value Date/Time    Color DARK YELLOW 02/10/2017 06:15 PM    Appearance CLOUDY 02/10/2017 06:15 PM    Specific gravity 1.018 02/10/2017 06:15 PM    pH (UA) 5.0 02/10/2017 06:15 PM    Protein NEGATIVE  02/10/2017 06:15 PM    Glucose NEGATIVE  02/10/2017 06:15 PM    Ketone TRACE 02/10/2017 06:15 PM    Bilirubin SMALL 02/10/2017 06:15 PM    Urobilinogen 1.0 02/10/2017 06:15 PM    Nitrites NEGATIVE  02/10/2017 06:15 PM    Leukocyte Esterase TRACE 02/10/2017 06:15 PM    Epithelial cells 1+ 02/10/2017 06:15 PM    Bacteria 1+ 02/10/2017 06:15 PM    WBC 4 to 10 02/10/2017 06:15 PM    RBC NONE 02/10/2017 06:15 PM        Micro  Recent Labs      02/10/17   1815  02/10/17   1745  02/10/17   1730   CULT  CULTURE IN PROGRESS,FURTHER UPDATES TO FOLLOW  NO GROWTH AFTER 13 HOURS  NO GROWTH AFTER 13 HOURS     Recent Labs      02/10/17   1815  02/10/17   1745  02/10/17   1730   CULT  CULTURE IN PROGRESS,FURTHER UPDATES TO FOLLOW  NO GROWTH AFTER 13 HOURS  NO GROWTH AFTER 13 HOURS        EKG  No results found for this or any previous visit. PFT  No flowsheet data found. Other  ASA reactivity:   Pre-albumin:   Ionized Calcium:   NH4:   T3, FT4:  Cortisol:  Urine Osm:  Urine Lytes:   HbA1c:      Ultrasound       LE Doppler       ECHO  ordered     CT (Most Recent)  No results found for this or any previous visit. XR (Most Recent). CXR  reviewed by me and compared with previous CXR   Results from Hospital Encounter encounter on 02/10/17   XR CHEST PORT   Narrative Chest, single view    Indication: Sepsis    Comparison: 1/20/2017    Findings:  Portable semiupright AP view of the chest was obtained. Right  jugular central venous catheter, tip overlying distal SVC. The cardiomediastinal  silhouette appears unchanged, upper limits of normal in size. Central vascular  crowding related to low lung volumes.  No convincing pulmonary vascular  congestion. Lung parenchyma is hypoinflated, without focal consolidation. No  pleural effusion nor pneumothorax. No acute osseous abnormality. Impression Impression:  Low lung volumes with no radiographic evidence of an acute abnormality. Best practice : All below core measures reviewed and addressed:   [x] Antibiotic choice. [x] Severe Sepsis bundles follwed; SIRS screen met? Yes   [x] Fluids. [x] Lactic acid ordered- initial and repeat Q6hrs if elevated till normalized. [x] Cultures drawn. [x] Antibiotic administered within 1hr-ICU and 3hrs ED. [x] Large bore IV- 2 sites        R IJ  [x] Pressors aim MAP >65mmHg. [x] Steroids. [x] Glycemic control. [x] Infection control. [x] Mech. Ventilated patients- aim to keep peak plateau pressure 82-89JU H2O. [x] HOB at >= 30 degree. [x] Stress ulcer prophylaxis. [x] DVT prophylaxis. [x] Central Line Bundle Followed. [x] Juarez Bundle Followed. [x] Ventilator Bundle Followed. (Daily sedation holiday to assess readiness for weaning from ventilator & SBT trial starting at 6.00 am, HOB 30-degree elevation, Chlorhexidine mouth washes & Routine oral care every 4 hours, Seneca tube to suction at 20-30 cm H2O, Maintain Seneca tube with 5-10ml air every 4 hours, DVT prophylaxis, GI prophylaxis etc.).     The patient is: [] acutely ill Risk of deterioration: [] moderate    [x] critically ill  [x] high     [x]See my orders for details    My assessment, plan of care, findings, medications, side effects etc were discussed with:  [x] Nurse [] PT/OT    [x] Respiratory therapy [x] Dr. Randolph Sumner   [] Pharmacist [x] MDR   [] Family: answered all questions to satisfaction [] Patient: answered all questions to satisfaction   [x]  [x]      [x] Case & management strategies discussed today on multidisciplinary rounds    High complexity decision making was performed during the evaluation of this patient at high risk for decompensation with multiple organ involvement    [x]Total time spent on reviewing the case/medical record/data/notes/EMR/patient examination/documentation/coordinating care with nurse/consultants, exclusive of procedures with complex decision making performed >35 minutes and > 50% time spent in face to face evaluation, as mentioned above.       Ira Kehr, [place]  2/11/2017

## 2017-02-11 NOTE — ED NOTES
OG tube placed without difficulty and placed on low cont suction. Placement verified dark brown drainage

## 2017-02-11 NOTE — PROGRESS NOTES
Patient has designated no one to participate in his/her discharge plan and to receive any needed information.    NOK  Name:   Jocelynn Hennessy [address]   [place] [address], [place], [facility]   Feb 11, 2017        Address:  Phone number:

## 2017-02-11 NOTE — ROUTINE PROCESS
2218:  Chart opened in anticipation of ICU admission. 0145:  TRANSFER - IN REPORT:    Verbal report received from PATI Conte RN(name) on Marii Stewart  being received from ED (unit) for routine progression of care      Report consisted of patients Situation, Background, Assessment and   Recommendations(SBAR). Information from the following report(s) SBAR, Kardex, ED Summary, Intake/Output, MAR, Accordion, Recent Results, Med Rec Status and Cardiac Rhythm SR was reviewed with the receiving nurse. Opportunity for questions and clarification was provided. Assessment completed upon patients arrival to unit and care assumed. Sever caregivers needed to move Ms. Ernesto Davalos from stretcher to bed. Multiple wounds, care exhausting two units' wound care supplies. Malodorous with drainage; dressed to the best extent possible. Pressor need has increased progressively overnight. Lactate way up. No Calls, visits. RR remains 30+. Verbal Patient-side shift change report given to NÉSTOR House, (oncoming nurse) by Nikos Ignacio RN  (offgoing nurse). Report included the following information SBAR, Kardex, ED Summary, Procedure Summary, Intake/Output, MAR, Recent Results and Med Rec Status. Included:  Intro, hx, two-RN eval of relevant assessment findings, LDAs, skin, diagnostics and infusions.

## 2017-02-11 NOTE — PROGRESS NOTES
Progress Note      Patient: Lenka Augustin               Sex: female          DOA: 2/10/2017       YOB: 1955      Age:  64 y.o.        LOS:  LOS: 1 day               Subjective:   Pt is obese and is intubated . she has sepsis probably from her ulcers in the gluteal fold and the sacrum she is on broad spectrum antibiotics . The pt had stool and bedbugs on admission . She is sedated the pt's house is apparently condemed and she lives with her son who is retarded      Objective:      Visit Vitals    /42    Pulse (!) 112    Temp 99.3 °F (37.4 °C)    Resp 28    Ht 5' 5\" (1.651 m)    Wt (!) 189.3 kg (417 lb 5.3 oz)    SpO2 100%    BMI 69.45 kg/m2       Physical Exam:  Sedated intubated obese female   Heart reg rate and rhythm   Lungs fair breath sounds heard   abdomen soft and obese . Extremities  Chronic changes in the feet .  Has gluteal and sacral ulcers   Neuro sedated       Lab/Data Reviewed:  BMP:   Lab Results   Component Value Date/Time     (H) 02/11/2017 04:25 AM    K 4.9 02/11/2017 04:25 AM     (H) 02/11/2017 04:25 AM    CO2 13 (L) 02/11/2017 04:25 AM    AGAP 16 02/11/2017 04:25 AM     (H) 02/11/2017 04:25 AM    BUN 86 (H) 02/11/2017 04:25 AM    CREA 1.29 02/11/2017 04:25 AM    GFRAA 51 (L) 02/11/2017 04:25 AM    GFRNA 42 (L) 02/11/2017 04:25 AM     CMP:   Lab Results   Component Value Date/Time     (H) 02/11/2017 04:25 AM    K 4.9 02/11/2017 04:25 AM     (H) 02/11/2017 04:25 AM    CO2 13 (L) 02/11/2017 04:25 AM    AGAP 16 02/11/2017 04:25 AM     (H) 02/11/2017 04:25 AM    BUN 86 (H) 02/11/2017 04:25 AM    CREA 1.29 02/11/2017 04:25 AM    GFRAA 51 (L) 02/11/2017 04:25 AM    GFRNA 42 (L) 02/11/2017 04:25 AM    CA 8.0 (L) 02/11/2017 04:25 AM    MG 2.7 (H) 02/11/2017 04:25 AM    PHOS 6.8 (H) 02/11/2017 04:25 AM    ALB 1.4 (L) 02/11/2017 04:25 AM    TP 5.8 (L) 02/11/2017 04:25 AM    GLOB 4.4 (H) 02/11/2017 04:25 AM    AGRAT 0.3 (L) 02/11/2017 04:25 AM    SGOT 42 (H) 02/11/2017 04:25 AM    ALT 21 02/11/2017 04:25 AM     CBC:   Lab Results   Component Value Date/Time    WBC 35.9 (H) 02/11/2017 04:25 AM    HGB 10.0 (L) 02/11/2017 04:25 AM    HCT 28.2 (L) 02/11/2017 04:25 AM     02/11/2017 04:25 AM           Assessment/Plan     Active Problems:    Septic shock ([address] (2/10/2017) probably from her ulcers in the sacral and gluteal regions   Respiratory failure   obesity  htn   Encephalopathy from her sepsis      Plan:wound care consult .  Continue the antibiotics and the respiratory support as well as the antibiotics

## 2017-02-11 NOTE — ED NOTES
Pt core temperature is 33.9 degrees celcius, per temp obando.  Dr. Mavis Polanco made aware and saúl montes in place

## 2017-02-11 NOTE — PROGRESS NOTES
Per JONATHON and Dr Jose Chaudhry, settings modified to RR 10 and FI02 70%. Orders per Dr Jose Chaudhry.

## 2017-02-11 NOTE — PROGRESS NOTES
Problem: Ventilator Management  Goal: *Adequate oxygenation and ventilation  Pt. Intubated secondary to Septic Shock     Current ventilator settings- ACVC+ 10, VT-500, PEEP-5, FIo2-45%     Current ABG-2/11/2017 1123- Ph-7.392, PCo2-26, PO2-321, HCo3-15.7, SO2-100%     X ray-2/11/2017 0410-Low lung volumes without focal consolidative process.      Plan: Maintain Ventilatory Support     Goal: Adequate oxygenation and ventilation

## 2017-02-11 NOTE — PROGRESS NOTES
Physical Exam   Constitutional: She appears ill. She appears distressed. She is sedated and intubated. Eyes: Right eye exhibits discharge. Cardiovascular: Regular rhythm, S1 normal and S2 normal.  Tachycardia present. PMI is not displaced. Pulmonary/Chest: Accessory muscle usage present. Tachypnea noted. She is intubated. She is in respiratory distress. Abdominal: Bowel sounds are decreased. Genitourinary:       There is lesion on the right labia. There is lesion on the left labia. Skin: Abrasion and lesion noted. There is pallor. Nails show clubbing. Psychiatric:   Sedated. Intubated.

## 2017-02-11 NOTE — PROGRESS NOTES
0130-chart review for pending ICU admission, care plan entered  0135-biomed contacted for speciality bed, PO# [phone], João contacted [facility] with [facility] sport surface requested, confirmation # [id number]  0136-isolation cart to room

## 2017-02-11 NOTE — PROGRESS NOTES
-0815-Received patient on ventilator ACVC+ 10, VT-600, PEEP-5, FIO2-60%. O2 Sats-100% HR-113, RR-32. ETT noted to be patent and secure. Respiratory will continue to monitor. -[facility]    -1125-ABG drawn and reported to MD. VT decreased to 500ml FIO2-45%. Results below    [facility] (MRN [id number]) as of 2/11/2017 14:07   Ref. Range 2/11/2017 11:23   pH (POC) Latest Ref Range: 7.35 - 7.45   7.392   pCO2 (POC) Latest Ref Range: 35.0 - 45.0 MMHG 26.0 (L)   pO2 (POC) Latest Ref Range: 80 - 100 MMHG 321 (H)   HCO3 (POC) Latest Ref Range: 22 - 26 MMOL/L 15.7 (L)   sO2 (POC) Latest Ref Range: 92 - 97 % 100 (H)   Base deficit (POC) Latest Units: mmol/L 9   FIO2 (POC) Latest Units: % 60   Patient temp. Latest Units:   37.4   Specimen type (POC) Latest Units:   ARTERIAL   Set Rate Latest Units: bpm 10   Site Latest Units:   LEFT RADIAL   Device: Latest Units:   VENT   Total resp. rate Latest Units:   30   Mode Latest Units:   ASSIST CONTROL   Tidal volume Latest Units: ml 600   Volume control plus Latest Units:   YES   PEEP/CPAP (POC) Latest Units: cmH2O 5   Allens test (POC) Latest Units:   YES     -1640-Pt. Remains on ventiilator ACVC+ 10, VT-500, PEEP-5, FIo2-45%. O2 Sats-100% HR-113, RR-28. ETT remains patent and secure.  -[address]

## 2017-02-11 NOTE — H&P
[address]    Name:  Kirby Rm  MR#:  [id number]  :  1955  Account #:  [de-identified]  Date of Adm:  02/10/2017      CHIEF COMPLAINT: The patient found down. HISTORY OF PRESENT ILLNESS: The patient is a 51-year-old  female with a history of diabetes, hypertension, liver disease, morbid  obesity, lymphedema. She lives at home with her caregiver who is her  mentally challenged son. The patient was found down today, and it is  reported that she has been down for about 3 days. EMS was called  and documents that she was covered in feces and bedbugs. The  patient's mental status is altered. She was brought into the ED  subsequently. While in the ED, the patient was found to be  tachycardic, hypotensive, and was found to have very large decubital  ulcers, and altered. She also had elevated lactic acid and +UTI. The patient's breathing was tachypneic. She was therefor intubated. The patient admitted to ICU for further management. Past Medical History   Diagnosis Date    Anemia     Anxiety 2016    Arthritis     Cancer ([address]      pre cancerous cells in the uterus    Chronic pain      knees    Chronic pain of both knees 2016    Cigarette nicotine dependence in remission 2016    Diabetes ([address]     Fatty liver 2016    Headache     Hypertension     Liver disease      fatty liver    Lymphedema 2016    Morbid obesity ([address] 2016    Psychotic disorder      anxiety and panic attacks    Thyroid disease        Past Surgical History   Procedure Laterality Date    Hx cholecystectomy      Hx gyn  2006     hysterectomy    Hx  section         Social History     Social History    Marital status:      Spouse name: N/A    Number of children: N/A    Years of education: N/A     Occupational History    Not on file.      Social History Main Topics    Smoking status: Never Smoker    Smokeless tobacco: Never Used  Alcohol use No    Drug use: No    Sexual activity: No     Other Topics Concern    Not on file     Social History Narrative       Family History   Problem Relation Age of Onset    Diabetes Mother     Hypertension Mother     Lung Disease Father        Allergies   Allergen Reactions    Ace Inhibitors Nausea and Vomiting    Hydralazine Nausea and Vomiting    Ibuprofen Shortness of Breath    Norvasc [Amlodipine] Swelling    Sulfa (Sulfonamide Antibiotics) Itching    Tramadol Shortness of Breath       Review of Systems:  Positive in bold. Unable to obtain. Physical Exam:      Visit Vitals    /51    Pulse 92    Temp (!) 34 °F (1.1 °C)    Resp (!) 31    Ht 5' 5\" (1.651 m)    Wt (!) 206.4 kg (455 lb)    SpO2 100%    BMI 75.72 kg/m2       Physical Exam:  Gen:  Moderated respiratory distress, awake . HEENT:  Normal cephalic atraumatic, extra-occular movements are intact. Neck:  Supple, No JVD. Central line right EJ  Lungs:  Clear bilaterally, no wheeze, no rales, normal effort  Cardiovascular:  Sinus tachycardia, normal S1 and S2, no audible murmur. Capillary refill: < 3 seconds. Abdomen:  Soft, non tender, non distended, normal bowel sounds, no guarding. Extremities:  Well perfused, no cyanosis, no edema  Neurological: unable to assess. Originally awake, however in moderated distress and altered. Patient then sedated and intubated. Skin:  Heavily keratinized, dry, hyperpigmented in lower extremities with raised lesions rashes or moles. Turgor and color normal  Mental Status:  Normal thought process, does not appear anxious      Laboratory Studies: All lab results for the last 24 hours reviewed. Assessment/Plan     Active Problems:    Septic shock ([facility] [address] (2/10/2017)        PLAN:    Respiratory:  Intubated secondary to respiratory distress. Admit to ICU     Infectious: septick shock. UTI and large decubital ulcers which may be point of entry.    Continue IV antibiotics-  Vanc, zosyn, levaquin   Repeat lactic acid levels every 4 hours until normal    CV: h/o HTN-  Currently hypotensive and in need of pressor   Continue to monitor on tele    Heme:  DVT prophylaxis   Lovenox 40 mg SQ daily    Metabolic/Endo: DM, hypothyroid   FS Q6 with sliding scale coverage   Continue home medications     GI: GI prohylaxis   Protonix 40 mg daily    Misc:  FULL CODE   Monitor intake and output   Monitor vitals as per unit   Neurovascular checks   Replace electrolytes as needed. Follow up am labs. Wound care consult for large ulcers.        MD SHANEKA Flaherty / Isis  D:  02/10/2017   21:23  T:  02/10/2017   21:35  Job #:  [id number]

## 2017-02-11 NOTE — ED NOTES
Bedside and Verbal shift change report given to aislinn RN (oncoming nurse) by Nicci Hunter RN (offgoing nurse). Report included the following information SBAR and ED Summary.

## 2017-02-11 NOTE — ED NOTES
Pt intubated by Dr. Mayra Snow from anesthesia. 7.5 ET tube 22@ the lip. Positive color change, bilateral LS present. Respiratory at bedside.

## 2017-02-12 NOTE — PROGRESS NOTES
Pt intubated. Visitor present. Unable to assess needs.     Zuhair Solis, MPH, [address] [address] [phone]

## 2017-02-12 NOTE — PROGRESS NOTES
[facility]  Pulmonary, Critical Care, and Sleep Medicine    Name: Bess Lawrence MRN: [id number]   : 1955 Hospital: [address] [place] [address]   Date: 2017        [facility]                                              [x] I have reviewed the flowsheet and previous days notes. Events, vitals, medications and notes from last 24 hours reviewed. Care plan discussed with staff, patient, family and on multidisciplinary rounds. 2017  Still requiring levophed and vasopressin, Hb decreased, no visible bleeding, small amount of oozing from wounds, remains mechanically ventilated      IMPRESSION:   · Septic shock requiring pressors, likely source skin with noted large tunneling sacral, L gluteal wounds  · Acute respiratory failure in setting of severe sepsis requiring MV  · Acute encephalopathy, likely metabolic, sepsis  · Hyperkalemia, s/p tx in ED, improved  · Lactic acidosis, improved  · JOAQUIN on CKD likely due to sepsis, volume depletion, improved  · Metabolic acidosis, improved  · Elevated ammonia, improved  · Diarrhea, r/o c.diff  · DM  · Morbid obesity  · Bedbug infestation  · Hx HTN    · Code status: FULL      RECOMMENDATIONS:   · CVS: Requiring levophed and vasopressin, Continue pressor support to maintain MAP>65, currently on levophed, vaso, BP borderline, will add sen if third pressor needed. RIJ in place. Negative enzymes. ECHO ordered and pending. S/p Albumin x 4 doses, lactic acid improved, most recently 1.6, repeat today  · Respiratory: Continue mechanical ventilation, no wean until hemodynamics improve, continue duonebs scheduled,  Sputum cx negative to date. Cont abx. · ID:  Sepsis bundle. Continue levaquin, zosyn, vanc. blood, urine cx, sputum negative to date. Wound cx.  Awaiting wound care consult, will likely need surgical consult for debridement when more stable, leukocytosis improving, low grade temps  · Hematology/Oncology:  Hb decrease today, repeat and if remains low will transfues Nl coags. · Renal:  Follow BUN, Cr, I/O. Obando for now, slight worsening of renal function  · GI/:  Continue obando for now. PPI. Trend LFT, ammonia. amylase and lipase ok  · Endocrine:  Nl TSH. Check cortisol. Glycemic control. ISS prn, avoid hypoglycemia  · Neurology/Pain/Sedation:  Propofol for sedation. CT head when machine available to accomodate weight (has not been available past 48 hours). Daily sedation wean for neuro eval  · Skin/Wound/Musc: Wound care consult. May need further imaging, surgical consult as above. · FEN: Trickle feed. IVF. Free water. Replace lytes prn per protocol. · Prophylaxis: GI Prophylaxis with protonix. DVT Prophylaxis with heparin. · Restraints: B wrist  ·   · Guardianship request initiated, son with MR, unable to make decisions  ·                                                               ·   ·   · Glycemic Control. Glucose stabilizer per ICU protocol when on insulin drip. Maintain blood glucose 140-180. · Replace electrolytes per ICU electrolyte replacement protocol  · Ventilator bundle & Sedation protocol followed. Daily sedation holiday, assessment for readiness for SBT and then re-titrate if required. Chlorhexidine mouth washes. · HOB 30 degree elevation all the time. Aggressive pulmonary toileting. Incentive spirometry when appropriate. Aspiration precautions. · Sepsis bundle and protocol followed. Follow serial lactic acid, frequent BMP check, fluid resuscitation. Follow cultures. Deescalate antibiotic when appropriate. · Obando bundle followed, remove obando catheter when not critically ill. · Central Line bundle followed, remove when not needed. · Skin & Wound care. · Weekly prealbumin, nutritional consult. · PT/OT eval and treat. OOB when appropriate. · Further recommendations will be based on the patient's response to recommended treatment and results of the investigation ordered.      · Quality Care: PPI, DVT prophylaxis, HOB elevated, Infection control all reviewed and addressed. · Events and notes from last 24 hours reviewed. Care plan discussed with nursing   · CC Time: >35 min    Review of Systems:  Review of systems not obtained due to patient factors. Allergies   Allergen Reactions    Ace Inhibitors Nausea and Vomiting    Hydralazine Nausea and Vomiting    Ibuprofen Shortness of Breath    Norvasc [Amlodipine] Swelling    Sulfa (Sulfonamide Antibiotics) Itching    Tramadol Shortness of Breath      Past Medical History   Diagnosis Date    Anemia     Anxiety 2016    Arthritis     Cancer ([facility]      pre cancerous cells in the uterus    Chronic pain      knees    Chronic pain of both knees 2016    Cigarette nicotine dependence in remission 2016    Diabetes ([facility]     Fatty liver 2016    Headache     Hypertension     Liver disease      fatty liver    Lymphedema 2016    Morbid obesity ([facility] 2016    Psychotic disorder      anxiety and panic attacks    Thyroid disease       Past Surgical History   Procedure Laterality Date    Hx cholecystectomy  2012    Hx gyn  2006     hysterectomy    Hx  section        Social History   Substance Use Topics    Smoking status: Never Smoker    Smokeless tobacco: Never Used    Alcohol use No      Family History   Problem Relation Age of Onset    Diabetes Mother     Hypertension Mother     Lung Disease Father       Prior to Admission medications    Medication Sig Start Date End Date Taking? Authorizing Provider   cephALEXin (KEFLEX) 500 mg capsule Take 1 Cap by mouth four (4) times daily. 17   Maureen Mcknight MD   nebivolol (BYSTOLIC) 5 mg tablet Take 5 mg by mouth daily. Marino Lal MD   ergocalciferol (VITAMIN D2) 50,000 unit capsule Take 50,000 Units by mouth. Marino Lal MD   hydroCHLOROthiazide (HYDRODIURIL) 25 mg tablet Take 25 mg by mouth daily.     Marino Lal MD   glimepiride (AMARYL) 2 mg tablet Take 2 mg by mouth every morning. Marino Lal MD   traMADol (ULTRAM) 50 mg tablet Take 50 mg by mouth every six (6) hours as needed for Pain. Marino Lal MD   loratadine (CLARITIN) 10 mg tablet 10 mg. 12/30/12   Historical Provider   losartan (COZAAR) 25 mg tablet Take 1 Tab by mouth every evening. 5/19/16   David Saldivar MD   metoprolol succinate (TOPROL-XL) 25 mg XL tablet Take 1 Tab by mouth daily. 5/19/16   David Saldivar MD     Current Facility-Administered Medications   Medication Dose Route Frequency    PHENYLephrine (NEOSYNEPHRINE) 30,000 mcg in 0.9% sodium chloride 250 mL infusion   mcg/min IntraVENous TITRATE    albuterol-ipratropium (DUO-NEB) 2.5 MG-0.5 MG/3 ML  3 mL Nebulization Q6H RT    NOREPINephrine (LEVOPHED) 1 mg/mL injection        levoFLOXacin (LEVAQUIN) 750 mg in D5W IVPB  750 mg IntraVENous Q24H    NOREPINephrine (LEVOPHED) 8,000 mcg in dextrose 5% 250 mL infusion  0-20 mcg/min IntraVENous TITRATE    0.9% sodium chloride infusion  150 mL/hr IntraVENous CONTINUOUS    insulin lispro (HUMALOG) injection   SubCUTAneous Q6H    piperacillin-tazobactam (ZOSYN) 4.5 g in 0.9% sodium chloride (MBP/ADV) 100 mL MBP  4.5 g IntraVENous Q6H    vasopressin (VASOSTRICT) 100 Units in 0.9% sodium chloride 100 mL infusion  0.01-0.04 Units/min IntraVENous TITRATE    propofol (DIPRIVAN) infusion  0-50 mcg/kg/min IntraVENous TITRATE    heparin (porcine) injection 5,000 Units  5,000 Units SubCUTAneous Q8H    pantoprazole (PROTONIX) 40 mg in sodium chloride 0.9 % 10 mL injection  40 mg IntraVENous DAILY    vancomycin (VANCOCIN) 2,000 mg in 0.9% sodium chloride 500 mL IVPB  2,000 mg IntraVENous Q18H    [START ON 2/13/2017] VANCOMYCIN INFORMATION NOTE   Other ONCE       Lines: All central lines examined by me. No signs of erythema, induration, discharge.       Central Venous Catheter:  Triple Lumen right ij cvl 02/10/17 Right Neck (Active)   Central Line Being Utilized Yes 2/10/2017  7:13 PM   Site Assessment Clean, dry, & intact 2/10/2017  7:13 PM   Dressing Status Clean, dry, & intact 2/10/2017  7:13 PM   Proximal Hub Color/Line Status White 2/10/2017  7:13 PM   Positive Blood Return (Medial Site) Yes 2/10/2017  7:13 PM   Medial Hub Color/Line Status Brown 2/10/2017  7:13 PM   Positive Blood Return (Lateral Site) Yes 2/10/2017  7:13 PM   Distal Hub Color/Line Status Blue 2/10/2017  7:13 PM   Positive Blood Return (Site #3) Yes 2/10/2017  7:13 PM          Telemetry:normal sinus rhythm    Objective:   Vital Signs:    Visit Vitals    /42    Pulse 93    Temp 99.3 °F (37.4 °C)    Resp 27    Ht 5' 5\" (1.651 m)    Wt (!) 189.5 kg (417 lb 11.2 oz)    SpO2 100%    BMI 69.51 kg/m2       O2 Device: Ventilator       Temp (24hrs), Av.4 °F (37.4 °C), Min:98.8 °F (37.1 °C), Max:100 °F (37.8 °C)       Intake/Output:   Last shift:           Last 3 shifts: 02/10 1901 -  0700  In: 9431.2 [I.V.:8946.2]  Out: 2653 [Urine:2540]      Intake/Output Summary (Last 24 hours) at 17 0735  Last data filed at 17 0700   Gross per 24 hour   Intake          6908.89 ml   Output             2410 ml   Net          4498.89 ml       Last 3 Recorded Weights in this Encounter    17 0232 17 1223 17 0100   Weight: (!) 189.3 kg (417 lb 5.3 oz) (!) 189.3 kg (417 lb 5.3 oz) (!) 189.5 kg (417 lb 11.2 oz)       Ventilator Settings:  Mode Rate Tidal Volume Pressure FiO2 PEEP   Assist control, VC+   500 ml    45 % 5 cm H20     Peak airway pressure: 18 cm H2O    Plateau pressure:     Tidal volume:    Minute ventilation: 15.2 l/min   SPO2      ARDS network Guidelines: Lung protective strategy and Plateau pressure goals less than or equal to 30      Physical Exam:     General/Neurology: Intubated, sedated on vent  Head:   Normocephalic  Eye:   No scleral icterus  Nose/Throat:  ETT in place  Neck:   Supple, R IJ  Lung:   Adequate air entry bilateral equal, no rales, no rhonchi, no wheezing.    Heart: Tachy  Abdomen:  Soft, morbidly obese  :  Juarez in place, clear dark urine  Extremities:  4+BLE edema, ulceration BLE, significant chronic changes  Skin per ED notes: tunneling sacral and L medial gluteal area, muscle and bone exposed    Data:       Recent Results (from the past 24 hour(s))   TROPONIN I    Collection Time: 02/11/17  9:10 AM   Result Value Ref Range    Troponin-I, Qt. <0.02 0.0 - 0.045 NG/ML   PROTHROMBIN TIME + INR    Collection Time: 02/11/17 11:16 AM   Result Value Ref Range    Prothrombin time 16.6 (H) 11.5 - 15.2 sec    INR 1.4 (H) 0.8 - 1.2     POC G3    Collection Time: 02/11/17 11:23 AM   Result Value Ref Range    Device: VENT      FIO2 (POC) 60 %    pH (POC) 7.392 7.35 - 7.45      pCO2 (POC) 26.0 (L) 35.0 - 45.0 MMHG    pO2 (POC) 321 (H) 80 - 100 MMHG    HCO3 (POC) 15.7 (L) 22 - 26 MMOL/L    sO2 (POC) 100 (H) 92 - 97 %    Base deficit (POC) 9 mmol/L    Mode ASSIST CONTROL      Tidal volume 600 ml    Set Rate 10 bpm    PEEP/CPAP (POC) 5 cmH2O    Allens test (POC) YES      Total resp. rate 30      Site LEFT RADIAL      Patient temp.  37.4      Specimen type (POC) ARTERIAL      Performed by Charley Gonzalez     Volume control plus YES     GLUCOSE, POC    Collection Time: 02/11/17 11:34 AM   Result Value Ref Range    Glucose (POC) 96 70 - 110 mg/dL   CULTURE, WOUND W GRAM STAIN    Collection Time: 02/11/17 12:56 PM   Result Value Ref Range    Special Requests: NO SPECIAL REQUESTS      GRAM STAIN RARE  WBC'S        GRAM STAIN RARE  GRAM POSITIVE COCCI  IN PAIRS        Culture result: PENDING    DRUG SCREEN, URINE    Collection Time: 02/11/17 12:56 PM   Result Value Ref Range    BENZODIAZEPINE NEGATIVE  NEG      BARBITURATES NEGATIVE  NEG      THC (TH-CANNABINOL) NEGATIVE  NEG      OPIATES NEGATIVE  NEG      PCP(PHENCYCLIDINE) NEGATIVE  NEG      COCAINE NEGATIVE  NEG      AMPHETAMINE NEGATIVE  NEG      METHADONE NEGATIVE       HDSCOM (NOTE)    GLUCOSE, POC    Collection Time: 02/11/17  4:16 PM Result Value Ref Range    Glucose (POC) 95 70 - 110 mg/dL   TROPONIN I    Collection Time: 02/11/17  5:45 PM   Result Value Ref Range    Troponin-I, Qt. <0.02 0.0 - 0.045 NG/ML   LACTIC ACID, PLASMA    Collection Time: 02/11/17  8:30 PM   Result Value Ref Range    Lactic acid 1.6 0.4 - 2.0 MMOL/L   GLUCOSE, POC    Collection Time: 02/11/17 11:38 PM   Result Value Ref Range    Glucose (POC) 125 (H) 70 - 110 mg/dL   CBC W/O DIFF    Collection Time: 02/12/17  3:25 AM   Result Value Ref Range    WBC 25.3 (H) 4.6 - 13.2 K/uL    RBC 2.67 (L) 4.20 - 5.30 M/uL    HGB 7.5 (L) 12.0 - 16.0 g/dL    HCT 22.1 (L) 35.0 - 45.0 %    MCV 82.8 74.0 - 97.0 FL    MCH 28.1 24.0 - 34.0 PG    MCHC 33.9 31.0 - 37.0 g/dL    RDW 14.5 11.6 - 14.5 %    PLATELET 087 664 - 005 K/uL    MPV 9.0 (L) 9.2 - 18.2 FL   METABOLIC PANEL, COMPREHENSIVE    Collection Time: 02/12/17  3:25 AM   Result Value Ref Range    Sodium 147 (H) 136 - 145 mmol/L    Potassium 4.1 3.5 - 5.5 mmol/L    Chloride 117 (H) 100 - 108 mmol/L    CO2 15 (L) 21 - 32 mmol/L    Anion gap 15 3.0 - 18 mmol/L    Glucose 106 (H) 74 - 99 mg/dL    BUN 79 (H) 7.0 - 18 MG/DL    Creatinine 1.33 (H) 0.6 - 1.3 MG/DL    BUN/Creatinine ratio 59 (H) 12 - 20      GFR est AA 49 (L) >60 ml/min/1.73m2    GFR est non-AA 41 (L) >60 ml/min/1.73m2    Calcium 8.1 (L) 8.5 - 10.1 MG/DL    Bilirubin, total 1.2 (H) 0.2 - 1.0 MG/DL    ALT (SGPT) 17 13 - 56 U/L    AST (SGOT) 31 15 - 37 U/L    Alk.  phosphatase 85 45 - 117 U/L    Protein, total 5.7 (L) 6.4 - 8.2 g/dL    Albumin 2.3 (L) 3.4 - 5.0 g/dL    Globulin 3.4 2.0 - 4.0 g/dL    A-G Ratio 0.7 (L) 0.8 - 1.7     GLUCOSE, POC    Collection Time: 02/12/17  6:14 AM   Result Value Ref Range    Glucose (POC) 145 (H) 70 - 110 mg/dL         Chemistry Recent Labs      02/12/17   0325  02/11/17   0425  02/10/17   1740   GLU  106*  116*  138*   NA  147*  148*  143   K  4.1  4.9  6.1*   CL  117*  119*  115*   CO2  15*  13*  13*   BUN  79*  86*  92*   CREA  1.33*  1.29 1.57*   CA  8.1*  8.0*  8.8   MG   --   2.7*  3.3*   PHOS   --   6.8*   --    AGAP  15  16  15   BUCR  59*  67*  59*   AP  85  114  141*   TP  5.7*  5.8*  6.7   ALB  2.3*  1.4*  1.6*   GLOB  3.4  4.4*  5.1*   AGRAT  0.7*  0.3*  0.3*        Lactic Acid Lactic acid   Date Value Ref Range Status   02/11/2017 1.6 0.4 - 2.0 MMOL/L Final     Recent Labs      02/11/17 2030 02/11/17 0425   LAC  1.6  3.5*        Liver Enzymes Protein, total   Date Value Ref Range Status   02/12/2017 5.7 (L) 6.4 - 8.2 g/dL Final     Albumin   Date Value Ref Range Status   02/12/2017 2.3 (L) 3.4 - 5.0 g/dL Final     Globulin   Date Value Ref Range Status   02/12/2017 3.4 2.0 - 4.0 g/dL Final     A-G Ratio   Date Value Ref Range Status   02/12/2017 0.7 (L) 0.8 - 1.7   Final     AST (SGOT)   Date Value Ref Range Status   02/12/2017 31 15 - 37 U/L Final     Alk.  phosphatase   Date Value Ref Range Status   02/12/2017 85 45 - 117 U/L Final     Recent Labs      02/12/17   0325  02/11/17   0425  02/10/17   1740   TP  5.7*  5.8*  6.7   ALB  2.3*  1.4*  1.6*   GLOB  3.4  4.4*  5.1*   AGRAT  0.7*  0.3*  0.3*   SGOT  31  42*  34   AP  85  114  141*        CBC w/Diff Recent Labs      02/12/17   0325  02/11/17   0425  02/10/17   1740   WBC  25.3*  35.9*  28.7*   RBC  2.67*  3.49*  3.89*   HGB  7.5*  10.0*  11.1*   HCT  22.1*  28.2*  31.2*   PLT  231  346  397   GRANS   --    --   87*   LYMPH   --    --   5*   EOS   --    --   0        Cardiac Enzymes Lab Results   Component Value Date/Time    TROIQ <0.02 02/11/2017 05:45 PM    TROIQ <0.02 02/11/2017 09:10 AM        BNP No results found for: BNP, BNPP, XBNPT     Coagulation Recent Labs      02/11/17   1116  02/10/17   1740   PTP  16.6*  15.7*   INR  1.4*  1.3*         Thyroid  Lab Results   Component Value Date/Time    TSH 2.41 02/10/2017 05:40 PM       No results found for: T4     Lipid Panel Lab Results   Component Value Date/Time    Cholesterol, total 167 05/05/2010 08:45 AM    HDL Cholesterol 65 05/05/2010 08:45 AM    LDL, calculated 88.4 05/05/2010 08:45 AM    VLDL, calculated 13.6 05/05/2010 08:45 AM    Triglyceride 68 05/05/2010 08:45 AM    CHOL/HDL Ratio 2.6 05/05/2010 08:45 AM        ABG Recent Labs      02/11/17   1123  02/10/17   2028  02/10/17   1806   PHI  7.392  7.327*  7.366   PCO2I  26.0*  24.5*  20.3*   PO2I  321*  470*  204*   HCO3I  15.7*  13.3*  11.7*   FIO2I  60  100  100        Urinalysis Lab Results   Component Value Date/Time    Color DARK YELLOW 02/10/2017 06:15 PM    Appearance CLOUDY 02/10/2017 06:15 PM    Specific gravity 1.018 02/10/2017 06:15 PM    pH (UA) 5.0 02/10/2017 06:15 PM    Protein NEGATIVE  02/10/2017 06:15 PM    Glucose NEGATIVE  02/10/2017 06:15 PM    Ketone TRACE 02/10/2017 06:15 PM    Bilirubin SMALL 02/10/2017 06:15 PM    Urobilinogen 1.0 02/10/2017 06:15 PM    Nitrites NEGATIVE  02/10/2017 06:15 PM    Leukocyte Esterase TRACE 02/10/2017 06:15 PM    Epithelial cells 1+ 02/10/2017 06:15 PM    Bacteria 1+ 02/10/2017 06:15 PM    WBC 4 to 10 02/10/2017 06:15 PM    RBC NONE 02/10/2017 06:15 PM        Micro  Recent Labs      02/11/17   1256  02/10/17   1815  02/10/17   1745   CULT  PENDING  CULTURE IN PROGRESS,FURTHER UPDATES TO FOLLOW  NO GROWTH AFTER 13 HOURS     Recent Labs      02/11/17   1256  02/10/17   1815  02/10/17   1745  02/10/17   1730   CULT  PENDING  CULTURE IN PROGRESS,FURTHER UPDATES TO FOLLOW  NO GROWTH AFTER 13 HOURS  NO GROWTH AFTER 13 HOURS        EKG  No results found for this or any previous visit. PFT  No flowsheet data found. Other  ASA reactivity:   Pre-albumin:   Ionized Calcium:   NH4:   T3, FT4:  Cortisol:  Urine Osm:  Urine Lytes:   HbA1c:      Ultrasound       LE Doppler       ECHO  ordered     CT (Most Recent)  No results found for this or any previous visit. XR (Most Recent).  CXR  reviewed by me and compared with previous CXR   Results from Hospital Encounter encounter on 02/10/17   XR CHEST PORT   Narrative EXAM: Portable chest x-ray    INDICATION: Respiratory failure    COMPARISON: 2/10/2017    TECHNIQUE: Single AP view of the chest was obtained.    _______________    FINDINGS:    Endotracheal tube tip is 5 cm above the kimberly. Enteric sump tube courses beyond  the limits of the film in the left upper quadrant. Right jugular central venous  catheter is in stable position. The lungs are hypoinflated, particularly when coupled with AP technique, but  clear of focal infiltrate. There is no pneumothorax or pleural effusion. Heart  size is within normal limits. There is no significant vascular congestion. There  is no acute osseous abnormality. _______________         Impression IMPRESSION:  1. Medical support devices in satisfactory position. 2. Low lung volumes without focal consolidative process. Best practice : All below core measures reviewed and addressed:   [x] Antibiotic choice. [x] Severe Sepsis bundles follwed; SIRS screen met? Yes   [x] Fluids. [x] Lactic acid ordered- initial and repeat Q6hrs if elevated till normalized. [x] Cultures drawn. [x] Antibiotic administered within 1hr-ICU and 3hrs ED. [x] Large bore IV- 2 sites        R IJ  [x] Pressors aim MAP >65mmHg. [x] Steroids. [x] Glycemic control. [x] Infection control. [x] Mech. Ventilated patients- aim to keep peak plateau pressure 91-00LI H2O. [x] HOB at >= 30 degree. [x] Stress ulcer prophylaxis. [x] DVT prophylaxis. [x] Central Line Bundle Followed. [x] Juarez Bundle Followed. [x] Ventilator Bundle Followed. (Daily sedation holiday to assess readiness for weaning from ventilator & SBT trial starting at 6.00 am, HOB 30-degree elevation, Chlorhexidine mouth washes & Routine oral care every 4 hours, Toole tube to suction at 20-30 cm H2O, Maintain Toole tube with 5-10ml air every 4 hours, DVT prophylaxis, GI prophylaxis etc.).     The patient is: [] acutely ill Risk of deterioration: [] moderate    [x] critically ill  [x] high [x]See my orders for details  [x] Case & management strategies discussed today on multidisciplinary rounds    High complexity decision making was performed during the evaluation of this patient at high risk for decompensation with multiple organ involvement    [x]Total time spent on reviewing the case/medical record/data/notes/EMR/patient examination/documentation/coordinating care with nurse/consultants, exclusive of procedures with complex decision making performed >35 minutes and > 50% time spent in face to face evaluation, as mentioned above.     Critical care time 35 minutes  Amanda Mccollum MD  2/12/2017

## 2017-02-12 NOTE — ROUTINE PROCESS
1957-spoke with [facility] rep, requested update on bed delivery delay, will request tech to delivery bed stat, clerical error for delay in delivery

## 2017-02-12 NOTE — PROGRESS NOTES
Progress Note      Patient: Nicolas Mosley               Sex: female          DOA: 2/10/2017       YOB: 1955      Age:  64 y.o.        LOS:  LOS: 2 days               Subjective:   Discussed with pulmonary. pt is to be transfused as her h/h is 6.7/20 . Pt's propofol increase as noted for her vent dyssynchrony large ulcerations noted on the legs . And back . pt remains intubated  . she remains on pressors and antibiotics . The pt will be seen by the wound care team presumably in the am   Pt is on duonebs but is not yet ready for weaning . .surgery will be needed to debride the decubitus . Objective:      Visit Vitals    /45    Pulse 94    Temp 99.7 °F (37.6 °C)    Resp 27    Ht 5' 5\" (1.651 m)    Wt (!) 189.5 kg (417 lb 11.2 oz)    SpO2 100%    BMI 69.51 kg/m2       Physical Exam:  Obese intubated sedated female . heart reg rate and rhythm   Lungs breaths ounds distant. No wheezing heard   Abdomen soft and  Obese . extremities  Chronic changes seen in the feet .    Neuro sedated    Lab/Data Reviewed:  CMP:   Lab Results   Component Value Date/Time     (H) 02/12/2017 03:25 AM    K 4.1 02/12/2017 03:25 AM     (H) 02/12/2017 03:25 AM    CO2 15 (L) 02/12/2017 03:25 AM    AGAP 15 02/12/2017 03:25 AM     (H) 02/12/2017 03:25 AM    BUN 79 (H) 02/12/2017 03:25 AM    CREA 1.33 (H) 02/12/2017 03:25 AM    GFRAA 49 (L) 02/12/2017 03:25 AM    GFRNA 41 (L) 02/12/2017 03:25 AM    CA 8.1 (L) 02/12/2017 03:25 AM    ALB 2.3 (L) 02/12/2017 03:25 AM    TP 5.7 (L) 02/12/2017 03:25 AM    GLOB 3.4 02/12/2017 03:25 AM    AGRAT 0.7 (L) 02/12/2017 03:25 AM    SGOT 31 02/12/2017 03:25 AM    ALT 17 02/12/2017 03:25 AM     CBC:   Lab Results   Component Value Date/Time    WBC 21.5 (H) 02/12/2017 07:00 AM    HGB 6.7 (L) 02/12/2017 09:30 AM    HCT 20.0 (L) 02/12/2017 09:30 AM     02/12/2017 07:00 AM           Assessment/Plan     Active Problems:    [facility] (2[facility]10/2017) presumably from the foul smelling decubitus ulcers . Respiratory failure pt is intubated . Magy Chun Obesity   Diabetes mellitus type 2   htn     Plan: pt will be weaned a t a later date . continue the pressors and the antibiotics . Decubitus ulcers will be debrided .

## 2017-02-12 NOTE — PROGRESS NOTES
Problem: Ventilator Management  Goal: *Adequate oxygenation and ventilation  Outcome: Progressing Towards Goal  Found patinent  On vent abg results  Able to titrate fio2 to 35% ACVC+ patient stable and tube secure at this time. Results for Faye Alvarez (MRN [id number]) as of 2/12/2017 09:23   Ref. Range 2/12/2017 08:56   pH (POC) Latest Ref Range: 7.35 - 7.45   7.333 (L)   pCO2 (POC) Latest Ref Range: 35.0 - 45.0 MMHG 31.7 (L)   pO2 (POC) Latest Ref Range: 80 - 100 MMHG 192 (H)   HCO3 (POC) Latest Ref Range: 22 - 26 MMOL/L 16.9 (L)   sO2 (POC) Latest Ref Range: 92 - 97 % 100 (H)   Base deficit (POC) Latest Units: mmol/L 9   FIO2 (POC) Latest Units: % 45   Patient temp. Latest Units:   98.0   Specimen type (POC) Latest Units:   ARTERIAL   Set Rate Latest Units: bpm 10   Site Latest Units:   RIGHT RADIAL   Device: Latest Units:   VENT   Total resp.  rate Latest Units:   25   Mode Latest Units:   ASSIST CONTROL   Tidal volume Latest Units: ml 500   Volume control plus Latest Units:   YES   PIP (POC) Latest Units:   20   PEEP/CPAP (POC) Latest Units: cmH2O 5   Allens test (POC) Latest Units:   N/A   Inspiratory Time Latest Units: sec 1   Titrated fio2 30%  Patient stable at this time

## 2017-02-12 NOTE — ROUTINE PROCESS
1900:  Rec'd Giorgi Matias @ 1900 from NÉSTOR Sadler RN. SBAR reviewed with two-nurse check-offs done of meds, dressings, wounds. Overnight:  Propofol increased for Vent dyssynchony,  Dr. Irasema Fenton advised of propofol at maximum. Minimal secretions, breathing 30/10. Ectopy more frequent. Dr. Irasema Fenton, at bedside, aware of decrease in Hb, ordered recheck. TF trickling, ? Stool, supplemental insulin not needed. Urine hazy but > 30 ml / h. Dressing change done:  Buttock very deep, dusky drainage had soaked prior dressing. Damp kerlix placed, large ABDs placed in contact but no adhesives. Verbal Patient-side shift change report given to NÉSTOR Lei, (oncoming nurse) by Randy Sanchez RN  (offgoing nurse). Report included the following information SBAR, Kardex, ED Summary, Procedure Summary, Intake/Output, MAR, Recent Results and Med Rec Status. Included:  Intro, hx, two-RN eval of relevant assessment findings, LDAs, skin, diagnostics and infusions.

## 2017-02-12 NOTE — PROGRESS NOTES
Patient in bed on back with head elevated - BBS equal and diminished ess clear - ETT secure at 23 at the lip - ETT moved to the right side of the mouth - patient suctioned and ISHAN tube cleared - MVB at [facility] - patient suctioned and ISHAN tube cleared - ETT moved to the center of the mouth    0354 - patient suctioned and ISHAN tube cleared - ETT moved to the left side of the mouth

## 2017-02-13 PROBLEM — R53.81 DEBILITY: Status: ACTIVE | Noted: 2017-01-01

## 2017-02-13 PROBLEM — J96.00 ACUTE RESPIRATORY FAILURE (HCC): Status: ACTIVE | Noted: 2017-01-01

## 2017-02-13 NOTE — PROGRESS NOTES
[facility]  Pulmonary, Critical Care, and Sleep Medicine    Name: Eitan Nino MRN: [id number]   : 1955 Hospital: [address] [place] [address]   Date: 2017        [facility] Progress Notes                                              [x] I have reviewed the flowsheet and previous days notes. Events, vitals, medications and notes from last 24 hours reviewed. Care plan discussed with staff, patient, family and on multidisciplinary rounds. 2017  Now off levophed, remains on vasopressin  Hb stable after 1uPRBC  Wound cx GPC pairs, sputum GS sputum GPC pairs, rods, GNR  WBC and Cr down, Na up      IMPRESSION:   · Septic shock requiring pressors, likely source skin with noted severe large tunneling sacral, gluteal wounds  · Acute respiratory failure in setting of severe sepsis requiring MV  · Acute encephalopathy, likely metabolic, sepsis  · Hyperkalemia, s/p tx in ED, resolved  · Lactic acidosis, resolved  · JOAQUIN on CKD likely due to sepsis, volume depletion, resolving  · Metabolic acidosis, improved  · Elevated ammonia, improved  · Diarrhea, improved  · DM  · Morbid obesity  · Bedbug infestation  · Hx HTN    · Code status: FULL      RECOMMENDATIONS:   · CVS: Monitor HD. Requiring vasopressin, off levo. Continue pressor support to maintain MAP>65,  RIJ in place. Negative enzymes. ECHO EF 55-60%. S/p Albumin x 4 doses, lactic acid improved, most recently 1.6, repeat today  · Respiratory: Continue mechanical ventilation, no wean until hemodynamics improve, continue duonebs scheduled,  Sputum GS + GPC, GNR, await culture. Cont abx. High risk aspiration. Daily CXR. ABG ok. · ID:  Sepsis bundle. Continue levaquin, zosyn, vanc. Urine cx +yeast. Wound cx: GPC pairs. Wound care consult appreciated. leukocytosis improving, low grade temps. Surgical consult  · Hematology/Oncology:  Hb stable, s/p1u, Nl coags. · Renal:  Follow BUN, Cr, I/O. Cr better  · GI/:  Continue obando for now. PPI. Trend LFT, ammonia. amylase and lipase ok  · Endocrine:  Nl TSH. Nl cortisol. Glycemic control. ISS prn, avoid hypoglycemia. IVF   · Neurology/Pain/Sedation:  Propofol for sedation. CT head when machine available to accomodate weight. Daily sedation wean for neuro eval.  · Skin/Wound/Musc: Wound care consult. Surgical consult. Likely needs colostomy. · FEN: Trickle feed. IVF-change to D5. Free water increased. Replace lytes prn per protocol. · Prophylaxis: GI Prophylaxis with protonix. DVT Prophylaxis with heparin. · Restraints: B wrist  ·   · Guardianship request initiated, son with MR, unable to make decisions. APS notified. · Palliative care consult  ·                                                               ·   ·   · Glycemic Control. Glucose stabilizer per ICU protocol when on insulin drip. Maintain blood glucose 140-180. · Replace electrolytes per ICU electrolyte replacement protocol  · Ventilator bundle & Sedation protocol followed. Daily sedation holiday, assessment for readiness for SBT and then re-titrate if required. Chlorhexidine mouth washes. · HOB 30 degree elevation all the time. Aggressive pulmonary toileting. Incentive spirometry when appropriate. Aspiration precautions. · Sepsis bundle and protocol followed. Follow serial lactic acid, frequent BMP check, fluid resuscitation. Follow cultures. Deescalate antibiotic when appropriate. · Obando bundle followed, remove obando catheter when not critically ill. · Central Line bundle followed, remove when not needed. · Skin & Wound care. · Weekly prealbumin, nutritional consult. · PT/OT eval and treat. OOB when appropriate. · Further recommendations will be based on the patient's response to recommended treatment and results of the investigation ordered. · Quality Care: PPI, DVT prophylaxis, HOB elevated, Infection control all reviewed and addressed. · Events and notes from last 24 hours reviewed.  Care plan discussed with nursing Review of Systems:  Review of systems not obtained due to patient factors. Allergies   Allergen Reactions    Ace Inhibitors Nausea and Vomiting    Hydralazine Nausea and Vomiting    Ibuprofen Shortness of Breath    Norvasc [Amlodipine] Swelling    Sulfa (Sulfonamide Antibiotics) Itching    Tramadol Shortness of Breath      Past Medical History   Diagnosis Date    Anemia     Anxiety 2016    Arthritis     Cancer ([facility] [address]      pre cancerous cells in the uterus    Chronic pain      knees    Chronic pain of both knees 2016    Cigarette nicotine dependence in remission 2016    Diabetes ([facility] [address]     Fatty liver 2016    Headache     Hypertension     Liver disease      fatty liver    Lymphedema 2016    Morbid obesity ([facility] [address] 2016    Psychotic disorder      anxiety and panic attacks    Thyroid disease       Past Surgical History   Procedure Laterality Date    Hx cholecystectomy      Hx gyn  2006     hysterectomy    Hx  section        Social History   Substance Use Topics    Smoking status: Never Smoker    Smokeless tobacco: Never Used    Alcohol use No      Family History   Problem Relation Age of Onset    Diabetes Mother     Hypertension Mother     Lung Disease Father       Prior to Admission medications    Medication Sig Start Date End Date Taking? Authorizing Provider   cephALEXin (KEFLEX) 500 mg capsule Take 1 Cap by mouth four (4) times daily. 17   Jeny Dolan MD   nebivolol (BYSTOLIC) 5 mg tablet Take 5 mg by mouth daily. Marino Lal MD   ergocalciferol (VITAMIN D2) 50,000 unit capsule Take 50,000 Units by mouth. Marino Lal MD   hydroCHLOROthiazide (HYDRODIURIL) 25 mg tablet Take 25 mg by mouth daily. Marino Lal MD   glimepiride (AMARYL) 2 mg tablet Take 2 mg by mouth every morning. Marino Lal MD   traMADol (ULTRAM) 50 mg tablet Take 50 mg by mouth every six (6) hours as needed for Pain.     Marino Lal MD   loratadine (CLARITIN) 10 mg tablet 10 mg. 12/30/12   Historical Provider   losartan (COZAAR) 25 mg tablet Take 1 Tab by mouth every evening. 5/19/16   Paul Doan MD   metoprolol succinate (TOPROL-XL) 25 mg XL tablet Take 1 Tab by mouth daily. 5/19/16   Paul Doan MD     Current Facility-Administered Medications   Medication Dose Route Frequency    vasopressin (VASOSTRICT) 20 unit/mL injection        dextrose 5% lactated ringers infusion  100 mL/hr IntraVENous CONTINUOUS    PHENYLephrine (NEOSYNEPHRINE) 30,000 mcg in 0.9% sodium chloride 250 mL infusion   mcg/min IntraVENous TITRATE    albuterol-ipratropium (DUO-NEB) 2.5 MG-0.5 MG/3 ML  3 mL Nebulization Q6H RT    levoFLOXacin (LEVAQUIN) 750 mg in D5W IVPB  750 mg IntraVENous Q24H    NOREPINephrine (LEVOPHED) 8,000 mcg in dextrose 5% 250 mL infusion  0-20 mcg/min IntraVENous TITRATE    insulin lispro (HUMALOG) injection   SubCUTAneous Q6H    piperacillin-tazobactam (ZOSYN) 4.5 g in 0.9% sodium chloride (MBP/ADV) 100 mL MBP  4.5 g IntraVENous Q6H    vasopressin (VASOSTRICT) 100 Units in 0.9% sodium chloride 100 mL infusion  0.01-0.04 Units/min IntraVENous TITRATE    propofol (DIPRIVAN) infusion  0-50 mcg/kg/min IntraVENous TITRATE    heparin (porcine) injection 5,000 Units  5,000 Units SubCUTAneous Q8H    pantoprazole (PROTONIX) 40 mg in sodium chloride 0.9 % 10 mL injection  40 mg IntraVENous DAILY    vancomycin (VANCOCIN) 2,000 mg in 0.9% sodium chloride 500 mL IVPB  2,000 mg IntraVENous Q18H    VANCOMYCIN INFORMATION NOTE   Other ONCE       Lines: All central lines examined by me. No signs of erythema, induration, discharge.       Central Venous Catheter:  Triple Lumen right ij cvl 02/10/17 Right Neck (Active)   Central Line Being Utilized Yes 2/10/2017  7:13 PM   Site Assessment Clean, dry, & intact 2/10/2017  7:13 PM   Dressing Status Clean, dry, & intact 2/10/2017  7:13 PM   Proximal Hub Color/Line Status White 2/10/2017  7:13 PM   Positive Blood Return (Medial Site) Yes 2/10/2017  7:13 PM   Medial Hub Color/Line Status Brown 2/10/2017  7:13 PM   Positive Blood Return (Lateral Site) Yes 2/10/2017  7:13 PM   Distal Hub Color/Line Status Blue 2/10/2017  7:13 PM   Positive Blood Return (Site #3) Yes 2/10/2017  7:13 PM          Telemetry:normal sinus rhythm    Objective:   Vital Signs:    Visit Vitals    /49    Pulse (!) 113    Temp 98.8 °F (37.1 °C)    Resp (!) 34    Ht 5' 5\" (1.651 m)    Wt (!) 193.2 kg (425 lb 14.9 oz)    SpO2 100%    BMI 70.88 kg/m2       O2 Device: Ventilator       Temp (24hrs), Av °F (37.8 °C), Min:98.8 °F (37.1 °C), Max:101.1 °F (38.4 °C)       Intake/Output:   Last shift:       07 -  190  In: 714.2 [I.V.:304.2]  Out: -     Last 3 shifts:  190 -  0700  In: 90086.6 [I.V.:9594.6]  Out: 5050 [Urine:5000]      Intake/Output Summary (Last 24 hours) at 17 1328  Last data filed at 17 1200   Gross per 24 hour   Intake          6561.79 ml   Output             2685 ml   Net          3876.79 ml       Last 3 Recorded Weights in this Encounter    17 1223 17 0100 17 0200   Weight: (!) 189.3 kg (417 lb 5.3 oz) (!) 189.5 kg (417 lb 11.2 oz) (!) 193.2 kg (425 lb 14.9 oz)       Ventilator Settings:  Mode Rate Tidal Volume Pressure FiO2 PEEP   (P) Assist control, VC+   (P) 450 ml    30 % (P) 5 cm H20     Peak airway pressure: (P) 19 cm H2O    Plateau pressure:     Tidal volume:    Minute ventilation: (P) 15.7 l/min   SPO2      ARDS network Guidelines: Lung protective strategy and Plateau pressure goals less than or equal to 30      Physical Exam:     General/Neurology: Intubated, sedated on vent  Head:   Normocephalic  Eye:   No scleral icterus  Nose/Throat:  ETT in place  Neck:   Supple, R IJ  Lung:   Adequate air entry bilateral equal, no rales, no rhonchi, no wheezing.    Heart:   Tachy  Abdomen:  Soft, morbidly obese  :  Juarez in place, clear dark urine  Extremities:  4+BLE edema, ulceration BLE, significant chronic changes  Skin : tunneling sacral and gluteal area, muscle and bone exposed-see photos    Data:       Recent Results (from the past 24 hour(s))   GLUCOSE, POC    Collection Time: 02/12/17  4:47 PM   Result Value Ref Range    Glucose (POC) 125 (H) 70 - 110 mg/dL   HGB & HCT    Collection Time: 02/12/17  8:40 PM   Result Value Ref Range    HGB 7.2 (L) 12.0 - 16.0 g/dL    HCT 21.3 (L) 35.0 - 45.0 %   GLUCOSE, POC    Collection Time: 02/12/17 11:27 PM   Result Value Ref Range    Glucose (POC) 71 70 - 110 mg/dL   CBC WITH AUTOMATED DIFF    Collection Time: 02/13/17  3:30 AM   Result Value Ref Range    WBC 16.9 (H) 4.6 - 13.2 K/uL    RBC 2.76 (L) 4.20 - 5.30 M/uL    HGB 7.7 (L) 12.0 - 16.0 g/dL    HCT 23.0 (L) 35.0 - 45.0 %    MCV 83.3 74.0 - 97.0 FL    MCH 27.9 24.0 - 34.0 PG    MCHC 33.5 31.0 - 37.0 g/dL    RDW 14.6 (H) 11.6 - 14.5 %    PLATELET 632 981 - 682 K/uL    MPV 9.1 (L) 9.2 - 11.8 FL    NEUTROPHILS 77 (H) 40 - 73 %    LYMPHOCYTES 13 (L) 21 - 52 %    MONOCYTES 7 3 - 10 %    EOSINOPHILS 3 0 - 5 %    BASOPHILS 0 0 - 2 %    ABS. NEUTROPHILS 13.1 (H) 1.8 - 8.0 K/UL    ABS. LYMPHOCYTES 2.2 0.9 - 3.6 K/UL    ABS. MONOCYTES 1.2 0.05 - 1.2 K/UL    ABS. EOSINOPHILS 0.5 (H) 0.0 - 0.4 K/UL    ABS. BASOPHILS 0.0 0.0 - 0.06 K/UL    DF AUTOMATED     METABOLIC PANEL, COMPREHENSIVE    Collection Time: 02/13/17  3:30 AM   Result Value Ref Range    Sodium 151 (H) 136 - 145 mmol/L    Potassium 3.9 3.5 - 5.5 mmol/L    Chloride 122 (H) 100 - 108 mmol/L    CO2 19 (L) 21 - 32 mmol/L    Anion gap 10 3.0 - 18 mmol/L    Glucose 122 (H) 74 - 99 mg/dL    BUN 42 (H) 7.0 - 18 MG/DL    Creatinine 0.78 0.6 - 1.3 MG/DL    BUN/Creatinine ratio 51 (H) 12 - 20      GFR est AA >60 >60 ml/min/1.73m2    GFR est non-AA >60 >60 ml/min/1.73m2    Calcium 7.9 (L) 8.5 - 10.1 MG/DL    Bilirubin, total 1.0 0.2 - 1.0 MG/DL    ALT (SGPT) 17 13 - 56 U/L    AST (SGOT) 32 15 - 37 U/L    Alk.  phosphatase 80 45 - 117 U/L Protein, total 5.2 (L) 6.4 - 8.2 g/dL    Albumin 2.1 (L) 3.4 - 5.0 g/dL    Globulin 3.1 2.0 - 4.0 g/dL    A-G Ratio 0.7 (L) 0.8 - 1.7     GLUCOSE, POC    Collection Time: 02/13/17  5:15 AM   Result Value Ref Range    Glucose (POC) 128 (H) 70 - 110 mg/dL   POC G3    Collection Time: 02/13/17  8:40 AM   Result Value Ref Range    Device: VENT      FIO2 (POC) 30 %    pH (POC) 7.315 (L) 7.35 - 7.45      pCO2 (POC) 32.5 (L) 35.0 - 45.0 MMHG    pO2 (POC) 137 (H) 80 - 100 MMHG    HCO3 (POC) 16.5 (L) 22 - 26 MMOL/L    sO2 (POC) 99 (H) 92 - 97 %    Base deficit (POC) 10 mmol/L    Mode ASSIST CONTROL      Tidal volume 450 ml    Set Rate 10 bpm    PEEP/CPAP (POC) 5 cmH2O    PIP (POC) 17      Allens test (POC) N/A      Inspiratory Time 1 sec    Total resp.  rate 28      Site LEFT RADIAL      Patient temp. 98.8      Specimen type (POC) ARTERIAL      Performed by Romayne Gales     Volume control plus YES     GLUCOSE, POC    Collection Time: 02/13/17 11:22 AM   Result Value Ref Range    Glucose (POC) 117 (H) 70 - 110 mg/dL         Chemistry Recent Labs      02/13/17   0330  02/12/17   0325  02/11/17   0425  02/10/17   1740   GLU  122*  106*  116*  138*   NA  151*  147*  148*  143   K  3.9  4.1  4.9  6.1*   CL  122*  117*  119*  115*   CO2  19*  15*  13*  13*   BUN  42*  79*  86*  92*   CREA  0.78  1.33*  1.29  1.57*   CA  7.9*  8.1*  8.0*  8.8   MG   --    --   2.7*  3.3*   PHOS   --    --   6.8*   --    AGAP  10  15  16  15   BUCR  51*  59*  67*  59*   AP  80  85  114  141*   TP  5.2*  5.7*  5.8*  6.7   ALB  2.1*  2.3*  1.4*  1.6*   GLOB  3.1  3.4  4.4*  5.1*   AGRAT  0.7*  0.7*  0.3*  0.3*        Lactic Acid Lactic acid   Date Value Ref Range Status   02/11/2017 1.6 0.4 - 2.0 MMOL/L Final     Recent Labs      02/11/17   2030  02/11/17   0425   LAC  1.6  3.5*        Liver Enzymes Protein, total   Date Value Ref Range Status   02/13/2017 5.2 (L) 6.4 - 8.2 g/dL Final     Albumin   Date Value Ref Range Status   02/13/2017 2.1 (L) 3.4 - 5.0 g/dL Final     Globulin   Date Value Ref Range Status   02/13/2017 3.1 2.0 - 4.0 g/dL Final     A-G Ratio   Date Value Ref Range Status   02/13/2017 0.7 (L) 0.8 - 1.7   Final     AST (SGOT)   Date Value Ref Range Status   02/13/2017 32 15 - 37 U/L Final     Alk. phosphatase   Date Value Ref Range Status   02/13/2017 80 45 - 117 U/L Final     Recent Labs      02/13/17   0330  02/12/17   0325  02/11/17   0425   TP  5.2*  5.7*  5.8*   ALB  2.1*  2.3*  1.4*   GLOB  3.1  3.4  4.4*   AGRAT  0.7*  0.7*  0.3*   SGOT  32  31  42*   AP  80  85  114        CBC w/Diff Recent Labs      02/13/17   0330  02/12/17   2040  02/12/17   0930  02/12/17   0700  02/12/17   0325   02/10/17   1740   WBC  16.9*   --    --   21.5*  25.3*   < >  28.7*   RBC  2.76*   --    --   2.34*  2.67*   < >  3.89*   HGB  7.7*  7.2*  6.7*  6.6*  7.5*   < >  11.1*   HCT  23.0*  21.3*  20.0*  19.6*  22.1*   < >  31.2*   PLT  166   --    --   184  231   < >  397   GRANS  77*   --    --    --    --    --   87*   LYMPH  13*   --    --    --    --    --   5*   EOS  3   --    --    --    --    --   0    < > = values in this interval not displayed.         Cardiac Enzymes No results found for: CPK, CKMMB, CKMB, RCK3, CKMBT, CKNDX, CKND1, ISAIAS, TROPT, TROIQ, ERNST, TROPT, TNIPOC, BNP, BNPP     BNP No results found for: BNP, BNPP, XBNPT     Coagulation Recent Labs      02/11/17   1116  02/10/17   1740   PTP  16.6*  15.7*   INR  1.4*  1.3*         Thyroid  Lab Results   Component Value Date/Time    TSH 2.41 02/10/2017 05:40 PM       No results found for: T4     Lipid Panel Lab Results   Component Value Date/Time    Cholesterol, total 167 05/05/2010 08:45 AM    HDL Cholesterol 65 05/05/2010 08:45 AM    LDL, calculated 88.4 05/05/2010 08:45 AM    VLDL, calculated 13.6 05/05/2010 08:45 AM    Triglyceride 68 05/05/2010 08:45 AM    CHOL/HDL Ratio 2.6 05/05/2010 08:45 AM        ABG Recent Labs      02/13/17   0840  02/12/17   0856  02/11/17   1123   PHI  7.315*  7.333* 7.392   PCO2I  32.5*  31.7*  26.0*   PO2I  137*  192*  321*   HCO3I  16.5*  16.9*  15.7*   FIO2I  30  45  60        Urinalysis Lab Results   Component Value Date/Time    Color DARK YELLOW 02/10/2017 06:15 PM    Appearance CLOUDY 02/10/2017 06:15 PM    Specific gravity 1.018 02/10/2017 06:15 PM    pH (UA) 5.0 02/10/2017 06:15 PM    Protein NEGATIVE  02/10/2017 06:15 PM    Glucose NEGATIVE  02/10/2017 06:15 PM    Ketone TRACE 02/10/2017 06:15 PM    Bilirubin SMALL 02/10/2017 06:15 PM    Urobilinogen 1.0 02/10/2017 06:15 PM    Nitrites NEGATIVE  02/10/2017 06:15 PM    Leukocyte Esterase TRACE 02/10/2017 06:15 PM    Epithelial cells 1+ 02/10/2017 06:15 PM    Bacteria 1+ 02/10/2017 06:15 PM    WBC 4 to 10 02/10/2017 06:15 PM    RBC NONE 02/10/2017 06:15 PM        Micro  Recent Labs      02/12/17   1025  02/11/17   1256  02/10/17   1815   CULT  FEW  NORMAL RESPIRATORY KALEE    FEW  STREPTOCOCCI,NON HEMOLYTIC  *  FEW  YEAST  *  >100,000  COLONIES/mL  YEAST  *     Recent Labs      02/12/17   1025  02/11/17   1256  02/10/17   1815  02/10/17   1745  02/10/17   1730   CULT  FEW  NORMAL RESPIRATORY KALEE    FEW  STREPTOCOCCI,NON HEMOLYTIC  *  FEW  YEAST  *  >100,000  COLONIES/mL  YEAST  *  NO GROWTH 3 DAYS  NO GROWTH 3 DAYS        EKG  No results found for this or any previous visit. PFT  No flowsheet data found. Other  ASA reactivity:   Pre-albumin:   Ionized Calcium:   NH4:   T3, FT4:  Cortisol:  Urine Osm:  Urine Lytes:   HbA1c:      Ultrasound       LE Doppler       ECHO  2/10/17  Left ventricle: Systolic function was normal. Ejection fraction was  estimated in the range of 55 % to 60 %. No obvious wall motion  abnormalities identified in the views obtained. There was mild concentric  hypertrophy. Mitral valve: There was no evidence for stenosis. There was no significant  regurgitation. Aortic valve: The valve was probably trileaflet. Leaflets exhibited mild  calcification and sclerosis. Not well visualized. Valve mean gradient was  8 mmHg. Tricuspid valve: There was no evidence for tricuspid stenosis. There was  trivial regurgitation. CT (Most Recent)  No results found for this or any previous visit. XR (Most Recent). CXR  reviewed by me and compared with previous CXR   Results from Hospital Encounter encounter on 02/10/17   XR CHEST PORT   Narrative EXAM: Portable chest x-ray    INDICATION: Respiratory failure    COMPARISON: 2/10/2017    TECHNIQUE: Single AP view of the chest was obtained.    _______________    FINDINGS:    Endotracheal tube tip is 5 cm above the kimberly. Enteric sump tube courses beyond  the limits of the film in the left upper quadrant. Right jugular central venous  catheter is in stable position. The lungs are hypoinflated, particularly when coupled with AP technique, but  clear of focal infiltrate. There is no pneumothorax or pleural effusion. Heart  size is within normal limits. There is no significant vascular congestion. There  is no acute osseous abnormality. _______________         Impression IMPRESSION:  1. Medical support devices in satisfactory position. 2. Low lung volumes without focal consolidative process. Best practice : All below core measures reviewed and addressed:   [x] Antibiotic choice. [x] Severe Sepsis bundles follwed; SIRS screen met? Yes   [x] Fluids. [x] Lactic acid ordered- initial and repeat Q6hrs if elevated till normalized. [x] Cultures drawn. [x] Antibiotic administered within 1hr-ICU and 3hrs ED. [x] Large bore IV- 2 sites        R IJ  [x] Pressors aim MAP >65mmHg. [x] Steroids. [x] Glycemic control. [x] Infection control. [x] Mech. Ventilated patients- aim to keep peak plateau pressure 59-28HD H2O. [x] HOB at >= 30 degree. [x] Stress ulcer prophylaxis. [x] DVT prophylaxis. [x] Central Line Bundle Followed. [x] Juarez Bundle Followed. [x] Ventilator Bundle Followed.  (Daily sedation holiday to assess readiness for weaning from ventilator & SBT trial starting at 6.00 am, HOB 30-degree elevation, Chlorhexidine mouth washes & Routine oral care every 4 hours, Parmer tube to suction at 20-30 cm H2O, Maintain Parmer tube with 5-10ml air every 4 hours, DVT prophylaxis, GI prophylaxis etc.).     The patient is: [] acutely ill Risk of deterioration: [] moderate    [x] critically ill  [x] high     [x]See my orders for details  [x] Case & management strategies discussed today on multidisciplinary rounds    High complexity decision making was performed during the evaluation of this patient at high risk for decompensation with multiple organ involvement      [place], [place]  2/13/2017

## 2017-02-13 NOTE — PROGRESS NOTES
Patient in bed on back with head elevated - BBS equal and diminished - ETT secure at 24 at the lips and moved to the left side of the mouth - patient suctioned and ISHAN tube cleared - MVb at [facility] - patient suctioned and ISHAN tube cleared - ETT moved to the right side of the mouth    0502 - patient suctioned and ISHAN tube cleared - ETT moved to the center of the mouth

## 2017-02-13 NOTE — PROGRESS NOTES
Bedside and Verbal shift change report given to Colleen Mueller (oncoming nurse) by Marsha Vallecillo RN (offgoing nurse). Report included the following information SBAR, Kardex, ED Summary, Procedure Summary, Intake/Output, MAR, Accordion, Recent Results, Med Rec Status and Cardiac Rhythm sinus/sinus tach   Pt unable to go to Ct Scan machine still down.

## 2017-02-13 NOTE — INTERDISCIPLINARY ROUNDS
IP consult to Colorectal surgery: Severe trauma like injury to gluteal & rectal regions. Likely needs colostomy   *IF* care is to be continued. Palliative Care input and guardianship have seen   Sought.  please call for questions; see photos in Wound Care Note 2/13/2017 - Bridget Sommers [phone]

## 2017-02-13 NOTE — PALLIATIVE CARE
According to chart, pt does not appear able to care for self at home. Also, there doesn't seem to be anyone at home who can function as caregiver.

## 2017-02-13 NOTE — PROGRESS NOTES
Spoke with pts  Son Vineet Driscoll , he does not appear  That he could make deccisions for pt, ex  and bowens father present, Norma Paez, he states pt has a good friend Ana Sorensen who  Would know if has med poa and will. He will give Ana Sorensen my number to let me know if this is true. Jose Drew and Kris Murillo do not know of any  Other family.

## 2017-02-13 NOTE — PROGRESS NOTES
Chart entered for review as Physician Advisor to Care Management. I read Zoran Hawkins note from today 2/13/17 which documents conversation with patient's ex-, in which he mentions a friend of the patient, \"Angelic\", who may have info re Med POA /Will. I wonder if this may be the visitor described in a previous nursing note:    Per Note on 2/11/17 by Valdez Bravo RN:  16:00 Friend came into see pt Rina Hoyos [phone].

## 2017-02-13 NOTE — ROUTINE PROCESS
1900:  Rec'd Eitan Mica @ 1900 from NÉSTOR Sadler RN. SBAR reviewed with two-nurse check-offs done of meds, dressings, wounds. Tonight:  Temp higher. HR to 140's. Residuals higher. Continues to need pressors. 0010:  Trying to decrease pressors:  BP dropped when decreasing ; staying > goal with small decrease in norepi. 0245:  Dsg changes completed:  Most of the abraded area bleeding more freely. Buttocks wound with blackened gauze removed. Replaced with NS kerlix for w->d; unable to find note from [address] 0600:  Trying to decrease propofol:  No increase in activity. Pressor needs also tapering fairly rapidly. No discernable stool unless mixed with gauze pulled from wound. Verbal Patient-side shift change report given to MAMTA Handy RN, (oncoming nurse) by Wilberto Baez RN  (offgoing nurse). Report included the following information SBAR, Kardex, ED Summary, Procedure Summary, Intake/Output, MAR, Recent Results and Med Rec Status. Included:  Intro, hx, two-RN eval of relevant assessment findings, LDAs, skin, diagnostics and infusions.

## 2017-02-13 NOTE — PROGRESS NOTES
[facility]  Hospitalist Division    Daily progress Note    Patient: Victor Hugo Paige MRN: [id number]  CSN: [id number]    YOB: 1955  Age: 64 y.o. Sex: female    DOA: 2/10/2017 LOS:  LOS: 3 days                    Subjective:     CC: Sepsis   Intubated and sedated     Objective:      Visit Vitals    /64    Pulse (!) 108    Temp 98.8 °F (37.1 °C)    Resp (!) 31    Ht 5' 5\" (1.651 m)    Wt (!) 193.2 kg (425 lb 14.9 oz)    SpO2 100%    Breastfeeding No    BMI 70.88 kg/m2       Physical Exam:    NC/AT  NO JVD,TMG  S1/S2 RRR  CTAB, NO WHEEZING  NT,ND, NABS  +  EDEMA  See wound care note for decub ulcer         Intake and Output:  Current Shift:  02/13 0701 - 02/13 1900  In: 714.2 [I.V.:304.2]  Out: 810 [Urine:810]  Last three shifts:  02/11 1901 - 02/13 0700  In: 70839.6 [I.V.:9594.6]  Out: 5050 [Urine:5000]    Recent Results (from the past 24 hour(s))   HGB & HCT    Collection Time: 02/12/17  8:40 PM   Result Value Ref Range    HGB 7.2 (L) 12.0 - 16.0 g/dL    HCT 21.3 (L) 35.0 - 45.0 %   GLUCOSE, POC    Collection Time: 02/12/17 11:27 PM   Result Value Ref Range    Glucose (POC) 71 70 - 110 mg/dL   CBC WITH AUTOMATED DIFF    Collection Time: 02/13/17  3:30 AM   Result Value Ref Range    WBC 16.9 (H) 4.6 - 13.2 K/uL    RBC 2.76 (L) 4.20 - 5.30 M/uL    HGB 7.7 (L) 12.0 - 16.0 g/dL    HCT 23.0 (L) 35.0 - 45.0 %    MCV 83.3 74.0 - 97.0 FL    MCH 27.9 24.0 - 34.0 PG    MCHC 33.5 31.0 - 37.0 g/dL    RDW 14.6 (H) 11.6 - 14.5 %    PLATELET 526 706 - 720 K/uL    MPV 9.1 (L) 9.2 - 11.8 FL    NEUTROPHILS 77 (H) 40 - 73 %    LYMPHOCYTES 13 (L) 21 - 52 %    MONOCYTES 7 3 - 10 %    EOSINOPHILS 3 0 - 5 %    BASOPHILS 0 0 - 2 %    ABS. NEUTROPHILS 13.1 (H) 1.8 - 8.0 K/UL    ABS. LYMPHOCYTES 2.2 0.9 - 3.6 K/UL    ABS. MONOCYTES 1.2 0.05 - 1.2 K/UL    ABS. EOSINOPHILS 0.5 (H) 0.0 - 0.4 K/UL    ABS.  BASOPHILS 0.0 0.0 - 0.06 K/UL    DF AUTOMATED     METABOLIC PANEL, COMPREHENSIVE    Collection Time: 02/13/17  3:30 AM   Result Value Ref Range    Sodium 151 (H) 136 - 145 mmol/L    Potassium 3.9 3.5 - 5.5 mmol/L    Chloride 122 (H) 100 - 108 mmol/L    CO2 19 (L) 21 - 32 mmol/L    Anion gap 10 3.0 - 18 mmol/L    Glucose 122 (H) 74 - 99 mg/dL    BUN 42 (H) 7.0 - 18 MG/DL    Creatinine 0.78 0.6 - 1.3 MG/DL    BUN/Creatinine ratio 51 (H) 12 - 20      GFR est AA >60 >60 ml/min/1.73m2    GFR est non-AA >60 >60 ml/min/1.73m2    Calcium 7.9 (L) 8.5 - 10.1 MG/DL    Bilirubin, total 1.0 0.2 - 1.0 MG/DL    ALT (SGPT) 17 13 - 56 U/L    AST (SGOT) 32 15 - 37 U/L    Alk. phosphatase 80 45 - 117 U/L    Protein, total 5.2 (L) 6.4 - 8.2 g/dL    Albumin 2.1 (L) 3.4 - 5.0 g/dL    Globulin 3.1 2.0 - 4.0 g/dL    A-G Ratio 0.7 (L) 0.8 - 1.7     GLUCOSE, POC    Collection Time: 02/13/17  5:15 AM   Result Value Ref Range    Glucose (POC) 128 (H) 70 - 110 mg/dL   POC G3    Collection Time: 02/13/17  8:40 AM   Result Value Ref Range    Device: VENT      FIO2 (POC) 30 %    pH (POC) 7.315 (L) 7.35 - 7.45      pCO2 (POC) 32.5 (L) 35.0 - 45.0 MMHG    pO2 (POC) 137 (H) 80 - 100 MMHG    HCO3 (POC) 16.5 (L) 22 - 26 MMOL/L    sO2 (POC) 99 (H) 92 - 97 %    Base deficit (POC) 10 mmol/L    Mode ASSIST CONTROL      Tidal volume 450 ml    Set Rate 10 bpm    PEEP/CPAP (POC) 5 cmH2O    PIP (POC) 17      Allens test (POC) N/A      Inspiratory Time 1 sec    Total resp.  rate 28      Site LEFT RADIAL      Patient temp. 98.8      Specimen type (POC) ARTERIAL      Performed by Nicole Aly     Volume control plus YES     GLUCOSE, POC    Collection Time: 02/13/17 11:22 AM   Result Value Ref Range    Glucose (POC) 117 (H) 70 - 110 mg/dL   GLUCOSE, POC    Collection Time: 02/13/17  5:08 PM   Result Value Ref Range    Glucose (POC) 149 (H) 70 - 110 mg/dL         Current Facility-Administered Medications:     dextrose 5% lactated ringers infusion, 100 mL/hr, IntraVENous, CONTINUOUS, LIZETT Nair, Last Rate: 100 mL/hr at 02/13/17 1117, 100 mL/hr at 02/13/17 1117    0.9% sodium chloride infusion 250 mL, 250 mL, IntraVENous, PRN, LIZETT Browning    albuterol-ipratropium (DUO-NEB) 2.5 MG-0.5 MG/3 ML, 3 mL, Nebulization, Q6H RT, LIZETT Browning, 3 mL at 02/13/17 1528    sodium chloride (NS) flush 5-10 mL, 5-10 mL, IntraVENous, PRN, Naz De Jesus DO    levoFLOXacin (LEVAQUIN) 750 mg in D5W IVPB, 750 mg, IntraVENous, Q24H, Daniel Archibald DO, Last Rate: 100 mL/hr at 02/12/17 1731, 750 mg at 02/12/17 1731    NOREPINephrine (LEVOPHED) 8,000 mcg in dextrose 5% 250 mL infusion, 0-20 mcg/min, IntraVENous, TITRATE, Mason Rand MD, Stopped at 02/13/17 1119    insulin lispro (HUMALOG) injection, , SubCUTAneous, Q6H, Mason Rand MD, Stopped at 02/10/17 1939    glucose chewable tablet 16 g, 4 Tab, Oral, PRN, Mason Rand MD    glucagon [facility]) injection 1 mg, 1 mg, IntraMUSCular, PRN, Mason Rand MD    dextrose (D50W) injection syrg 12.5-25 g, 25-50 mL, IntraVENous, PRN, Mason Rand MD    piperacillin-tazobactam (ZOSYN) 4.5 g in 0.9% sodium chloride (MBP/ADV) 100 mL MBP, 4.5 g, IntraVENous, Q6H, Daniel Archibald DO, Last Rate: 25 mL/hr at 02/13/17 1703, 4.5 g at 02/13/17 1703    vasopressin (VASOSTRICT) 100 Units in 0.9% sodium chloride 100 mL infusion, 0.01-0.04 Units/min, IntraVENous, TITRATE, Mason Rand MD, Last Rate: 2.4 mL/hr at 02/13/17 0350, 0.04 Units/min at 02/13/17 0350    propofol (DIPRIVAN) infusion, 0-50 mcg/kg/min, IntraVENous, TITRATE, Mason Rand MD, Last Rate: 31 mL/hr at 02/13/17 1702, 25 mcg/kg/min at 02/13/17 1702    midazolam (VERSED) injection 2 mg, 2 mg, IntraVENous, Q1H PRN, Mason Rand MD, 2 mg at 02/11/17 1815    heparin (porcine) injection 5,000 Units, 5,000 Units, SubCUTAneous, Q8H, Mason Rand MD, 5,000 Units at 02/13/17 1153    pantoprazole (PROTONIX) 40 mg in sodium chloride 0.9 % 10 mL injection, 40 mg, IntraVENous, DAILY, Kelly Doan MD, 40 mg at 02/13/17 9979    vancomycin (VANCOCIN) 2,000 mg in 0.9% sodium chloride 500 mL IVPB, 2,000 mg, IntraVENous, Q18H, Early Rias, DO, Last Rate: 250 mL/hr at 02/13/17 0441, 2,000 mg at 02/13/17 0441    VANCOMYCIN INFORMATION NOTE, , Other, ONCE, Early Rias, DO    Lab Results   Component Value Date/Time    Glucose 122 02/13/2017 03:30 AM    Glucose 106 02/12/2017 03:25 AM    Glucose 116 02/11/2017 04:25 AM    Glucose 138 02/10/2017 05:40 PM    Glucose 75 01/22/2017 04:00 AM        Assessment/Plan     Active Problems:    Septic shock ([facility] [address] (2/10/2017)      Acute respiratory failure ([facility] [address] (2/13/2017)      Debility (2/13/2017)      Septic shock, cont pressors and abx  Acute hypoxic resp failure 2/2 above  JOAQUIN on CKD 2/2 sepsis, reolved  DM  Morbid obesity  HTN  Fever, resolving   Anemia, stable post transfusion   Hyperkalemia, resolved   Hypernatremia   Multiple ulcer including B/L heels, large sacral ulcer    Overall prognosis poor  Wean pressors as carloz  Cont abx, f/u cx           Caridad Curling, MD  2/13/2017, 5:13 PM

## 2017-02-13 NOTE — WOUND CARE
General Progress Note    Patient: Francie Bravo MRN: [id number] LOS: 3     YOB: 1955  Age: 64 y.o. Sex: female        Admit Date: 2/10/2017      Subjective:   Patient has no complaint of pain with wound care as she is sedated and intubated. No signs of distress during assessment and care. Harriet Schafer (patient's current nurse), Alison Lazo RN (wound care), Gus Son RN (wound care) at bedside to assist with assessment. Report to Alexandra Phoenix RN. Orders received, Dr. Steve Sifuentes.     Objective:     Vitals  Patient Vitals for the past 8 hrs:   BP Pulse Resp SpO2   02/13/17 1532 - (!) 106 (!) 31 100 %   02/13/17 1529 - - - 100 %   02/13/17 1200 116/49 (!) 113 (!) 34 100 %   02/13/17 1146 - (!) 112 (!) 33 100 %   02/13/17 1130 115/53 (!) 114 (!) 33 100 %   02/13/17 1100 (!) 160/118 (!) 110 (!) 32 100 %   02/13/17 1030 173/69 (!) 112 (!) 31 100 %   02/13/17 1000 152/48 (!) 102 29 100 %   02/13/17 0930 163/62 (!) 112 (!) 33 100 %   02/13/17 0900 160/53 (!) 103 (!) 31 100 %   02/13/17 0842 - (!) 105 (!) 31 100 %   02/13/17 0830 145/41 91 26 100 %       I/O Current Shift  02/13 0701 - 02/13 1900  In: 714.2 [I.V.:304.2]  Out: -     I/O Last Three Shifts  02/11 1901 - 02/13 0700  In: 00511.6 [I.V.:9594.6]  Out: 5050 [Urine:5000]      Data Review  Recent Results (from the past 24 hour(s))   GLUCOSE, POC    Collection Time: 02/12/17  4:47 PM   Result Value Ref Range    Glucose (POC) 125 (H) 70 - 110 mg/dL   HGB & HCT    Collection Time: 02/12/17  8:40 PM   Result Value Ref Range    HGB 7.2 (L) 12.0 - 16.0 g/dL    HCT 21.3 (L) 35.0 - 45.0 %   GLUCOSE, POC    Collection Time: 02/12/17 11:27 PM   Result Value Ref Range    Glucose (POC) 71 70 - 110 mg/dL   CBC WITH AUTOMATED DIFF    Collection Time: 02/13/17  3:30 AM   Result Value Ref Range    WBC 16.9 (H) 4.6 - 13.2 K/uL    RBC 2.76 (L) 4.20 - 5.30 M/uL    HGB 7.7 (L) 12.0 - 16.0 g/dL    HCT 23.0 (L) 35.0 - 45.0 %    MCV 83.3 74.0 - 97.0 FL    MCH 27.9 24.0 - 34.0 PG MCHC 33.5 31.0 - 37.0 g/dL    RDW 14.6 (H) 11.6 - 14.5 %    PLATELET 978 980 - 993 K/uL    MPV 9.1 (L) 9.2 - 11.8 FL    NEUTROPHILS 77 (H) 40 - 73 %    LYMPHOCYTES 13 (L) 21 - 52 %    MONOCYTES 7 3 - 10 %    EOSINOPHILS 3 0 - 5 %    BASOPHILS 0 0 - 2 %    ABS. NEUTROPHILS 13.1 (H) 1.8 - 8.0 K/UL    ABS. LYMPHOCYTES 2.2 0.9 - 3.6 K/UL    ABS. MONOCYTES 1.2 0.05 - 1.2 K/UL    ABS. EOSINOPHILS 0.5 (H) 0.0 - 0.4 K/UL    ABS. BASOPHILS 0.0 0.0 - 0.06 K/UL    DF AUTOMATED     METABOLIC PANEL, COMPREHENSIVE    Collection Time: 02/13/17  3:30 AM   Result Value Ref Range    Sodium 151 (H) 136 - 145 mmol/L    Potassium 3.9 3.5 - 5.5 mmol/L    Chloride 122 (H) 100 - 108 mmol/L    CO2 19 (L) 21 - 32 mmol/L    Anion gap 10 3.0 - 18 mmol/L    Glucose 122 (H) 74 - 99 mg/dL    BUN 42 (H) 7.0 - 18 MG/DL    Creatinine 0.78 0.6 - 1.3 MG/DL    BUN/Creatinine ratio 51 (H) 12 - 20      GFR est AA >60 >60 ml/min/1.73m2    GFR est non-AA >60 >60 ml/min/1.73m2    Calcium 7.9 (L) 8.5 - 10.1 MG/DL    Bilirubin, total 1.0 0.2 - 1.0 MG/DL    ALT (SGPT) 17 13 - 56 U/L    AST (SGOT) 32 15 - 37 U/L    Alk. phosphatase 80 45 - 117 U/L    Protein, total 5.2 (L) 6.4 - 8.2 g/dL    Albumin 2.1 (L) 3.4 - 5.0 g/dL    Globulin 3.1 2.0 - 4.0 g/dL    A-G Ratio 0.7 (L) 0.8 - 1.7     GLUCOSE, POC    Collection Time: 02/13/17  5:15 AM   Result Value Ref Range    Glucose (POC) 128 (H) 70 - 110 mg/dL   POC G3    Collection Time: 02/13/17  8:40 AM   Result Value Ref Range    Device: VENT      FIO2 (POC) 30 %    pH (POC) 7.315 (L) 7.35 - 7.45      pCO2 (POC) 32.5 (L) 35.0 - 45.0 MMHG    pO2 (POC) 137 (H) 80 - 100 MMHG    HCO3 (POC) 16.5 (L) 22 - 26 MMOL/L    sO2 (POC) 99 (H) 92 - 97 %    Base deficit (POC) 10 mmol/L    Mode ASSIST CONTROL      Tidal volume 450 ml    Set Rate 10 bpm    PEEP/CPAP (POC) 5 cmH2O    PIP (POC) 17      Allens test (POC) N/A      Inspiratory Time 1 sec    Total resp.  rate 28      Site LEFT RADIAL      Patient temp. 98.8      Specimen type (POC) ARTERIAL      Performed by Todd Wadsworth     Volume control plus YES     GLUCOSE, POC    Collection Time: 02/13/17 11:22 AM   Result Value Ref Range    Glucose (POC) 117 (H) 70 - 110 mg/dL             Assessment:     Wound Presentation:                                                                                 02/13/17 1307   Wound Arm Left;Upper; Lower   Date First Assessed/Time First Assessed: 02/11/17 0230   Wound Type: Abrasion  Location: Arm  Orientation: Left;Upper; Lower   DRESSING STATUS Removed   DRESSING TYPE Other (Comment)  (Mepilex border.)   Non-Pressure Ulcer Partial thickness (epider/derm)   Wound Length (cm) 30 cm   Wound Width (cm) 3.5 cm   Wound Depth (cm) 0.1   Wound Surface area (cm^3) 10.5 cm^2   Condition of Base Pink   Condition of Edges Open   Tissue Type Pink   Tissue Type Percent Pink 100   Drainage Amount  Moderate   Drainage Color Serosanguinous   Wound Odor Mild   Cleansing and Cleansing Agents  Other (comment)  (Barrier wipe)   Dressing Type Applied Other (Comment)  (Mepilex border.)   Procedure Tolerated Well   Wound Thigh Left;Medial;Upper;Posterior   Date First Assessed/Time First Assessed: 02/11/17 0230   POA: Yes  Wound Type: Pressure ulcer;Moisture Assoc.  Skin Damage  Location: Thigh  Orientation: Left;Medial;Upper;Posterior   DRESSING STATUS Removed   DRESSING TYPE Other (Comment)  (ABD pads and Kerlix. )   Pressure Ulcer DTI  (Noted several scattered pressure areas DTPI's and Stage II. )   Condition of Base Pink;Slough   Condition of Edges Open;Closed  (The DTPI's are closed and Stage II pressure injuries open.)   Tissue Type Percent Maroon/Purple 100 %  (Scattered DTPI's in this area.)   Tissue Type Percent Pink 50  (Several scattered stage II pressure injuries.)   Tissue Type Percent Yellow 50  (Several scattered stage II pressure injuries. )   Drainage Amount  Moderate  (No drainage noted from the DTPI's. )   Drainage Color Sanguinous   Wound Odor Foul Periwound Skin Condition Excoriated;Erythema, blanchable;Macerated   Cleansing and Cleansing Agents  Other (comment)  (Barrier wipes.)   Dressing Type Applied Zinc based paste  (ABD's. )   Procedure Tolerated Well   Wound Heel Left   Date First Assessed/Time First Assessed: 02/11/17 0230   POA: Yes  Wound Type: Pressure ulcer  Location: Heel  Orientation: Left   DRESSING STATUS Removed   DRESSING TYPE Other (Comment)  (Mepilex heel.)   Pressure Ulcer Unstageable   Wound Length (cm) 7.8 cm   Wound Width (cm) 5.5 cm   Wound Depth (cm) 0.3   Wound Surface area (cm^3) 12.87 cm^2   Condition of Base Eschar;Slough   Condition of Edges Open   Tissue Type Percent Black 50 %   Tissue Type Percent Yellow 50   Drainage Amount  Small    Drainage Color Serosanguinous   Wound Odor Foul   Periwound Skin Condition Calloused;Erythema, non-blanchable   Cleansing and Cleansing Agents  Other (comment)  (Barrier wipe.)   Dressing Type Applied Other (Comment)  (Mepilex heel.)   Procedure Tolerated Well   Wound Heel Right   Date First Assessed/Time First Assessed: 02/11/17 0230   Wound Type: Pressure ulcer  Location: Heel  Wound Description (Optional): black, boggy.   Orientation: Right   DRESSING STATUS Removed   DRESSING TYPE Other (Comment)  (Mepilex heel.)   Pressure Ulcer Unstageable   Wound Length (cm) 2.9 cm   Wound Width (cm) 3.5 cm   Wound Depth (cm) 0.2   Wound Surface area (cm^3) 2.03 cm^2   Condition of Base Slough;Eschar   Condition of Edges Open   Tissue Type Percent Black 50 %   Tissue Type Percent Yellow 50   Drainage Amount  Small    Drainage Color Serosanguinous   Wound Odor Foul   Periwound Skin Condition Calloused;Erythema, non-blanchable   Cleansing and Cleansing Agents  Other (comment)  (Barrier wipe.)   Dressing Type Applied Other (Comment)  (Mepilex heel.)   Procedure Tolerated Well   Wound Sacral/coccyx Posterior;Left;Right   Date First Assessed/Time First Assessed: 02/11/17 0230   Wound Type: Pressure ulcer;Moisture Assoc. Skin Damage  Location: Sacral/coccyx  Orientation: Posterior;Left;Right   DRESSING STATUS Removed   DRESSING TYPE Other (Comment)  (ABD's and Kerlix.)   Pressure Ulcer Stage lV  (Sacral wound extending to bilateral buttocks to lower back.)   Wound Length (cm) 52 cm   Wound Width (cm) 37 cm   Wound Depth (cm) 17  (The depth was measured at 17.0 + cm in depth.)   Wound Surface area (cm^3) 01693 cm^2   Condition of Base Bone exposed;Muscle exposed; Other (comment)  (Adipose)   Condition of Edges Open   Tissue Type Black; Yellow   Tissue Type Percent Black 50 %   Tissue Type Percent Yellow 50   Depth of Tunnel/Sinus (cm) (Possible tunnel communicating with wound in the lumbar area.)   Drainage Amount  Large   Drainage Color Purulent;Black; Brown   Wound Odor Foul   Periwound Skin Condition Erythema, blanchable;Excoriated; Macerated   Cleansing and Cleansing Agents  Normal saline   Dressing Type Applied Other (Comment)  (Patient placed on absorbant pad pending surgical consult.)   Procedure Tolerated Fair   Wound Leg Right; Lower; Posterior   Date First Assessed/Time First Assessed: 02/11/17 0230   POA: Yes  Wound Type: Pressure ulcer;Moisture Assoc. Skin Damage  Location: Leg  Orientation: Right; Lower; Posterior   DRESSING TYPE (There was nothing on the area. )   Pressure Ulcer Stage ll   Wound Length (cm) (Noted multiple scattered open areas.)   Condition of Base Pink;Slough   Condition of Edges Open   Tissue Type Percent Pink 50   Tissue Type Percent Other (comment) 50   Drainage Amount  Moderate   Drainage Color Serosanguinous   Wound Odor Mild   Periwound Skin Condition Excoriated;Erythema, non-blanchable   Cleansing and Cleansing Agents  Other (comment)  (Barrier wipes.)   Dressing Type Applied Other (Comment)  (Mepilex border.)   Procedure Tolerated Well   Wound Leg Left; Lower; Posterior   Date First Assessed/Time First Assessed: 02/11/17 0230   POA: Yes  Wound Type: Pressure ulcer  Location: Leg Wound Description (Optional): venous stasis  Orientation: Left; Lower; Posterior   DRESSING TYPE Other (Comment)  (There was no dressing on the area. )   Pressure Ulcer Stage ll   Wound Length (cm) (Noted several scattered stage II pressure ulcers.)   Condition of Base Pink;Slough   Condition of Edges Open   Tissue Type Percent Pink 50   Tissue Type Percent Yellow 50   Drainage Amount  Moderate   Drainage Color Serosanguinous   Wound Odor Mild   Periwound Skin Condition Erythema, non-blanchable;Excoriated; Macerated   Cleansing and Cleansing Agents  Other (comment)  (Barrier wipes.)   Dressing Type Applied Other (Comment)  (Mepilex border.)   Procedure Tolerated Well   Wound Thigh Right;Medial;Posterior; Upper   Date First Assessed/Time First Assessed: 02/11/17 0230   POA: Yes  Wound Type: Pressure ulcer;Moisture Assoc. Skin Damage  Location: Thigh  Orientation: Right;Medial;Posterior; Upper   DRESSING STATUS Removed   DRESSING TYPE Other (Comment)  (ABD's and Kerlix.)   Pressure Ulcer DTI  (Noted several scattered pressure areas DTPI's and Stage II. )   Condition of Base Pink;Slough  (Stage II base slough and pink. DTPI's Maroon/Purple closed.)   Condition of Edges Open;Closed  (Stage II base slough and pink. DTPI's Maroon/Purple closed.)   Tissue Type Percent Maroon/Purple 100 %  (DTPI's Maroon/Purple closed.)   Tissue Type Percent Pink 50  (Multiple scattered open Stage II base slough and pink.)   Tissue Type Percent Yellow 50  (Multiple scattered open Stage II base slough and pink.)   Drainage Amount  Large   Drainage Color Black; Brown;Purulent   Wound Odor Foul   Periwound Skin Condition Erythema, non-blanchable;Excoriated   Cleansing and Cleansing Agents  Normal saline   Dressing Type Applied Other (Comment)  (Absorptive pad.)   Procedure Tolerated Fair   Wound Back Medial   Date First Assessed/Time First Assessed: 02/13/17 1020   POA: Yes  Wound Type: Abrasion  Location: Back  Orientation: Medial   DRESSING STATUS Removed   DRESSING TYPE Other (Comment)  (ABD's.)   Non-Pressure Ulcer Partial thickness (epider/derm)   Wound Length (cm) 10 cm   Wound Width (cm) 37 cm   Wound Depth (cm) 0.1   Wound Surface area (cm^3) 37 cm^2   Condition of Base Pink   Condition of Edges Open   Tissue Type Pink   Tissue Type Percent Pink 100   Drainage Amount  Moderate   Drainage Color Serosanguinous   Wound Odor None   Periwound Skin Condition Erythema, blanchable;Excoriated   Cleansing and Cleansing Agents  Other (comment)  (Barrier wipe)   Dressing Type Applied Other (Comment)  (Mepilex border.)   Procedure Tolerated Well       Active Problems:    Septic shock (HCC) (2/10/2017)      Acute respiratory failure (HCC) (2/13/2017)      Debility (2/13/2017)        Patient turned to left and right sides for assessment; bed rails up x3, bed low and locked, lights dimmed for comfort, and call bell within patient's reach. Patient unable to verbalize understanding to any wound care instructions given as she is currently sedated and intubated. Plan:     Nursing to perform every 8 hours and PRN soilage:  Apply zinc to bilateral medial thighs after skin cleansing. Nursing to perform every other day:  Wound Care/Dressing change to bilateral heels:  Cleanse wound with normal saline or dermal wound cleanser, apply no sting skin prep to periwound skin, apply Aquacel Ag to wound base/sides, cover with Mepilex Heel Border, may secure with hypafix tape. Daily skin cleansing with pH balanced cleanser is recommended for general hygiene. If able, separate skin folds between legs by positioning apart or place ABD pads between folds to be changed every 8 hours. Please do not place cotton linens beneath or between skin folds. Wound Care Team recommends surgical consult for extensive sacral wound at this time. Please change absorbent pad beneath patient as it becomes soiled. Check patient every two hours when positioning.     Please continue to keep Prevalon boots in place on bilateral feet at all times. Please continue to turn and reposition patient at least once every two hours.     Holland PONCEN, RN, Michael Mitchell

## 2017-02-13 NOTE — ROUTINE PROCESS
Dr. Hamlin Buys ! Patient:  Josie Matias 64 y.o.  1955  Full Code  Next of Kin:  Listed as  Nilton Valente, [phone]; he was phoned and denied any relationship very vociferously. Allergies   Allergen Reactions    Ace Inhibitors Nausea and Vomiting    Hydralazine Nausea and Vomiting    Ibuprofen Shortness of Breath    Norvasc [Amlodipine] Swelling    Sulfa (Sulfonamide Antibiotics) Itching    Tramadol Shortness of Breath     Enteric Contact (waiting to r/o C-diff). _____________________    Patient factors:      Why in hospital:  Septic shock ([address]   Was in hospital two weeks ago, sent home with antibiotics. 02-10:  EMS was called for AMS. Reported caregiver is mentally-challenged son saying Ms. Jarrod Weinstein was [employer] for 3 days. Found \"covered in feces and bedbugs. \"  Transported to ED. In ED:  Unable to CT head, C-A-P 2/2 habitus. BG ~ 100. T ~93 [employer] started). 74/58 (fluids, then pressors). Found multiple, very deep, pressure sores. Dx Sepsis, presumably from wounds; JOAQUIN. K 6.1, Cr 1.57, Lactic Acid 4/9,NH4 168, WBC 28.7.  UA (+) WBC, bacteria, yeast.    Days in hospital:   3    Why in ICU:  Pressor & Ventilator support. Days in ICU:  ()  2  *  ____________________    Additional history: Morbid obesity. Arthritis. Lymphedema. Hypertension,  Anemia. Anxiety. Panic Attacks. Pre-cancerous cells in uterus.  delivery, hysterectomy. DM (A1c 5.5). Fatty liver. Liver disease. Lap-tisha. Thyroid disease. Quit smoking. Denied alcohol. Treatment team, in addition to intensivists, hospitalists:  *   OT, PT, Diabetes Management, Care Management.  ___________________________    Vent days:  (02-10) 3  *    ____________________    Overnight events--include procedures / blood / consults:  (see note)    ____________________________    Quality:    Line:      A-line:     No   Central Line(s):   02-10:  NIKOLAS COSME 3LC.   (bridget 02-10)   PIV's:     No.   Other lines:     No.  Needs:  Pressors, propofol    CVP not ordered. Juarez?:  Date:  02-10: 12 F, 2-lumen. 10 ml balloon. Tamper seal intact, Stat-lock in place. Temp monitor   Expected date of removal?: TBD. Need:  Strict i/o, wounds. DVT prophylaxis:    SCDs: Not Bilateral.    Anticoagulant: heparin sub-cut q 8 h. Anitplatelet:   ASA not   Yet. GI Prophylaxis:   Pantoprazole IV every day     Inpat Anti-Infectives     Start     Ordered Stop    17 2200  VANCOMYCIN INFORMATION NOTE  Other,   ONCE      02/10/17 21117 [id number]    17 1700  vancomycin (VANCOCIN) 2,000 mg in 0.9% sodium chloride 500 mL IVPB  2,000 mg,   IntraVENous,   EVERY 18 HOURS      02/10/17 2110 --    17 0000  piperacillin-tazobactam (ZOSYN) 4.5 g in 0.9% sodium chloride (MBP/ADV) 100 mL MBP  4.5 g,   IntraVENous,   EVERY 6 HOURS      02/10/17 1941 --    02/10/17 1810  levoFLOXacin (LEVAQUIN) 750 mg in D5W IVPB  750 mg,   IntraVENous,   EVERY 24 HOURS      02/10/17 1809 --          Cultures:  Blood:  : ( - ) x 6 d.  : ( - ) x 2 d. *    Sputum:  : (pending)  *   Urine:  :  (-) x 2 d.  02-10:  (+) yeast.  :  U-tox ( - ). Stool:  (need C-diff)  *   Other:  Wound:  :  B-hemolytic Strep, Coag-(-) Staph, many diptheroids. :  GPC pairs. Imaging:  CT:  Head:   (02-10:  Unable 2/2 pt size.)   CT Chest/Abd/Pelvis:  (02-10:  Unable 2/2 pt size.)     TTE:  02-10:  EF 55 %, otherwise benign. Duplex:     Not Yet. Ultrasound:   12 (abd pain):  Gall stones. Liver: possible hepatic steatosis vs hepatitis or cirrhosis. EK-10:  ST, possible subendocardial injury. Procedures:  (lines, others pending)    Blood sugars:  q 6  h. Coverage:  (runs low). Feeding:  Peritive @ 10, no advance, with H2O 200 q 6 h. [facility]  [facility]  Date:  02-10  25 F  OGT  (2nd black michael) CM. Labs:  Now daily. T & C exp 02-15.   *  Labs needed? : C-diff. Skin:  Many sores. .    Palliative:  Not Yet. *  Care Management Y    PT / OT:  Y    ______________________    Sedation vacation?:  (slowly decrease propofol, RR ~ 30). RASS score:  -4.  Weaning:  Decr RR, Vt, FiO2. Airway:  Vent Settings:  30 %, AC  30 / 10. ,  PEEP 5 cm H2O. Secretions:  Scant clear       Oxygenation:  100 %. IVF:  NS @ 150. Infusions:  Propofol. Noreip (decreasing), Vasopressin (not weaning), phenylephrine (not yet started) for MAP > 65. Procedures planned?:  Will need surgical debridement. Consults planned?:  Surgery. APS has been contacted several times. Ethics. Wound Care *    Restraints?:  D/c'd. Wounds:  (see Skin Man Note) multiple. 45 - 60 min for dressing changes. Any other concerns?:  Medical POA? Guardianship hearing? Jaime Zhou

## 2017-02-13 NOTE — PROGRESS NOTES
Assumed care from off going nurse at the bedside. Patient lying in bed without distress. Patient unresponsive while on sedation and mechanical ventilation. Patient has call bell within reach, and bed locked in low position. See complex assessment for further findings. 0940 patient resting without distress or discomfort noted at this time while on sedation and mechanical ventilation. Patient moves all extremities with weakness and purpose. Iv site intact, no redness, swelling noted. 1030 called into patients room to assist with wound care in turning patient. Wound care was performed at this time, fecal management system inserted and sample collected at this time. Patient sedated and tolerated wound care well. 1230 patient resting while on mechanical ventilation, no distress or discomfort noted at this time. Obando draining and intact, obando and oral care provided at this time. Tube feeding infusing, water flushes given. 1500 spoke with patients ex  and son, was able to complete admission data base, no distress or discomfort noted at this time. Obando draining and intact. 1730 patient resting without distress or discomfort noted at this time. 1910 Bedside and Verbal shift change report given to [address] [facility] [address], [address] [facility] (oncoming nurse) by Jesus Alberto Tejeda RN (offgoing nurse). Report given with SBAR, Kardex, Intake/Output, MAR and Recent Results. Marlen Christopher

## 2017-02-13 NOTE — DIABETES MGMT
NUTRITIONAL RE-ASSESSMENT AND GLYCEMIC CONTROL PLAN OF CARE     Jg Zhou           64 y.o.           2/10/2017                 1. Sepsis, due to unspecified organism ([facility]    2. Septic shock ([facility]    3. Sacral decubitus ulcer, unstageable ([facility]    Morbid Obesity  DM     INTERVENTIONS/PLAN:   Full strength Perative is infusing at 10 ml/hr. TF is providing 312 calories , 16 g protein/d. Pt is receiving additional calories from propofol: 818/24 hrs. While on this level of propofol would not exceed TF rate of 35 ml/hr. When pt is off propofol recommend advancing TF  as tolerated to goal rate of 60 ml/hr. TF at goal rate will provide 1872 calories, 96 g protein and 1.1 liter free water /d from TF plus 800ml /24 hrs from water flushes. ASSESSMENT:   Nutritional Status:  Pt is overweight related to excess caloric intake as evidenced by 309% ideal weight and BMI= 69.6kg/m2. Pt meets criteria for morbid . obesity. Pt with increased calorie and protein requirements related to multiple pressure ulcers. Diagnosis-Intake: Inadequate protein-energy intake related to respiratory failure AEB inability to take in oral nourishment and need for enteral feeding while on M.vent. Pt w/hypoalbuminemia as evidenced by albumin=1.4mg/dl on admission , now 2.1. Altered nutrition related lab values r/t  JOAQUIN on CKD  AEB elevated  BUN, Cr, Mg and Phos. Diabetes Management:   Recent blood glucose:   Lab Results   Component Value Date/Time     (H) 02/13/2017 03:30 AM    GLUCPOC 117 (H) 02/13/2017 11:22 AM    GLUCPOC 128 (H) 02/13/2017 05:15 AM    GLUCPOC 71 02/12/2017 11:27 PM     Within target range (non-ICU: <140; ICU<180): [x] Yes   []  No  Current Insulin regimen: corrective lispro  Home medication/insulin regimen: amaryl , not verified  HbA1c:5.5%  Adequate glycemic control PTA:  [x] Yes  [] No     SUBJECTIVE/OBJECTIVE:   Diet: Perative TF at 10 ml/hr plus 200ml free water q 6 hrs  No data found.     Medications: [x] Reviewed      Labs:   Lab Results   Component Value Date/Time    Hemoglobin A1c 5.5 02/10/2017 05:40 PM     Lab Results   Component Value Date/Time    Sodium 151 02/13/2017 03:30 AM    Potassium 3.9 02/13/2017 03:30 AM    Chloride 122 02/13/2017 03:30 AM    CO2 19 02/13/2017 03:30 AM    Anion gap 10 02/13/2017 03:30 AM    Glucose 122 02/13/2017 03:30 AM    BUN 42 02/13/2017 03:30 AM    Creatinine 0.78 02/13/2017 03:30 AM    Calcium 7.9 02/13/2017 03:30 AM    Magnesium 2.7 02/11/2017 04:25 AM    Phosphorus 6.8 02/11/2017 04:25 AM    Albumin 2.1 02/13/2017 03:30 AM     Anthropometrics: IBW : 61.2 kg (135 lb), % IBW (Calculated): 309.13 %, BMI (calculated): 69.6  Wt Readings from Last 1 Encounters:   02/13/17 (!) 193.2 kg (425 lb 14.9 oz)      Ht Readings from Last 1 Encounters:   02/11/17 5' 5\" (1.651 m)     Estimated Nutrition Needs:  1840 Kcals/day, Protein (g): 92 g Fluid (ml): 1800 ml  Based on:   []          Actual BW    [x]          IBW   []            Adjusted BW    Nutrition Diagnoses:      As stated above. Nutrition Interventions: TF recommendations  Goal:   Blood glucose will be within target range of  mg/dL by2/13-met    Intake will meet >75% of energy and protein requirements for wound healing by 2/18 .     Nutrition Monitoring and Evaluation      []     Monitor po intake on meal rounds  [x]     Continue inpatient monitoring and intervention  [x]     Other: advance TF as tolerated to goal    Nutrition Risk:  [x]   High     []  Moderate    []  Minimal/Uncompromised    Martha Lopez RD

## 2017-02-13 NOTE — PROGRESS NOTES
[facility]: [phone] ([phone])  [facility]: [address] [place] [address] Way: [phone]    Patient Name: Jennifer Claros  YOB: 1955    Date of Initial Consult: 2/13/2017   Reason for Consult: care decisions  Requesting Provider:  LIZETT Escobar  Primary Care Physician: David Saldivar MD      SUMMARY:   Jennifer Claros is a 64y.o. year old with a past history of diabetes, hypertension, morbid obesity, liver disease, lymphedema, who was admitted on 2/10/2017 from  home with a diagnosis of septic shock and respiratory distress requiring intubation . Current medical issues leading to Palliative Medicine involvement include: 64year old who was found down on floor at home covered with feces and bed bugs, with large decubitus ulcer, septic with shock, intubated due to acute respiratory distress. Her overall outlook is poor. Palliative medicine is consulted for care decisions. PALLIATIVE DIAGNOSES:   1. Decubitus ulcer  2. Acute respiratory failure   3. Septic shock   4. Debility          PLAN:   1. Patient seen at bedside, she is orally intubated, sedated with Propofol. Did not respond to verbal stimulus on my assessment. 2. Goals of care full code. Chart reviewed and have discussed with ICU team. APS has been consulted. By report, son not able to make medical decisions due to MR. Only number on chart is for [employer] [phone](son). Appreciate case management assistance. They have a call out to APS will await their direction before contacting son. If son is not able to reliably make medical decisions for his mother, no other family listed will likely need guardian. 3. Decubitus ulcer, large per report, wound care involved. Morbid obesity, likely she was bed / chair bound at home. Alb 1.5 on admit. Unsure of who was care giver or helping her at home.  Likely she will need nursing facility care if she survives this event.   4. Initial consult note routed to primary continuity provider  5.  Communicated plan of care with: Palliative IDT, ICU team       GOALS OF CARE:     [====Goals of Care====]  GOALS OF CARE:  Patient / health care proxy stated goals: full code with aggressive measures       TREATMENT PREFERENCES:   Code Status: Full Code    Advance Care Planning:  Advance Care Planning 2/13/2017   Patient's Healthcare Decision Maker is: Legal Next of Stalin Rodarte   Primary Decision Maker Name Kaitlin Fulton   Primary Decision Maker Phone Number [phone]   Primary Decision Maker Relationship to Patient Spouse   Confirm Advance Directive None   Patient Would Like to Complete Advance Directive No   Does the patient have other document types -       Other:    The palliative care team has discussed with patient / health care proxy about goals of care / treatment preferences for patient.  [====Goals of Care====]    Advance Care Planning 2/13/2017   Patient's Healthcare Decision Maker is: Legal Next of Stalin Rodarte   Primary Decision Maker Name Kaitlin Fulton   Primary Decision Maker Phone Number [phone]   Primary Decision Maker Relationship to Patient Spouse   Confirm Advance Directive None   Patient Would Like to Complete Advance Directive No   Does the patient have other document types -           HISTORY:     History obtained from: chart     CHIEF COMPLAINT: septic shock     HPI/SUBJECTIVE:    The patient is:   [] Verbal and participatory  [x] Non-participatory due to: intubated and sedated      Clinical Pain Assessment (nonverbal scale for nonverbal patients): Pain: 0  Adult Non-Verbal Pain Assessment    Face  [x] 0   No particular expression or smile  [] 1   Occasional grimace, tearing, frowning, wrinkled forehead  [] 2   Frequent grimace, tearing, frowning, wrinkled forehead    Activity (movement)  [x] 0   Lying quietly, normal position  [] 1   Seeking attention through movement or slow, cautious movement  [] 2   Restless, excessive activity and/or withdrawal reflexes    Guarding  [x] 0   Lying quietly, no positioning of hands over areas of body  [] 1   Splinting areas of the body, tense  [] 2   Rigid, stiff    Physiology (vital signs)  [x] 0   Stable vital signs  [] 1   Change in any of the following: SBP > 20mm Hg; HR > 20/minute  [] 2   Change in any of the following: SBP > 30mm Hg; HR > 25/minute    Respiratory  [x] 0   Baseline RR/SpO2, compliant with ventilator  [] 1   RR > 10 above baseline, or 5% drop SpO2, mild asynchrony with ventilator  [] 2   RR > 20 above baseline, or 10% drop SpO2, asynchrony with ventilator    Total Non-Verbal Pain Score: 0       FUNCTIONAL ASSESSMENT:     Palliative Performance Scale (PPS): 20%          PSYCHOSOCIAL/SPIRITUAL SCREENING:     Advance Care Planning:  Advance Care Planning 2/13/2017   Patient's Healthcare Decision Maker is: Legal Next of Stalin 69   Primary Decision Maker Name Leonardo Peguero   Primary Decision Maker Phone Number [phone]   Primary Decision Maker Relationship to Patient Spouse   Confirm Advance Directive None   Patient Would Like to Complete Advance Directive No   Does the patient have other document types -        Any spiritual / Baptist concerns:  [] Yes /  [x] No    Caregiver Burnout:  [] Yes /  [] No /  [x] No Caregiver Present      Anticipatory grief assessment:   [x] Normal  / [] Maladaptive       ESAS Anxiety:      ESAS Depression:          REVIEW OF SYSTEMS:     Positive and pertinent negative findings in ROS are noted above in HPI. The following systems were [] reviewed / [x] unable to be reviewed as noted in HPI  Other findings are noted below. Systems: constitutional, ears/nose/mouth/throat, respiratory, gastrointestinal, genitourinary, musculoskeletal, integumentary, neurologic, psychiatric, endocrine. Positive findings noted below.   Modified ESAS Completed by: provider           Pain: 0           Dyspnea: 0                    PHYSICAL EXAM:     Wt Readings from Last 3 Encounters:   17 (!) 193.2 kg (425 lb 14.9 oz)   17 (!) 206.7 kg (455 lb 11.1 oz)   16 (!) 210.2 kg (463 lb 6.4 oz)     Blood pressure 116/49, pulse (!) 113, temperature 98.8 °F (37.1 °C), resp. rate (!) 34, height 5' 5\" (1.651 m), weight (!) 193.2 kg (425 lb 14.9 oz), SpO2 100 %, not currently breastfeeding. Pain:  Pain Scale 1: Adult Nonverbal Pain Scale                    Last bowel movement: FMS    Constitutional: ill appearing female, who is orally intubated lying in bed, in NAD  ENMT: orally intubated   Respiratory: ventilator supported/ breathing over the vent  Neurologic: sedated on propofol, did not respond to verbal stimulus        HISTORY:     Active Problems:    Septic shock ([address] (2/10/2017)      Past Medical History   Diagnosis Date    Anemia     Anxiety 2016    Arthritis     Cancer ([address]      pre cancerous cells in the uterus    Chronic pain      knees    Chronic pain of both knees 2016    Cigarette nicotine dependence in remission 2016    Diabetes ([address]     Fatty liver 2016    Headache     Hypertension     Liver disease      fatty liver    Lymphedema 2016    Morbid obesity ([address] 2016    Psychotic disorder      anxiety and panic attacks    Thyroid disease       Past Surgical History   Procedure Laterality Date    Hx cholecystectomy      Hx gyn  2006     hysterectomy    Hx  section        Family History   Problem Relation Age of Onset    Diabetes Mother     Hypertension Mother     Lung Disease Father      History reviewed, no pertinent family history.   Social History   Substance Use Topics    Smoking status: Never Smoker    Smokeless tobacco: Never Used    Alcohol use No     Allergies   Allergen Reactions    Ace Inhibitors Nausea and Vomiting    Hydralazine Nausea and Vomiting    Ibuprofen Shortness of Breath    Norvasc [Amlodipine] Swelling    Sulfa (Sulfonamide Antibiotics) Itching    Tramadol Shortness of Breath      Current Facility-Administered Medications   Medication Dose Route Frequency    dextrose 5% lactated ringers infusion  100 mL/hr IntraVENous CONTINUOUS    0.9% sodium chloride infusion 250 mL  250 mL IntraVENous PRN    albuterol-ipratropium (DUO-NEB) 2.5 MG-0.5 MG/3 ML  3 mL Nebulization Q6H RT    sodium chloride (NS) flush 5-10 mL  5-10 mL IntraVENous PRN    levoFLOXacin (LEVAQUIN) 750 mg in D5W IVPB  750 mg IntraVENous Q24H    NOREPINephrine (LEVOPHED) 8,000 mcg in dextrose 5% 250 mL infusion  0-20 mcg/min IntraVENous TITRATE    insulin lispro (HUMALOG) injection   SubCUTAneous Q6H    glucose chewable tablet 16 g  4 Tab Oral PRN    glucagon (GLUCAGEN) injection 1 mg  1 mg IntraMUSCular PRN    dextrose (D50W) injection syrg 12.5-25 g  25-50 mL IntraVENous PRN    piperacillin-tazobactam (ZOSYN) 4.5 g in 0.9% sodium chloride (MBP/ADV) 100 mL MBP  4.5 g IntraVENous Q6H    vasopressin (VASOSTRICT) 100 Units in 0.9% sodium chloride 100 mL infusion  0.01-0.04 Units/min IntraVENous TITRATE    propofol (DIPRIVAN) infusion  0-50 mcg/kg/min IntraVENous TITRATE    midazolam (VERSED) injection 2 mg  2 mg IntraVENous Q1H PRN    heparin (porcine) injection 5,000 Units  5,000 Units SubCUTAneous Q8H    pantoprazole (PROTONIX) 40 mg in sodium chloride 0.9 % 10 mL injection  40 mg IntraVENous DAILY    vancomycin (VANCOCIN) 2,000 mg in 0.9% sodium chloride 500 mL IVPB  2,000 mg IntraVENous Q18H    VANCOMYCIN INFORMATION NOTE   Other ONCE        LAB AND IMAGING FINDINGS:     Lab Results   Component Value Date/Time    WBC 16.9 02/13/2017 03:30 AM    HGB 7.7 02/13/2017 03:30 AM    PLATELET 055 86/77/6075 03:30 AM     Lab Results   Component Value Date/Time    Sodium 151 02/13/2017 03:30 AM    Potassium 3.9 02/13/2017 03:30 AM    Chloride 122 02/13/2017 03:30 AM    CO2 19 02/13/2017 03:30 AM    BUN 42 02/13/2017 03:30 AM    Creatinine 0.78 02/13/2017 03:30 AM    Calcium 7.9 02/13/2017 03:30 AM Magnesium 2.7 02/11/2017 04:25 AM    Phosphorus 6.8 02/11/2017 04:25 AM      Lab Results   Component Value Date/Time    AST (SGOT) 32 02/13/2017 03:30 AM    Alk. phosphatase 80 02/13/2017 03:30 AM    Protein, total 5.2 02/13/2017 03:30 AM    Albumin 2.1 02/13/2017 03:30 AM    Globulin 3.1 02/13/2017 03:30 AM     Lab Results   Component Value Date/Time    INR 1.4 02/11/2017 11:16 AM    Prothrombin time 16.6 02/11/2017 11:16 AM      No results found for: IRON, FE, TIBC, IBCT, PSAT, FERR   No results found for: PH, PCO2, PO2  No components found for: [place]   Lab Results   Component Value Date/Time     02/10/2017 05:40 PM    CK - MB 5.4 02/10/2017 05:40 PM              Total time: 30 minutes   Counseling / coordination time: 20 minutes  > 50% counseling / coordination?: yes    Prolonged service was provided for  []30 min   []75 min in face to face time in the presence of the patient. Time Start:   Time End:   Note: this can only be billed with 49249 (initial) or 96393 (follow up). If multiple start / stop times, list each separately.

## 2017-02-13 NOTE — PROGRESS NOTES
[facility]    Spoke with patient's neighbor who was willing to leave his number [phone], Emmalena Starling for any further questions. Mr. Kip Mueller has been bringing food to patients son, who has autism and he believes is unable to care for himself. He does not know of any other family members/or have contact numbers at this time.      Tanya Saint, PA-C

## 2017-02-14 NOTE — PROGRESS NOTES
2 [facility]  Hospitalist Division    Daily progress Note    Patient: Martir Oviedo MRN: [id number]  CSN: [id number]    YOB: 1955  Age: 64 y.o.   Sex: female    DOA: 2/10/2017 LOS:  LOS: 4 days                    Subjective:     CC: Sepsis   Remains intubated and sedated     Objective:      Visit Vitals    /51    Pulse (!) 105    Temp 98.8 °F (37.1 °C)    Resp (!) 31    Ht 5' 5\" (1.651 m)    Wt 156.2 kg (344 lb 5.7 oz)    SpO2 100%    Breastfeeding No    BMI 57.3 kg/m2       Physical Exam:    NC/AT  NO JVD,TMG  S1/S2 RRR  CTAB, NO WHEEZING  NT,ND, NABS  +  EDEMA  See wound care note for decub ulcer         Intake and Output:  Current Shift:  02/14 0701 - 02/14 1900  In: 926.8 [I.V.:516.8]  Out: 175 [Urine:175]  Last three shifts:  02/12 1901 - 02/14 0700  In: 6010.2 [I.V.:4974.2]  Out: 9814 [Urine:3535]    Recent Results (from the past 24 hour(s))   GLUCOSE, POC    Collection Time: 02/13/17  5:08 PM   Result Value Ref Range    Glucose (POC) 149 (H) 70 - 110 mg/dL   VANCOMYCIN, TROUGH    Collection Time: 02/13/17 10:45 PM   Result Value Ref Range    Vancomycin,trough 20.9 (HH) 10.0 - 20.0 ug/mL    Reported dose date: [id number]      Reported dose time: 500      Reported dose: 2,000 MG UNITS   GLUCOSE, POC    Collection Time: 02/13/17 10:48 PM   Result Value Ref Range    Glucose (POC) 150 (H) 70 - 110 mg/dL   CBC WITH AUTOMATED DIFF    Collection Time: 02/14/17  4:10 AM   Result Value Ref Range    WBC 14.7 (H) 4.6 - 13.2 K/uL    RBC 2.57 (L) 4.20 - 5.30 M/uL    HGB 7.2 (L) 12.0 - 16.0 g/dL    HCT 21.8 (L) 35.0 - 45.0 %    MCV 84.8 74.0 - 97.0 FL    MCH 28.0 24.0 - 34.0 PG    MCHC 33.0 31.0 - 37.0 g/dL    RDW 15.2 (H) 11.6 - 14.5 %    PLATELET 357 870 - 229 K/uL    MPV 9.0 (L) 9.2 - 11.8 FL    NEUTROPHILS 64 42 - 75 %    BAND NEUTROPHILS 8 (H) 0 - 5 %    LYMPHOCYTES 14 (L) 20 - 51 %    MONOCYTES 3 2 - 9 %    EOSINOPHILS 7 (H) 0 - 5 %    BASOPHILS 0 0 - 3 % METAMYELOCYTES 4 (H) 0 %    ABS. NEUTROPHILS 9.4 (H) 1.8 - 8.0 K/UL    ABS. LYMPHOCYTES 2.1 0.8 - 3.5 K/UL    ABS. MONOCYTES 0.4 0 - 1.0 K/UL    ABS. EOSINOPHILS 1.0 (H) 0.0 - 0.4 K/UL    ABS. BASOPHILS 0.0 0.0 - 0.1 K/UL    RBC COMMENTS ANISOCYTOSIS  1+        RBC COMMENTS POLYCHROMASIA  1+        WBC COMMENTS TOXIC GRANULATION      DF MANUAL     METABOLIC PANEL, BASIC    Collection Time: 02/14/17  4:10 AM   Result Value Ref Range    Sodium 153 (H) 136 - 145 mmol/L    Potassium 3.6 3.5 - 5.5 mmol/L    Chloride 124 (H) 100 - 108 mmol/L    CO2 19 (L) 21 - 32 mmol/L    Anion gap 10 3.0 - 18 mmol/L    Glucose 172 (H) 74 - 99 mg/dL    BUN 29 (H) 7.0 - 18 MG/DL    Creatinine 0.67 0.6 - 1.3 MG/DL    BUN/Creatinine ratio 40 (H) 12 - 20      GFR est AA >60 >60 ml/min/1.73m2    GFR est non-AA >60 >60 ml/min/1.73m2    Calcium 7.9 (L) 8.5 - 10.1 MG/DL   AMMONIA    Collection Time: 02/14/17  4:10 AM   Result Value Ref Range    Ammonia 13 11 - 32 UMOL/L   GLUCOSE, POC    Collection Time: 02/14/17  6:38 AM   Result Value Ref Range    Glucose (POC) 161 (H) 70 - 110 mg/dL   POC G3    Collection Time: 02/14/17  9:02 AM   Result Value Ref Range    Device: VENT      FIO2 (POC) 21 %    pH (POC) 7.378 7.35 - 7.45      pCO2 (POC) 26.2 (L) 35.0 - 45.0 MMHG    pO2 (POC) 89 80 - 100 MMHG    HCO3 (POC) 15.6 (L) 22 - 26 MMOL/L    sO2 (POC) 97 92 - 97 %    Base deficit (POC) 10 mmol/L    Mode ASSIST CONTROL      Tidal volume 450 ml    Set Rate 10 bpm    PEEP/CPAP (POC) 5 cmH2O    Allens test (POC) YES      Total resp.  rate 31      Site LEFT RADIAL      Patient temp. 97.2      Specimen type (POC) ARTERIAL      Performed by Braden Neil     Volume control plus YES     GLUCOSE, POC    Collection Time: 02/14/17 11:22 AM   Result Value Ref Range    Glucose (POC) 127 (H) 70 - 110 mg/dL         Current Facility-Administered Medications:     vancomycin (VANCOCIN) 1,000 mg in 0.9% sodium chloride (MBP/ADV) 250 mL adv, 1,000 mg, IntraVENous, Q12H, Marium Lopez MD, Last Rate: 250 mL/hr at 02/14/17 0838, 1,000 mg at 02/14/17 0838    [START ON 2/16/2017] VANCOMYCIN INFORMATION NOTE, , Other, ONCE, Marium Lopez MD    piperacillin-tazobactam (ZOSYN) 3.375 g in  mL MBP ##EXTENDED 4 HRS INFUSION, 3.375 g, IntraVENous, Q8H, Marium Lopez MD, Last Rate: 25 mL/hr at 02/14/17 1326, 3.375 g at 02/14/17 1326    dextrose 5% lactated ringers infusion, 100 mL/hr, IntraVENous, CONTINUOUS, LIZETT Ramos, Last Rate: 100 mL/hr at 02/13/17 2320, 100 mL/hr at 02/13/17 2320    0.9% sodium chloride infusion 250 mL, 250 mL, IntraVENous, PRN, LIZETT Ramos    albuterol-ipratropium (DUO-NEB) 2.5 MG-0.5 MG/3 ML, 3 mL, Nebulization, Q6H RT, LIZETT Ramos, 3 mL at 02/14/17 1420    sodium chloride (NS) flush 5-10 mL, 5-10 mL, IntraVENous, PRN, Naomy Underwood DO    levoFLOXacin (LEVAQUIN) 750 mg in D5W IVPB, 750 mg, IntraVENous, Q24H, Tavon Iron, DO, Last Rate: 100 mL/hr at 02/13/17 1714, 750 mg at 02/13/17 1714    NOREPINephrine (LEVOPHED) 8,000 mcg in dextrose 5% 250 mL infusion, 0-20 mcg/min, IntraVENous, TITRATE, Marium Lopez MD, Stopped at 02/13/17 1119    insulin lispro (HUMALOG) injection, , SubCUTAneous, Q6H, Marium Lopez MD, Stopped at 02/14/17 1328    glucose chewable tablet 16 g, 4 Tab, Oral, PRN, Marium Lopez MD    glucagon [facility]) injection 1 mg, 1 mg, IntraMUSCular, PRN, Marium Lopez MD    dextrose (D50W) injection syrg 12.5-25 g, 25-50 mL, IntraVENous, PRN, Marium Lopez MD    vasopressin (VASOSTRICT) 100 Units in 0.9% sodium chloride 100 mL infusion, 0.01-0.04 Units/min, IntraVENous, TITRATE, Marium Lopez MD, Last Rate: 2.4 mL/hr at 02/13/17 0350, 0.04 Units/min at 02/13/17 0350    propofol (DIPRIVAN) infusion, 0-50 mcg/kg/min, IntraVENous, TITRATE, Marium Lopez MD, Last Rate: 24.8 mL/hr at 02/14/17 1118, 20 mcg/kg/min at 02/14/17 1118    midazolam (VERSED) injection 2 mg, 2 mg, IntraVENous, Q1H PRN, Leonie Saha MD, 2 mg at 02/11/17 1815    heparin (porcine) injection 5,000 Units, 5,000 Units, SubCUTAneous, Q8H, Leonie Saha MD, 5,000 Units at 02/14/17 1326    pantoprazole (PROTONIX) 40 mg in sodium chloride 0.9 % 10 mL injection, 40 mg, IntraVENous, DAILY, Leonie Saha MD, 40 mg at 02/14/17 7111    Lab Results   Component Value Date/Time    Glucose 172 02/14/2017 04:10 AM    Glucose 122 02/13/2017 03:30 AM    Glucose 106 02/12/2017 03:25 AM    Glucose 116 02/11/2017 04:25 AM    Glucose 138 02/10/2017 05:40 PM        Assessment/Plan     Active Problems:    Septic shock ([facility] [address] (2/10/2017)      Acute respiratory failure ([facility] [address] (2/13/2017)      Debility (2/13/2017)      Septic shock, cont pressors and abx  Acute hypoxic resp failure 2/2 above  JOAQUIN on CKD 2/2 sepsis, reolved  acute metabolic encephalopathy   Hypernatremia, change IVF  DM, stable   Morbid obesity  HTN stable  Fever, resolving   Anemia, stable post transfusion   Hyperkalemia, resolved  Multiple ulcer including B/L heels, large sacral ulcer    Overall prognosis poor  Wean pressors as carloz  Cont abx, f/u cx   Guardianship pending           Tamra Campbell MD  2/14/2017, 5:13 PM

## 2017-02-14 NOTE — PROGRESS NOTES
Problem: Ventilator Management  Goal: *Adequate oxygenation and ventilation  Pt.  Intubated secondary to Septic Shock      Current ventilator settings- ACVC+ 10, VT-450, PEEP-5, FIo2-21%      Current ABG-2/14/2017 0902- Ph-7.378, PCo2-26.2, PO2-89, HCo3-15.6, SO2-97%      X ray-2/14/2017 0110-Slight interval progression of central vascular and interstitial congestion     Plan: Maintain Ventilatory Support      Goal: Adequate oxygenation and ventilation

## 2017-02-14 NOTE — PROGRESS NOTES
Assumed care from off going nurse at the bedside. Patient lying in bed without distress. Patient unresponsive while on mechanical ventilation. Patient has call bell within reach, and bed locked in low position. See complex assessment for further findings. 1687 patient resting without distress or discomfort noted at this time while on mechanical ventilation and sedation. Iv site intact, no redness, swelling noted. Obando draining and intact, obando and oral care provided. Fecal management system intact and draining. Oral gastric tube in place and infusing tube feeing at this time and patient tolerating well. 1235 wound care provided and incontinence care provided at this time, no distress or discomfort noted at this time. Obando draining and intact, oral and obando care also provided at this time. 1300 turned down sedation at this time. 1400 sedation turned off.    1600 sedation turned back on due to patient having increased work of breathing. Respiratory therapist at the bedside. 1811 patient resting without distress or discomfort at this time while on sedation and mechanical ventilation. Sedation also turned down at this time. 1845 ct called and stated that patient could come down for ct, called respiratory they were unable to at this time due to being busy with another patient. 1910 Bedside and Verbal shift change report given to [address] [place] [address] (oncoming nurse) by Jesus Alberto Tejeda RN (offgoing nurse). Report given with SBAR, Kardex, Intake/Output, MAR and Recent Results. Marlen Christopher

## 2017-02-14 NOTE — PROGRESS NOTES
Spoke with pts friend Misty Diallo, she does not have med poa and does not know if pt does. She states pt has a brother Min Matias, but does not know number. She also has a sister and another brother, who are not in area, she will try to find out their names. Brother jerald is in area. Notified olivia hare and law office. Paperwork fax'd yesterday  By Bryce John to law office.

## 2017-02-14 NOTE — ROUTINE PROCESS
Bedside and Verbal shift change report given to kim Azar rn (oncoming nurse) by Penny Uriostegui RN   (offgoing nurse). Report included the following information SBAR, Kardex, Intake/Output, MAR and Recent Results.

## 2017-02-14 NOTE — PROGRESS NOTES
[facility]  Pulmonary, Critical Care, and Sleep Medicine    Name: Marii Stewart MRN: [id number]   : 1955 Hospital: [facility]   Date: 2017        [facility] Progress Notes                                              [x] I have reviewed the flowsheet and previous days notes. Events, vitals, medications and notes from last 24 hours reviewed. Care plan discussed with staff, patient, family and on multidisciplinary rounds. 2017  Remain intubated, unresposive  Still on vasopressin  Hb stable, s/p 1uPRBC  Wound cx GPC pairs, sputum GS sputum GPC pairs, rods, GNR  WBC and Cr down, Na up      IMPRESSION:   · Septic shock requiring pressors, likely source skin with noted severe large tunneling sacral, gluteal wounds  · Acute respiratory failure in setting of severe sepsis requiring MV  · Acute encephalopathy, likely metabolic, sepsis  · Hyperkalemia, s/p tx in ED, resolved  · Lactic acidosis, resolved  · JOAQUIN on CKD likely due to sepsis, volume depletion, resolving  · Metabolic acidosis, improved  · Elevated ammonia, improved  · Diarrhea, improved  · DM  · Morbid obesity  · Bedbug infestation  · Hx HTN    · Code status: FULL      RECOMMENDATIONS:   · CVS: Monitor HD. Off levo. Continue Vasopressin, to maintain MAP>65,  RIJ in place. Negative enzymes. ECHO EF 55-60%. S/p Albumin x 4 doses, lactic acid improved,   · Respiratory: Continue mechanical ventilation, no wean until hemodynamics improve, continue duonebs scheduled,  Sputum GS + GPC, GNR, await culture. Cont abx. High risk aspiration. Daily CXR. ABG ok. · ID:  Sepsis bundle. Continue levaquin, zosyn, vanc. Urine cx +yeast. Wound cx: GPC pairs. Wound care/surgery following. leukocytosis improving, low grade temps. · Hematology/Oncology:  Hb stable, s/p1u, Nl coags. · Renal:  Follow BUN, Cr, I/O. Cr better  · GI/:  Continue obando for now. PPI. Trend LFT, ammonia-ok. amylase and lipase ok  · Endocrine:  Nl TSH. Nl cortisol. Glycemic control. ISS prn, avoid hypoglycemia. IVF   · Neurology/Pain/Sedation:  Propofol for sedation. CT head when machine available to accomodate weight. Daily sedation wean for neuro eval.  · Skin/Wound/Musc: Wound care following. Surgery following. · FEN: Trickle feed. IVF-D5. Free water for hypernatremia. Replace lytes prn per protocol. · Prophylaxis: GI Prophylaxis with protonix. DVT Prophylaxis with heparin. · Restraints: B wrist  ·   · Guardianship request initiated, son with MR, unable to make decisions. APS notified. · Palliative care for care goals following. ·                                                               ·   ·   · Glycemic Control. Glucose stabilizer per ICU protocol when on insulin drip. Maintain blood glucose 140-180. · Replace electrolytes per ICU electrolyte replacement protocol  · Ventilator bundle & Sedation protocol followed. Daily sedation holiday, assessment for readiness for SBT and then re-titrate if required. Chlorhexidine mouth washes. · HOB 30 degree elevation all the time. Aggressive pulmonary toileting. Incentive spirometry when appropriate. Aspiration precautions. · Sepsis bundle and protocol followed. Follow serial lactic acid, frequent BMP check, fluid resuscitation. Follow cultures. Deescalate antibiotic when appropriate. · Obando bundle followed, remove obando catheter when not critically ill. · Central Line bundle followed, remove when not needed. · Skin & Wound care. · Weekly prealbumin, nutritional consult. · PT/OT eval and treat. OOB when appropriate. · Further recommendations will be based on the patient's response to recommended treatment and results of the investigation ordered. · Quality Care: PPI, DVT prophylaxis, HOB elevated, Infection control all reviewed and addressed. · Events and notes from last 24 hours reviewed.  Care plan discussed with nursing     Review of Systems:  Review of systems not obtained due to patient factors. Allergies   Allergen Reactions    Ace Inhibitors Nausea and Vomiting    Hydralazine Nausea and Vomiting    Ibuprofen Shortness of Breath    Norvasc [Amlodipine] Swelling    Sulfa (Sulfonamide Antibiotics) Itching    Tramadol Shortness of Breath      Past Medical History   Diagnosis Date    Anemia     Anxiety 2016    Arthritis     Cancer ([address]      pre cancerous cells in the uterus    Chronic pain      knees    Chronic pain of both knees 2016    Cigarette nicotine dependence in remission 2016    Diabetes ([address]     Fatty liver 2016    Headache     Hypertension     Liver disease      fatty liver    Lymphedema 2016    Morbid obesity ([address] 2016    Psychotic disorder      anxiety and panic attacks    Thyroid disease       Past Surgical History   Procedure Laterality Date    Hx cholecystectomy      Hx gyn  2006     hysterectomy    Hx  section        Social History   Substance Use Topics    Smoking status: Never Smoker    Smokeless tobacco: Never Used    Alcohol use No      Family History   Problem Relation Age of Onset    Diabetes Mother     Hypertension Mother     Lung Disease Father       Prior to Admission medications    Medication Sig Start Date End Date Taking? Authorizing Provider   cephALEXin (KEFLEX) 500 mg capsule Take 1 Cap by mouth four (4) times daily. 17   Nico Burnette MD   nebivolol (BYSTOLIC) 5 mg tablet Take 5 mg by mouth daily. Marino Lal MD   ergocalciferol (VITAMIN D2) 50,000 unit capsule Take 50,000 Units by mouth. Marino Lal MD   hydroCHLOROthiazide (HYDRODIURIL) 25 mg tablet Take 25 mg by mouth daily. Marino Lal MD   glimepiride (AMARYL) 2 mg tablet Take 2 mg by mouth every morning. Marino Lal MD   traMADol (ULTRAM) 50 mg tablet Take 50 mg by mouth every six (6) hours as needed for Pain.     Marino Lal MD   loratadine (CLARITIN) 10 mg tablet 10 mg. 12   Historical Provider losartan (COZAAR) 25 mg tablet Take 1 Tab by mouth every evening. 5/19/16   John Hsieh MD   metoprolol succinate (TOPROL-XL) 25 mg XL tablet Take 1 Tab by mouth daily. 5/19/16   John Hsieh MD     Current Facility-Administered Medications   Medication Dose Route Frequency    vancomycin (VANCOCIN) 1,000 mg in 0.9% sodium chloride (MBP/ADV) 250 mL adv  1,000 mg IntraVENous Q12H    [START ON 2/16/2017] VANCOMYCIN INFORMATION NOTE   Other ONCE    piperacillin-tazobactam (ZOSYN) 3.375 g in  mL MBP ##EXTENDED 4 HRS INFUSION  3.375 g IntraVENous Q8H    dextrose 5% lactated ringers infusion  100 mL/hr IntraVENous CONTINUOUS    albuterol-ipratropium (DUO-NEB) 2.5 MG-0.5 MG/3 ML  3 mL Nebulization Q6H RT    levoFLOXacin (LEVAQUIN) 750 mg in D5W IVPB  750 mg IntraVENous Q24H    NOREPINephrine (LEVOPHED) 8,000 mcg in dextrose 5% 250 mL infusion  0-20 mcg/min IntraVENous TITRATE    insulin lispro (HUMALOG) injection   SubCUTAneous Q6H    vasopressin (VASOSTRICT) 100 Units in 0.9% sodium chloride 100 mL infusion  0.01-0.04 Units/min IntraVENous TITRATE    propofol (DIPRIVAN) infusion  0-50 mcg/kg/min IntraVENous TITRATE    heparin (porcine) injection 5,000 Units  5,000 Units SubCUTAneous Q8H    pantoprazole (PROTONIX) 40 mg in sodium chloride 0.9 % 10 mL injection  40 mg IntraVENous DAILY       Lines: All central lines examined by me. No signs of erythema, induration, discharge.       Central Venous Catheter:  Triple Lumen right ij cvl 02/10/17 Right Neck (Active)   Central Line Being Utilized Yes 2/10/2017  7:13 PM   Site Assessment Clean, dry, & intact 2/10/2017  7:13 PM   Dressing Status Clean, dry, & intact 2/10/2017  7:13 PM   Proximal Hub Color/Line Status White 2/10/2017  7:13 PM   Positive Blood Return (Medial Site) Yes 2/10/2017  7:13 PM   Medial Hub Color/Line Status Brown 2/10/2017  7:13 PM   Positive Blood Return (Lateral Site) Yes 2/10/2017  7:13 PM   Distal Hub Color/Line Status Blue 2/10/2017  7:13 PM   Positive Blood Return (Site #3) Yes 2/10/2017  7:13 PM          Telemetry:normal sinus rhythm    Objective:   Vital Signs:    Visit Vitals    /51    Pulse (!) 105    Temp 98.8 °F (37.1 °C)    Resp (!) 31    Ht 5' 5\" (1.651 m)    Wt 156.2 kg (344 lb 5.7 oz)    SpO2 100%    Breastfeeding No    BMI 57.3 kg/m2       O2 Device: Ventilator       Temp (24hrs), Av.7 °F (37.1 °C), Min:97.2 °F (36.2 °C), Max:99.3 °F (37.4 °C)       Intake/Output:   Last shift:       07 -  1900  In: 926.8 [I.V.:516.8]  Out: 175 [Urine:175]    Last 3 shifts:  190 -  0700  In: 6010.2 [I.V.:4974.2]  Out: 0468 [Urine:3535]      Intake/Output Summary (Last 24 hours) at 17 1524  Last data filed at 17 0900   Gross per 24 hour   Intake           2062.4 ml   Output             1110 ml   Net            952.4 ml       Last 3 Recorded Weights in this Encounter    17 0100 17 0200 17 0420   Weight: (!) 189.5 kg (417 lb 11.2 oz) (!) 193.2 kg (425 lb 14.9 oz) 156.2 kg (344 lb 5.7 oz)       Ventilator Settings:  Mode Rate Tidal Volume Pressure FiO2 PEEP   Assist control, VC+   450 ml    21 % 5 cm H20     Peak airway pressure: 21 cm H2O    Plateau pressure:     Tidal volume:    Minute ventilation: 15.7 l/min   SPO2      ARDS network Guidelines: Lung protective strategy and Plateau pressure goals less than or equal to 30      Physical Exam:     General/Neurology: Intubated, sedated on vent  Head:   Normocephalic  Eye:   No scleral icterus  Nose/Throat:  ETT in place  Neck:   Supple, R IJ  Lung:   Adequate air entry bilateral equal, no rales, no rhonchi, no wheezing.    Heart:   Tachy  Abdomen:  Soft, morbidly obese  :  Juarez in place, clear dark urine  Extremities:  4+BLE edema, ulceration BLE, significant chronic changes  Skin : tunneling sacral and gluteal area, muscle and bone exposed-see photos    Data:       Recent Results (from the past 24 hour(s))   GLUCOSE, POC Collection Time: 02/13/17  5:08 PM   Result Value Ref Range    Glucose (POC) 149 (H) 70 - 110 mg/dL   VANCOMYCIN, TROUGH    Collection Time: 02/13/17 10:45 PM   Result Value Ref Range    Vancomycin,trough 20.9 (HH) 10.0 - 20.0 ug/mL    Reported dose date: [id number]      Reported dose time: 500      Reported dose: 2,000 MG UNITS   GLUCOSE, POC    Collection Time: 02/13/17 10:48 PM   Result Value Ref Range    Glucose (POC) 150 (H) 70 - 110 mg/dL   CBC WITH AUTOMATED DIFF    Collection Time: 02/14/17  4:10 AM   Result Value Ref Range    WBC 14.7 (H) 4.6 - 13.2 K/uL    RBC 2.57 (L) 4.20 - 5.30 M/uL    HGB 7.2 (L) 12.0 - 16.0 g/dL    HCT 21.8 (L) 35.0 - 45.0 %    MCV 84.8 74.0 - 97.0 FL    MCH 28.0 24.0 - 34.0 PG    MCHC 33.0 31.0 - 37.0 g/dL    RDW 15.2 (H) 11.6 - 14.5 %    PLATELET 316 920 - 992 K/uL    MPV 9.0 (L) 9.2 - 11.8 FL    NEUTROPHILS 64 42 - 75 %    BAND NEUTROPHILS 8 (H) 0 - 5 %    LYMPHOCYTES 14 (L) 20 - 51 %    MONOCYTES 3 2 - 9 %    EOSINOPHILS 7 (H) 0 - 5 %    BASOPHILS 0 0 - 3 %    METAMYELOCYTES 4 (H) 0 %    ABS. NEUTROPHILS 9.4 (H) 1.8 - 8.0 K/UL    ABS. LYMPHOCYTES 2.1 0.8 - 3.5 K/UL    ABS. MONOCYTES 0.4 0 - 1.0 K/UL    ABS. EOSINOPHILS 1.0 (H) 0.0 - 0.4 K/UL    ABS.  BASOPHILS 0.0 0.0 - 0.1 K/UL    RBC COMMENTS ANISOCYTOSIS  1+        RBC COMMENTS POLYCHROMASIA  1+        WBC COMMENTS TOXIC GRANULATION      DF MANUAL     METABOLIC PANEL, BASIC    Collection Time: 02/14/17  4:10 AM   Result Value Ref Range    Sodium 153 (H) 136 - 145 mmol/L    Potassium 3.6 3.5 - 5.5 mmol/L    Chloride 124 (H) 100 - 108 mmol/L    CO2 19 (L) 21 - 32 mmol/L    Anion gap 10 3.0 - 18 mmol/L    Glucose 172 (H) 74 - 99 mg/dL    BUN 29 (H) 7.0 - 18 MG/DL    Creatinine 0.67 0.6 - 1.3 MG/DL    BUN/Creatinine ratio 40 (H) 12 - 20      GFR est AA >60 >60 ml/min/1.73m2    GFR est non-AA >60 >60 ml/min/1.73m2    Calcium 7.9 (L) 8.5 - 10.1 MG/DL   AMMONIA    Collection Time: 02/14/17  4:10 AM   Result Value Ref Range    Ammonia 13 11 - 32 UMOL/L   GLUCOSE, POC    Collection Time: 02/14/17  6:38 AM   Result Value Ref Range    Glucose (POC) 161 (H) 70 - 110 mg/dL   POC G3    Collection Time: 02/14/17  9:02 AM   Result Value Ref Range    Device: VENT      FIO2 (POC) 21 %    pH (POC) 7.378 7.35 - 7.45      pCO2 (POC) 26.2 (L) 35.0 - 45.0 MMHG    pO2 (POC) 89 80 - 100 MMHG    HCO3 (POC) 15.6 (L) 22 - 26 MMOL/L    sO2 (POC) 97 92 - 97 %    Base deficit (POC) 10 mmol/L    Mode ASSIST CONTROL      Tidal volume 450 ml    Set Rate 10 bpm    PEEP/CPAP (POC) 5 cmH2O    Allens test (POC) YES      Total resp. rate 31      Site LEFT RADIAL      Patient temp. 97.2      Specimen type (POC) ARTERIAL      Performed by Levorn Spinner     Volume control plus YES     GLUCOSE, POC    Collection Time: 02/14/17 11:22 AM   Result Value Ref Range    Glucose (POC) 127 (H) 70 - 110 mg/dL         Chemistry Recent Labs      02/14/17   0410  02/13/17   0330  02/12/17   0325   GLU  172*  122*  106*   NA  153*  151*  147*   K  3.6  3.9  4.1   CL  124*  122*  117*   CO2  19*  19*  15*   BUN  29*  42*  79*   CREA  0.67  0.78  1.33*   CA  7.9*  7.9*  8.1*   AGAP  10  10  15   BUCR  40*  51*  59*   AP   --   80  85   TP   --   5.2*  5.7*   ALB   --   2.1*  2.3*   GLOB   --   3.1  3.4   AGRAT   --   0.7*  0.7*        Lactic Acid Lactic acid   Date Value Ref Range Status   02/11/2017 1.6 0.4 - 2.0 MMOL/L Final     Recent Labs      02/11/17   2030   LAC  1.6        Liver Enzymes Protein, total   Date Value Ref Range Status   02/13/2017 5.2 (L) 6.4 - 8.2 g/dL Final     Albumin   Date Value Ref Range Status   02/13/2017 2.1 (L) 3.4 - 5.0 g/dL Final     Globulin   Date Value Ref Range Status   02/13/2017 3.1 2.0 - 4.0 g/dL Final     A-G Ratio   Date Value Ref Range Status   02/13/2017 0.7 (L) 0.8 - 1.7   Final     AST (SGOT)   Date Value Ref Range Status   02/13/2017 32 15 - 37 U/L Final     Alk.  phosphatase   Date Value Ref Range Status   02/13/2017 80 45 - 117 U/L Final     Recent Labs      02/13/17   0330  02/12/17   0325   TP  5.2*  5.7*   ALB  2.1*  2.3*   GLOB  3.1  3.4   AGRAT  0.7*  0.7*   SGOT  32  31   AP  80  85        CBC w/Diff Recent Labs      02/14/17   0410  02/13/17   0330  02/12/17   2040   02/12/17   0700   WBC  14.7*  16.9*   --    --   21.5*   RBC  2.57*  2.76*   --    --   2.34*   HGB  7.2*  7.7*  7.2*   < >  6.6*   HCT  21.8*  23.0*  21.3*   < >  19.6*   PLT  147  166   --    --   184   GRANS  64  77*   --    --    --    LYMPH  14*  13*   --    --    --    EOS  7*  3   --    --    --     < > = values in this interval not displayed. Cardiac Enzymes No results found for: CPK, CKMMB, CKMB, RCK3, CKMBT, CKNDX, CKND1, ISAIAS, TROPT, TROIQ, ERNST, TROPT, TNIPOC, BNP, BNPP     BNP No results found for: BNP, BNPP, XBNPT     Coagulation No results for input(s): PTP, INR, APTT in the last 72 hours.     No lab exists for component: INREXT, INREXT      Thyroid  Lab Results   Component Value Date/Time    TSH 2.41 02/10/2017 05:40 PM       No results found for: T4     Lipid Panel Lab Results   Component Value Date/Time    Cholesterol, total 167 05/05/2010 08:45 AM    HDL Cholesterol 65 05/05/2010 08:45 AM    LDL, calculated 88.4 05/05/2010 08:45 AM    VLDL, calculated 13.6 05/05/2010 08:45 AM    Triglyceride 68 05/05/2010 08:45 AM    CHOL/HDL Ratio 2.6 05/05/2010 08:45 AM        ABG Recent Labs      02/14/17   0902  02/13/17   0840  02/12/17   0856   PHI  7.378  7.315*  7.333*   PCO2I  26.2*  32.5*  31.7*   PO2I  89  137*  192*   HCO3I  15.6*  16.5*  16.9*   FIO2I  21  30  45        Urinalysis Lab Results   Component Value Date/Time    Color DARK YELLOW 02/10/2017 06:15 PM    Appearance CLOUDY 02/10/2017 06:15 PM    Specific gravity 1.018 02/10/2017 06:15 PM    pH (UA) 5.0 02/10/2017 06:15 PM    Protein NEGATIVE  02/10/2017 06:15 PM    Glucose NEGATIVE  02/10/2017 06:15 PM    Ketone TRACE 02/10/2017 06:15 PM    Bilirubin SMALL 02/10/2017 06:15 PM    Urobilinogen 1.0 02/10/2017 06:15 PM    Nitrites NEGATIVE  02/10/2017 06:15 PM    Leukocyte Esterase TRACE 02/10/2017 06:15 PM    Epithelial cells 1+ 02/10/2017 06:15 PM    Bacteria 1+ 02/10/2017 06:15 PM    WBC 4 to 10 02/10/2017 06:15 PM    RBC NONE 02/10/2017 06:15 PM        Micro  Recent Labs      02/12/17   1025   CULT  FEW  NORMAL RESPIRATORY KALEE       Recent Labs      02/12/17   1025   CULT  FEW  NORMAL RESPIRATORY KALEE          EKG  No results found for this or any previous visit. PFT  No flowsheet data found. Other  ASA reactivity:   Pre-albumin:   Ionized Calcium:   NH4:   T3, FT4:  Cortisol:  Urine Osm:  Urine Lytes:   HbA1c:      Ultrasound       LE Doppler       ECHO  2/10/17  Left ventricle: Systolic function was normal. Ejection fraction was  estimated in the range of 55 % to 60 %. No obvious wall motion  abnormalities identified in the views obtained. There was mild concentric  hypertrophy. Mitral valve: There was no evidence for stenosis. There was no significant  regurgitation. Aortic valve: The valve was probably trileaflet. Leaflets exhibited mild  calcification and sclerosis. Not well visualized. Valve mean gradient was  8 mmHg. Tricuspid valve: There was no evidence for tricuspid stenosis. There was  trivial regurgitation. CT (Most Recent)  No results found for this or any previous visit. XR (Most Recent). CXR  reviewed by me and compared with previous CXR   Results from Hospital Encounter encounter on 02/10/17   XR CHEST PORT   Narrative Chest, single view    Indication: Shortness of breath, intubated, follow-up    Comparison: 2/11/2017    Findings:  Portable upright AP view of the chest was obtained. Right jugular  central venous catheter, endotracheal tube, and enteric tube all appear  unchanged in position. The cardiomediastinal silhouette is within normal limits. Central vascular and interstitial prominence appears slightly progressed  compared to prior study.  There is atelectasis in the left midlung. No confluent  airspace opacity otherwise throughout the lungs. No pleural effusion nor  pneumothorax. No acute osseous abnormality. Impression Impression:  Slight interval progression of central vascular and interstitial congestion. Best practice : All below core measures reviewed and addressed:   [x] Antibiotic choice. [x] Severe Sepsis bundles follwed; SIRS screen met? Yes   [x] Fluids. [x] Lactic acid ordered- initial and repeat Q6hrs if elevated till normalized. [x] Cultures drawn. [x] Antibiotic administered within 1hr-ICU and 3hrs ED. [x] Large bore IV- 2 sites        R IJ  [x] Pressors aim MAP >65mmHg. [x] Steroids. [x] Glycemic control. [x] Infection control. [x] Mech. Ventilated patients- aim to keep peak plateau pressure 57-67TO H2O. [x] HOB at >= 30 degree. [x] Stress ulcer prophylaxis. [x] DVT prophylaxis. [x] Central Line Bundle Followed. [x] Juarez Bundle Followed. [x] Ventilator Bundle Followed. (Daily sedation holiday to assess readiness for weaning from ventilator & SBT trial starting at 6.00 am, HOB 30-degree elevation, Chlorhexidine mouth washes & Routine oral care every 4 hours, [place] tube to suction at 20-30 cm H2O, Maintain Brenda tube with 5-10ml air every 4 hours, DVT prophylaxis, GI prophylaxis etc.).     The patient is: [] acutely ill Risk of deterioration: [] moderate    [x] critically ill  [x] high        Marianela Bynum NP  2/14/2017

## 2017-02-14 NOTE — CONSULTS
[facility] INTENSIVE CARE 2  [address] [facility] [place] [address]  [phone]  Colon and Rectal Surgery Consult  Note      Patient: Jason Sotomayor MRN: [id number]  SSN: [id number]    YOB: 1955  Age: 64 y.o. Sex: female      Admit Date: 2/10/2017    LOS: 4 days     Subjective:     Ms. Lilly Thompson is a 65 yo female brought in by [facility] after being reported down for 3 days unresponsive covered in feces and bedbugs. She has a hx of DM, HTN, liver disease, morbid obesity and lymphedema. Her son who is her caregiver is mentally challenged. She was in acute respiratory failure and intubated. It was noted that she was in septic shock, likely source skin with large tunneling sacral and gluteal wounds. No other family members available. She is in ICU on ventilator and on pressors. We were called in to evaluate her large sacral decubitus which is most likely involving the rectum. Objective:     Vitals:    02/14/17 1100 02/14/17 1200 02/14/17 1233 02/14/17 1423   BP: 126/51      Pulse: 100  (!) 105    Resp: 28  (!) 31    Temp:  98.8 °F (37.1 °C)     SpO2: 100%  100% 100%   Weight:       Height:            Intake and Output:  Current Shift: 02/14 0701 - 02/14 1900  In: 926.8 [I.V.:516.8]  Out: 175 [Urine:175]  Last three shifts: 02/12 1901 - 02/14 0700  In: 6010.2 [I.V.:4974.2]  Out: 7079 [Urine:3535]    Physical Exam:   GENERAL: intubated  ABDOMEN: soft, non-tender.  Bowel sounds normal. No masses,  no organomegaly  EXTREMITIES:  See wound care's notes and pictures of extremities   SKIN:See wound care's notes and pictures of extremities  NEUROLOGIC: intubated  PSYCHIATRIC: intubated    Lab/Data Review:  BMP:   Lab Results   Component Value Date/Time     (H) 02/14/2017 04:10 AM    K 3.6 02/14/2017 04:10 AM     (H) 02/14/2017 04:10 AM    CO2 19 (L) 02/14/2017 04:10 AM    AGAP 10 02/14/2017 04:10 AM     (H) 02/14/2017 04:10 AM    BUN 29 (H) 02/14/2017 04:10 AM    CREA 0.67 02/14/2017 04:10 AM GFRAA >60 02/14/2017 04:10 AM    GFRNA >60 02/14/2017 04:10 AM     CBC:   Lab Results   Component Value Date/Time    WBC 14.7 (H) 02/14/2017 04:10 AM    HGB 7.2 (L) 02/14/2017 04:10 AM    HCT 21.8 (L) 02/14/2017 04:10 AM     02/14/2017 04:10 AM     Recent Glucose Results:   Lab Results   Component Value Date/Time     (H) 02/14/2017 04:10 AM     ABG:   Lab Results   Component Value Date/Time    PHI 7.378 02/14/2017 09:02 AM    PCO2I 26.2 (L) 02/14/2017 09:02 AM    PO2I 89 02/14/2017 09:02 AM    HCO3I 15.6 (L) 02/14/2017 09:02 AM    FIO2I 21 02/14/2017 09:02 AM     COAGS: No results found for: APTT, PTP, INR  Liver Panel: No results found for: ALB, CBIL, TBIL, TP, GLOB, AGRAT, SGOT, ASTPOC, ALTPOC, ALT, GPT, AP         Assessment:     Active Problems:    Septic shock ([address] (2/10/2017)      Acute respiratory failure ([address] (2/13/2017)      Debility (2/13/2017)        Plan:     Awaiting guardianship or family members to make decisions for patient  Her overall outlook is poor at this time  Will be available if necessary    Signed By: Dru Leonard NP        February 14, 2017

## 2017-02-14 NOTE — PROGRESS NOTES
-0900-Received patient on ventilator ACVC+ 10, VT-450, PEEP-5, FIO2-21%. O2 Sats-100% HR-104, RR-31, ETT noted to be patent and secure. Respiratory will continue to monitor. -[facility]    -1620-Pt. Remains on  Above ventilator on settings. O2 sats-100% HR-120, RR-36-40. Proprofol being restarted  Due tachypneia and tachycardia. -[facility] No SBT this shift as patient remains unstable. -[address]

## 2017-02-14 NOTE — PROGRESS NOTES
[facility]: [phone])  Formerly [facility]: [address] [place] [address] Way: [phone]    Patient Name: Nicolas Mosley  YOB: 1955    Date of Initial Consult: 2/13/2017   Reason for Consult: care decisions  Requesting Provider:  LIZETT Ng  Primary Care Physician: Aroldo Alvarez MD      SUMMARY:   Nicolas Mosley is a 64y.o. year old with a past history of diabetes, hypertension, morbid obesity, liver disease, lymphedema, who was admitted on 2/10/2017 from  home with a diagnosis of septic shock and respiratory distress requiring intubation . Current medical issues leading to Palliative Medicine involvement include: 64year old who was found down on floor at home covered with feces and bed bugs, with large decubitus ulcer, septic with shock, intubated due to acute respiratory distress. Her overall outlook is poor. Palliative medicine is consulted for care decisions. PALLIATIVE DIAGNOSES:   1. Decubitus ulcer  2. Acute respiratory failure   3. Septic shock   4. Debility          PLAN:   1. Patient seen at bedside, remains intubated, and sedated. Comfortable appearing. No family or friends at bedside. 2. Goals of care full code. Medical decision maker in question. Appreciate case management assistance. They have faxed paper work to  for guardianship process to begin. We will be available to have care discussions once guardian is appointed. 3. Decubitus ulcer, large per report, wound care involved. Morbid obesity, likely she was bed / chair bound at home. Alb 1.5 on admit. Unsure of who was care giver or helping her at home. Likely she will need nursing facility care if she survives this event. 4. Septic shock PCCM on board. bld cx NGTD. Remains on Levophed and IVAB  5. Initial consult note routed to primary continuity provider  6.  Communicated plan of care with: Palliative IDT, ICU team       GOALS OF CARE:     [====Goals of Care====]  GOALS OF CARE:  Patient / health care proxy stated goals: full code with aggressive measures       TREATMENT PREFERENCES:   Code Status: Full Code    Advance Care Planning:  Advance Care Planning 2/13/2017   Patient's Healthcare Decision Maker is: Legal Next eLonel Rodarte   Primary Decision Maker Name Bran Walters   Primary Decision Maker Phone Number [phone]   Primary Decision Maker Relationship to Patient Spouse   Confirm Advance Directive None   Patient Would Like to Complete Advance Directive No   Does the patient have other document types -       Other:    The palliative care team has discussed with patient / health care proxy about goals of care / treatment preferences for patient.  [====Goals of Care====]    Advance Care Planning 2/13/2017   Patient's Healthcare Decision Maker is: Legal Next of Stalin Rodarte   Primary Decision Maker Name Bran Walters   Primary Decision Maker Phone Number [phone]   Primary Decision Maker Relationship to Patient Spouse   Confirm Advance Directive None   Patient Would Like to Complete Advance Directive No   Does the patient have other document types -           HISTORY:     History obtained from: chart     CHIEF COMPLAINT: septic shock     HPI/SUBJECTIVE:    The patient is:   [] Verbal and participatory  [x] Non-participatory due to: intubated and sedated     Please see summary  2/14/2017 99 102 28 126/51 100% on FIO2 21%   Wbc 14.7 hgb 7.2, creat 0.67 BUN 29 ammonia 13   Clinical Pain Assessment (nonverbal scale for nonverbal patients): Pain: 0  Adult Non-Verbal Pain Assessment    Face  [x] 0   No particular expression or smile  [] 1   Occasional grimace, tearing, frowning, wrinkled forehead  [] 2   Frequent grimace, tearing, frowning, wrinkled forehead    Activity (movement)  [x] 0   Lying quietly, normal position  [] 1   Seeking attention through movement or slow, cautious movement  [] 2   Restless, excessive activity and/or withdrawal reflexes    Guarding  [x] 0   Lying quietly, no positioning of hands over areas of body  [] 1   Splinting areas of the body, tense  [] 2   Rigid, stiff    Physiology (vital signs)  [x] 0   Stable vital signs  [] 1   Change in any of the following: SBP > 20mm Hg; HR > 20/minute  [] 2   Change in any of the following: SBP > 30mm Hg; HR > 25/minute    Respiratory  [x] 0   Baseline RR/SpO2, compliant with ventilator  [] 1   RR > 10 above baseline, or 5% drop SpO2, mild asynchrony with ventilator  [] 2   RR > 20 above baseline, or 10% drop SpO2, asynchrony with ventilator    Total Non-Verbal Pain Score: 0       FUNCTIONAL ASSESSMENT:     Palliative Performance Scale (PPS): 20%          PSYCHOSOCIAL/SPIRITUAL SCREENING:     Advance Care Planning:  Advance Care Planning 2/13/2017   Patient's Healthcare Decision Maker is: Legal Next of Stalin 69   Primary Decision Maker Name Malik Lawson   Primary Decision Maker Phone Number [phone]   Primary Decision Maker Relationship to Patient Spouse   Confirm Advance Directive None   Patient Would Like to Complete Advance Directive No   Does the patient have other document types -        Any spiritual / Jewish concerns:  [] Yes /  [x] No    Caregiver Burnout:  [] Yes /  [] No /  [x] No Caregiver Present      Anticipatory grief assessment:   [x] Normal  / [] Maladaptive       ESAS Anxiety:      ESAS Depression:          REVIEW OF SYSTEMS:     Positive and pertinent negative findings in ROS are noted above in HPI. The following systems were [] reviewed / [x] unable to be reviewed as noted in HPI  Other findings are noted below. Systems: constitutional, ears/nose/mouth/throat, respiratory, gastrointestinal, genitourinary, musculoskeletal, integumentary, neurologic, psychiatric, endocrine. Positive findings noted below.   Modified ESAS Completed by: provider           Pain: 0           Dyspnea: 0                    PHYSICAL EXAM:     Wt Readings from Last 3 Encounters:   02/14/17 156.2 kg (344 lb 5.7 oz)   17 (!) 206.7 kg (455 lb 11.1 oz)   16 (!) 210.2 kg (463 lb 6.4 oz)     Blood pressure 126/51, pulse 100, temperature 99 °F (37.2 °C), resp. rate 28, height 5' 5\" (1.651 m), weight 156.2 kg (344 lb 5.7 oz), SpO2 100 %, not currently breastfeeding. Pain:  Pain Scale 1: Adult Nonverbal Pain Scale  Pain Intensity 1: 0                 Last bowel movement: FMS    Constitutional: ill appearing female, who is orally intubated lying in bed, in NAD  ENMT: orally intubated   Respiratory: ventilator supported/ breathing over the vent  Neurologic: sedated on propofol, did not respond to verbal stimulus        HISTORY:     Active Problems:    Septic shock (HCC) (2/10/2017)      Acute respiratory failure ([address] (2017)      Debility (2017)      Past Medical History   Diagnosis Date    Anemia     Anxiety 2016    Arthritis     Cancer ([address]      pre cancerous cells in the uterus    Chronic pain      knees    Chronic pain of both knees 2016    Cigarette nicotine dependence in remission 2016    Diabetes ([address]     Fatty liver 2016    Headache     Hypertension     Liver disease      fatty liver    Lymphedema 2016    Morbid obesity ([address] 2016    Psychotic disorder      anxiety and panic attacks    Thyroid disease       Past Surgical History   Procedure Laterality Date    Hx cholecystectomy      Hx gyn  2006     hysterectomy    Hx  section        Family History   Problem Relation Age of Onset    Diabetes Mother     Hypertension Mother     Lung Disease Father      History reviewed, no pertinent family history.   Social History   Substance Use Topics    Smoking status: Never Smoker    Smokeless tobacco: Never Used    Alcohol use No     Allergies   Allergen Reactions    Ace Inhibitors Nausea and Vomiting    Hydralazine Nausea and Vomiting    Ibuprofen Shortness of Breath    Norvasc [Amlodipine] Swelling    Sulfa (Sulfonamide Antibiotics) Itching    Tramadol Shortness of Breath      Current Facility-Administered Medications   Medication Dose Route Frequency    vancomycin (VANCOCIN) 1,000 mg in 0.9% sodium chloride (MBP/ADV) 250 mL adv  1,000 mg IntraVENous Q12H    [START ON 2/16/2017] VANCOMYCIN INFORMATION NOTE   Other ONCE    piperacillin-tazobactam (ZOSYN) 3.375 g in  mL MBP ##EXTENDED 4 HRS INFUSION  3.375 g IntraVENous Q8H    dextrose 5% lactated ringers infusion  100 mL/hr IntraVENous CONTINUOUS    0.9% sodium chloride infusion 250 mL  250 mL IntraVENous PRN    albuterol-ipratropium (DUO-NEB) 2.5 MG-0.5 MG/3 ML  3 mL Nebulization Q6H RT    sodium chloride (NS) flush 5-10 mL  5-10 mL IntraVENous PRN    levoFLOXacin (LEVAQUIN) 750 mg in D5W IVPB  750 mg IntraVENous Q24H    NOREPINephrine (LEVOPHED) 8,000 mcg in dextrose 5% 250 mL infusion  0-20 mcg/min IntraVENous TITRATE    insulin lispro (HUMALOG) injection   SubCUTAneous Q6H    glucose chewable tablet 16 g  4 Tab Oral PRN    glucagon (GLUCAGEN) injection 1 mg  1 mg IntraMUSCular PRN    dextrose (D50W) injection syrg 12.5-25 g  25-50 mL IntraVENous PRN    vasopressin (VASOSTRICT) 100 Units in 0.9% sodium chloride 100 mL infusion  0.01-0.04 Units/min IntraVENous TITRATE    propofol (DIPRIVAN) infusion  0-50 mcg/kg/min IntraVENous TITRATE    midazolam (VERSED) injection 2 mg  2 mg IntraVENous Q1H PRN    heparin (porcine) injection 5,000 Units  5,000 Units SubCUTAneous Q8H    pantoprazole (PROTONIX) 40 mg in sodium chloride 0.9 % 10 mL injection  40 mg IntraVENous DAILY        LAB AND IMAGING FINDINGS:     Lab Results   Component Value Date/Time    WBC 14.7 02/14/2017 04:10 AM    HGB 7.2 02/14/2017 04:10 AM    PLATELET 757 06/03/8827 04:10 AM     Lab Results   Component Value Date/Time    Sodium 153 02/14/2017 04:10 AM    Potassium 3.6 02/14/2017 04:10 AM    Chloride 124 02/14/2017 04:10 AM    CO2 19 02/14/2017 04:10 AM    BUN 29 02/14/2017 04:10 AM    Creatinine 0.67 02/14/2017 04:10 AM    Calcium 7.9 02/14/2017 04:10 AM    Magnesium 2.7 02/11/2017 04:25 AM    Phosphorus 6.8 02/11/2017 04:25 AM      Lab Results   Component Value Date/Time    AST (SGOT) 32 02/13/2017 03:30 AM    Alk. phosphatase 80 02/13/2017 03:30 AM    Protein, total 5.2 02/13/2017 03:30 AM    Albumin 2.1 02/13/2017 03:30 AM    Globulin 3.1 02/13/2017 03:30 AM     Lab Results   Component Value Date/Time    INR 1.4 02/11/2017 11:16 AM    Prothrombin time 16.6 02/11/2017 11:16 AM      No results found for: IRON, FE, TIBC, IBCT, PSAT, FERR   No results found for: PH, PCO2, PO2  No components found for: [place]   Lab Results   Component Value Date/Time     02/10/2017 05:40 PM    CK - MB 5.4 02/10/2017 05:40 PM              Total time: 15 minutes   Counseling / coordination time: 10 minutes  > 50% counseling / coordination?: yes    Prolonged service was provided for  []30 min   []75 min in face to face time in the presence of the patient. Time Start:   Time End:   Note: this can only be billed with 39457 (initial) or 78358 (follow up). If multiple start / stop times, list each separately.
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
## 2017-02-14 NOTE — PROGRESS NOTES
[facility] ABX Dosing Services Update Note     Labs:   Level: 20.9 mcg/dl (18 hours post dose)  Bun/Serum Creatinine - 29 mg/dl / 0.67 mg/dl  CrCl - 134.6 ml/min    Renal assessment: extrapolated CrCl~72 based on Vanco trough. Empirically ABX for treatment of sepsis     Vancomycin:  Was on a regimen of 2 g IV q18h  Goal trough 15-20. Change to 1 g IV q12hrs. Recheck trough on 2/16. Zosyn:  Changed Zosyn to 3.375 gm IV q8h extended 4 hours infusion. Pharmacy to follow and will make changes to dose and/or frequency based on clinical status. Thanks for the consult.

## 2017-02-14 NOTE — PROGRESS NOTES
2000 Nursing assessment done, sedated, intubated no distress noted. 0130 Bath given and dressings changed. Blisters and bleeding noted back and legs. No distress noted. 0600 CXR done as ordered Stool like drainage from the vagina.

## 2017-02-14 NOTE — PROGRESS NOTES
Patient in bed on back with head elevated - BBS equal and diminished - ETT secure at 23 at the lip and moved to the center of the mouth - patient suctioned and ISHAN tube cleared - [facility] at [facility] - skin warm and dry, appropriate for ethnicity - vent alarms set    0056 - patient suctioned, ISHAN tube cleared, ETT moved to the left side of the mouth    0506 - patient suctioned and ISHAN tuibe cleared, ETT moved to the right side of the mouth

## 2017-02-15 NOTE — PROGRESS NOTES
-0920-Received patient on ventilator ACVC+ 10, VT-450, PEEP-5, FIo2-21%. O2 Sats-100, HR-103, RR-32. ETT noted to be patent and secure. Respiratory will continue to [address] [place] [address]    1625-Pt. Remains on above settings. O2 Sats-100% HR-113, RR-36. No SBT this shift.  Pt. Remains sedated and tachypneic OF-24-33-[facility]

## 2017-02-15 NOTE — ROUTINE PROCESS
1530-Patient's son and ex- are visiting. I spoke with the son to query about home health services. I asked \"Did your mom have any home health services? \"   He did not seem to understand. I then asked \"Has anyone ever come to the house to help your mom, like a nurse? \"  He did not seem to understand  the question no matter how I posed it. He stated \"a nurse came to the house yesterday but my mom is sitting here. \"  It is questionable if he has the mental capacity to understand the current situation or to make healthcare decisions. Will update case management.

## 2017-02-15 NOTE — PROGRESS NOTES
[place] [address] [facility] 2  [address]  [phone]  Colon and Rectal Surgery Progress Note      Patient: Nicolas Mosley MRN: [id number]  SSN: [id number]    YOB: 1955  Age: 64 y.o. Sex: female      Admit Date: 2/10/2017    LOS: 5 days     Subjective:     On ventilator with sedation turned off.  No pt response per RN caring for patient  No family or guardianship in place    Objective:     Vitals:    02/15/17 1030 02/15/17 1100 02/15/17 1130 02/15/17 1142   BP:  143/58     Pulse: (!) 102 (!) 106 (!) 103 (!) 106   Resp: (!) 31 (!) 32 (!) 32 (!) 33   Temp:       SpO2: 100% 100% 100% 100%   Weight:       Height:            Intake and Output:  Current Shift: 02/15 0701 - 02/15 1900  In: 745.8 [I.V.:705.8]  Out: 400 [Urine:400]  Last three shifts: 02/13 1901 - 02/15 0700  In: 3327.7 [I.V.:2697.7]  Out: 2485 [Urine:2485]    Physical Exam:   GENERAL: intubated and sedated  ABDOMEN:  obese  EXTREMITIES:  venous stasis dermatitis noted, edema Obie  SKIN: multiple decubitus on heels, arms, thighs, sacral area  NEUROLOGIC: Unable to evaluate     Lab/Data Review:  BMP:   Lab Results   Component Value Date/Time     (H) 02/15/2017 04:00 AM    K 3.8 02/15/2017 04:00 AM     (H) 02/15/2017 04:00 AM    CO2 19 (L) 02/15/2017 04:00 AM    AGAP 10 02/15/2017 04:00 AM     (H) 02/15/2017 04:00 AM    BUN 24 (H) 02/15/2017 04:00 AM    CREA 0.67 02/15/2017 04:00 AM    GFRAA >60 02/15/2017 04:00 AM    GFRNA >60 02/15/2017 04:00 AM     CBC:   Lab Results   Component Value Date/Time    WBC 15.1 (H) 02/15/2017 04:00 AM    HGB 7.8 (L) 02/15/2017 04:00 AM    HCT 23.3 (L) 02/15/2017 04:00 AM     02/15/2017 04:00 AM     Recent Glucose Results:   Lab Results   Component Value Date/Time     (H) 02/15/2017 04:00 AM            Assessment:     Active Problems:    Septic shock ([facility] (2/10/2017)      Acute respiratory failure ([facility] (2/13/2017)      Debility (2/13/2017)        Plan: Will sign off at this time  Pt is not a surgical candidate at this time and is stable per Dr. Alcala Mention  Discussed the patient with Dr. Nilam Hernandez and he is in agreement  [address] is involved    Signed By: Ciara Chan NP        February 15, 2017

## 2017-02-15 NOTE — PROGRESS NOTES
2000 Nursing assessment done, sedated, no distress noted. Dr. Todd Muro here, saw pt , may take to CT in AM as stated. 2230 HR up to 158, EKG done, suctioned, good cough. HR dropped to 116, blood pressure remains unchanged. 0130 Bath given and dressing change done. Some bleeding noted from the back. Eyes open, does not follow commands or withdraws to pain. 0600 CXR as ordered. Condition remains unchanged.

## 2017-02-15 NOTE — CDMP QUERY
Please document if you concur with the diagnosis of acute metabolic encephalopathy   =>  =>Other Explanation of clinical findings  =>Unable to Determine (no explanation of clinical findings)    The medical record reflects the following clinical findings, treatment, and risk factors:  =>60 yo female with PMH DM, HTN, liver disease, morbid obesity, multiple decubitus ulcers  =>Per ED MD \"EMS personnel say this is the second time they have picked her up this month. The first time they saw her she was talking, and state she is not currently at her baseline\"  =>Per Hospitalist note 2/11    \"Encephalopathy from her sepsis\"  =>Per Pulmonary note 2/12    \"Acute encephalopathy, likely metabolic, sepsis\"               Please clarify and document your clinical opinion in the progress notes and discharge summary including the definitive and/or presumptive diagnosis, (suspected or probable), related to the above clinical findings. Please include clinical findings supporting your diagnosis. If you DECLINE this query or would like to communicate with [facility], please utilize the \"[facility] message box\" at the TOP of the Progress Note on the right.       Thank you,    David Brice RN BSN  [facility] [phone]

## 2017-02-15 NOTE — PROGRESS NOTES
Problem: Ventilator Management  Goal: *Adequate oxygenation and ventilation  Pt. Intubated secondary to Septic Shock      Current ventilator settings- ACVC+ 10, VT-450, PEEP-5, FIo2-21%      Current ABG-2/15/2017 0946- Ph-7.412, PCo2-28.7, PO2-102, HCo3-8.3, SO2-98%      X ray-2/15/2017 43453-Ks radiographic evidence of an acute abnormality.         Plan: Maintain Ventilatory Support      Goal: Adequate oxygenation and ventilation

## 2017-02-15 NOTE — PROGRESS NOTES
2 [facility]  Hospitalist Division    Daily progress Note    Patient: Alex Wyatt MRN: [id number]  CSN: [id number]    YOB: 1955  Age: 64 y.o.   Sex: female    DOA: 2/10/2017 LOS:  LOS: 5 days                    Subjective:     CC: Sepsis   Remains intubated and sedated   Off pressors now     Objective:      Visit Vitals    /56 (BP 1 Location: Right arm, BP Patient Position: At rest)    Pulse (!) 115    Temp 98.4 °F (36.9 °C)    Resp (!) 35    Ht 5' 5\" (1.651 m)    Wt (!) 194.4 kg (428 lb 9.2 oz)    SpO2 100%    Breastfeeding No    BMI 71.32 kg/m2       Physical Exam:    NC/AT  NO JVD,TMG  S1/S2 RRR  CTAB, NO WHEEZING  NT,ND, NABS  +  EDEMA  See wound care note for sacral and LE  ulcers        Intake and Output:  Current Shift:  02/15 0701 - 02/15 1900  In: 2047.3 [I.V.:1317.3]  Out: 680 [Urine:680]  Last three shifts:  02/13 1901 - 02/15 0700  In: 3327.7 [I.V.:2697.7]  Out: 2485 [Urine:2485]    Recent Results (from the past 24 hour(s))   GLUCOSE, POC    Collection Time: 02/14/17  5:06 PM   Result Value Ref Range    Glucose (POC) 150 (H) 70 - 110 mg/dL   EKG, 12 LEAD, SUBSEQUENT    Collection Time: 02/14/17 10:11 PM   Result Value Ref Range    Ventricular Rate 154 BPM    Atrial Rate 163 BPM    QRS Duration 88 ms    Q-T Interval 296 ms    QTC Calculation (Bezet) 474 ms    Calculated R Axis 70 degrees    Calculated T Axis -84 degrees    Diagnosis       Supraventricular tachycardia  Non-specific ST/T wave changes  Abnormal ECG  Confirmed by Daina Leo ([id number]) on 2/15/2017 3:48:25 PM     GLUCOSE, POC    Collection Time: 02/15/17 12:03 AM   Result Value Ref Range    Glucose (POC) 135 (H) 70 - 110 mg/dL   EKG, 12 LEAD, SUBSEQUENT    Collection Time: 02/15/17  3:49 AM   Result Value Ref Range    Ventricular Rate 109 BPM    Atrial Rate 109 BPM    P-R Interval 138 ms    QRS Duration 88 ms    Q-T Interval 362 ms    QTC Calculation (Bezet) 487 ms    Calculated P Axis 80 degrees    Calculated R Axis 72 degrees    Calculated T Axis -28 degrees    Diagnosis       Sinus tachycardia  Nonspecific T wave abnormality  Abnormal ECG  When compared with ECG of 14-FEB-2017 22:11,  Nonspecific T wave abnormality has replaced inverted T waves in Inferior   leads  Confirmed by Landy Faust ([id number]) on 2/15/2017 3:49:15 PM     CBC WITH AUTOMATED DIFF    Collection Time: 02/15/17  4:00 AM   Result Value Ref Range    WBC 15.1 (H) 4.6 - 13.2 K/uL    RBC 2.82 (L) 4.20 - 5.30 M/uL    HGB 7.8 (L) 12.0 - 16.0 g/dL    HCT 23.3 (L) 35.0 - 45.0 %    MCV 82.6 74.0 - 97.0 FL    MCH 27.7 24.0 - 34.0 PG    MCHC 33.5 31.0 - 37.0 g/dL    RDW 14.8 (H) 11.6 - 14.5 %    PLATELET 176 095 - 913 K/uL    MPV 9.5 9.2 - 11.8 FL    NEUTROPHILS 75 (H) 40 - 73 %    LYMPHOCYTES 12 (L) 21 - 52 %    MONOCYTES 6 3 - 10 %    EOSINOPHILS 7 (H) 0 - 5 %    BASOPHILS 0 0 - 2 %    ABS. NEUTROPHILS 11.2 (H) 1.8 - 8.0 K/UL    ABS. LYMPHOCYTES 1.8 0.9 - 3.6 K/UL    ABS. MONOCYTES 1.0 0.05 - 1.2 K/UL    ABS. EOSINOPHILS 1.1 (H) 0.0 - 0.4 K/UL    ABS.  BASOPHILS 0.0 0.0 - 0.06 K/UL    DF AUTOMATED     METABOLIC PANEL, BASIC    Collection Time: 02/15/17  4:00 AM   Result Value Ref Range    Sodium 154 (H) 136 - 145 mmol/L    Potassium 3.8 3.5 - 5.5 mmol/L    Chloride 125 (H) 100 - 108 mmol/L    CO2 19 (L) 21 - 32 mmol/L    Anion gap 10 3.0 - 18 mmol/L    Glucose 152 (H) 74 - 99 mg/dL    BUN 24 (H) 7.0 - 18 MG/DL    Creatinine 0.67 0.6 - 1.3 MG/DL    BUN/Creatinine ratio 36 (H) 12 - 20      GFR est AA >60 >60 ml/min/1.73m2    GFR est non-AA >60 >60 ml/min/1.73m2    Calcium 8.1 (L) 8.5 - 10.1 MG/DL   GLUCOSE, POC    Collection Time: 02/15/17  5:56 AM   Result Value Ref Range    Glucose (POC) 176 (H) 70 - 110 mg/dL   POC G3    Collection Time: 02/15/17  9:46 AM   Result Value Ref Range    Device: VENT      FIO2 (POC) 21 %    pH (POC) 7.412 7.35 - 7.45      pCO2 (POC) 28.7 (L) 35.0 - 45.0 MMHG    pO2 (POC) 102 (H) 80 - 100 MMHG    HCO3 (POC) 18.3 (L) 22 - 26 MMOL/L    sO2 (POC) 98 (H) 92 - 97 %    Base deficit (POC) 6 mmol/L    Mode ASSIST CONTROL      Tidal volume 450 ml    Set Rate 10 bpm    PEEP/CPAP (POC) 5 cmH2O    Allens test (POC) YES      Total resp. rate 32      Site LEFT RADIAL      Patient temp.  98.0      Specimen type (POC) ARTERIAL      Performed by Stormy Baker     Volume control plus YES     GLUCOSE, POC    Collection Time: 02/15/17 11:33 AM   Result Value Ref Range    Glucose (POC) 144 (H) 70 - 110 mg/dL   GLUCOSE, POC    Collection Time: 02/15/17  1:27 PM   Result Value Ref Range    Glucose (POC) 138 (H) 70 - 110 mg/dL         Current Facility-Administered Medications:     chlorhexidine (PERIDEX) 0.12 % mouthwash 10 mL, 10 mL, Oral, Q12H, Rodger Lemons NP    vancomycin (VANCOCIN) 1,000 mg in 0.9% sodium chloride (MBP/ADV) 250 mL adv, 1,000 mg, IntraVENous, Q12H, Alexia Mora MD, Last Rate: 250 mL/hr at 02/15/17 0742, 1,000 mg at 02/15/17 0742    [START ON 2/16/2017] VANCOMYCIN INFORMATION NOTE, , Other, ONCE, Alexia Mora MD    piperacillin-tazobactam (ZOSYN) 3.375 g in  mL MBP ##EXTENDED 4 HRS INFUSION, 3.375 g, IntraVENous, Q8H, Alexia Mora MD, Last Rate: 25 mL/hr at 02/15/17 1335, 3.375 g at 02/15/17 1335    dextrose 5% lactated ringers infusion, 100 mL/hr, IntraVENous, CONTINUOUS, LIZETT Kelley, Last Rate: 100 mL/hr at 02/15/17 1005, 100 mL/hr at 02/15/17 1005    0.9% sodium chloride infusion 250 mL, 250 mL, IntraVENous, PRN, LIZETT Kelley    albuterol-ipratropium (DUO-NEB) 2.5 MG-0.5 MG/3 ML, 3 mL, Nebulization, Q6H RT, LIZETT Kelley, 3 mL at 02/15/17 1409    sodium chloride (NS) flush 5-10 mL, 5-10 mL, IntraVENous, PRN, David Gold DO    levoFLOXacin (LEVAQUIN) 750 mg in D5W IVPB, 750 mg, IntraVENous, Q24H, Augusta Jones DO, Last Rate: 100 mL/hr at 02/14/17 1805, 750 mg at 02/14/17 1805    insulin lispro (HUMALOG) injection, , SubCUTAneous, Q6H, Rocio Figueroa MD, Stopped at 02/15/17 1200    glucose chewable tablet 16 g, 4 Tab, Oral, PRN, Rocio Figueroa MD    glucagon [facility]) injection 1 mg, 1 mg, IntraMUSCular, PRN, Rocio Figueroa MD    dextrose (D50W) injection syrg 12.5-25 g, 25-50 mL, IntraVENous, PRN, Rocio Figueroa MD    propofol (DIPRIVAN) infusion, 0-50 mcg/kg/min, IntraVENous, TITRATE, Rocio Figueroa MD, Stopped at 02/15/17 1037    midazolam (VERSED) injection 2 mg, 2 mg, IntraVENous, Q1H PRN, Rocio Figueroa MD, 2 mg at 02/11/17 1815    heparin (porcine) injection 5,000 Units, 5,000 Units, SubCUTAneous, Q8H, Rocio Figueroa MD, 5,000 Units at 02/15/17 1135    pantoprazole (PROTONIX) 40 mg in sodium chloride 0.9 % 10 mL injection, 40 mg, IntraVENous, DAILY, Rocio Figueroa MD, 40 mg at 02/15/17 0844    Lab Results   Component Value Date/Time    Glucose 152 02/15/2017 04:00 AM    Glucose 172 02/14/2017 04:10 AM    Glucose 122 02/13/2017 03:30 AM    Glucose 106 02/12/2017 03:25 AM    Glucose 116 02/11/2017 04:25 AM        Assessment/Plan     Active Problems:    Septic shock ([facility] [address] (2/10/2017)      Acute respiratory failure ([facility] [address] (2/13/2017)      Debility (2/13/2017)      Septic shock, monitor off pressors   Cont  abx  Acute hypoxic resp failure 2/2 above, wean as carloz  JOAQUIN on CKD 2/2 sepsis, reolved  acute metabolic encephalopathy   Hypernatremia, change IVF and water flushes   DM, stable   Morbid obesity  HTN stable  Fever, resolving   Anemia, stable post transfusion   Hyperkalemia, resolved  Multiple ulcer including B/L heels, large sacral ulcer  Wound care and surgical team are following   May need debridement               Bernie Jhaveri MD  2/15/2017, 5:13 PM

## 2017-02-15 NOTE — PROGRESS NOTES
[facility]  Pulmonary, Critical Care, and Sleep Medicine    Name: Jennifer Claros MRN: [id number]   : 1955 Hospital: [address] [place] [address]   Date: 2/15/2017        [facility]                                              [x] I have reviewed the flowsheet and previous days notes. Events, vitals, medications and notes from last 24 hours reviewed. Care plan discussed with staff, patient, family and on multidisciplinary rounds. 2/15/2017  Open eyes, appear tracking, does not follow commands-Off Propofol  Off all pressors, BP stable  Hb stable, s/p 1uPRBC  D/w wound care team, \"continue wound care, pt will probably benefit with wound debridement by general surgery,\" once guardianship in place for care goals. WBC trending down then sl up today, Cr wnl,   Na still elevated-on free water flushes      IMPRESSION:   · Septic shock requiring pressors, likely source skin with noted severe large tunneling sacral, gluteal wounds  · Acute respiratory failure in setting of severe sepsis requiring MV  · Acute encephalopathy, likely metabolic, sepsis  · Hyperkalemia, s/p tx in ED, resolved  · Lactic acidosis, resolved  · JOAQUIN on CKD likely due to sepsis, volume depletion, resolving  · Metabolic acidosis, improved  · Elevated ammonia, improved  · Diarrhea, improved  · DM  · Morbid obesity  · Bedbug infestation  · Hx HTN    · Code status: FULL      RECOMMENDATIONS:   · CVS: Tachycardic low 100's. Monitor HD. Off levo/Vasopressin,  maintain MAP>65,  RIJ in place. Negative enzymes. ECHO EF 55-60%. S/p Albumin x 4 doses, lactic acid normalized 1.6 (). · Respiratory: Tachypnea rate in low 30's. Continue mechanical ventilation, Off sedation, SBT per protocol, continue duonebs. High risk aspiration. CXR 2/15 reviewed. ABG ok. ABG and CXR PRN or condition change. · ID:  Antibiotic-levaquin, zosyn, vanc. Urine cx +yeast. Wound cx: GPC pairs/EF/CK/CG/Diptheroids.  Wound care team discussion - \"continue wound care, pt will probably benefit for wound debridement by general surgery (await guardianship). \"  Colorectal surgery signed off for now. leukocytosis but currently afebrile. Trend temp and wbc. Sepsis bundle. · Hematology/Oncology:  Hb stable, s/p1u PRBC, Nl coags. · Renal:  Follow BUN, Cr, I/O. Cr better  · GI/:  Continue obando for now. PPI. Trend LFT, ammonia-ok. amylase and lipase ok  · Endocrine:  Nl TSH. Nl cortisol. Glycemic control. ISS prn, avoid hypoglycemia. IVF   · Neurology/Pain/Sedation:  OFF Propofol- eyes open but does not follow commands. CT head/imaging not done-unable to accomodate weight. ? neuro eval if pt unable to fully awaken-sedation was turn off today. .  · Skin/Wound/Musc: Wound care following. · FEN: TF increase 30 ml/hr . IVF-D5. Free water for hypernatremia. Replace lytes prn per protocol. · Prophylaxis: GI Prophylaxis with protonix. DVT Prophylaxis with heparin. · Restraints: B wrist  ·   · Await Guardianship, Pt lives with son (mentally challenge- unable to make decisions). · Palliative following for care goals. ·                                                               ·   ·   · Glycemic Control. Glucose stabilizer per ICU protocol when on insulin drip. Maintain blood glucose 140-180. · Replace electrolytes per ICU electrolyte replacement protocol  · Ventilator bundle & Sedation protocol followed. Daily sedation holiday, assessment for readiness for SBT and then re-titrate if required. Chlorhexidine mouth washes. · HOB 30 degree elevation all the time. Aggressive pulmonary toileting. Incentive spirometry when appropriate. Aspiration precautions. · Sepsis bundle and protocol followed. Follow serial lactic acid, frequent BMP check, fluid resuscitation. Follow cultures. Deescalate antibiotic when appropriate. · [place] bundle followed, remove obando catheter when not critically ill. · Central Line bundle followed, remove when not needed.   · Skin & Wound care. · Weekly prealbumin, nutritional consult. · PT/OT eval and treat. OOB when appropriate. · Further recommendations will be based on the patient's response to recommended treatment and results of the investigation ordered. · Quality Care: PPI, DVT prophylaxis, HOB elevated, Infection control all reviewed and addressed. · Events and notes from last 24 hours reviewed. Care plan discussed with nursing     Review of Systems:  Review of systems not obtained due to patient factors. Allergies   Allergen Reactions    Ace Inhibitors Nausea and Vomiting    Hydralazine Nausea and Vomiting    Ibuprofen Shortness of Breath    Norvasc [Amlodipine] Swelling    Sulfa (Sulfonamide Antibiotics) Itching    Tramadol Shortness of Breath      Past Medical History   Diagnosis Date    Anemia     Anxiety 2016    Arthritis     Cancer ([facility]      pre cancerous cells in the uterus    Chronic pain      knees    Chronic pain of both knees 2016    Cigarette nicotine dependence in remission 2016    Diabetes ([facility]     Fatty liver 2016    Headache     Hypertension     Liver disease      fatty liver    Lymphedema 2016    Morbid obesity ([facility] 2016    Psychotic disorder      anxiety and panic attacks    Thyroid disease       Past Surgical History   Procedure Laterality Date    Hx cholecystectomy      Hx gyn  2006     hysterectomy    Hx  section        Social History   Substance Use Topics    Smoking status: Never Smoker    Smokeless tobacco: Never Used    Alcohol use No      Family History   Problem Relation Age of Onset    Diabetes Mother     Hypertension Mother     Lung Disease Father       Prior to Admission medications    Medication Sig Start Date End Date Taking? Authorizing Provider   cephALEXin (KEFLEX) 500 mg capsule Take 1 Cap by mouth four (4) times daily. 17   Viviana Molina MD   nebivolol (BYSTOLIC) 5 mg tablet Take 5 mg by mouth daily. Marino Lal MD   ergocalciferol (VITAMIN D2) 50,000 unit capsule Take 50,000 Units by mouth. Marino Lal MD   hydroCHLOROthiazide (HYDRODIURIL) 25 mg tablet Take 25 mg by mouth daily. Marino Lal MD   glimepiride (AMARYL) 2 mg tablet Take 2 mg by mouth every morning. Marino Lal MD   traMADol (ULTRAM) 50 mg tablet Take 50 mg by mouth every six (6) hours as needed for Pain. Marino Lal MD   loratadine (CLARITIN) 10 mg tablet 10 mg. 12/30/12   Historical Provider   losartan (COZAAR) 25 mg tablet Take 1 Tab by mouth every evening. 5/19/16   Pa Lam MD   metoprolol succinate (TOPROL-XL) 25 mg XL tablet Take 1 Tab by mouth daily.  5/19/16   Pa Lam MD     Current Facility-Administered Medications   Medication Dose Route Frequency    chlorhexidine (PERIDEX) 0.12 % mouthwash 10 mL  10 mL Oral ONCE    chlorhexidine (PERIDEX) 0.12 % mouthwash 10 mL  10 mL Oral Q12H    vancomycin (VANCOCIN) 1,000 mg in 0.9% sodium chloride (MBP/ADV) 250 mL adv  1,000 mg IntraVENous Q12H    [START ON 2/16/2017] VANCOMYCIN INFORMATION NOTE   Other ONCE    piperacillin-tazobactam (ZOSYN) 3.375 g in  mL MBP ##EXTENDED 4 HRS INFUSION  3.375 g IntraVENous Q8H    dextrose 5% lactated ringers infusion  100 mL/hr IntraVENous CONTINUOUS    albuterol-ipratropium (DUO-NEB) 2.5 MG-0.5 MG/3 ML  3 mL Nebulization Q6H RT    levoFLOXacin (LEVAQUIN) 750 mg in D5W IVPB  750 mg IntraVENous Q24H    NOREPINephrine (LEVOPHED) 8,000 mcg in dextrose 5% 250 mL infusion  0-20 mcg/min IntraVENous TITRATE    insulin lispro (HUMALOG) injection   SubCUTAneous Q6H    vasopressin (VASOSTRICT) 100 Units in 0.9% sodium chloride 100 mL infusion  0.01-0.04 Units/min IntraVENous TITRATE    propofol (DIPRIVAN) infusion  0-50 mcg/kg/min IntraVENous TITRATE    heparin (porcine) injection 5,000 Units  5,000 Units SubCUTAneous Q8H    pantoprazole (PROTONIX) 40 mg in sodium chloride 0.9 % 10 mL injection  40 mg IntraVENous DAILY Lines: All central lines examined by me. No signs of erythema, induration, discharge.       Central Venous Catheter:  Triple Lumen right ij cvl 02/10/17 Right Neck (Active)   Central Line Being Utilized Yes 2/10/2017  7:13 PM   Site Assessment Clean, dry, & intact 2/10/2017  7:13 PM   Dressing Status Clean, dry, & intact 2/10/2017  7:13 PM   Proximal Hub Color/Line Status White 2/10/2017  7:13 PM   Positive Blood Return (Medial Site) Yes 2/10/2017  7:13 PM   Medial Hub Color/Line Status Brown 2/10/2017  7:13 PM   Positive Blood Return (Lateral Site) Yes 2/10/2017  7:13 PM   Distal Hub Color/Line Status Blue 2/10/2017  7:13 PM   Positive Blood Return (Site #3) Yes 2/10/2017  7:13 PM          Telemetry:normal sinus rhythm    Objective:   Vital Signs:    Visit Vitals    /58    Pulse (!) 106    Temp 98 °F (36.7 °C)    Resp (!) 33    Ht 5' 5\" (1.651 m)    Wt (!) 194.4 kg (428 lb 9.2 oz)    SpO2 100%    Breastfeeding No    BMI 71.32 kg/m2       O2 Device: Ventilator       Temp (24hrs), Av.3 °F (36.8 °C), Min:97.6 °F (36.4 °C), Max:99.6 °F (37.6 °C)       Intake/Output:   Last shift:      02/15 07 - 02/15 1900  In: 845.8 [I.V.:805.8]  Out: 400 [Urine:400]    Last 3 shifts:  190 - 02/15 0700  In: 3327.7 [I.V.:2697.7]  Out: 2485 [Urine:2485]      Intake/Output Summary (Last 24 hours) at 02/15/17 1443  Last data filed at 02/15/17 1335   Gross per 24 hour   Intake           2119.6 ml   Output             1460 ml   Net            659.6 ml       Last 3 Recorded Weights in this Encounter    17 0200 17 0420 02/15/17 0544   Weight: (!) 193.2 kg (425 lb 14.9 oz) 156.2 kg (344 lb 5.7 oz) (!) 194.4 kg (428 lb 9.2 oz)       Ventilator Settings:  Mode Rate Tidal Volume Pressure FiO2 PEEP   Assist control, VC+   450 ml    21 % 5 cm H20     Peak airway pressure: 17 cm H2O    Plateau pressure:     Tidal volume:    Minute ventilation: 15.6 l/min   SPO2      ARDS network Guidelines: Lung protective strategy and Plateau pressure goals less than or equal to 30      Physical Exam:     General/Neurology: Intubated, open eyes does not follow commands  Head:   Normocephalic  Eye:   No scleral icterus  Nose/Throat:  ETT in place  Neck:   Supple, R IJ  Lung:   Adequate air entry bilateral equal, no rales, no rhonchi, no wheezing. Heart:   Tachy  Abdomen:  Soft, morbidly obese  :  Juarez in place.   Extremities:  4+BLE edema, ulceration BLE, significant chronic changes  Skin : tunneling sacral and gluteal area, muscle and bone exposed-see photos    Data:       Recent Results (from the past 24 hour(s))   GLUCOSE, POC    Collection Time: 02/14/17  5:06 PM   Result Value Ref Range    Glucose (POC) 150 (H) 70 - 110 mg/dL   EKG, 12 LEAD, SUBSEQUENT    Collection Time: 02/14/17 10:11 PM   Result Value Ref Range    Ventricular Rate 154 BPM    Atrial Rate 163 BPM    QRS Duration 88 ms    Q-T Interval 296 ms    QTC Calculation (Bezet) 474 ms    Calculated R Axis 70 degrees    Calculated T Axis -84 degrees    Diagnosis       Supraventricular tachycardia  ST & T wave abnormality, consider inferior ischemia  Abnormal ECG  When compared with ECG of 10-FEB-2017 17:29,  No significant change was found     GLUCOSE, POC    Collection Time: 02/15/17 12:03 AM   Result Value Ref Range    Glucose (POC) 135 (H) 70 - 110 mg/dL   EKG, 12 LEAD, SUBSEQUENT    Collection Time: 02/15/17  3:49 AM   Result Value Ref Range    Ventricular Rate 109 BPM    Atrial Rate 109 BPM    P-R Interval 138 ms    QRS Duration 88 ms    Q-T Interval 362 ms    QTC Calculation (Bezet) 487 ms    Calculated P Axis 80 degrees    Calculated R Axis 72 degrees    Calculated T Axis -28 degrees    Diagnosis       Sinus tachycardia  Nonspecific T wave abnormality  Abnormal ECG  When compared with ECG of 14-FEB-2017 22:11,  Nonspecific T wave abnormality has replaced inverted T waves in Inferior   leads     CBC WITH AUTOMATED DIFF    Collection Time: 02/15/17  4:00 AM Result Value Ref Range    WBC 15.1 (H) 4.6 - 13.2 K/uL    RBC 2.82 (L) 4.20 - 5.30 M/uL    HGB 7.8 (L) 12.0 - 16.0 g/dL    HCT 23.3 (L) 35.0 - 45.0 %    MCV 82.6 74.0 - 97.0 FL    MCH 27.7 24.0 - 34.0 PG    MCHC 33.5 31.0 - 37.0 g/dL    RDW 14.8 (H) 11.6 - 14.5 %    PLATELET 134 986 - 113 K/uL    MPV 9.5 9.2 - 11.8 FL    NEUTROPHILS 75 (H) 40 - 73 %    LYMPHOCYTES 12 (L) 21 - 52 %    MONOCYTES 6 3 - 10 %    EOSINOPHILS 7 (H) 0 - 5 %    BASOPHILS 0 0 - 2 %    ABS. NEUTROPHILS 11.2 (H) 1.8 - 8.0 K/UL    ABS. LYMPHOCYTES 1.8 0.9 - 3.6 K/UL    ABS. MONOCYTES 1.0 0.05 - 1.2 K/UL    ABS. EOSINOPHILS 1.1 (H) 0.0 - 0.4 K/UL    ABS. BASOPHILS 0.0 0.0 - 0.06 K/UL    DF AUTOMATED     METABOLIC PANEL, BASIC    Collection Time: 02/15/17  4:00 AM   Result Value Ref Range    Sodium 154 (H) 136 - 145 mmol/L    Potassium 3.8 3.5 - 5.5 mmol/L    Chloride 125 (H) 100 - 108 mmol/L    CO2 19 (L) 21 - 32 mmol/L    Anion gap 10 3.0 - 18 mmol/L    Glucose 152 (H) 74 - 99 mg/dL    BUN 24 (H) 7.0 - 18 MG/DL    Creatinine 0.67 0.6 - 1.3 MG/DL    BUN/Creatinine ratio 36 (H) 12 - 20      GFR est AA >60 >60 ml/min/1.73m2    GFR est non-AA >60 >60 ml/min/1.73m2    Calcium 8.1 (L) 8.5 - 10.1 MG/DL   GLUCOSE, POC    Collection Time: 02/15/17  5:56 AM   Result Value Ref Range    Glucose (POC) 176 (H) 70 - 110 mg/dL   POC G3    Collection Time: 02/15/17  9:46 AM   Result Value Ref Range    Device: VENT      FIO2 (POC) 21 %    pH (POC) 7.412 7.35 - 7.45      pCO2 (POC) 28.7 (L) 35.0 - 45.0 MMHG    pO2 (POC) 102 (H) 80 - 100 MMHG    HCO3 (POC) 18.3 (L) 22 - 26 MMOL/L    sO2 (POC) 98 (H) 92 - 97 %    Base deficit (POC) 6 mmol/L    Mode ASSIST CONTROL      Tidal volume 450 ml    Set Rate 10 bpm    PEEP/CPAP (POC) 5 cmH2O    Allens test (POC) YES      Total resp. rate 32      Site LEFT RADIAL      Patient temp.  98.0      Specimen type (POC) ARTERIAL      Performed by Leslie Capps     Volume control plus YES     GLUCOSE, POC    Collection Time: 02/15/17 11:33 AM   Result Value Ref Range    Glucose (POC) 144 (H) 70 - 110 mg/dL   GLUCOSE, POC    Collection Time: 02/15/17  1:27 PM   Result Value Ref Range    Glucose (POC) 138 (H) 70 - 110 mg/dL         Chemistry Recent Labs      02/15/17   0400  02/14/17   0410  02/13/17   0330   GLU  152*  172*  122*   NA  154*  153*  151*   K  3.8  3.6  3.9   CL  125*  124*  122*   CO2  19*  19*  19*   BUN  24*  29*  42*   CREA  0.67  0.67  0.78   CA  8.1*  7.9*  7.9*   AGAP  10  10  10   BUCR  36*  40*  51*   AP   --    --   80   TP   --    --   5.2*   ALB   --    --   2.1*   GLOB   --    --   3.1   AGRAT   --    --   0.7*        Lactic Acid Lactic acid   Date Value Ref Range Status   02/11/2017 1.6 0.4 - 2.0 MMOL/L Final     No results for input(s): LAC in the last 72 hours. Liver Enzymes Protein, total   Date Value Ref Range Status   02/13/2017 5.2 (L) 6.4 - 8.2 g/dL Final     Albumin   Date Value Ref Range Status   02/13/2017 2.1 (L) 3.4 - 5.0 g/dL Final     Globulin   Date Value Ref Range Status   02/13/2017 3.1 2.0 - 4.0 g/dL Final     A-G Ratio   Date Value Ref Range Status   02/13/2017 0.7 (L) 0.8 - 1.7   Final     AST (SGOT)   Date Value Ref Range Status   02/13/2017 32 15 - 37 U/L Final     Alk. phosphatase   Date Value Ref Range Status   02/13/2017 80 45 - 117 U/L Final     Recent Labs      02/13/17   0330   TP  5.2*   ALB  2.1*   GLOB  3.1   AGRAT  0.7*   SGOT  32   AP  80        CBC w/Diff Recent Labs      02/15/17   0400  02/14/17   0410  02/13/17   0330   WBC  15.1*  14.7*  16.9*   RBC  2.82*  2.57*  2.76*   HGB  7.8*  7.2*  7.7*   HCT  23.3*  21.8*  23.0*   PLT  159  147  166   GRANS  75*  64  77*   LYMPH  12*  14*  13*   EOS  7*  7*  3        Cardiac Enzymes No results found for: CPK, CKMMB, CKMB, RCK3, CKMBT, CKNDX, CKND1, ISAIAS, TROPT, TROIQ, ERNST, TROPT, TNIPOC, BNP, BNPP     BNP No results found for: BNP, BNPP, XBNPT     Coagulation No results for input(s): PTP, INR, APTT in the last 72 hours.     No lab exists for component: Katy Raza      Thyroid  Lab Results   Component Value Date/Time    TSH 2.41 02/10/2017 05:40 PM       No results found for: T4     Lipid Panel Lab Results   Component Value Date/Time    Cholesterol, total 167 05/05/2010 08:45 AM    HDL Cholesterol 65 05/05/2010 08:45 AM    LDL, calculated 88.4 05/05/2010 08:45 AM    VLDL, calculated 13.6 05/05/2010 08:45 AM    Triglyceride 68 05/05/2010 08:45 AM    CHOL/HDL Ratio 2.6 05/05/2010 08:45 AM        ABG Recent Labs      02/15/17   0946  02/14/17   0902  02/13/17   0840   PHI  7.412  7.378  7.315*   PCO2I  28.7*  26.2*  32.5*   PO2I  102*  89  137*   HCO3I  18.3*  15.6*  16.5*   FIO2I  21  21  30        Urinalysis Lab Results   Component Value Date/Time    Color DARK YELLOW 02/10/2017 06:15 PM    Appearance CLOUDY 02/10/2017 06:15 PM    Specific gravity 1.018 02/10/2017 06:15 PM    pH (UA) 5.0 02/10/2017 06:15 PM    Protein NEGATIVE  02/10/2017 06:15 PM    Glucose NEGATIVE  02/10/2017 06:15 PM    Ketone TRACE 02/10/2017 06:15 PM    Bilirubin SMALL 02/10/2017 06:15 PM    Urobilinogen 1.0 02/10/2017 06:15 PM    Nitrites NEGATIVE  02/10/2017 06:15 PM    Leukocyte Esterase TRACE 02/10/2017 06:15 PM    Epithelial cells 1+ 02/10/2017 06:15 PM    Bacteria 1+ 02/10/2017 06:15 PM    WBC 4 to 10 02/10/2017 06:15 PM    RBC NONE 02/10/2017 06:15 PM        Micro  No results for input(s): SDES, CULT in the last 72 hours. No results for input(s): CULT in the last 72 hours. EKG  No results found for this or any previous visit. PFT  No flowsheet data found. Other  ASA reactivity:   Pre-albumin:   Ionized Calcium:   NH4:   T3, FT4:  Cortisol:  Urine Osm:  Urine Lytes:   HbA1c:      Ultrasound       LE Doppler       ECHO  2/10/17  Left ventricle: Systolic function was normal. Ejection fraction was  estimated in the range of 55 % to 60 %. No obvious wall motion  abnormalities identified in the views obtained. There was mild concentric  hypertrophy.     Mitral valve: There was no evidence for stenosis. There was no significant  regurgitation. Aortic valve: The valve was probably trileaflet. Leaflets exhibited mild  calcification and sclerosis. Not well visualized. Valve mean gradient was  8 mmHg. Tricuspid valve: There was no evidence for tricuspid stenosis. There was  trivial regurgitation. CT (Most Recent)  No results found for this or any previous visit. XR (Most Recent). CXR  reviewed by me and compared with previous CXR   Results from Hospital Encounter encounter on 02/10/17   XR CHEST PORT   Narrative Chest, single view    Indication: Shortness of breath, intubated, follow-up    Comparison: 2/14/2017    Findings:  Portable semiupright AP view of the chest was obtained. Right  jugular central venous catheter, endotracheal tube, and enteric tube appear  unchanged in position. The cardiomediastinal silhouette is within normal limits. The pulmonary vasculature is unremarkable. Lung parenchyma is well aerated,  without focal consolidation. No pleural effusion nor pneumothorax. No acute  osseous abnormality. Impression Impression:  Unchanged tubes and lines. No radiographic evidence of an acute abnormality. Best practice : All below core measures reviewed and addressed:   [x] Antibiotic choice. [x] Severe Sepsis bundles follwed; SIRS screen met? Yes   [x] Fluids. [x] Lactic acid ordered- initial and repeat Q6hrs if elevated till normalized. [x] Cultures drawn. [x] Antibiotic administered within 1hr-ICU and 3hrs ED. [x] Large bore IV- 2 sites        R IJ  [x] Pressors aim MAP >65mmHg. [x] Steroids. [x] Glycemic control. [x] Infection control. [x] Mech. Ventilated patients- aim to keep peak plateau pressure 41-10LB H2O. [x] HOB at >= 30 degree. [x] Stress ulcer prophylaxis. [x] DVT prophylaxis. [x] Central Line Bundle Followed. [x] Juarez Bundle Followed. [x] Ventilator Bundle Followed.  (Daily sedation holiday to assess readiness for weaning from ventilator & SBT trial starting at 6.00 am, HOB 30-degree elevation, Chlorhexidine mouth washes & Routine oral care every 4 hours, Brenda tube to suction at 20-30 cm H2O, Maintain [place] tube with 5-10ml air every 4 hours, DVT prophylaxis, GI prophylaxis etc.).     The patient is: [] acutely ill Risk of deterioration: [] moderate    [x] critically ill  [x] high        Louann Galan NP  2/15/2017

## 2017-02-15 NOTE — DIABETES MGMT
NUTRITIONAL RE-ASSESSMENT AND GLYCEMIC CONTROL PLAN OF CARE     Kimani Roberts           64 y.o.           2/10/2017                 1. Sepsis, due to unspecified organism ([facility]    2. Septic shock ([facility]    3. Sacral decubitus ulcer, unstageable ([facility]    4. Acute respiratory failure, unspecified whether with hypoxia or hypercapnia ([facility]    5. Debility    6. Morbid obesity, unspecified obesity type ([facility]    Morbid Obesity  DM     INTERVENTIONS/PLAN:   Full strength Perative is infusing at 10 ml/hr. TF is providing 312 calories , 16 g protein/d. Recommend increasing TF to 30 ml/hr today and advancing TF  as tolerated to goal rate of 60 ml/hr. TF at goal rate will provide 1872 calories, 96 g protein and 1.1 liter free water /d from TF plus 1200ml /24 hrs from water flushes (200ml q 4 hrs). ASSESSMENT:   Nutritional Status:  Pt is overweight related to excess caloric intake as evidenced by 309% ideal weight and BMI= 69.6kg/m2. Pt meets criteria for morbid . obesity. Pt with increased calorie and protein requirements related to multiple pressure ulcers. Diagnosis-Intake: Inadequate protein-energy intake related to respiratory failure AEB inability to take in oral nourishment and need for enteral feeding while on M.vent. Pt w/hypoalbuminemia as evidenced by albumin=1.4mg/dl on admission , now 2.1. Diabetes Management:   Recent blood glucose:   Lab Results   Component Value Date/Time     (H) 02/15/2017 04:00 AM    GLUCPOC 138 (H) 02/15/2017 01:27 PM    GLUCPOC 144 (H) 02/15/2017 11:33 AM    GLUCPOC 176 (H) 02/15/2017 05:56 AM     Within target range (non-ICU: <140; ICU<180): [x] Yes   []  No  Current Insulin regimen: corrective lispro  Home medication/insulin regimen: amaryl , not verified  HbA1c:5.5%  Adequate glycemic control PTA:  [x] Yes  [] No     SUBJECTIVE/OBJECTIVE:   Diet: Perative TF at 10 ml/hr plus 200ml free water q 4 hrs  No data found.     Medications: [x]                Reviewed      Labs: Lab Results   Component Value Date/Time    Hemoglobin A1c 5.5 02/10/2017 05:40 PM     Lab Results   Component Value Date/Time    Sodium 154 02/15/2017 04:00 AM    Potassium 3.8 02/15/2017 04:00 AM    Chloride 125 02/15/2017 04:00 AM    CO2 19 02/15/2017 04:00 AM    Anion gap 10 02/15/2017 04:00 AM    Glucose 152 02/15/2017 04:00 AM    BUN 24 02/15/2017 04:00 AM    Creatinine 0.67 02/15/2017 04:00 AM    Calcium 8.1 02/15/2017 04:00 AM    Magnesium 2.7 02/11/2017 04:25 AM    Phosphorus 6.8 02/11/2017 04:25 AM    Albumin 2.1 02/13/2017 03:30 AM     Anthropometrics: IBW : 61.2 kg (135 lb), % IBW (Calculated): 309.13 %, BMI (calculated): 69.6  Wt Readings from Last 1 Encounters:   02/15/17 (!) 194.4 kg (428 lb 9.2 oz)      Ht Readings from Last 1 Encounters:   02/11/17 5' 5\" (1.651 m)     Estimated Nutrition Needs:  1840 Kcals/day, Protein (g): 92 g Fluid (ml): 1800 ml  Based on:   []          Actual BW    [x]          IBW   []            Adjusted BW    Nutrition Diagnoses:      As stated above. Nutrition Interventions: TF recommendations  Goal:   Blood glucose will be within target range of  mg/dL by2/13-met    Intake will meet >75% of energy and protein requirements for wound healing by 2/20 .     Nutrition Monitoring and Evaluation      []     Monitor po intake on meal rounds  [x]     Continue inpatient monitoring and intervention  [x]     Other: advance TF as tolerated to goal    Nutrition Risk:  [x]   High     []  Moderate    []  Minimal/Uncompromised    Silvino Marcelino RD

## 2017-02-15 NOTE — MANAGEMENT PLAN
Reviewed chart. Pt is awaiting guardianship for decision making.       Lyubov Mello RN BSN  Outcomes Manager  Pager # [phone]

## 2017-02-15 NOTE — PROGRESS NOTES
Bedside and Verbal shift change report given to Goldie Palma RN (oncoming nurse) by Salas Sandhu RN   (offgoing nurse). Report included the following information SBAR, Kardex, Intake/Output, MAR and Recent Results.

## 2017-02-16 NOTE — PROGRESS NOTES
0800 Assessment completed. Pt is not on any sedation. Open eyes to voice. Focusing with eyes but not following command. SR-ST on the monitor. Lungs sound diminished. Abdomen soft, non tender. NGT is clamped since MN. TF is on hold since MN. Juarez intact with adequate urine output. IVF D5LR @ 100 ml/hr  1014 IVF changed to D5W @ 75 ml/hr  1200 Reassessment done. VSS .  1600 Dressing changes done on pt's decub. Sacral/buttocks decub with slough and wound is very deep. 1920 Bedside and Verbal shift change report given to Ventura Triana 69 (oncoming nurse) by Rose Kenny RN   (offgoing nurse).  Report included the following information SBAR, Kardex, Intake/Output, MAR, Recent Results and Cardiac Rhythm SR-ST.

## 2017-02-16 NOTE — PROGRESS NOTES
[facility]  Hospitalist Division    Daily progress Note    Patient: Martir Oviedo MRN: [id number]  CSN: [id number]    YOB: 1955  Age: 64 y.o. Sex: female    DOA: 2/10/2017 LOS:  LOS: 6 days                    Subjective:     CC: Sepsis   Intubated and sedated   Off sedation, open eyes spont     Objective:      Visit Vitals    /46    Pulse (!) 105    Temp 98.7 °F (37.1 °C)    Resp (!) 33    Ht 5' 5\" (1.651 m)    Wt (!) 193.5 kg (426 lb 9.4 oz)    SpO2 100%    Breastfeeding No    BMI 70.99 kg/m2       Physical Exam:    NC/AT  NO JVD,TMG  S1/S2 tachy but reg   CTAB, NO WHEEZING  NT,ND, NABS  +  EDEMA  + for sacral and LE  ulcers        Intake and Output:  Current Shift:  02/16 0701 - 02/16 1900  In: 370 [I.V.:100]  Out: 350 [Urine:350]  Last three shifts:  02/14 1901 - 02/16 0700  In: 5575.1 [I.V.:4075.1]  Out: 2115 [Urine:2115]    Recent Results (from the past 24 hour(s))   GLUCOSE, POC    Collection Time: 02/15/17  4:03 PM   Result Value Ref Range    Glucose (POC) 139 (H) 70 - 110 mg/dL   GLUCOSE, POC    Collection Time: 02/15/17 11:32 PM   Result Value Ref Range    Glucose (POC) 136 (H) 70 - 110 mg/dL   CBC WITH AUTOMATED DIFF    Collection Time: 02/16/17  3:00 AM   Result Value Ref Range    WBC 11.5 4.6 - 13.2 K/uL    RBC 2.58 (L) 4.20 - 5.30 M/uL    HGB 7.2 (L) 12.0 - 16.0 g/dL    HCT 21.6 (L) 35.0 - 45.0 %    MCV 83.7 74.0 - 97.0 FL    MCH 27.9 24.0 - 34.0 PG    MCHC 33.3 31.0 - 37.0 g/dL    RDW 15.1 (H) 11.6 - 14.5 %    PLATELET 330 172 - 169 K/uL    MPV 8.8 (L) 9.2 - 11.8 FL    NEUTROPHILS 73 40 - 73 %    LYMPHOCYTES 16 (L) 21 - 52 %    MONOCYTES 6 3 - 10 %    EOSINOPHILS 5 0 - 5 %    BASOPHILS 0 0 - 2 %    ABS. NEUTROPHILS 8.4 (H) 1.8 - 8.0 K/UL    ABS. LYMPHOCYTES 1.8 0.9 - 3.6 K/UL    ABS. MONOCYTES 0.7 0.05 - 1.2 K/UL    ABS. EOSINOPHILS 0.6 (H) 0.0 - 0.4 K/UL    ABS.  BASOPHILS 0.0 0.0 - 0.06 K/UL    DF AUTOMATED     METABOLIC PANEL, BASIC Collection Time: 02/16/17  3:00 AM   Result Value Ref Range    Sodium 155 (H) 136 - 145 mmol/L    Potassium 3.6 3.5 - 5.5 mmol/L    Chloride 125 (H) 100 - 108 mmol/L    CO2 19 (L) 21 - 32 mmol/L    Anion gap 11 3.0 - 18 mmol/L    Glucose 130 (H) 74 - 99 mg/dL    BUN 21 (H) 7.0 - 18 MG/DL    Creatinine 0.61 0.6 - 1.3 MG/DL    BUN/Creatinine ratio 34 (H) 12 - 20      GFR est AA >60 >60 ml/min/1.73m2    GFR est non-AA >60 >60 ml/min/1.73m2    Calcium 7.7 (L) 8.5 - 10.1 MG/DL   GLUCOSE, POC    Collection Time: 02/16/17  5:51 AM   Result Value Ref Range    Glucose (POC) 125 (H) 70 - 110 mg/dL   VANCOMYCIN, TROUGH    Collection Time: 02/16/17  6:35 AM   Result Value Ref Range    Vancomycin,trough 16.8 10.0 - 20.0 ug/mL    Reported dose date: 20170215      Reported dose time: 2000      Reported dose: 1G UNITS   POC G3    Collection Time: 02/16/17  9:22 AM   Result Value Ref Range    Device: VENT      FIO2 (POC) 21 %    pH (POC) 7.414 7.35 - 7.45      pCO2 (POC) 27.1 (L) 35.0 - 45.0 MMHG    pO2 (POC) 111 (H) 80 - 100 MMHG    HCO3 (POC) 17.4 (L) 22 - 26 MMOL/L    sO2 (POC) 99 (H) 92 - 97 %    Base deficit (POC) 7 mmol/L    Mode CPAP/SPON      PEEP/CPAP (POC) 5 cmH2O    Pressure support 7 cmH2O    Allens test (POC) YES      Total resp. rate 32      Site RIGHT RADIAL      Patient temp.  98.5      Specimen type (POC) ARTERIAL      Performed by Darletta Nageotte    SODIUM    Collection Time: 02/16/17 10:30 AM   Result Value Ref Range    Sodium 158 (H) 136 - 145 mmol/L   GLUCOSE, POC    Collection Time: 02/16/17 12:17 PM   Result Value Ref Range    Glucose (POC) 132 (H) 70 - 110 mg/dL         Current Facility-Administered Medications:     vancomycin (VANCOCIN) 1,250 mg in 0.9% sodium chloride 250 mL IVPB, 1,250 mg, IntraVENous, Q12H, Samuel Broussard MD    [START ON 2/18/2017] VANCOMYCIN INFORMATION NOTE, , Other, ONCE, Samuel Broussard MD    dextrose 5% infusion, 75 mL/hr, IntraVENous, CONTINUOUS, Jonelle Tim MD, Last Rate: 75 mL/hr at 02/16/17 1034, 75 mL/hr at 02/16/17 1034    chlorhexidine (PERIDEX) 0.12 % mouthwash 10 mL, 10 mL, Oral, Q12H, Alexi Merchant, NP, 10 mL at 02/16/17 0934    piperacillin-tazobactam (ZOSYN) 3.375 g in  mL MBP ##EXTENDED 4 HRS INFUSION, 3.375 g, IntraVENous, Q8H, Prince Blas MD, Last Rate: 25 mL/hr at 02/16/17 0600, 3.375 g at 02/16/17 0600    0.9% sodium chloride infusion 250 mL, 250 mL, IntraVENous, PRN, LIZETT Caro    albuterol-ipratropium (DUO-NEB) 2.5 MG-0.5 MG/3 ML, 3 mL, Nebulization, Q6H RT, LIZETT Caro, 3 mL at 02/16/17 0815    sodium chloride (NS) flush 5-10 mL, 5-10 mL, IntraVENous, PRN, Jose Elias Francois DO    insulin lispro (HUMALOG) injection, , SubCUTAneous, Q6H, Prince Blas MD, Stopped at 02/15/17 1200    glucose chewable tablet 16 g, 4 Tab, Oral, PRN, Prince Blas MD    glucagon [facility]) injection 1 mg, 1 mg, IntraMUSCular, PRN, Prince Blas MD    dextrose (D50W) injection syrg 12.5-25 g, 25-50 mL, IntraVENous, PRN, Prince Blas MD    midazolam (VERSED) injection 2 mg, 2 mg, IntraVENous, Q1H PRN, Prince Blas MD, 2 mg at 02/15/17 2112    heparin (porcine) injection 5,000 Units, 5,000 Units, SubCUTAneous, Q8H, Prince Blas MD, 5,000 Units at 02/16/17 1220    pantoprazole (PROTONIX) 40 mg in sodium chloride 0.9 % 10 mL injection, 40 mg, IntraVENous, DAILY, Prince Blas MD, 40 mg at 02/16/17 0934    Lab Results   Component Value Date/Time    Glucose 130 02/16/2017 03:00 AM    Glucose 152 02/15/2017 04:00 AM    Glucose 172 02/14/2017 04:10 AM    Glucose 122 02/13/2017 03:30 AM    Glucose 106 02/12/2017 03:25 AM        Assessment/Plan     Active Problems:    Septic shock ([facility] [address] (2/10/2017)      Acute respiratory failure ([facility] [address] (2/13/2017)      Debility (2/13/2017)      Septic shock, off pressors   Cont  abx  Acute hypoxic resp failure 2/2 above, wean as carloz  JOAQUIN on CKD 2/2 sepsis, reolved  acute metabolic encephalopathy   Hypernatremia, change IVF to D5  DM, stable   Morbid obesity  HTN stable  Fever, resolved   Anemia, stable post transfusion   Hyperkalemia, resolved  Multiple ulcer including B/L heels, large sacral ulcer  Wound care and surgical team are following   May need debridement     Cont abx  Supportive care  Guardianship in progress               Yvonne Phillip MD  2/16/2017, 5:13 PM

## 2017-02-16 NOTE — ROUTINE PROCESS
1900:  Rec'd Reid Christopher @ 1900 from Tena Daugherty RN. SBAR reviewed with two-nurse check-offs done of meds, dressings, wounds. Overnight:  Wounds bled more freely. Comfortable on vent but still tachypnic. Suction more frequently. BP lower this am, awaiting intensivist before restarting pressor. 0720:  Verbal Patient-side shift change report given to Shaista Farias RN, (oncoming nurse) by Mary Ch RN  (offgoing nurse). Report included the following information SBAR, Kardex, ED Summary, Procedure Summary, Intake/Output, MAR, Recent Results and Med Rec Status. Included:  Intro, hx, two-RN eval of relevant assessment findings, LDAs, skin, diagnostics and infusions.

## 2017-02-16 NOTE — PROGRESS NOTES
Problem: Ventilator Management  Goal: *Adequate oxygenation and ventilation  Outcome: Progressing Towards Goal  0810 placed patient on PSV 7 patient doing well RN aware no pressors or sedation on board will continue to monitor RR 27 good vt 589    abg results on PSV Results for Sujatha Adkins (MRN [id number]) as of 2/16/2017 09:59   Ref. Range 2/16/2017 09:22   pH (POC) Latest Ref Range: 7.35 - 7.45   7.414   pCO2 (POC) Latest Ref Range: 35.0 - 45.0 MMHG 27.1 (L)   pO2 (POC) Latest Ref Range: 80 - 100 MMHG 111 (H)   HCO3 (POC) Latest Ref Range: 22 - 26 MMOL/L 17.4 (L)   sO2 (POC) Latest Ref Range: 92 - 97 % 99 (H)   Base deficit (POC) Latest Units: mmol/L 7   FIO2 (POC) Latest Units: % 21   Patient temp. Latest Units:   98.5   Specimen type (POC) Latest Units:   ARTERIAL   Site Latest Units:   RIGHT RADIAL   Device: Latest Units:   VENT   Total resp.  rate Latest Units:   32   Mode Latest Units:   CPAP/SPON   PEEP/CPAP (POC) Latest Units: cmH2O 5   Pressure support Latest Units: cmH2O 7   Allens test (POC) Latest Units:   YES    increased PS to 10 per md to help with overbrething  Patient doing well on PSV 10 rate 30 will continue to monitor RN aware hr 104   placed patient back on acvc+ patient stable at this time

## 2017-02-16 NOTE — PROGRESS NOTES
[facility]  Pulmonary, Critical Care, and Sleep Medicine    Name: Jg Zhou MRN: [id number]   : 1955 Hospital: [facility]   Date: 2017        [facility] Progress Notes                                              [x] I have reviewed the flowsheet and previous days notes. Events, vitals, medications and notes from last 24 hours reviewed. Care plan discussed with staff, patient, family and on multidisciplinary rounds. 2017  Intubated on MV, Open eyes, appear tracking, does not follow commands- Off Propofol since yesterday  Off all pressors, BP stable  Hb down 7.2 today, no external bleeding noted, monitor Hb. Await guardianship for care goals. WBC wnl up today, afebrile, d/c Levaquin, continue Zosyn and vanco for now  Cr wnl, obando with yellow urine-keep due to multiple areas of skin ulcerations  Na still elevated- IVF D5W and free water flushes      IMPRESSION:   · Septic shock requiring pressors, likely source skin with noted severe large tunneling sacral, gluteal wounds  · Acute respiratory failure in setting of severe sepsis requiring MV  · Acute encephalopathy, likely metabolic, sepsis  · Hyperkalemia, s/p tx in ED, resolved  · Lactic acidosis, resolved  · JOAQUIN on CKD likely due to sepsis, volume depletion, resolving  · Metabolic acidosis, improved  · Elevated ammonia, improved  · Diarrhea, improved  · DM  · Morbid obesity  · Bedbug infestation  · Hx HTN    · Code status: FULL      RECOMMENDATIONS:   · CVS: BP stable ranges 119//58. Remain tachycardic low 100's. Monitor HD. Off levo/Vasopressin,  maintain MAP>65,  RIJ in place. Negative enzymes. ECHO EF 55-60%. S/p Albumin x 4 doses, lactic acid normalized 1.6 (). · Respiratory: SBT trial-off sedation since yesterday, ? Extubation-pt high risk for aspiration ? Neurological status, remain tachypnea rate in low 30's at times. Continue mechanical ventilation, continue duonebs. CXR  reviewed. ABG 2/16 reviewed. ABG and CXR PRN or condition change. · ID:  Antibiotic-d/c Levaquin(2/16), continue zosyn and vanc. Urine cx +yeast. Wound cx: GPC pairs/EF/CK/CG/Diptheroids. WBC wnl today and afebrile. Trend temp and wbc. Sepsis bundle. · Hematology/Oncology:  Hb down 7.2 today, no external visible bleeding, s/p1u PRBC, Nl coags. Monitor H and H and any active bleeding. · Renal/Lytes:  Monitor Cr and lytes. Cr wnl. Na remain elevated-158. Continue IVF D5W, monitor Na M4q-pwat Na<148 or >155. Obando, monitor I&O's. · GI/:  Continue obando-due to multiple ulcers. PPI. Trend LFT, ammonia-ok. amylase and lipase ok  · Endocrine:  Nl TSH. Nl cortisol. Glycemic control. ISS prn, avoid hypoglycemia. · Neurology/Pain/Sedation:  OFF Propofol- eyes open but does not follow commands. CT head/imaging not done-unable to accomodate weight/shoulder. ? neuro eval if pt unable to fully awaken/non emergent. · Skin/Wound/Musc: Wound care following. · FEN: TF 30 ml/hr advance as tolerated. IVF-D5W/ Free water for hypernatremia. Replace lytes prn per protocol. · Prophylaxis: GI Prophylaxis with protonix. DVT Prophylaxis with heparin. · Restraints: B wrist  ·   · Await Guardianship, Pt lives with son (mentally challenge- unable to make decisions). · Palliative following for care goals. ·                                                               ·   ·   · Glycemic Control. Glucose stabilizer per ICU protocol when on insulin drip. Maintain blood glucose 140-180. · Replace electrolytes per ICU electrolyte replacement protocol  · Ventilator bundle & Sedation protocol followed. Daily sedation holiday, assessment for readiness for SBT and then re-titrate if required. Chlorhexidine mouth washes. · HOB 30 degree elevation all the time. Aggressive pulmonary toileting. Incentive spirometry when appropriate. Aspiration precautions. · Sepsis bundle and protocol followed.  Follow serial lactic acid, frequent BMP check, fluid resuscitation. Follow cultures. Deescalate antibiotic when appropriate. · Obando bundle followed, remove obando catheter when not critically ill. · Central Line bundle followed, remove when not needed. · Skin & Wound care. · Weekly prealbumin, nutritional consult. · PT/OT eval and treat. OOB when appropriate. · Further recommendations will be based on the patient's response to recommended treatment and results of the investigation ordered. · Quality Care: PPI, DVT prophylaxis, HOB elevated, Infection control all reviewed and addressed. · Events and notes from last 24 hours reviewed. Care plan discussed with nursing     Review of Systems:  Review of systems not obtained due to patient factors.     Allergies   Allergen Reactions    Ace Inhibitors Nausea and Vomiting    Hydralazine Nausea and Vomiting    Ibuprofen Shortness of Breath    Norvasc [Amlodipine] Swelling    Sulfa (Sulfonamide Antibiotics) Itching    Tramadol Shortness of Breath      Past Medical History   Diagnosis Date    Anemia     Anxiety 2016    Arthritis     Cancer ([address]      pre cancerous cells in the uterus    Chronic pain      knees    Chronic pain of both knees 2016    Cigarette nicotine dependence in remission 2016    Diabetes ([address]     Fatty liver 2016    Headache     Hypertension     Liver disease      fatty liver    Lymphedema 2016    Morbid obesity ([address] 2016    Psychotic disorder      anxiety and panic attacks    Thyroid disease       Past Surgical History   Procedure Laterality Date    Hx cholecystectomy      Hx gyn  2006     hysterectomy    Hx  section        Social History   Substance Use Topics    Smoking status: Never Smoker    Smokeless tobacco: Never Used    Alcohol use No      Family History   Problem Relation Age of Onset    Diabetes Mother     Hypertension Mother     Lung Disease Father       Prior to Admission medications Medication Sig Start Date End Date Taking? Authorizing Provider   cephALEXin (KEFLEX) 500 mg capsule Take 1 Cap by mouth four (4) times daily. 1/22/17   Brittney Crockett MD   nebivolol (BYSTOLIC) 5 mg tablet Take 5 mg by mouth daily. Marino Lal MD   ergocalciferol (VITAMIN D2) 50,000 unit capsule Take 50,000 Units by mouth. Marino Lal MD   hydroCHLOROthiazide (HYDRODIURIL) 25 mg tablet Take 25 mg by mouth daily. Marino Lal MD   glimepiride (AMARYL) 2 mg tablet Take 2 mg by mouth every morning. Marino Lal MD   traMADol (ULTRAM) 50 mg tablet Take 50 mg by mouth every six (6) hours as needed for Pain. Marino Lal MD   loratadine (CLARITIN) 10 mg tablet 10 mg. 12/30/12   Historical Provider   losartan (COZAAR) 25 mg tablet Take 1 Tab by mouth every evening. 5/19/16   Sebas Foreman MD   metoprolol succinate (TOPROL-XL) 25 mg XL tablet Take 1 Tab by mouth daily. 5/19/16   Sebas Foreman MD     Current Facility-Administered Medications   Medication Dose Route Frequency    vancomycin (VANCOCIN) 1,250 mg in 0.9% sodium chloride 250 mL IVPB  1,250 mg IntraVENous Q12H    [START ON 2/18/2017] VANCOMYCIN INFORMATION NOTE   Other ONCE    dextrose 5% infusion  75 mL/hr IntraVENous CONTINUOUS    chlorhexidine (PERIDEX) 0.12 % mouthwash 10 mL  10 mL Oral Q12H    piperacillin-tazobactam (ZOSYN) 3.375 g in  mL MBP ##EXTENDED 4 HRS INFUSION  3.375 g IntraVENous Q8H    albuterol-ipratropium (DUO-NEB) 2.5 MG-0.5 MG/3 ML  3 mL Nebulization Q6H RT    insulin lispro (HUMALOG) injection   SubCUTAneous Q6H    heparin (porcine) injection 5,000 Units  5,000 Units SubCUTAneous Q8H    pantoprazole (PROTONIX) 40 mg in sodium chloride 0.9 % 10 mL injection  40 mg IntraVENous DAILY       Lines: All central lines examined by me. No signs of erythema, induration, discharge.       Central Venous Catheter:  Triple Lumen right ij cvl 02/10/17 Right Neck (Active)   Central Line Being Utilized Yes 2/10/2017  7:13 PM Site Assessment Clean, dry, & intact 2/10/2017  7:13 PM   Dressing Status Clean, dry, & intact 2/10/2017  7:13 PM   Proximal Hub Color/Line Status White 2/10/2017  7:13 PM   Positive Blood Return (Medial Site) Yes 2/10/2017  7:13 PM   Medial Hub Color/Line Status Brown 2/10/2017  7:13 PM   Positive Blood Return (Lateral Site) Yes 2/10/2017  7:13 PM   Distal Hub Color/Line Status Blue 2/10/2017  7:13 PM   Positive Blood Return (Site #3) Yes 2/10/2017  7:13 PM          Telemetry:normal sinus rhythm    Objective:   Vital Signs:    Visit Vitals    /54    Pulse (!) 105    Temp 98.7 °F (37.1 °C)    Resp (!) 33    Ht 5' 5\" (1.651 m)    Wt (!) 193.5 kg (426 lb 9.4 oz)    SpO2 100%    Breastfeeding No    BMI 70.99 kg/m2       O2 Device: Endotracheal tube       Temp (24hrs), Av.5 °F (36.9 °C), Min:97.8 °F (36.6 °C), Max:98.9 °F (37.2 °C)       Intake/Output:   Last shift:       0701 -  190  In: 280 [I.V.:100]  Out: 350 [Urine:350]    Last 3 shifts:  190 -  0700  In: 5575.1 [I.V.:4075.1]  Out: 2115 [Urine:2115]      Intake/Output Summary (Last 24 hours) at 17 1256  Last data filed at 17 1225   Gross per 24 hour   Intake             3755 ml   Output             1330 ml   Net             2425 ml       Last 3 Recorded Weights in this Encounter    17 0420 02/15/17 0544 17 0600   Weight: 156.2 kg (344 lb 5.7 oz) (!) 194.4 kg (428 lb 9.2 oz) (!) 193.5 kg (426 lb 9.4 oz)       Ventilator Settings:  Mode Rate Tidal Volume Pressure FiO2 PEEP   Pressure support   450 ml  10 cm H2O 21 % 5 cm H20     Peak airway pressure: 16 cm H2O    Plateau pressure:     Tidal volume:    Minute ventilation: 16.5 l/min   SPO2      ARDS network Guidelines: Lung protective strategy and Plateau pressure goals less than or equal to 30      Physical Exam:     General/Neurology: Intubated, open eyes does not follow commands  Head:   Normocephalic  Eye:   No scleral icterus  Nose/Throat:  ETT in place  Neck:   Supple, R IJ  Lung:   Adequate air entry bilateral equal, no rales, no rhonchi, no wheezing. Heart:   Tachy  Abdomen:  Soft, morbidly obese  :  Juarez in place. Extremities:  4+BLE edema, ulceration BLE, significant chronic changes  Skin : tunneling sacral and gluteal area, muscle and bone exposed-see photos    Data:       Recent Results (from the past 24 hour(s))   GLUCOSE, POC    Collection Time: 02/15/17  1:27 PM   Result Value Ref Range    Glucose (POC) 138 (H) 70 - 110 mg/dL   GLUCOSE, POC    Collection Time: 02/15/17  4:03 PM   Result Value Ref Range    Glucose (POC) 139 (H) 70 - 110 mg/dL   GLUCOSE, POC    Collection Time: 02/15/17 11:32 PM   Result Value Ref Range    Glucose (POC) 136 (H) 70 - 110 mg/dL   CBC WITH AUTOMATED DIFF    Collection Time: 02/16/17  3:00 AM   Result Value Ref Range    WBC 11.5 4.6 - 13.2 K/uL    RBC 2.58 (L) 4.20 - 5.30 M/uL    HGB 7.2 (L) 12.0 - 16.0 g/dL    HCT 21.6 (L) 35.0 - 45.0 %    MCV 83.7 74.0 - 97.0 FL    MCH 27.9 24.0 - 34.0 PG    MCHC 33.3 31.0 - 37.0 g/dL    RDW 15.1 (H) 11.6 - 14.5 %    PLATELET 553 583 - 045 K/uL    MPV 8.8 (L) 9.2 - 11.8 FL    NEUTROPHILS 73 40 - 73 %    LYMPHOCYTES 16 (L) 21 - 52 %    MONOCYTES 6 3 - 10 %    EOSINOPHILS 5 0 - 5 %    BASOPHILS 0 0 - 2 %    ABS. NEUTROPHILS 8.4 (H) 1.8 - 8.0 K/UL    ABS. LYMPHOCYTES 1.8 0.9 - 3.6 K/UL    ABS. MONOCYTES 0.7 0.05 - 1.2 K/UL    ABS. EOSINOPHILS 0.6 (H) 0.0 - 0.4 K/UL    ABS.  BASOPHILS 0.0 0.0 - 0.06 K/UL    DF AUTOMATED     METABOLIC PANEL, BASIC    Collection Time: 02/16/17  3:00 AM   Result Value Ref Range    Sodium 155 (H) 136 - 145 mmol/L    Potassium 3.6 3.5 - 5.5 mmol/L    Chloride 125 (H) 100 - 108 mmol/L    CO2 19 (L) 21 - 32 mmol/L    Anion gap 11 3.0 - 18 mmol/L    Glucose 130 (H) 74 - 99 mg/dL    BUN 21 (H) 7.0 - 18 MG/DL    Creatinine 0.61 0.6 - 1.3 MG/DL    BUN/Creatinine ratio 34 (H) 12 - 20      GFR est AA >60 >60 ml/min/1.73m2    GFR est non-AA >60 >60 ml/min/1.73m2 Calcium 7.7 (L) 8.5 - 10.1 MG/DL   GLUCOSE, POC    Collection Time: 02/16/17  5:51 AM   Result Value Ref Range    Glucose (POC) 125 (H) 70 - 110 mg/dL   VANCOMYCIN, TROUGH    Collection Time: 02/16/17  6:35 AM   Result Value Ref Range    Vancomycin,trough 16.8 10.0 - 20.0 ug/mL    Reported dose date: 20170215      Reported dose time: 2000      Reported dose: 1G UNITS   POC G3    Collection Time: 02/16/17  9:22 AM   Result Value Ref Range    Device: VENT      FIO2 (POC) 21 %    pH (POC) 7.414 7.35 - 7.45      pCO2 (POC) 27.1 (L) 35.0 - 45.0 MMHG    pO2 (POC) 111 (H) 80 - 100 MMHG    HCO3 (POC) 17.4 (L) 22 - 26 MMOL/L    sO2 (POC) 99 (H) 92 - 97 %    Base deficit (POC) 7 mmol/L    Mode CPAP/SPON      PEEP/CPAP (POC) 5 cmH2O    Pressure support 7 cmH2O    Allens test (POC) YES      Total resp. rate 32      Site RIGHT RADIAL      Patient temp. 98.5      Specimen type (POC) ARTERIAL      Performed by Jean Pierre Perrin    SODIUM    Collection Time: 02/16/17 10:30 AM   Result Value Ref Range    Sodium 158 (H) 136 - 145 mmol/L   GLUCOSE, POC    Collection Time: 02/16/17 12:17 PM   Result Value Ref Range    Glucose (POC) 132 (H) 70 - 110 mg/dL         Chemistry Recent Labs      02/16/17   1030  02/16/17   0300  02/15/17   0400  02/14/17   0410   GLU   --   130*  152*  172*   NA  158*  155*  154*  153*   K   --   3.6  3.8  3.6   CL   --   125*  125*  124*   CO2   --   19*  19*  19*   BUN   --   21*  24*  29*   CREA   --   0.61  0.67  0.67   CA   --   7.7*  8.1*  7.9*   AGAP   --   11  10  10   BUCR   --   34*  36*  40*        Lactic Acid Lactic acid   Date Value Ref Range Status   02/11/2017 1.6 0.4 - 2.0 MMOL/L Final     No results for input(s): LAC in the last 72 hours.      Liver Enzymes Protein, total   Date Value Ref Range Status   02/13/2017 5.2 (L) 6.4 - 8.2 g/dL Final     Albumin   Date Value Ref Range Status   02/13/2017 2.1 (L) 3.4 - 5.0 g/dL Final     Globulin   Date Value Ref Range Status   02/13/2017 3.1 2.0 - 4.0 g/dL Final     A-G Ratio   Date Value Ref Range Status   02/13/2017 0.7 (L) 0.8 - 1.7   Final     AST (SGOT)   Date Value Ref Range Status   02/13/2017 32 15 - 37 U/L Final     Alk. phosphatase   Date Value Ref Range Status   02/13/2017 80 45 - 117 U/L Final     No results for input(s): TP, ALB, GLOB, AGRAT, SGOT, GPT, AP, TBIL in the last 72 hours. No lab exists for component: DBIL     CBC w/Diff Recent Labs      02/16/17   0300  02/15/17   0400  02/14/17   0410   WBC  11.5  15.1*  14.7*   RBC  2.58*  2.82*  2.57*   HGB  7.2*  7.8*  7.2*   HCT  21.6*  23.3*  21.8*   PLT  181  159  147   GRANS  73  75*  64   LYMPH  16*  12*  14*   EOS  5  7*  7*        Cardiac Enzymes No results found for: CPK, CKMMB, CKMB, RCK3, CKMBT, CKNDX, CKND1, ISAIAS, TROPT, TROIQ, ERNST, TROPT, TNIPOC, BNP, BNPP     BNP No results found for: BNP, BNPP, XBNPT     Coagulation No results for input(s): PTP, INR, APTT in the last 72 hours.     No lab exists for component: INREXT, INREXT      Thyroid  Lab Results   Component Value Date/Time    TSH 2.41 02/10/2017 05:40 PM       No results found for: T4     Lipid Panel Lab Results   Component Value Date/Time    Cholesterol, total 167 05/05/2010 08:45 AM    HDL Cholesterol 65 05/05/2010 08:45 AM    LDL, calculated 88.4 05/05/2010 08:45 AM    VLDL, calculated 13.6 05/05/2010 08:45 AM    Triglyceride 68 05/05/2010 08:45 AM    CHOL/HDL Ratio 2.6 05/05/2010 08:45 AM        ABG Recent Labs      02/16/17   0922  02/15/17   0946  02/14/17   0902   PHI  7.414  7.412  7.378   PCO2I  27.1*  28.7*  26.2*   PO2I  111*  102*  89   HCO3I  17.4*  18.3*  15.6*   FIO2I  21  21  21        Urinalysis Lab Results   Component Value Date/Time    Color DARK YELLOW 02/10/2017 06:15 PM    Appearance CLOUDY 02/10/2017 06:15 PM    Specific gravity 1.018 02/10/2017 06:15 PM    pH (UA) 5.0 02/10/2017 06:15 PM    Protein NEGATIVE  02/10/2017 06:15 PM    Glucose NEGATIVE  02/10/2017 06:15 PM    Ketone TRACE 02/10/2017 06:15 PM Bilirubin SMALL 02/10/2017 06:15 PM    Urobilinogen 1.0 02/10/2017 06:15 PM    Nitrites NEGATIVE  02/10/2017 06:15 PM    Leukocyte Esterase TRACE 02/10/2017 06:15 PM    Epithelial cells 1+ 02/10/2017 06:15 PM    Bacteria 1+ 02/10/2017 06:15 PM    WBC 4 to 10 02/10/2017 06:15 PM    RBC NONE 02/10/2017 06:15 PM        Micro  No results for input(s): SDES, CULT in the last 72 hours. No results for input(s): CULT in the last 72 hours. EKG  No results found for this or any previous visit. PFT  No flowsheet data found. Other  ASA reactivity:   Pre-albumin:   Ionized Calcium:   NH4:   T3, FT4:  Cortisol:  Urine Osm:  Urine Lytes:   HbA1c:      Ultrasound       LE Doppler       ECHO  2/10/17  Left ventricle: Systolic function was normal. Ejection fraction was  estimated in the range of 55 % to 60 %. No obvious wall motion  abnormalities identified in the views obtained. There was mild concentric  hypertrophy. Mitral valve: There was no evidence for stenosis. There was no significant  regurgitation. Aortic valve: The valve was probably trileaflet. Leaflets exhibited mild  calcification and sclerosis. Not well visualized. Valve mean gradient was  8 mmHg. Tricuspid valve: There was no evidence for tricuspid stenosis. There was  trivial regurgitation. CT (Most Recent)  No results found for this or any previous visit. XR (Most Recent). CXR  reviewed by me and compared with previous CXR   Results from Hospital Encounter encounter on 02/10/17   XR CHEST PORT   Narrative Indication:  Endotracheal tube placement    Comparison:  02/15/17    Time of study - 0323    Findings:  AP supine portable chest    The cardiomediastinal silhouette is normal.  The endotracheal tube is in good  position about 5.5 cm above the kimberly. NG tube enters the stomach. The lungs  are clear. Impression IMPRESSION:     Stable chest and catheters. Best practice :   All below core measures reviewed and addressed:   [x] Antibiotic choice. [x] Severe Sepsis bundles follwed; SIRS screen met? Yes   [x] Fluids. [x] Lactic acid ordered- initial and repeat Q6hrs if elevated till normalized. [x] Cultures drawn. [x] Antibiotic administered within 1hr-ICU and 3hrs ED. [x] Large bore IV- 2 sites        R IJ  [x] Pressors aim MAP >65mmHg. [x] Steroids. [x] Glycemic control. [x] Infection control. [x] Mech. Ventilated patients- aim to keep peak plateau pressure 87-89AX H2O. [x] HOB at >= 30 degree. [x] Stress ulcer prophylaxis. [x] DVT prophylaxis. [x] Central Line Bundle Followed. [x] Juarez Bundle Followed. [x] Ventilator Bundle Followed. (Daily sedation holiday to assess readiness for weaning from ventilator & SBT trial starting at 6.00 am, HOB 30-degree elevation, Chlorhexidine mouth washes & Routine oral care every 4 hours, Brenda tube to suction at 20-30 cm H2O, Maintain Hudson tube with 5-10ml air every 4 hours, DVT prophylaxis, GI prophylaxis etc.).     The patient is: [] acutely ill Risk of deterioration: [] moderate    [x] critically ill  [x] high        Bandar Glynn NP  2/16/2017

## 2017-02-16 NOTE — ROUTINE PROCESS
Bedside and Verbal shift change report given to Delonte Block (oncoming nurse) by >me   (offgoing nurse). Report included the following information SBAR, Kardex, Intake/Output, MAR and Recent Results.

## 2017-02-16 NOTE — ROUTINE PROCESS
Dr. Heri Magdaleno ! Patient:  Milton William 64 y.o.  1955  Full Code  Next of Kin:  Listed as  Will Yap, [phone]; he was phoned and denied any relationship very vociferously. Son, Anna Valdes reportedly has autism. Brother?:  Abdirahman Brinda. Neighbor:  Wang Stiles. Allergies   Allergen Reactions    Ace Inhibitors Nausea and Vomiting    Hydralazine Nausea and Vomiting    Ibuprofen Shortness of Breath    Norvasc [Amlodipine] Swelling    Sulfa (Sulfonamide Antibiotics) Itching    Tramadol Shortness of Breath     Enteric Contact (waiting to r/o C-diff). _____________________    Patient factors:      Why in hospital:  Septic shock ([address]  ulcer decubitus   Was in hospital two weeks ago, sent home with antibiotics. 02-10:  EMS was called for AMS. Reported caregiver is mentally-challenged son saying Ms. Chrissy Alcantara was [employer] for 3 days. Found \"covered in feces and bedbugs. \"  Transported to ED. In ED:  Unable to CT head, C-A-P 2/2 habitus. BG ~ 100. T ~93 [employer] started). 74/58 (fluids, then pressors). Found multiple, very deep, pressure sores. Dx Sepsis, presumably from wounds; JOAQUIN. K 6.1, Cr 1.57, Lactic Acid 4/9,NH4 168, WBC 28.7.  UA (+) WBC, bacteria, yeast.    Days in hospital:   6    Why in ICU:  Pressor (now off) & Ventilator support. :  Transfused 1 unit PRBC for hemoglobin 6.6. Days in ICU:  ()  5  *  ____________________    Additional history: Morbid obesity. Arthritis. Lymphedema. Hypertension,  Anemia. Anxiety. Panic Attacks. Pre-cancerous cells in uterus.  delivery, hysterectomy. DM (A1c 5.5). Fatty liver. Liver disease. Lap-tisha. Thyroid disease. Quit smoking. Denied alcohol. Treatment team, in addition to intensivists, hospitalists:  (Dr. Marla Pereira, [place], has signed-off) *   OT, PT, Diabetes Management, Care Management. Palliative. Ethics. Wound Care. ___________________________    Vent days:  (02-10) 6  *    ____________________    Overnight events--include procedures / blood / consults:  (see note)    ____________________________    Quality:    Line:      A-line:     No   Central Line(s):   02-10:  R IJ 3LC. (dsg 02-10)   PIV's:     No.   Other lines:     No.  Needs:  Limited access. CVP not ordered. Juarez?:  Date:  : 12 F, 2-lumen. 10 ml balloon. Tamper seal intact, Stat-lock in place. Expected date of removal?: TBD. Need:  Strict i/o, wounds. DVT prophylaxis:    SCDs: Not Bilateral.    Anticoagulant: heparin sub-cut q 8 h. Anitplatelet:   ASA not   Yet. GI Prophylaxis:   Pantoprazole IV every day     Inpat Anti-Infectives     Start     Ordered Stop    17 2200  VANCOMYCIN INFORMATION NOTE  Other,   ONCE      02/10/17 2110 17 0959    17 1700  vancomycin (VANCOCIN) 2,000 mg in 0.9% sodium chloride 500 mL IVPB  2,000 mg,   IntraVENous,   EVERY 18 HOURS      02/10/17 2110 --    17 0000  piperacillin-tazobactam (ZOSYN) 4.5 g in 0.9% sodium chloride (MBP/ADV) 100 mL MBP  4.5 g,   IntraVENous,   EVERY 6 HOURS      02/10/17 1941 --    02/10/17 1810  levoFLOXacin (LEVAQUIN) 750 mg in D5W IVPB  750 mg,   IntraVENous,   EVERY 24 HOURS      02/10/17 1809 --          Cultures:  Blood:  : ( - ) x 6 d.   02-10:  ( - ) x 5 d. Sputum:  : (pending)  :  NL hannah. Urine:  :  (-) x 2 d.  02-10:  (+) yeast.  :  U-tox ( - ). Stool:  (need C-diff)  *   Other:  Wound:  :  B-hemolytic Strep, Coag-(-) Staph, many diptheroids. :  Candida, Enterococcus      Imaging:  CT:  Head:   (02-10:  Unable 2/2 pt size.)   CT Chest/Abd/Pelvis:  (02-10:  Unable 2/2 pt size.)     TTE:  02-10:  EF 55 %, otherwise benign. Duplex:     Not Yet. Ultrasound:   12 (abd pain):  Gall stones. Liver: possible hepatic steatosis vs hepatitis or cirrhosis. EK-10:  ST, possible subendocardial injury. 02-14:  SVT. Procedures:  (lines, others pending)    Blood sugars:  q 6  h. Coverage:  (runs low). Feeding:  Peritive @ 30, no advance, with H2O 200 q 6 h. Now, NPO p MN. Gastric access:  Feliberto Lip:  Date:  02-14  25 F  OGT  50 CM. Labs:  Now daily. T & C exp 02-15. *  Labs needed? : C-diff. Skin:  Many sores. .    Palliative:  Y.  *  Care Management Y    PT / OT:  Y    ______________________    Sedation vacation?:  (slowly decrease propofol, RR ~ 30). RASS score:  -4.  Weaning:  Decr RR, Vt, FiO2. Airway:  Vent Settings:  21 %, AC  30 / 10. ,  PEEP 5 cm H2O. Secretions:  Scant clear       Oxygenation:  100 %. IVF:  NS @ 150. Infusions:  Propofol. Noreip (decreasing), Vasopressin (not weaning), phenylephrine (not yet started) for MAP > 65. Procedures planned?:  Will need surgical debridement. Consults planned?:  Surgery. APS has been contacted several times. Burn unit for surgical debridement? Guardianship. *    Restraints?:  D/c'd. Wounds:  (see Skin Man Note) multiple. 45 - 60 min for dressing changes. Any other concerns?:  Medical POA? Guardianship hearing? NPO for ? Dong Nunez

## 2017-02-16 NOTE — PROGRESS NOTES
Pharmacy Dosing Services: Vancomycin Dosing Update Note    Consult for Vancomycin Dosing   Indication: Sepsis of Unknown Etiology  Day of Therapy 6    Previous Regimen 1 g IV Q 12H (This dose has been adjusted-see below)   Last Trough Level 16.8 mcg/dL ( drawn 10 hours post-dose)   Other Current Antibiotics Zosyn   Significant Cultures 2/11/17 Wound Drainage: few Enterococcus facaelis (results from 2/14/17)   Serum Creatinine Lab Results   Component Value Date/Time    Creatinine (POC) 1.3 02/10/2017 05:52 PM    Creatinine 0.61 02/16/2017 03:00 AM      Creatinine Clearance Estimated Creatinine Clearance: 170.6 mL/min (based on Cr of 0.61). BUN Lab Results   Component Value Date/Time    BUN 21 02/16/2017 03:00 AM    BUN (POC) 71 02/10/2017 05:52 PM       WBC Lab Results   Component Value Date/Time    WBC 11.5 02/16/2017 03:00 AM      H/H    Lab Results   Component Value Date/Time    HGB 7.2 02/16/2017 03:00 AM      Platelets Lab Results   Component Value Date/Time    PLATELET 497 76/63/7497 03:00 AM      Temp 98.5 °F (36.9 °C)     Dose administration notes:   Doses given appropriately as scheduled    Trough drawn early   Adjustments:  Increased dose to Vancomycin 1250 mg IV Q 12H   Plan for level: Trough ordered for 2/18/17    Plan:  Continue to monitor     Thank you,  Nick Johnson, PharmD, M.S.   [address] [address]   21

## 2017-02-16 NOTE — PROGRESS NOTES
Patient is unable to communicate at this time.  offered prayer and left Spiritual Care brochure. Chaplains will continue to follow and will provide pastoral care on an as needed/requested basis.     [facility]   Staff [address] [place] [address]   [phone]

## 2017-02-16 NOTE — PROGRESS NOTES
2030:received pt on vent with head elevated sxn for small amount of yellow secretions ett secured and scooby tube cleared in line resp neb tx given,NR and mask at bedside,inline resp neb tx given will monitor though the shift.

## 2017-02-17 NOTE — PROGRESS NOTES
-0850-Received patient on ventilator ACVC+ 10, VT-450, PEEP-5, FIo2-21%. O2 sats-99% HR-114, RR-30. ETT noted to be patent and secure. Respiratory will continue to monitor. -[address]    1135-Pt. Placed on SBT=PS-7, PEEP-5, FIO2-21%. O2 Sats-100% HR-112, RR-32. Baseline RR on vent. Is 28. RSBI-61. Will continue to monitor. -[facility]    -1610-SBT ended at this time. In efforts to rest patient. RSBI-56, O2 Sats-99% HR-119 RR-36. Pt. Returned to full ventilator support. ACVC+ 10, VT-450, PEEP-5,  FIo2-100%. O2 Sats-100 HR-112, RR-32. ETT remains patent and secure.  -[address]

## 2017-02-17 NOTE — PROGRESS NOTES
Problem: Impaired Skin Integrity/Pressure Ulcer Treatment  Goal: *Improvement of existing pressure ulcer  Outcome: Not Progressing Towards Goal  Wounds do not appear to be improving  Goal: *Prevention of pressure ulcer  Outcome: Not Progressing Towards Goal  Pressure ulcers noted    Problem: Patient Education: Go to Patient Education Activity  Goal: Patient/Family Education  Outcome: Not Progressing Towards Goal  Pressure ulcers noted    Problem: Pressure Ulcer - Risk of  Goal: *Prevention of pressure ulcer  Outcome: Not Progressing Towards Goal  Pressure ulcers noted

## 2017-02-17 NOTE — PROGRESS NOTES
[facility]  Pulmonary, Critical Care, and Sleep Medicine    Name: Gilma Mcdonald MRN: [id number]   : 1955 Hospital: [address] [place] [address]   Date: 2017        [facility] Progress Notes                                              [x] I have reviewed the flowsheet and previous days notes. Events, vitals, medications and notes from last 24 hours reviewed. Care plan discussed with staff, patient, family and on multidisciplinary rounds. 2017  Intubated on MV (Day 8), opens eyes spontaneously, not FC  Hd stable, afebrile  Hgb stable, Na remains up  Tolerating TF    IMPRESSION:   · Septic shock, likely source skin with noted severe large tunneling sacral, gluteal wounds, now off pressors  · Acute respiratory failure in setting of severe sepsis requiring MV  · Acute encephalopathy, likely metabolic, sepsis  · Hyperkalemia, s/p tx in ED, resolved  · Lactic acidosis, resolved  · JOAQUIN on CKD likely due to sepsis, volume depletion, resolved  · Metabolic acidosis, improved  · Elevated ammonia, improved  · Diarrhea, improved  · Hypernatremia, on free water, D5  · DM  · Morbid obesity  · Bedbug infestation  · Hx HTN    · Code status: FULL      RECOMMENDATIONS:   · CVS: monitor HD. Remain tachycardic low 100's. Monitor HD. Off levo/Vasopressin,  maintain MAP>65,  RIJ in place. Negative enzymes. ECHO EF 55-60%. S  · Respiratory: SBT as tolerated, not candidate for extubation due to AMS. Continue mechanical ventilation duonebs. Follow CXR and ABG, no acute changes  · ID:  Continue zosyn and vanc. Urine cx +yeast. Sputum +staph aureus  Trend temp and wbc. Sepsis bundle. · Hematology/Oncology:  Hb 7.2 today, no external visible bleeding, s/p1u PRBC, Nl coags. Monitor H and H and any active bleeding. · Renal/Lytes:  Monitor Cr and lytes. Cr wnl. Na remains elevated. Continue IVF D5W, monitor Na Obando, monitor I&O's. · GI/:  Continue obando-due to multiple ulcers. PPI.  Trend LFT, ammonia-ok. amylase and lipase ok  · Endocrine:  Nl TSH. Nl cortisol. Glycemic control. ISS prn, avoid hypoglycemia. · Neurology/Pain/Sedation:  Weaned off Propofol- eyes open but does not follow commands. CT head/imaging not done-unable to accomodate weight/shoulder. ? neuro eval if pt unable to fully awaken/non emergent. · Skin/Wound/Musc: Wound care following. Surgery has signed off  · FEN: Cont TF. IVF-D5W/ Free water for hypernatremia. Replace lytes prn per protocol. · Prophylaxis: GI Prophylaxis with protonix. DVT Prophylaxis with heparin. · Restraints: B wrist  ·   · Await Guardianship, Pt lives with son (mentally challenged- unable to make decisions). · Palliative following for care goals. ·                                                               ·   ·   · Glycemic Control. Glucose stabilizer per ICU protocol when on insulin drip. Maintain blood glucose 140-180. · Replace electrolytes per ICU electrolyte replacement protocol  · Ventilator bundle & Sedation protocol followed. Daily sedation holiday, assessment for readiness for SBT and then re-titrate if required. Chlorhexidine mouth washes. · HOB 30 degree elevation all the time. Aggressive pulmonary toileting. Incentive spirometry when appropriate. Aspiration precautions. · Sepsis bundle and protocol followed. Follow serial lactic acid, frequent BMP check, fluid resuscitation. Follow cultures. Deescalate antibiotic when appropriate. · Obando bundle followed, remove obando catheter when not critically ill. · Central Line bundle followed, remove when not needed. · Skin & Wound care. · Weekly prealbumin, nutritional consult. · PT/OT eval and treat. OOB when appropriate. · Further recommendations will be based on the patient's response to recommended treatment and results of the investigation ordered. · Quality Care: PPI, DVT prophylaxis, HOB elevated, Infection control all reviewed and addressed.   · Events and notes from last 24 hours reviewed. Care plan discussed with nursing       Allergies   Allergen Reactions    Ace Inhibitors Nausea and Vomiting    Hydralazine Nausea and Vomiting    Ibuprofen Shortness of Breath    Norvasc [Amlodipine] Swelling    Sulfa (Sulfonamide Antibiotics) Itching    Tramadol Shortness of Breath      Past Medical History   Diagnosis Date    Anemia     Anxiety 2016    Arthritis     Cancer ([facility] [address]      pre cancerous cells in the uterus    Chronic pain      knees    Chronic pain of both knees 2016    Cigarette nicotine dependence in remission 2016    Diabetes ([facility] [address] [facility]     Fatty liver 2016    Headache     Hypertension     Liver disease      fatty liver    Lymphedema 2016    Morbid obesity ([facility] [address] 2016    Psychotic disorder      anxiety and panic attacks    Thyroid disease       Past Surgical History   Procedure Laterality Date    Hx cholecystectomy  2012    Hx gyn  2006     hysterectomy    Hx  section        Social History   Substance Use Topics    Smoking status: Never Smoker    Smokeless tobacco: Never Used    Alcohol use No      Family History   Problem Relation Age of Onset    Diabetes Mother     Hypertension Mother     Lung Disease Father       Prior to Admission medications    Medication Sig Start Date End Date Taking? Authorizing Provider   cephALEXin (KEFLEX) 500 mg capsule Take 1 Cap by mouth four (4) times daily. 17   Daina Zaidi MD   nebivolol (BYSTOLIC) 5 mg tablet Take 5 mg by mouth daily. Marino Lal MD   ergocalciferol (VITAMIN D2) 50,000 unit capsule Take 50,000 Units by mouth. Marino Lal MD   hydroCHLOROthiazide (HYDRODIURIL) 25 mg tablet Take 25 mg by mouth daily. Marino Lal MD   glimepiride (AMARYL) 2 mg tablet Take 2 mg by mouth every morning. Marino Lal MD   traMADol (ULTRAM) 50 mg tablet Take 50 mg by mouth every six (6) hours as needed for Pain. Marino Lal MD   loratadine (CLARITIN) 10 mg tablet 10 mg. 12/30/12   Historical Provider   losartan (COZAAR) 25 mg tablet Take 1 Tab by mouth every evening. 5/19/16   Naida Paez MD   metoprolol succinate (TOPROL-XL) 25 mg XL tablet Take 1 Tab by mouth daily. 5/19/16   Naida Paez MD     Current Facility-Administered Medications   Medication Dose Route Frequency    vancomycin (VANCOCIN) 1,250 mg in 0.9% sodium chloride 250 mL IVPB  1,250 mg IntraVENous Q12H    [START ON 2/18/2017] VANCOMYCIN INFORMATION NOTE   Other ONCE    dextrose 5% infusion  100 mL/hr IntraVENous CONTINUOUS    chlorhexidine (PERIDEX) 0.12 % mouthwash 10 mL  10 mL Oral Q12H    piperacillin-tazobactam (ZOSYN) 3.375 g in  mL MBP ##EXTENDED 4 HRS INFUSION  3.375 g IntraVENous Q8H    albuterol-ipratropium (DUO-NEB) 2.5 MG-0.5 MG/3 ML  3 mL Nebulization Q6H RT    insulin lispro (HUMALOG) injection   SubCUTAneous Q6H    heparin (porcine) injection 5,000 Units  5,000 Units SubCUTAneous Q8H    pantoprazole (PROTONIX) 40 mg in sodium chloride 0.9 % 10 mL injection  40 mg IntraVENous DAILY       Lines: All central lines examined by me. No signs of erythema, induration, discharge.       Central Venous Catheter:  Triple Lumen right ij cvl 02/10/17 Right Neck (Active)   Central Line Being Utilized Yes 2/10/2017  7:13 PM   Site Assessment Clean, dry, & intact 2/10/2017  7:13 PM   Dressing Status Clean, dry, & intact 2/10/2017  7:13 PM   Proximal Hub Color/Line Status White 2/10/2017  7:13 PM   Positive Blood Return (Medial Site) Yes 2/10/2017  7:13 PM   Medial Hub Color/Line Status Brown 2/10/2017  7:13 PM   Positive Blood Return (Lateral Site) Yes 2/10/2017  7:13 PM   Distal Hub Color/Line Status Blue 2/10/2017  7:13 PM   Positive Blood Return (Site #3) Yes 2/10/2017  7:13 PM          Telemetry: sinus tachycardia    Objective:   Vital Signs:    Visit Vitals    /72    Pulse (!) 117    Temp 98.8 °F (37.1 °C)    Resp 30    Ht 5' 5\" (1.651 m)    Wt (!) 193.1 kg (425 lb 11.2 oz)    SpO2 100%    Breastfeeding No    BMI 70.84 kg/m2       O2 Device: Endotracheal tube, Ventilator       Temp (24hrs), Av °F (37.2 °C), Min:98.6 °F (37 °C), Max:99.2 °F (37.3 °C)       Intake/Output:   Last shift:      701 - 1900  In: -   Out: 100 [Urine:100]    Last 3 shifts: 02/15 1901 - 700  In: 5557.3 [I.V.:4117.3]  Out: 6611 [HWWGE:6874; Drains:25]      Intake/Output Summary (Last 24 hours) at 17 1041  Last data filed at 17 1028   Gross per 24 hour   Intake          3292.25 ml   Output             1050 ml   Net          2242.25 ml       Last 3 Recorded Weights in this Encounter    02/15/17 0544 17 0600 17 0546   Weight: (!) 194.4 kg (428 lb 9.2 oz) (!) 193.5 kg (426 lb 9.4 oz) (!) 193.1 kg (425 lb 11.2 oz)       Ventilator Settings:  Mode Rate Tidal Volume Pressure FiO2 PEEP   Assist control, VC+   450 ml  10 cm H2O 21 % 5 cm H20     Peak airway pressure: 18 cm H2O    Plateau pressure:     Tidal volume:    Minute ventilation: 14.8 l/min   SPO2      ARDS network Guidelines: Lung protective strategy and Plateau pressure goals less than or equal to 30      Physical Exam:     General/Neurology: Intubated, open eyes spontaneously, does not follow commands  Head:   Normocephalic  Eye:   No scleral icterus  Nose/Throat:  ETT in place  Neck:   Supple, R IJ  Lung:   Adequate air entry bilateral equal, no rales, no rhonchi, no wheezing. Heart:   Tachy, no m  Abdomen:  Soft, morbidly obese  :  Juarez in place.   Extremities:  4+BLE edema, ulceration BLE, significant chronic changes  Skin : tunneling sacral and gluteal area, muscle and bone exposed-see photos    Data:       Recent Results (from the past 24 hour(s))   GLUCOSE, POC    Collection Time: 17 12:17 PM   Result Value Ref Range    Glucose (POC) 132 (H) 70 - 110 mg/dL   GLUCOSE, POC    Collection Time: 17  5:27 PM   Result Value Ref Range    Glucose (POC) 126 (H) 70 - 110 mg/dL   POTASSIUM    Collection Time: 02/16/17  7:15 PM   Result Value Ref Range    Potassium 4.0 3.5 - 5.5 mmol/L   MAGNESIUM    Collection Time: 02/16/17  7:15 PM   Result Value Ref Range    Magnesium 2.0 1.8 - 2.4 mg/dL   PHOSPHORUS    Collection Time: 02/16/17  7:15 PM   Result Value Ref Range    Phosphorus 1.7 (L) 2.5 - 4.9 MG/DL   SODIUM    Collection Time: 02/16/17  7:15 PM   Result Value Ref Range    Sodium 156 (H) 136 - 145 mmol/L   GLUCOSE, POC    Collection Time: 02/16/17 11:36 PM   Result Value Ref Range    Glucose (POC) 124 (H) 70 - 110 mg/dL   CBC WITH AUTOMATED DIFF    Collection Time: 02/17/17  5:36 AM   Result Value Ref Range    WBC 10.4 4.6 - 13.2 K/uL    RBC 2.50 (L) 4.20 - 5.30 M/uL    HGB 7.2 (L) 12.0 - 16.0 g/dL    HCT 21.6 (L) 35.0 - 45.0 %    MCV 86.4 74.0 - 97.0 FL    MCH 28.8 24.0 - 34.0 PG    MCHC 33.3 31.0 - 37.0 g/dL    RDW 15.3 (H) 11.6 - 14.5 %    PLATELET 138 650 - 176 K/uL    MPV 9.4 9.2 - 11.8 FL    NEUTROPHILS 69 40 - 73 %    LYMPHOCYTES 19 (L) 21 - 52 %    MONOCYTES 6 3 - 10 %    EOSINOPHILS 6 (H) 0 - 5 %    BASOPHILS 0 0 - 2 %    ABS. NEUTROPHILS 7.2 1.8 - 8.0 K/UL    ABS. LYMPHOCYTES 2.0 0.9 - 3.6 K/UL    ABS. MONOCYTES 0.6 0.05 - 1.2 K/UL    ABS. EOSINOPHILS 0.6 (H) 0.0 - 0.4 K/UL    ABS.  BASOPHILS 0.0 0.0 - 0.06 K/UL    RBC COMMENTS ANISOCYTOSIS  1+        RBC COMMENTS MACROCYTOSIS  1+        RBC COMMENTS POLYCHROMASIA  1+        RBC COMMENTS TARGET CELLS  1+        RBC COMMENTS HYPOCHROMIA  1+        DF AUTOMATED     METABOLIC PANEL, BASIC    Collection Time: 02/17/17  5:36 AM   Result Value Ref Range    Sodium 155 (H) 136 - 145 mmol/L    Potassium 4.2 3.5 - 5.5 mmol/L    Chloride 125 (H) 100 - 108 mmol/L    CO2 17 (L) 21 - 32 mmol/L    Anion gap 13 3.0 - 18 mmol/L    Glucose 135 (H) 74 - 99 mg/dL    BUN 20 (H) 7.0 - 18 MG/DL    Creatinine 0.72 0.6 - 1.3 MG/DL    BUN/Creatinine ratio 28 (H) 12 - 20      GFR est AA >60 >60 ml/min/1.73m2    GFR est non-AA >60 >60 ml/min/1.73m2    Calcium 7.9 (L) 8.5 - 10.1 MG/DL CALCIUM, IONIZED    Collection Time: 02/17/17  5:36 AM   Result Value Ref Range    Ionized Calcium 1.10 (L) 1.12 - 1.32 MMOL/L   GLUCOSE, POC    Collection Time: 02/17/17  6:14 AM   Result Value Ref Range    Glucose (POC) 145 (H) 70 - 110 mg/dL   POC G3    Collection Time: 02/17/17  9:09 AM   Result Value Ref Range    Device: VENT      FIO2 (POC) 21 %    pH (POC) 7.406 7.35 - 7.45      pCO2 (POC) 26.9 (L) 35.0 - 45.0 MMHG    pO2 (POC) 107 (H) 80 - 100 MMHG    HCO3 (POC) 16.9 (L) 22 - 26 MMOL/L    sO2 (POC) 98 (H) 92 - 97 %    Base deficit (POC) 8 mmol/L    Mode ASSIST CONTROL      Tidal volume 450 ml    Set Rate 10 bpm    PEEP/CPAP (POC) 5 cmH2O    Allens test (POC) YES      Total resp. rate 30      Site LEFT RADIAL      Patient temp. 98.8      Specimen type (POC) ARTERIAL      Performed by Tate Boyd     Volume control plus YES           Chemistry Recent Labs      02/17/17   0536  02/16/17   1915  02/16/17   1030  02/16/17   0300  02/15/17   0400   GLU  135*   --    --   130*  152*   NA  155*  156*  158*  155*  154*   K  4.2  4.0   --   3.6  3.8   CL  125*   --    --   125*  125*   CO2  17*   --    --   19*  19*   BUN  20*   --    --   21*  24*   CREA  0.72   --    --   0.61  0.67   CA  7.9*   --    --   7.7*  8.1*   MG   --   2.0   --    --    --    PHOS   --   1.7*   --    --    --    AGAP  13   --    --   11  10   BUCR  28*   --    --   34*  36*        Lactic Acid Lactic acid   Date Value Ref Range Status   02/11/2017 1.6 0.4 - 2.0 MMOL/L Final     No results for input(s): LAC in the last 72 hours.      Liver Enzymes Protein, total   Date Value Ref Range Status   02/13/2017 5.2 (L) 6.4 - 8.2 g/dL Final     Albumin   Date Value Ref Range Status   02/13/2017 2.1 (L) 3.4 - 5.0 g/dL Final     Globulin   Date Value Ref Range Status   02/13/2017 3.1 2.0 - 4.0 g/dL Final     A-G Ratio   Date Value Ref Range Status   02/13/2017 0.7 (L) 0.8 - 1.7   Final     AST (SGOT)   Date Value Ref Range Status   02/13/2017 32 15 - 37 U/L Final     Alk. phosphatase   Date Value Ref Range Status   02/13/2017 80 45 - 117 U/L Final     No results for input(s): TP, ALB, GLOB, AGRAT, SGOT, GPT, AP, TBIL in the last 72 hours. No lab exists for component: DBIL     CBC w/Diff Recent Labs      02/17/17   0536  02/16/17   0300  02/15/17   0400   WBC  10.4  11.5  15.1*   RBC  2.50*  2.58*  2.82*   HGB  7.2*  7.2*  7.8*   HCT  21.6*  21.6*  23.3*   PLT  207  181  159   GRANS  69  73  75*   LYMPH  19*  16*  12*   EOS  6*  5  7*        Cardiac Enzymes No results found for: CPK, CKMMB, CKMB, RCK3, CKMBT, CKNDX, CKND1, ISAIAS, TROPT, TROIQ, ERNST, TROPT, TNIPOC, BNP, BNPP     BNP No results found for: BNP, BNPP, XBNPT     Coagulation No results for input(s): PTP, INR, APTT in the last 72 hours.     No lab exists for component: INREXT, INREXT      Thyroid  Lab Results   Component Value Date/Time    TSH 2.41 02/10/2017 05:40 PM       No results found for: T4     Lipid Panel Lab Results   Component Value Date/Time    Cholesterol, total 167 05/05/2010 08:45 AM    HDL Cholesterol 65 05/05/2010 08:45 AM    LDL, calculated 88.4 05/05/2010 08:45 AM    VLDL, calculated 13.6 05/05/2010 08:45 AM    Triglyceride 68 05/05/2010 08:45 AM    CHOL/HDL Ratio 2.6 05/05/2010 08:45 AM        ABG Recent Labs      02/17/17   0909  02/16/17   0922  02/15/17   0946   PHI  7.406  7.414  7.412   PCO2I  26.9*  27.1*  28.7*   PO2I  107*  111*  102*   HCO3I  16.9*  17.4*  18.3*   FIO2I  21  21  21        Urinalysis Lab Results   Component Value Date/Time    Color DARK YELLOW 02/10/2017 06:15 PM    Appearance CLOUDY 02/10/2017 06:15 PM    Specific gravity 1.018 02/10/2017 06:15 PM    pH (UA) 5.0 02/10/2017 06:15 PM    Protein NEGATIVE  02/10/2017 06:15 PM    Glucose NEGATIVE  02/10/2017 06:15 PM    Ketone TRACE 02/10/2017 06:15 PM    Bilirubin SMALL 02/10/2017 06:15 PM    Urobilinogen 1.0 02/10/2017 06:15 PM    Nitrites NEGATIVE  02/10/2017 06:15 PM    Leukocyte Esterase TRACE 02/10/2017 06:15 PM    Epithelial cells 1+ 02/10/2017 06:15 PM    Bacteria 1+ 02/10/2017 06:15 PM    WBC 4 to 10 02/10/2017 06:15 PM    RBC NONE 02/10/2017 06:15 PM        Micro  No results for input(s): SDES, CULT in the last 72 hours. No results for input(s): CULT in the last 72 hours. EKG  No results found for this or any previous visit. PFT  No flowsheet data found. Other  ASA reactivity:   Pre-albumin:   Ionized Calcium:   NH4:   T3, FT4:  Cortisol:  Urine Osm:  Urine Lytes:   HbA1c:      Ultrasound       LE Doppler       ECHO  2/10/17  Left ventricle: Systolic function was normal. Ejection fraction was  estimated in the range of 55 % to 60 %. No obvious wall motion  abnormalities identified in the views obtained. There was mild concentric  hypertrophy. Mitral valve: There was no evidence for stenosis. There was no significant  regurgitation. Aortic valve: The valve was probably trileaflet. Leaflets exhibited mild  calcification and sclerosis. Not well visualized. Valve mean gradient was  8 mmHg. Tricuspid valve: There was no evidence for tricuspid stenosis. There was  trivial regurgitation. CT (Most Recent)  No results found for this or any previous visit. XR (Most Recent). CXR  reviewed by me and compared with previous CXR   Results from Hospital Encounter encounter on 02/10/17   XR CHEST PORT   Narrative Indication:  Endotracheal tube placement    Comparison:  02/15/17    Time of study - 0323    Findings:  AP supine portable chest    The cardiomediastinal silhouette is normal.  The endotracheal tube is in good  position about 5.5 cm above the kimberly. NG tube enters the stomach. The lungs  are clear. Impression IMPRESSION:     Stable chest and catheters. Best practice : All below core measures reviewed and addressed:   [x] Antibiotic choice. [x] Severe Sepsis bundles follwed; SIRS screen met? Yes   [x] Fluids.   [x] Lactic acid ordered- initial and repeat Q6hrs if elevated till normalized. [x] Cultures drawn. [x] Antibiotic administered within 1hr-ICU and 3hrs ED. [x] Large bore IV- 2 sites        R IJ  [x] Pressors aim MAP >65mmHg. [x] Steroids. [x] Glycemic control. [x] Infection control. [x] Mech. Ventilated patients- aim to keep peak plateau pressure 22-33ST H2O. [x] HOB at >= 30 degree. [x] Stress ulcer prophylaxis. [x] DVT prophylaxis. [x] Central Line Bundle Followed. [x] Juarez Bundle Followed. [x] Ventilator Bundle Followed. (Daily sedation holiday to assess readiness for weaning from ventilator & SBT trial starting at 6.00 am, HOB 30-degree elevation, Chlorhexidine mouth washes & Routine oral care every 4 hours, Noble tube to suction at 20-30 cm H2O, Maintain Noble tube with 5-10ml air every 4 hours, DVT prophylaxis, GI prophylaxis etc.).     The patient is: [] acutely ill Risk of deterioration: [] moderate    [x] critically ill  [x] high        Annette Lima  2/17/2017

## 2017-02-17 NOTE — CONSULTS
Neurology Consultation    Patient: Olvin Alanis MRN: [id number]  SSN: [id number]    YOB: 1955  Age: 64 y.o. Sex: female      Subjective:      65 y/o female brought to the ER on 1/20/17 after falling out of bed and being unable to get up. She had been on the floor for 4 days. When she was lifted off of the floor a large amount of skin from the buttocks peeled off, stuck to the floor, per ER notes. Patient was bed bound due to obesity and deconditioning. She had rhabdomyolysis and leukocytosis. She was treated and discharged home on 1/22/17. She was brought back to the ER on 2/10/17 with altered mental status, respiratory distress, with cellulitis both legs, and covered in feces. She had a sacral decubitus ulcer. EMS reported that the patient's home should be condemned and that her son, her only caretaker, was mentally challenged. Patient was tachycardic and BP dropped to 74/58 mmHg in the ER. A central line was placed, and the patient was treated with Levaquin, vanco, Zoxyn and pressors, and she was intubated. Propofol was turned off yesterday, and the patient now is off of pressors. She remains intubated. Echo done 2/11/17 showed no valve vegetations or source of emboli. No brain imaging has been done. Recent labs:  Hgb 7.2, WBC count 10.4, Na+ 155, creatinine 0.72, calcium 7.9. PO4 1.7 and Mg 2.0 yesterday. TFTs OK. ALT and ALT normal, NH3 13 on 2/13/17. Last CK on 2/10/17 was 178. Today's nurse reports that the patient gags upon suctioning but has not opened her eyes or followed any commands.     Past Medical History   Diagnosis Date    Anemia     Anxiety 5/19/2016    Arthritis     Cancer ([address]      pre cancerous cells in the uterus    Chronic pain      knees    Chronic pain of both knees 5/19/2016    Cigarette nicotine dependence in remission 5/19/2016    Diabetes ([address]     Fatty liver 5/19/2016    Headache     Hypertension     Liver disease fatty liver    Lymphedema 5/19/2016    Morbid obesity ([facility] [address] 5/19/2016    Psychotic disorder      anxiety and panic attacks    Thyroid disease    S/p cholecystectomy, hysterectomy, C section.       ALLERGIES:  Ace inhibitors  Hydralazine  Ibuprofen  Amlodipine  Sulfa  Tramadol    Family History   Problem Relation Age of Onset    Diabetes Mother     Hypertension Mother     Lung Disease Father      Social History   Substance Use Topics    Smoking status: Never Smoker    Smokeless tobacco: Never Used    Alcohol use No      Current Facility-Administered Medications   Medication Dose Route Frequency Provider Last Rate Last Dose    vancomycin (VANCOCIN) 1,250 mg in 0.9% sodium chloride 250 mL IVPB  1,250 mg IntraVENous Q12H Alexandrea Rossi  mL/hr at 02/17/17 0946 1,250 mg at 02/17/17 0946    [START ON 2/18/2017] VANCOMYCIN INFORMATION NOTE   Other ONCE Alexandrea Rossi MD        dextrose 5% infusion  100 mL/hr IntraVENous CONTINUOUS Wade Vasquez  mL/hr at 02/16/17 2206 100 mL/hr at 02/16/17 2206    ELECTROLYTE REPLACEMENT PROTOCOL  1 Each Other PRN [place] Ek, NP        ELECTROLYTE REPLACEMENT PROTOCOL  1 Each Other PRN [place] Ek, NP        ELECTROLYTE REPLACEMENT PROTOCOL  1 Each Other PRN [place] Ek, NP        ELECTROLYTE REPLACEMENT PROTOCOL  1 Each Other PRN [place] Ek, NP        chlorhexidine (PERIDEX) 0.12 % mouthwash 10 mL  10 mL Oral Q12H [place] Ek, NP   10 mL at 02/17/17 0945    piperacillin-tazobactam (ZOSYN) 3.375 g in  mL MBP ##EXTENDED 4 HRS INFUSION  3.375 g IntraVENous Q8H Naveen Abbasi MD 25 mL/hr at 02/17/17 0619 3.375 g at 02/17/17 5669    0.9% sodium chloride infusion 250 mL  250 mL IntraVENous PRN LIZETT Rodríguez        albuterol-ipratropium (DUO-NEB) 2.5 MG-0.5 MG/3 ML  3 mL Nebulization Q6H RT LIZETT Rodríguez   3 mL at 02/17/17 1344    sodium chloride (NS) flush 5-10 mL  5-10 mL IntraVENous PRN Canda Meter, DO        insulin lispro (HUMALOG) injection   SubCUTAneous Q6H Douglas Rider MD   Stopped at 02/15/17 1200    glucose chewable tablet 16 g  4 Tab Oral PRN Douglas Rider MD        glucagon [facility]) injection 1 mg  1 mg IntraMUSCular PRN Douglas Rider MD        dextrose (D50W) injection syrg 12.5-25 g  25-50 mL IntraVENous PRN Douglas Rider MD        midazolam (VERSED) injection 2 mg  2 mg IntraVENous Q1H PRN Douglas Rider MD   2 mg at 02/16/17 1613    heparin (porcine) injection 5,000 Units  5,000 Units SubCUTAneous Claytonsea MD Talya   5,000 Units at 02/17/17 1238    pantoprazole (PROTONIX) 40 mg in sodium chloride 0.9 % 10 mL injection  40 mg IntraVENous DAILY Douglas Rider MD   40 mg at 02/17/17 0945        Allergies   Allergen Reactions    Ace Inhibitors Nausea and Vomiting    Hydralazine Nausea and Vomiting    Ibuprofen Shortness of Breath    Norvasc [Amlodipine] Swelling    Sulfa (Sulfonamide Antibiotics) Itching    Tramadol Shortness of Breath       Review of Systems:  Review of systems not obtained due to patient factors. Objective:     Vitals:    02/17/17 1000 02/17/17 1140 02/17/17 1200 02/17/17 1346   BP: 123/60      Pulse: (!) 118 (!) 112     Resp: (!) 32 (!) 32     Temp:   99 °F (37.2 °C)    SpO2:  100%  100%   Weight:       Height:            Physical Exam:      General appearance was that of an obese female in ICU, intubated. No spontaneous movement noted. Opened eyes to sternal rub. Did not follow commands. Grimaced to deep pain in the extremities. HEENT:  Normocephalic, atraumatic. Neck:  Supple. Extremities:  No cyanosis, clubbing. Diffuse edema. Cranial nerves: pupils round, reactive to light. Gaze conjugate and she appeared to focus on me at times. Resisted passive eye opening. Blinked to visual threat. Motor exam:  No movement to command or withdrawal to pain.   Tone flaccid. Reflexes:  0/4 throughout. Plantar reflexes silent. Cerebellar:  Could not be tested. Sensory: Grimaced to deep pain in extremities. Gait: Could not be tested. Assessment:     Hospital Problems  Date Reviewed: 5/19/2016          Codes Class Noted POA    Acute respiratory failure [facility]) ICD-10-CM: J96.00  ICD-9-CM: 518.81  2/13/2017 Unknown        Debility ICD-10-CM: R53.81  ICD-9-CM: 799.3  2/13/2017 Unknown        Septic shock ([facility] [address] ICD-10-CM: A41.9, R65.21  ICD-9-CM: 038.9, 785.52, 995.92  2/10/2017 Unknown              Plan:     Sepsis, hypotension, severe debility. Unclear how much hypoxic brain injury or watershed strokes may have occurred. CT of the head cannot be done due to obesity and the patient's wound covering most of the back. Following patient's clinical status is just as good. She opened her eyes to strong stimulus, and appeared to feel deep pain, and possibly focussed briefly. She blinked to visual threat reproducibly. Given all of this, recommend full continued care. Will follow her clinical responsiveness peripherally. Avoid sedating medications as possible. Discussed with Dr. Oliver Nation. I appreciate the consultation.     Signed By: Romy Forde MD     February 17, 2017

## 2017-02-17 NOTE — PROGRESS NOTES
[facility]  Hospitalist Division    Daily progress Note    Patient: Jihan Gorman MRN: [id number]  CSN: [id number]    YOB: 1955  Age: 64 y.o.   Sex: female    DOA: 2/10/2017 LOS:  LOS: 7 days                    Subjective:     CC: Sepsis   Intubated, open eyes spont     Objective:      Visit Vitals    /52    Pulse (!) 112    Temp 98.8 °F (37.1 °C)    Resp (!) 32    Ht 5' 5\" (1.651 m)    Wt (!) 193.1 kg (425 lb 11.2 oz)    SpO2 100%    Breastfeeding No    BMI 70.84 kg/m2       Physical Exam:    NC/AT  NO JVD,TMG  S1/S2 tachy but reg   CTAB, NO WHEEZING  NT,ND, NABS  +  EDEMA  + for sacral and LE  ulcers        Intake and Output:  Current Shift:  02/17 0701 - 02/17 1900  In: 3966 [I.V.:1000]  Out: 200 [Urine:200]  Last three shifts:  02/15 1901 - 02/17 0700  In: 5557.3 [I.V.:4117.3]  Out: 0536 [Urine:1745; Drains:25]    Recent Results (from the past 24 hour(s))   GLUCOSE, POC    Collection Time: 02/16/17  5:27 PM   Result Value Ref Range    Glucose (POC) 126 (H) 70 - 110 mg/dL   POTASSIUM    Collection Time: 02/16/17  7:15 PM   Result Value Ref Range    Potassium 4.0 3.5 - 5.5 mmol/L   MAGNESIUM    Collection Time: 02/16/17  7:15 PM   Result Value Ref Range    Magnesium 2.0 1.8 - 2.4 mg/dL   PHOSPHORUS    Collection Time: 02/16/17  7:15 PM   Result Value Ref Range    Phosphorus 1.7 (L) 2.5 - 4.9 MG/DL   SODIUM    Collection Time: 02/16/17  7:15 PM   Result Value Ref Range    Sodium 156 (H) 136 - 145 mmol/L   GLUCOSE, POC    Collection Time: 02/16/17 11:36 PM   Result Value Ref Range    Glucose (POC) 124 (H) 70 - 110 mg/dL   CBC WITH AUTOMATED DIFF    Collection Time: 02/17/17  5:36 AM   Result Value Ref Range    WBC 10.4 4.6 - 13.2 K/uL    RBC 2.50 (L) 4.20 - 5.30 M/uL    HGB 7.2 (L) 12.0 - 16.0 g/dL    HCT 21.6 (L) 35.0 - 45.0 %    MCV 86.4 74.0 - 97.0 FL    MCH 28.8 24.0 - 34.0 PG    MCHC 33.3 31.0 - 37.0 g/dL    RDW 15.3 (H) 11.6 - 14.5 %    PLATELET 207 135 - 420 K/uL    MPV 9.4 9.2 - 11.8 FL    NEUTROPHILS 69 40 - 73 %    LYMPHOCYTES 19 (L) 21 - 52 %    MONOCYTES 6 3 - 10 %    EOSINOPHILS 6 (H) 0 - 5 %    BASOPHILS 0 0 - 2 %    ABS. NEUTROPHILS 7.2 1.8 - 8.0 K/UL    ABS. LYMPHOCYTES 2.0 0.9 - 3.6 K/UL    ABS. MONOCYTES 0.6 0.05 - 1.2 K/UL    ABS. EOSINOPHILS 0.6 (H) 0.0 - 0.4 K/UL    ABS. BASOPHILS 0.0 0.0 - 0.06 K/UL    RBC COMMENTS ANISOCYTOSIS  1+        RBC COMMENTS MACROCYTOSIS  1+        RBC COMMENTS POLYCHROMASIA  1+        RBC COMMENTS TARGET CELLS  1+        RBC COMMENTS HYPOCHROMIA  1+        DF AUTOMATED     METABOLIC PANEL, BASIC    Collection Time: 02/17/17  5:36 AM   Result Value Ref Range    Sodium 155 (H) 136 - 145 mmol/L    Potassium 4.2 3.5 - 5.5 mmol/L    Chloride 125 (H) 100 - 108 mmol/L    CO2 17 (L) 21 - 32 mmol/L    Anion gap 13 3.0 - 18 mmol/L    Glucose 135 (H) 74 - 99 mg/dL    BUN 20 (H) 7.0 - 18 MG/DL    Creatinine 0.72 0.6 - 1.3 MG/DL    BUN/Creatinine ratio 28 (H) 12 - 20      GFR est AA >60 >60 ml/min/1.73m2    GFR est non-AA >60 >60 ml/min/1.73m2    Calcium 7.9 (L) 8.5 - 10.1 MG/DL   CALCIUM, IONIZED    Collection Time: 02/17/17  5:36 AM   Result Value Ref Range    Ionized Calcium 1.10 (L) 1.12 - 1.32 MMOL/L   GLUCOSE, POC    Collection Time: 02/17/17  6:14 AM   Result Value Ref Range    Glucose (POC) 145 (H) 70 - 110 mg/dL   POC G3    Collection Time: 02/17/17  9:09 AM   Result Value Ref Range    Device: VENT      FIO2 (POC) 21 %    pH (POC) 7.406 7.35 - 7.45      pCO2 (POC) 26.9 (L) 35.0 - 45.0 MMHG    pO2 (POC) 107 (H) 80 - 100 MMHG    HCO3 (POC) 16.9 (L) 22 - 26 MMOL/L    sO2 (POC) 98 (H) 92 - 97 %    Base deficit (POC) 8 mmol/L    Mode ASSIST CONTROL      Tidal volume 450 ml    Set Rate 10 bpm    PEEP/CPAP (POC) 5 cmH2O    Allens test (POC) YES      Total resp.  rate 30      Site LEFT RADIAL      Patient temp. 98.8      Specimen type (POC) ARTERIAL      Performed by Braden Neil     Volume control plus YES     SODIUM Collection Time: 02/17/17 10:18 AM   Result Value Ref Range    Sodium 155 (H) 136 - 145 mmol/L   GLUCOSE, POC    Collection Time: 02/17/17 11:27 AM   Result Value Ref Range    Glucose (POC) 149 (H) 70 - 110 mg/dL         Current Facility-Administered Medications:     vancomycin (VANCOCIN) 1,250 mg in 0.9% sodium chloride 250 mL IVPB, 1,250 mg, IntraVENous, Q12H, Christy Melchor MD, Last Rate: 125 mL/hr at 02/17/17 0946, 1,250 mg at 02/17/17 0946    [START ON 2/18/2017] VANCOMYCIN INFORMATION NOTE, , Other, ONCE, Christy Melchor MD    dextrose 5% infusion, 100 mL/hr, IntraVENous, CONTINUOUS, Wade Vasquez MD, Last Rate: 100 mL/hr at 02/16/17 2206, 100 mL/hr at 02/16/17 2206    ELECTROLYTE REPLACEMENT PROTOCOL, 1 Each, Other, PRN, Camilaertazar Uribeelor, NP    ELECTROLYTE REPLACEMENT PROTOCOL, 1 Each, Other, PRN, Laverta Bachelor, NP    ELECTROLYTE REPLACEMENT PROTOCOL, 1 Each, Other, PRN, Laverta Bachelor, NP    ELECTROLYTE REPLACEMENT PROTOCOL, 1 Each, Other, PRN, Laverta Bachelor, NP    chlorhexidine (PERIDEX) 0.12 % mouthwash 10 mL, 10 mL, Oral, Q12H, Heather Uribeelor, NP, 10 mL at 02/17/17 0945    piperacillin-tazobactam (ZOSYN) 3.375 g in  mL MBP ##EXTENDED 4 HRS INFUSION, 3.375 g, IntraVENous, Q8H, Dev Lackey MD, Last Rate: 25 mL/hr at 02/17/17 1416, 3.375 g at 02/17/17 1416    0.9% sodium chloride infusion 250 mL, 250 mL, IntraVENous, PRN, Dolores Addison PA    albuterol-ipratropium (DUO-NEB) 2.5 MG-0.5 MG/3 ML, 3 mL, Nebulization, Q6H RT, Dolores Addison, PA, 3 mL at 02/17/17 1344    sodium chloride (NS) flush 5-10 mL, 5-10 mL, IntraVENous, PRN, Malinda Seal, DO    insulin lispro (HUMALOG) injection, , SubCUTAneous, Q6H, Dev Lackey MD, Stopped at 02/15/17 1200    glucose chewable tablet 16 g, 4 Tab, Oral, PRN, Dev Lackey MD    glucagon [facility]) injection 1 mg, 1 mg, IntraMUSCular, PRN, Dev Lackey MD    dextrose (D50W) injection syrg 12.5-25 g, 25-50 mL, IntraVENous, PRN, Reg Whalen MD    midazolam (VERSED) injection 2 mg, 2 mg, IntraVENous, Q1H PRN, Reg Whalen MD, 2 mg at 02/16/17 1613    heparin (porcine) injection 5,000 Units, 5,000 Units, SubCUTAneous, Q8H, Reg Whalen MD, 5,000 Units at 02/17/17 1238    pantoprazole (PROTONIX) 40 mg in sodium chloride 0.9 % 10 mL injection, 40 mg, IntraVENous, DAILY, Reg Whalen MD, 40 mg at 02/17/17 0945    Lab Results   Component Value Date/Time    Glucose 135 02/17/2017 05:36 AM    Glucose 130 02/16/2017 03:00 AM    Glucose 152 02/15/2017 04:00 AM    Glucose 172 02/14/2017 04:10 AM    Glucose 122 02/13/2017 03:30 AM        Assessment/Plan     Active Problems:    Septic shock ([address] (2/10/2017)      Acute respiratory failure ([address] (2/13/2017)      Debility (2/13/2017)      Septic shock, off pressors   Cont  abx  Acute hypoxic resp failure 2/2 above, wean as carloz  JOAQUIN on CKD 2/2 sepsis, reolved  acute metabolic encephalopathy   Hypernatremia, now on  D5  DM, stable   Morbid obesity  HTN stable  Fever, resolved   Anemia, stable post transfusion   Hyperkalemia, resolved  Multiple ulcer including B/L heels, large sacral ulcer  Wound care and surgical team are following   May need debridement     Cont abx  [facility]  Supportive care  Guardianship in progress               Jaymie Nuñez MD  2/17/2017, 5:13 PM

## 2017-02-17 NOTE — PROGRESS NOTES
Problem: Ventilator Management  Goal: *Adequate oxygenation and ventilation  Pt. Intubated secondary to Septic Shock      Current ventilator settings- ACVC+ 10, VT-450, PEEP-5, FIo2-21%      Current ABG-2/17/2017 0909- Ph-7.406, PCo2-26.9, PO2-107, HCo3-16.9, SO2-98%      X ray-2/16/2017 0404-  Stable chest and catheters. Plan: Maintain Ventilatory Support      Goal:  Wean as tolerated. Adequate oxygenation and ventilation

## 2017-02-17 NOTE — PROGRESS NOTES
2100;received pt on vent head elevated sxn for moderate amount of thick yellow secretions inline resp neb tx given,ett secured and scooby tube cleared BVM at bedside pt resting with etes open will monitor though the shift,

## 2017-02-18 NOTE — PROGRESS NOTES
[facility] Dosing Services for Vancomycin    Indication: Sepsis of Unknown Etiology  Day of Therapy: 8  Goal Level(s): 15-20 mcg/ml  Today's Trough: 22.7 mcg/ml about 9 hours after the last dose. Extrapolating to the 12 hour dosing interval the trough calculates to be about 16.0 mcg/ml - Within Goal     Temp: 97.8 °F (36.6 °C)    Serum Creatinine:   Lab Results   Component Value Date/Time    Creatinine 0.67 02/18/2017 03:50 AM     Creatinine Clearance:  Estimated Creatinine Clearance: 155.3 mL/min (based on Cr of 0.67). BUN:    Lab Results   Component Value Date/Time    BUN 19 02/18/2017 03:50 AM      WBC / C&S:    Lab Results   Component Value Date/Time    WBC 11.3 02/18/2017 03:50 AM    Culture result: FEW  STAPHYLOCOCCUS AUREUS   02/12/2017 10:25 AM    Culture result: MODERATE  NORMAL RESPIRATORY KALEE   02/12/2017 10:25 AM       Other Current Antibiotics:    Current Antimicrobial Therapy (168h ago through future)      Ordered     Start Stop      02/16/17 0933  vancomycin (VANCOCIN) 1,250 mg in 0.9% sodium chloride 250 mL IVPB  1,250 mg,   IntraVENous,   EVERY 12 HOURS      02/16/17 2000 --    02/14/17 0801  piperacillin-tazobactam (ZOSYN) 3.375 g in  mL MBP ##EXTENDED 4 HRS INFUSION  3.375 g,   IntraVENous,   EVERY 8 HOURS      02/14/17 1400 --              Continue with maintenance dose of Vancomycin IV 1.25 grams every 12 hours. Pharmacy will follow daily and make changes as needed.     Heather Nova, PHARMD, Pharmacist  2/18/2017 9:31 AM

## 2017-02-18 NOTE — PROGRESS NOTES
Assumed care of patient from night nurse. Received patient in bed, resting quietly. Eyes open, patient will follow simple commands. Remains intubated. HOB elevated, side rails up x 3. Bed in low position with wheels locked. See shift assessment for further information concerning patient. 0800- Patient is alert with eyes open. Focusses and tracks with eyes. Follows simple command. Right upper extremities patient is unable to squeeze my hand but can squeeze on left hand and wiggle toes on bilateral lower extremities. Heart rate running NSR. Patient remains ventilated. Mouth care done. Lungs coarse and diminished. Minimal secretions through ET tube. Abdomen soft, obese. Bowel sound active. FMS in place. No output. Juarez draining arnol colored urine with sediment; malodorous. Wound dressings intact; not due to be changed at this time. Dextrose 5% infusing at 200mL/hr. Perative infusing through OG tube at 30mL/hr; 40mL gastric residual. No signs of distress. 1045- Blood drawn from central line. 1200- Juarez care done. Mouth care done. 1600- Juarez care done. Mouth care done. 1800- Wound care done. Patient tolerated fairly. 1910- Bedside and Verbal shift change report given to Bryon Amaya RN (oncoming nurse) by Jessica Mendez RN   (offgoing nurse). Report included the following information SBAR, Kardex, Procedure Summary, Intake/Output, MAR, Recent Results and Cardiac Rhythm NSR/ST.

## 2017-02-18 NOTE — PROGRESS NOTES
2 [facility]  Hospitalist Division    Daily progress Note    Patient: Merlin Boys MRN: [id number]  CSN: [id number]    YOB: 1955  Age: 64 y.o. Sex: female    DOA: 2/10/2017 LOS:  LOS: 8 days                    Subjective:     CC: Sepsis   Intubated but more alert and awake, follows command     Objective:      Visit Vitals    /59    Pulse (!) 102    Temp 97.8 °F (36.6 °C)    Resp 28    Ht 5' 5\" (1.651 m)    Wt (!) 193.4 kg (426 lb 5.9 oz)    SpO2 100%    Breastfeeding No    BMI 70.95 kg/m2       Physical Exam:    NC/AT  NO JVD,TMG  S1/S2  RRR  CTAB, NO WHEEZING  NT,ND, NABS  +  EDEMA  + for sacral and LE  ulcers        Intake and Output:  Current Shift:  02/18 0701 - 02/18 1900  In: 2270 [I.V.:1850]  Out: 100 [Urine:100]  Last three shifts:  02/16 1901 - 02/18 0700  In: 6909.8 [I.V.:5199.8]  Out: 1530 [Urine:1355; Drains:175]    Recent Results (from the past 24 hour(s))   SODIUM    Collection Time: 02/17/17  8:45 PM   Result Value Ref Range    Sodium 159 (H) 136 - 145 mmol/L   GLUCOSE, POC    Collection Time: 02/18/17 12:26 AM   Result Value Ref Range    Glucose (POC) 123 (H) 70 - 110 mg/dL   CALCIUM, IONIZED    Collection Time: 02/18/17  3:40 AM   Result Value Ref Range    Ionized Calcium 1.10 (L) 1.12 - 1.32 MMOL/L   CBC WITH AUTOMATED DIFF    Collection Time: 02/18/17  3:50 AM   Result Value Ref Range    WBC 11.3 4.6 - 13.2 K/uL    RBC 2.36 (L) 4.20 - 5.30 M/uL    HGB 6.6 (L) 12.0 - 16.0 g/dL    HCT 20.7 (L) 35.0 - 45.0 %    MCV 87.7 74.0 - 97.0 FL    MCH 28.0 24.0 - 34.0 PG    MCHC 31.9 31.0 - 37.0 g/dL    RDW 15.6 (H) 11.6 - 14.5 %    PLATELET 426 441 - 395 K/uL    MPV 9.1 (L) 9.2 - 11.8 FL    NEUTROPHILS 63 42 - 75 %    BAND NEUTROPHILS 3 0 - 5 %    LYMPHOCYTES 22 20 - 51 %    MONOCYTES 7 2 - 9 %    EOSINOPHILS 4 0 - 5 %    BASOPHILS 0 0 - 3 %    METAMYELOCYTES 1 (H) 0 %    NRBC 2.0 (H) 0  WBC    ABS. NEUTROPHILS 7.1 1.8 - 8.0 K/UL    ABS. LYMPHOCYTES 2.5 0.8 - 3.5 K/UL    ABS. MONOCYTES 0.8 0 - 1.0 K/UL    ABS. EOSINOPHILS 0.5 (H) 0.0 - 0.4 K/UL    ABS. BASOPHILS 0.0 0.0 - 0.1 K/UL    RBC COMMENTS ANISOCYTOSIS  1+        RBC COMMENTS POLYCHROMASIA  1+        DF MANUAL     METABOLIC PANEL, BASIC    Collection Time: 02/18/17  3:50 AM   Result Value Ref Range    Sodium 153 (H) 136 - 145 mmol/L    Potassium 3.9 3.5 - 5.5 mmol/L    Chloride 124 (H) 100 - 108 mmol/L    CO2 16 (L) 21 - 32 mmol/L    Anion gap 13 3.0 - 18 mmol/L    Glucose 136 (H) 74 - 99 mg/dL    BUN 19 (H) 7.0 - 18 MG/DL    Creatinine 0.67 0.6 - 1.3 MG/DL    BUN/Creatinine ratio 28 (H) 12 - 20      GFR est AA >60 >60 ml/min/1.73m2    GFR est non-AA >60 >60 ml/min/1.73m2    Calcium 7.9 (L) 8.5 - 10.1 MG/DL   MAGNESIUM    Collection Time: 02/18/17  3:50 AM   Result Value Ref Range    Magnesium 2.0 1.8 - 2.4 mg/dL   PHOSPHORUS    Collection Time: 02/18/17  3:50 AM   Result Value Ref Range    Phosphorus 2.3 (L) 2.5 - 4.9 MG/DL   GLUCOSE, POC    Collection Time: 02/18/17  6:06 AM   Result Value Ref Range    Glucose (POC) 154 (H) 70 - 110 mg/dL   VANCOMYCIN, TROUGH    Collection Time: 02/18/17  7:00 AM   Result Value Ref Range    Vancomycin,trough 22.7 (HH) 10.0 - 20.0 ug/mL    Reported dose date: 0      Reported dose time: 0      Reported dose: 1.25 G IV Q 12H UNITS   POC G3    Collection Time: 02/18/17  9:52 AM   Result Value Ref Range    Device: VENT      FIO2 (POC) 21 %    pH (POC) 7.378 7.35 - 7.45      pCO2 (POC) 26.2 (L) 35.0 - 45.0 MMHG    pO2 (POC) 104 (H) 80 - 100 MMHG    HCO3 (POC) 15.5 (L) 22 - 26 MMOL/L    sO2 (POC) 98 (H) 92 - 97 %    Base deficit (POC) 10 mmol/L    Mode ASSIST CONTROL      Tidal volume 450 ml    Set Rate 10 bpm    PEEP/CPAP (POC) 5 cmH2O    PIP (POC) 14      Allens test (POC) N/A      Inspiratory Time 1 sec    Total resp. rate 14      Site RIGHT RADIAL      Patient temp.  98.0      Specimen type (POC) ARTERIAL      Performed by Rea dean plus YES     SODIUM    Collection Time: 02/18/17 10:45 AM   Result Value Ref Range    Sodium 153 (H) 136 - 145 mmol/L   GLUCOSE, POC    Collection Time: 02/18/17 11:07 AM   Result Value Ref Range    Glucose (POC) 122 (H) 70 - 110 mg/dL         Current Facility-Administered Medications:     vancomycin (VANCOCIN) 1,250 mg in 0.9% sodium chloride 250 mL IVPB, 1,250 mg, IntraVENous, Q12H, Bernie Jhaveri MD, Last Rate: 125 mL/hr at 02/18/17 1032, 1,250 mg at 02/18/17 1032    dextrose 5% infusion, 200 mL/hr, IntraVENous, CONTINUOUS, Wade Vasquez MD, Last Rate: 200 mL/hr at 02/18/17 0819, 200 mL/hr at 02/18/17 0819    ELECTROLYTE REPLACEMENT PROTOCOL, 1 Each, Other, PRN, Mechelle Loja NP    ELECTROLYTE REPLACEMENT PROTOCOL, 1 Each, Other, PRN, Mechelle Loja NP    ELECTROLYTE REPLACEMENT PROTOCOL, 1 Each, Other, PRN, Mechelle Loja NP    ELECTROLYTE REPLACEMENT PROTOCOL, 1 Each, Other, PRN, Mechelle Loja, NP    chlorhexidine (PERIDEX) 0.12 % mouthwash 10 mL, 10 mL, Oral, Q12H, Mechelle Ljoa NP, 10 mL at 02/18/17 0818    piperacillin-tazobactam (ZOSYN) 3.375 g in  mL MBP ##EXTENDED 4 HRS INFUSION, 3.375 g, IntraVENous, Q8H, Rocio Figueroa MD, Last Rate: 25 mL/hr at 02/18/17 0619, 3.375 g at 02/18/17 0619    0.9% sodium chloride infusion 250 mL, 250 mL, IntraVENous, PRN, LIZETT Figueroa    albuterol-ipratropium (DUO-NEB) 2.5 MG-0.5 MG/3 ML, 3 mL, Nebulization, Q6H RT, LIZETT Figueroa, 3 mL at 02/18/17 0850    sodium chloride (NS) flush 5-10 mL, 5-10 mL, IntraVENous, PRN, Tyler Banks DO    insulin lispro (HUMALOG) injection, , SubCUTAneous, Q6H, Rocio Figueroa MD, 2 Units at 02/18/17 0600    glucose chewable tablet 16 g, 4 Tab, Oral, PRN, Rocio Figueroa MD    glucagon [facility]) injection 1 mg, 1 mg, IntraMUSCular, PRN, Rocio Figueroa MD    dextrose (D50W) injection syrg 12.5-25 g, 25-50 mL, IntraVENous, PRN, Yisel Dorman Matt Diez MD    midazolam (VERSED) injection 2 mg, 2 mg, IntraVENous, Q1H PRN, Tish Smith MD, 1 mg at 02/18/17 0400    heparin (porcine) injection 5,000 Units, 5,000 Units, SubCUTAneous, Q8H, Tish Smith MD, 5,000 Units at 02/18/17 0400    pantoprazole (PROTONIX) 40 mg in sodium chloride 0.9 % 10 mL injection, 40 mg, IntraVENous, DAILY, Tish Smith MD, 40 mg at 02/18/17 0818    Lab Results   Component Value Date/Time    Glucose 136 02/18/2017 03:50 AM    Glucose 135 02/17/2017 05:36 AM    Glucose 130 02/16/2017 03:00 AM    Glucose 152 02/15/2017 04:00 AM    Glucose 172 02/14/2017 04:10 AM        Assessment/Plan     Active Problems:    Septic shock (HCC) (2/10/2017)      Acute respiratory failure (HCC) (2/13/2017)      Debility (2/13/2017)      Septic shock, off pressors   Cont  abx  Acute hypoxic resp failure 2/2 above, wean as carloz  JOAQUIN on CKD 2/2 sepsis, reolved  acute metabolic encephalopathy, improving    Hypernatremia, cont with IVF   DM, stable   Morbid obesity  HTN stable  Fever, resolved   Anemia with H/h trending down, may need transfusion again    Hyperkalemia, resolved  Multiple ulcer including B/L heels, large sacral ulcer  Wound care and surgical team are following   May need debridement     Cont abx  LWC  Wean as carloz   Monitor H/h closely   Guardianship in progress               Hermes Gomez MD  2/18/2017, 5:13 PM

## 2017-02-18 NOTE — PROGRESS NOTES
Problem: Ventilator Management  Goal: *Adequate oxygenation and ventilation  Rec'd pt on vent:  AC VC+, rate=10, Id=686, Fio2=21%, peep=5  -pt resting on vent-suction in liine- scooby tube patent  -administer neb- no adverse reaction osberved    1050-Placed pt on SBT:  PSV=7    1120-Pt remains on SBT with PSV=7:  Pt RR ranging 28-2, Vt rangin-650, Ve=16.5-17, , sat=100%    1950-End SBT -returned pt to full support to rest overnight    Resp Plan: Monitor pt on vent. Wean and/or titrate settings to maintain pt comfort & sats. Provide suction and nebs as needed or scheduled.     Resp Goal: Successful SBT, extubation.

## 2017-02-18 NOTE — PROGRESS NOTES
Rec'd pt resting in bed awakened, opens eyes, appears to focus. Upper ext's appear to withdrawal to pain, no movement from lower ext's. Has grossly swollen lower ext's with skin nodules/bumps present. Currently has mepilex present to inner thights and heel areas both dry and intact. Also dry mepilex in place to elbow areas bilaterally. Mouth care suctioning completed, also via ETT. Small amount yellowish / tan secretions present. Grimaces at times with suctioning, also has positive cough reflex. TF residual > 150. TF placed on hold for current time. 23:30  Recheck TF residual < 25.  Restarted TF at 30mls/hr. Will continue to monitor. 03:00  Pt awake, though not following commands. Does focus with eyes. VSS. Dressing change, bath completed. Dressing change completed to inner thighs, heel, and sacrum, (Unstageable). Has multiple abrasions, breakdowns, blisters, nodules, flaky sloughing skin to all ext's.    Pt rec'd valum 2mg IV for pain, discomfort (prior to dressing change.)

## 2017-02-19 NOTE — PROGRESS NOTES
2 [facility]  Hospitalist Division    Daily progress Note    Patient: Cira Perez MRN: [id number]  CSN: [id number]    YOB: 1955  Age: 64 y.o.   Sex: female    DOA: 2/10/2017 LOS:  LOS: 9 days                    Subjective:     CC: Sepsis   Intubated but awake, follows command     Objective:      Visit Vitals    /58    Pulse (!) 101    Temp 98.5 °F (36.9 °C)    Resp 26    Ht 5' 5\" (1.651 m)    Wt (!) 197.4 kg (435 lb 3 oz)    SpO2 100%    Breastfeeding No    BMI 72.42 kg/m2       Physical Exam:    NC/AT  NO JVD,TMG  S1/S2  RRR  CTAB, NO WHEEZING  NT,ND, NABS  +  EDEMA  + for large sacral and LE  ulcers        Intake and Output:  Current Shift:  02/19 0701 - 02/19 1900  In: 480 [I.V.:250]  Out: 180 [Urine:130; Drains:50]  Last three shifts:  02/17 1901 - 02/19 0700  In: 8745 [I.V.:6655]  Out: 1900 [Urine:1570; Drains:330]    Recent Results (from the past 24 hour(s))   GLUCOSE, POC    Collection Time: 02/18/17  5:40 PM   Result Value Ref Range    Glucose (POC) 133 (H) 70 - 110 mg/dL   SODIUM    Collection Time: 02/18/17  6:50 PM   Result Value Ref Range    Sodium 151 (H) 136 - 145 mmol/L   TYPE & CROSSMATCH    Collection Time: 02/18/17  8:30 PM   Result Value Ref Range    Crossmatch Expiration 02/21/2017     ABO/Rh(D) B POSITIVE     Antibody screen NEG     CALLED TO:       RUFINA SEHA RN EXT 0172 FOR BLOOD READY 2/19/17 00:38    Unit number [id number]     Blood component type RC LR AS1     Unit division 00     Status of unit TRANSFUSED     Crossmatch result Compatible    GLUCOSE, POC    Collection Time: 02/18/17 11:53 PM   Result Value Ref Range    Glucose (POC) 113 (H) 70 - 110 mg/dL   CBC WITH AUTOMATED DIFF    Collection Time: 02/19/17  5:00 AM   Result Value Ref Range    WBC 11.2 4.6 - 13.2 K/uL    RBC 2.67 (L) 4.20 - 5.30 M/uL    HGB 7.4 (L) 12.0 - 16.0 g/dL    HCT 22.9 (L) 35.0 - 45.0 %    MCV 85.8 74.0 - 97.0 FL    MCH 27.7 24.0 - 34.0 PG MCHC 32.3 31.0 - 37.0 g/dL    RDW 16.4 (H) 11.6 - 14.5 %    PLATELET 476 107 - 982 K/uL    MPV 8.9 (L) 9.2 - 11.8 FL    NEUTROPHILS 68 42 - 75 %    LYMPHOCYTES 24 20 - 51 %    MONOCYTES 3 2 - 9 %    EOSINOPHILS 5 0 - 5 %    BASOPHILS 0 0 - 3 %    NRBC 4.0 (H) 0  WBC    ABS. NEUTROPHILS 7.6 1.8 - 8.0 K/UL    ABS. LYMPHOCYTES 2.7 0.8 - 3.5 K/UL    ABS. MONOCYTES 0.3 0 - 1.0 K/UL    ABS. EOSINOPHILS 0.6 (H) 0.0 - 0.4 K/UL    ABS. BASOPHILS 0.0 0.0 - 0.1 K/UL    RBC COMMENTS ANISOCYTOSIS  1+        RBC COMMENTS POLYCHROMASIA  1+        RBC COMMENTS HYPOCHROMIA  2+        DF MANUAL     METABOLIC PANEL, COMPREHENSIVE    Collection Time: 02/19/17  5:00 AM   Result Value Ref Range    Sodium 149 (H) 136 - 145 mmol/L    Potassium 3.9 3.5 - 5.5 mmol/L    Chloride 120 (H) 100 - 108 mmol/L    CO2 18 (L) 21 - 32 mmol/L    Anion gap 11 3.0 - 18 mmol/L    Glucose 112 (H) 74 - 99 mg/dL    BUN 19 (H) 7.0 - 18 MG/DL    Creatinine 0.76 0.6 - 1.3 MG/DL    BUN/Creatinine ratio 25 (H) 12 - 20      GFR est AA >60 >60 ml/min/1.73m2    GFR est non-AA >60 >60 ml/min/1.73m2    Calcium 8.1 (L) 8.5 - 10.1 MG/DL    Bilirubin, total 0.6 0.2 - 1.0 MG/DL    ALT (SGPT) 26 13 - 56 U/L    AST (SGOT) 32 15 - 37 U/L    Alk.  phosphatase 94 45 - 117 U/L    Protein, total 5.4 (L) 6.4 - 8.2 g/dL    Albumin 1.6 (L) 3.4 - 5.0 g/dL    Globulin 3.8 2.0 - 4.0 g/dL    A-G Ratio 0.4 (L) 0.8 - 1.7     GLUCOSE, POC    Collection Time: 02/19/17  6:06 AM   Result Value Ref Range    Glucose (POC) 119 (H) 70 - 110 mg/dL   GLUCOSE, POC    Collection Time: 02/19/17 11:16 AM   Result Value Ref Range    Glucose (POC) 112 (H) 70 - 110 mg/dL         Current Facility-Administered Medications:     fluconazole (DIFLUCAN) 200mg/100 mL IVPB (premix), 200 mg, IntraVENous, Q24H, Heriberto Banda MD    0.9% sodium chloride infusion 250 mL, 250 mL, IntraVENous, PRN, Wade Vasquez MD    dextrose 5% lactated ringers infusion, 75 mL/hr, IntraVENous, CONTINUOUS, Wade Ori Chen MD, Last Rate: 75 mL/hr at 02/18/17 2116, 75 mL/hr at 02/18/17 2116    vancomycin (VANCOCIN) 1,250 mg in 0.9% sodium chloride 250 mL IVPB, 1,250 mg, IntraVENous, Q12H, Machelle Wheeler MD, Last Rate: 125 mL/hr at 02/19/17 0912, 1,250 mg at 02/19/17 0912    ELECTROLYTE REPLACEMENT PROTOCOL, 1 Each, Other, PRN, Marianela Misa, NP    ELECTROLYTE REPLACEMENT PROTOCOL, 1 Each, Other, PRN, Marianela Misa, NP    ELECTROLYTE REPLACEMENT PROTOCOL, 1 Each, Other, PRN, Marianela Misa, NP    ELECTROLYTE REPLACEMENT PROTOCOL, 1 Each, Other, PRN, Marianela Misa, NP    chlorhexidine (PERIDEX) 0.12 % mouthwash 10 mL, 10 mL, Oral, Q12H, Marianela Clemonsion, NP, 10 mL at 02/19/17 0913    piperacillin-tazobactam (ZOSYN) 3.375 g in  mL MBP ##EXTENDED 4 HRS INFUSION, 3.375 g, IntraVENous, Q8H, Brandie Ocampo MD, Last Rate: 25 mL/hr at 02/19/17 1517, 3.375 g at 02/19/17 1517    albuterol-ipratropium (DUO-NEB) 2.5 MG-0.5 MG/3 ML, 3 mL, Nebulization, Q6H RT, LIZETT Ricks, 3 mL at 02/19/17 1429    sodium chloride (NS) flush 5-10 mL, 5-10 mL, IntraVENous, PRN, Natalie Billy DO    insulin lispro (HUMALOG) injection, , SubCUTAneous, Q6H, Brandie Ocampo MD, Stopped at 02/18/17 1203    glucose chewable tablet 16 g, 4 Tab, Oral, PRN, Brandie Ocampo MD    glucagon [facility]) injection 1 mg, 1 mg, IntraMUSCular, PRN, Brandie Ocampo MD    dextrose (D50W) injection syrg 12.5-25 g, 25-50 mL, IntraVENous, PRN, Brandie Ocampo MD    midazolam (VERSED) injection 2 mg, 2 mg, IntraVENous, Q1H PRN, Brandie Ocampo MD, 2 mg at 02/19/17 1527    heparin (porcine) injection 5,000 Units, 5,000 Units, SubCUTAneous, Q8H, Brandie Ocampo MD, 5,000 Units at 02/19/17 1517    pantoprazole (PROTONIX) 40 mg in sodium chloride 0.9 % 10 mL injection, 40 mg, IntraVENous, DAILY, Brandie Ocampo MD, 40 mg at 02/19/17 0913    Lab Results   Component Value Date/Time Glucose 112 02/19/2017 05:00 AM    Glucose 136 02/18/2017 03:50 AM    Glucose 135 02/17/2017 05:36 AM    Glucose 130 02/16/2017 03:00 AM    Glucose 152 02/15/2017 04:00 AM        Assessment/Plan     Active Problems:    Septic shock (HCC) (2/10/2017)      Acute respiratory failure (HCC) (2/13/2017)      Debility (2/13/2017)      Septic shock, off pressors   UTI with yeast, add diflucan  Pneumonia, sputum + for MSSA  Multiple ulcer including B/L heels, large sacral ulcer  Wound Cx + E faecalis sensitive to PCN and vanc  Wound care and surgical team are following   May need debridement   Acute hypoxic resp failure 2/2 above, wean as carloz  JOAQUIN on CKD 2/2 sepsis, reolved  acute metabolic encephalopathy, improving    Hypernatremia, improving  with IVF   DM, stable   Morbid obesity  HTN stable  Fever, resolved   Anemia monitor H/H   Hyperkalemia, resolved        Cont abx  Add diflucan   LWC  Wean as carloz    Guardianship in progress               Tamra Campbell MD  2/19/2017, 5:13 PM

## 2017-02-19 NOTE — PROGRESS NOTES
[facility]  Sat 2017     Events: +AYALA; now [address]  Hd stable, afebrile  Hgb down to 6.6 -> will tx 1 uPRBC        IMPRESSION & PLANS:   #  Septic shock, likely source skin     * massive severe tunneling and necrotic sacral/gluteal wounds - see pictures    * off pressors several days      # Acute respiratory failure on mech vent    * severe sepsis with high WOB    * morbid obesity    * depressed MS     * wean as carloz now    # Acute encephalopathy, likely metabolic, sepsis    * improving off sedation    # Hypernatremia    * improving     # Anemia    * no evidence bleeding or hemolysis     * decrease blood draws    * 1u PRBC today    # GI/ nutrition   * Diarrhea, improved   * DM   * Morbid obesity   * Bedbug infestation   * Hx HTN    # SUMMARY/ DISPOSITION     * Code status: FULL     * guardianship pending     * consider transfer to [facility] trauma center for wound care      * see my attestation of              Allergies   Allergen Reactions    Ace Inhibitors Nausea and Vomiting    Hydralazine Nausea and Vomiting    Ibuprofen Shortness of Breath    Norvasc [Amlodipine] Swelling    Sulfa (Sulfonamide Antibiotics) Itching    Tramadol Shortness of Breath      Past Medical History   Diagnosis Date    Anemia     Anxiety 2016    Arthritis     Cancer ([address]      pre cancerous cells in the uterus    Chronic pain      knees    Chronic pain of both knees 2016    Cigarette nicotine dependence in remission 2016    Diabetes ([address]     Fatty liver 2016    Headache     Hypertension     Liver disease      fatty liver    Lymphedema 2016    Morbid obesity ([address] 2016    Psychotic disorder      anxiety and panic attacks    Thyroid disease       Past Surgical History   Procedure Laterality Date    Hx cholecystectomy      Hx gyn  2006     hysterectomy    Hx  section        Social History   Substance Use Topics    Smoking status: Never Smoker    Smokeless tobacco: Never Used  Alcohol use No      Family History   Problem Relation Age of Onset    Diabetes Mother     Hypertension Mother     Lung Disease Father       Prior to Admission medications    Medication Sig Start Date End Date Taking? Authorizing Provider   cephALEXin (KEFLEX) 500 mg capsule Take 1 Cap by mouth four (4) times daily. 1/22/17   Nico Burnette MD   nebivolol (BYSTOLIC) 5 mg tablet Take 5 mg by mouth daily. Marino Lal MD   ergocalciferol (VITAMIN D2) 50,000 unit capsule Take 50,000 Units by mouth. Marino Lal MD   hydroCHLOROthiazide (HYDRODIURIL) 25 mg tablet Take 25 mg by mouth daily. Marino Lal MD   glimepiride (AMARYL) 2 mg tablet Take 2 mg by mouth every morning. Marino Lal MD   traMADol (ULTRAM) 50 mg tablet Take 50 mg by mouth every six (6) hours as needed for Pain. Marino Lal MD   loratadine (CLARITIN) 10 mg tablet 10 mg. 12/30/12   Historical Provider   losartan (COZAAR) 25 mg tablet Take 1 Tab by mouth every evening. 5/19/16   Raymond Seymour MD   metoprolol succinate (TOPROL-XL) 25 mg XL tablet Take 1 Tab by mouth daily. 5/19/16   Raymond Seymour MD     Current Facility-Administered Medications   Medication Dose Route Frequency    vancomycin (VANCOCIN) 1,250 mg in 0.9% sodium chloride 250 mL IVPB  1,250 mg IntraVENous Q12H    dextrose 5% infusion  200 mL/hr IntraVENous CONTINUOUS    chlorhexidine (PERIDEX) 0.12 % mouthwash 10 mL  10 mL Oral Q12H    piperacillin-tazobactam (ZOSYN) 3.375 g in  mL MBP ##EXTENDED 4 HRS INFUSION  3.375 g IntraVENous Q8H    albuterol-ipratropium (DUO-NEB) 2.5 MG-0.5 MG/3 ML  3 mL Nebulization Q6H RT    insulin lispro (HUMALOG) injection   SubCUTAneous Q6H    heparin (porcine) injection 5,000 Units  5,000 Units SubCUTAneous Q8H    pantoprazole (PROTONIX) 40 mg in sodium chloride 0.9 % 10 mL injection  40 mg IntraVENous DAILY       Lines: All central lines examined by me. No signs of erythema, induration, discharge.       Central Venous Catheter:  Triple Lumen right ij cvl 02/10/17 Right Neck (Active)   Central Line Being Utilized Yes 2/10/2017  7:13 PM   Site Assessment Clean, dry, & intact 2/10/2017  7:13 PM   Dressing Status Clean, dry, & intact 2/10/2017  7:13 PM   Proximal Hub Color/Line Status White 2/10/2017  7:13 PM   Positive Blood Return (Medial Site) Yes 2/10/2017  7:13 PM   Medial Hub Color/Line Status Brown 2/10/2017  7:13 PM   Positive Blood Return (Lateral Site) Yes 2/10/2017  7:13 PM   Distal Hub Color/Line Status Blue 2/10/2017  7:13 PM   Positive Blood Return (Site #3) Yes 2/10/2017  7:13 PM          Telemetry: sinus tachycardia    Objective:   Vital Signs:    Visit Vitals    /41    Pulse (!) 106    Temp 98.2 °F (36.8 °C)    Resp 24    Ht 5' 5\" (1.651 m)    Wt (!) 193.4 kg (426 lb 5.9 oz)    SpO2 100%    Breastfeeding No    BMI 70.95 kg/m2       O2 Device: Endotracheal tube, Ventilator       Temp (24hrs), Av.4 °F (36.9 °C), Min:97.8 °F (36.6 °C), Max:99.7 °F (37.6 °C)       Intake/Output:   Last shift:           Last 3 shifts:  0701 -  1900  In: 9180 [I.V.:6950]  Out: 1600 [EDBSD:4742; Drains:230]      Intake/Output Summary (Last 24 hours) at 17  Last data filed at 17 1800   Gross per 24 hour   Intake             6950 ml   Output             1195 ml   Net             5755 ml       Last 3 Recorded Weights in this Encounter    17 0600 17 0546 17 0546   Weight: (!) 193.5 kg (426 lb 9.4 oz) (!) 193.1 kg (425 lb 11.2 oz) (!) 193.4 kg (426 lb 5.9 oz)       Ventilator Settings:  Mode Rate Tidal Volume Pressure FiO2 PEEP   Pressure support, Spontaneous   400 ml  7 cm H2O 21 % 5 cm H20     Peak airway pressure: 14 cm H2O    Plateau pressure:     Tidal volume:    Minute ventilation: 18.1 l/min   SPO2      ARDS network Guidelines: Lung protective strategy and Plateau pressure goals less than or equal to 30      Physical Exam:     General/Neurology: Intubated, open eyes spontaneously, does not follow commands  Head:   Normocephalic  Eye:   No scleral icterus  Nose/Throat:  ETT in place  Neck:   Supple, R IJ  Lung:   Adequate air entry bilateral equal, no rales, no rhonchi, no wheezing. Heart:   Tachy, no m  Abdomen:  Soft, morbidly obese  :  Juarez in place. Extremities:  4+BLE edema, ulceration BLE, significant chronic changes  Skin : tunneling sacral and gluteal area, muscle and bone exposed-see photos    Data:       Recent Results (from the past 24 hour(s))   SODIUM    Collection Time: 02/17/17  8:45 PM   Result Value Ref Range    Sodium 159 (H) 136 - 145 mmol/L   GLUCOSE, POC    Collection Time: 02/18/17 12:26 AM   Result Value Ref Range    Glucose (POC) 123 (H) 70 - 110 mg/dL   CALCIUM, IONIZED    Collection Time: 02/18/17  3:40 AM   Result Value Ref Range    Ionized Calcium 1.10 (L) 1.12 - 1.32 MMOL/L   CBC WITH AUTOMATED DIFF    Collection Time: 02/18/17  3:50 AM   Result Value Ref Range    WBC 11.3 4.6 - 13.2 K/uL    RBC 2.36 (L) 4.20 - 5.30 M/uL    HGB 6.6 (L) 12.0 - 16.0 g/dL    HCT 20.7 (L) 35.0 - 45.0 %    MCV 87.7 74.0 - 97.0 FL    MCH 28.0 24.0 - 34.0 PG    MCHC 31.9 31.0 - 37.0 g/dL    RDW 15.6 (H) 11.6 - 14.5 %    PLATELET 165 775 - 554 K/uL    MPV 9.1 (L) 9.2 - 11.8 FL    NEUTROPHILS 63 42 - 75 %    BAND NEUTROPHILS 3 0 - 5 %    LYMPHOCYTES 22 20 - 51 %    MONOCYTES 7 2 - 9 %    EOSINOPHILS 4 0 - 5 %    BASOPHILS 0 0 - 3 %    METAMYELOCYTES 1 (H) 0 %    NRBC 2.0 (H) 0  WBC    ABS. NEUTROPHILS 7.1 1.8 - 8.0 K/UL    ABS. LYMPHOCYTES 2.5 0.8 - 3.5 K/UL    ABS. MONOCYTES 0.8 0 - 1.0 K/UL    ABS. EOSINOPHILS 0.5 (H) 0.0 - 0.4 K/UL    ABS.  BASOPHILS 0.0 0.0 - 0.1 K/UL    RBC COMMENTS ANISOCYTOSIS  1+        RBC COMMENTS POLYCHROMASIA  1+        DF MANUAL     METABOLIC PANEL, BASIC    Collection Time: 02/18/17  3:50 AM   Result Value Ref Range    Sodium 153 (H) 136 - 145 mmol/L    Potassium 3.9 3.5 - 5.5 mmol/L    Chloride 124 (H) 100 - 108 mmol/L    CO2 16 (L) 21 - 32 mmol/L    Anion gap 13 3.0 - 18 mmol/L    Glucose 136 (H) 74 - 99 mg/dL    BUN 19 (H) 7.0 - 18 MG/DL    Creatinine 0.67 0.6 - 1.3 MG/DL    BUN/Creatinine ratio 28 (H) 12 - 20      GFR est AA >60 >60 ml/min/1.73m2    GFR est non-AA >60 >60 ml/min/1.73m2    Calcium 7.9 (L) 8.5 - 10.1 MG/DL   MAGNESIUM    Collection Time: 02/18/17  3:50 AM   Result Value Ref Range    Magnesium 2.0 1.8 - 2.4 mg/dL   PHOSPHORUS    Collection Time: 02/18/17  3:50 AM   Result Value Ref Range    Phosphorus 2.3 (L) 2.5 - 4.9 MG/DL   GLUCOSE, POC    Collection Time: 02/18/17  6:06 AM   Result Value Ref Range    Glucose (POC) 154 (H) 70 - 110 mg/dL   VANCOMYCIN, TROUGH    Collection Time: 02/18/17  7:00 AM   Result Value Ref Range    Vancomycin,trough 22.7 (HH) 10.0 - 20.0 ug/mL    Reported dose date: 0      Reported dose time: 0      Reported dose: 1.25 G IV Q 12H UNITS   POC G3    Collection Time: 02/18/17  9:52 AM   Result Value Ref Range    Device: VENT      FIO2 (POC) 21 %    pH (POC) 7.378 7.35 - 7.45      pCO2 (POC) 26.2 (L) 35.0 - 45.0 MMHG    pO2 (POC) 104 (H) 80 - 100 MMHG    HCO3 (POC) 15.5 (L) 22 - 26 MMOL/L    sO2 (POC) 98 (H) 92 - 97 %    Base deficit (POC) 10 mmol/L    Mode ASSIST CONTROL      Tidal volume 450 ml    Set Rate 10 bpm    PEEP/CPAP (POC) 5 cmH2O    PIP (POC) 14      Allens test (POC) N/A      Inspiratory Time 1 sec    Total resp. rate 14      Site RIGHT RADIAL      Patient temp.  98.0      Specimen type (POC) ARTERIAL      Performed by Royal Anderson     Volume control plus YES     SODIUM    Collection Time: 02/18/17 10:45 AM   Result Value Ref Range    Sodium 153 (H) 136 - 145 mmol/L   GLUCOSE, POC    Collection Time: 02/18/17 11:07 AM   Result Value Ref Range    Glucose (POC) 122 (H) 70 - 110 mg/dL   POTASSIUM    Collection Time: 02/18/17  3:15 PM   Result Value Ref Range    Potassium 3.8 3.5 - 5.5 mmol/L   CALCIUM, IONIZED    Collection Time: 02/18/17  3:15 PM   Result Value Ref Range    Ionized Calcium 1. 19 1.12 - 1.32 MMOL/L   GLUCOSE, POC    Collection Time: 02/18/17  5:40 PM   Result Value Ref Range    Glucose (POC) 133 (H) 70 - 110 mg/dL   SODIUM    Collection Time: 02/18/17  6:50 PM   Result Value Ref Range    Sodium 151 (H) 136 - 145 mmol/L         Chemistry Recent Labs      02/18/17   1850  02/18/17   1515  02/18/17   1045  02/18/17   0350   02/17/17   0536  02/16/17   1915   02/16/17   0300   GLU   --    --    --   136*   --   135*   --    --   130*   NA  151*   --   153*  153*   < >  155*  156*   < >  155*   K   --   3.8   --   3.9   --   4.2  4.0   --   3.6   CL   --    --    --   124*   --   125*   --    --   125*   CO2   --    --    --   16*   --   17*   --    --   19*   BUN   --    --    --   19*   --   20*   --    --   21*   CREA   --    --    --   0.67   --   0.72   --    --   0.61   CA   --    --    --   7.9*   --   7.9*   --    --   7.7*   MG   --    --    --   2.0   --    --   2.0   --    --    PHOS   --    --    --   2.3*   --    --   1.7*   --    --    AGAP   --    --    --   13   --   13   --    --   11   BUCR   --    --    --   28*   --   28*   --    --   34*    < > = values in this interval not displayed. Lactic Acid Lactic acid   Date Value Ref Range Status   02/11/2017 1.6 0.4 - 2.0 MMOL/L Final     No results for input(s): LAC in the last 72 hours. Liver Enzymes Protein, total   Date Value Ref Range Status   02/13/2017 5.2 (L) 6.4 - 8.2 g/dL Final     Albumin   Date Value Ref Range Status   02/13/2017 2.1 (L) 3.4 - 5.0 g/dL Final     Globulin   Date Value Ref Range Status   02/13/2017 3.1 2.0 - 4.0 g/dL Final     A-G Ratio   Date Value Ref Range Status   02/13/2017 0.7 (L) 0.8 - 1.7   Final     AST (SGOT)   Date Value Ref Range Status   02/13/2017 32 15 - 37 U/L Final     Alk. phosphatase   Date Value Ref Range Status   02/13/2017 80 45 - 117 U/L Final     No results for input(s): TP, ALB, GLOB, AGRAT, SGOT, GPT, AP, TBIL in the last 72 hours.     No lab exists for component: DBIL     CBC w/Diff Recent Labs      02/18/17   0350  02/17/17   0536  02/16/17   0300   WBC  11.3  10.4  11.5   RBC  2.36*  2.50*  2.58*   HGB  6.6*  7.2*  7.2*   HCT  20.7*  21.6*  21.6*   PLT  235  207  181   GRANS  63  69  73   LYMPH  22  19*  16*   EOS  4  6*  5        Cardiac Enzymes No results found for: CPK, CKMMB, CKMB, RCK3, CKMBT, CKNDX, CKND1, ISAIAS, TROPT, TROIQ, ERNST, TROPT, TNIPOC, BNP, BNPP     BNP No results found for: BNP, BNPP, XBNPT     Coagulation No results for input(s): PTP, INR, APTT in the last 72 hours.     No lab exists for component: INREXT, INREXT      Thyroid  Lab Results   Component Value Date/Time    TSH 2.41 02/10/2017 05:40 PM       No results found for: T4     Lipid Panel Lab Results   Component Value Date/Time    Cholesterol, total 167 05/05/2010 08:45 AM    HDL Cholesterol 65 05/05/2010 08:45 AM    LDL, calculated 88.4 05/05/2010 08:45 AM    VLDL, calculated 13.6 05/05/2010 08:45 AM    Triglyceride 68 05/05/2010 08:45 AM    CHOL/HDL Ratio 2.6 05/05/2010 08:45 AM        ABG Recent Labs      02/18/17   0952  02/17/17   0909  02/16/17   0922   PHI  7.378  7.406  7.414   PCO2I  26.2*  26.9*  27.1*   PO2I  104*  107*  111*   HCO3I  15.5*  16.9*  17.4*   FIO2I  21  21  21        Urinalysis Lab Results   Component Value Date/Time    Color DARK YELLOW 02/10/2017 06:15 PM    Appearance CLOUDY 02/10/2017 06:15 PM    Specific gravity 1.018 02/10/2017 06:15 PM    pH (UA) 5.0 02/10/2017 06:15 PM    Protein NEGATIVE  02/10/2017 06:15 PM    Glucose NEGATIVE  02/10/2017 06:15 PM    Ketone TRACE 02/10/2017 06:15 PM    Bilirubin SMALL 02/10/2017 06:15 PM    Urobilinogen 1.0 02/10/2017 06:15 PM    Nitrites NEGATIVE  02/10/2017 06:15 PM    Leukocyte Esterase TRACE 02/10/2017 06:15 PM    Epithelial cells 1+ 02/10/2017 06:15 PM    Bacteria 1+ 02/10/2017 06:15 PM    WBC 4 to 10 02/10/2017 06:15 PM    RBC NONE 02/10/2017 06:15 PM            ECHO  2/10/17  Left ventricle: Systolic function was normal. Ejection fraction was  estimated in the range of 55 % to 60 %. No obvious wall motion  abnormalities identified in the views obtained. There was mild concentric  hypertrophy. Mitral valve: There was no evidence for stenosis. There was no significant  regurgitation. Aortic valve: The valve was probably trileaflet. Leaflets exhibited mild  calcification and sclerosis. Not well visualized. Valve mean gradient was  8 mmHg. Tricuspid valve: There was no evidence for tricuspid stenosis. There was  trivial regurgitation. XR (Most Recent). CXR  reviewed by me and compared with previous CXR   Results from Hospital Encounter encounter on 02/10/17   XR CHEST PORT   Narrative Indication:  Endotracheal tube placement    Comparison:  02/15/17    Time of study - 0323    Findings:  AP supine portable chest    The cardiomediastinal silhouette is normal.  The endotracheal tube is in good  position about 5.5 cm above the kimberly. NG tube enters the stomach. The lungs  are clear. Impression IMPRESSION:     Stable chest and catheters. Administrative Addendum  1. *Time: includes bedside assessment, serial attendance, discussions, record review, decision making and documentation, but does not include separately documented procedures. In the ICU, \"Time\" noted above includes that spent on all aspects of critical care including initial daily evaluation & serial re-evaluation of the patient, reviewing data, rendering, discussing and documenting care (including daily multidisciplinary rounds), managing ventilator & hemodynamics; but excludes separately documented procedures. Serial bedside re-evaluation & management of cardiopulmonary status includes that necessary to determine suitability for liberation from mechanical ventilatory support &/or continued use of NIMV or other PAP device.  Bedside attendance in the ICU frequently utilizes point-of-care crutucal care ultrasound, which is not separately billed. More than 50% of all time was spent at the bedside in attendance of the patient and/or family. 2.  Unless specifically contraindicated & discussed in the above note, the following standard procedures &/or best practices/ core measures are being applied to this patient in our ICU:    * Replace electrolytes per ICU electrolyte replacement protocol   * glucose containing IVF when pt cannot receive EN or sufficient parenteral calories; Glucose management per hospital protocols   * Ventilator patients:      (i) Ventilator bundle including Sedation protocol, Daily sedation holiday & assessment for SBT; Chlorhexidine mouth washes & routine oral care q4h; HOB 30 degree elevation unless AIS or other specific consideration. (ii) ARDS network guidelines: Lung protective strategy and Plateau pressure goals less than or equal to 30     (iii) Brenda tube to suction at 20-30 cm H2O, Maintain Wayne tube with 5-10ml air injection every 4 hours    * All patients: Frequent pulmonary toileting; Incentive spirometry when appropriate    * DVT (VTE) prophylaxis & SUP GI prophylaxis  as indicated/ documented in Assessment & Plans &/or Active Orders. * Sepsis: protocol/bundle including lactic acid, BMP check, IV access, fluid resuscitation, initial antibiotic administered within 1hr-ICU and 3hrs ED; MAP > 65mmHg   * [place] bundle including remove obando catheter according to nurse-driven protocol when not critically ill. * Central Line bundle including removal of femoral CVC within 24h if possible and all CVC when no longer needed. PICC when possible to remove any non-dialysis femoral CVCs. * Initial and daily Skin & wound assessment by RNs. * Local skin & wound care for diffuse edema and skin weeping as needed; special beds as needed; Wound care consultation for all complex concerns.     * Weekly prealbumin & nutritional consult as needed for severe malnutrition   * PT/OT evaluation and treatment for all patients able to cooperate; OOB ASAP. * Antibiotics:  Follow cultures and sensitivities; daily review of abx choices with de-escalation and/or IV to PO switch when appropriate    * Maximal concentration of all IV Meds to prevent excessive fluid loading  (revisions: 6/13/2016; 8/22/2016; 11/26/2016; 2/18/2017 - charlene)       Vic Grossman MD  2/18/2017   Pager [phone]

## 2017-02-19 NOTE — PROGRESS NOTES
PCCM  Sun  2/19/2017     Events:  Fully A&A FC !     2/18:  +AYALA; now [facility]  Hd stable, afebrile   Hgb down to 6.6 -> will tx 1 uPRBC        IMPRESSION & PLANS:   #  Septic shock, likely source skin but also had signif funguria     * massive severe tunneling and necrotic sacral/gluteal wounds - see pictures    * off pressors several days    * on zosyn, vanco + diflucan since 2/10    * will stop vanco & diflucan today -> I suggest surveillance BCx & Urine Cx & then consider stop zosyn as well    # Acute respiratory failure on mech vent    * severe sepsis with high WOB    * morbid obesity    * depressed MS -> resolved     * weaning - may be suitable for extubation tomorrow 2/20 or 2/21    # Massive chronic BLE edema/ elephantiasis    * no signs of LHF; RV nt well seen    * high probability pulm htn/MALKA/OHS should be considered    # Acute encephalopathy, likely metabolic, sepsis: resolved     # Hypernatremia    * improving/ almost resolved    # Anemia    * no evidence bleeding or hemolysis     * decrease blood draws    * 1u PRBC 2/18     * VTEP enox double dose due to BMI    # GI/ nutrition   * Diarrhea = intentional = she needs to have liquid stools for FMS; do not give fiber, etc   * DM   * Morbid obesity   * Bedbug infestation at home (which is now condemned by report)    * Hx HTN    # SUMMARY/ DISPOSITION     * Code status: FULL     * guardianship pending, but may no longer be necessary now that she is no longer encephalopathic     * consider transfer to [facility] for wound care      * see my attestation of 2/17      * PC/ Case Management are involved & possible APS   - charlene           Allergies   Allergen Reactions    Ace Inhibitors Nausea and Vomiting    Hydralazine Nausea and Vomiting    Ibuprofen Shortness of Breath    Norvasc [Amlodipine] Swelling    Sulfa (Sulfonamide Antibiotics) Itching    Tramadol Shortness of Breath      Past Medical History   Diagnosis Date    Anemia     Anxiety 5/19/2016  Arthritis     Cancer ([facility] [address]      pre cancerous cells in the uterus    Chronic pain      knees    Chronic pain of both knees 2016    Cigarette nicotine dependence in remission 2016    Diabetes ([facility] [address]     Fatty liver 2016    Headache     Hypertension     Liver disease      fatty liver    Lymphedema 2016    Morbid obesity ([facility] [address] 2016    Psychotic disorder      anxiety and panic attacks    Thyroid disease       Past Surgical History   Procedure Laterality Date    Hx cholecystectomy      Hx gyn  2006     hysterectomy    Hx  section        Social History   Substance Use Topics    Smoking status: Never Smoker    Smokeless tobacco: Never Used    Alcohol use No      Family History   Problem Relation Age of Onset    Diabetes Mother     Hypertension Mother     Lung Disease Father       Prior to Admission medications    Medication Sig Start Date End Date Taking? Authorizing Provider   cephALEXin (KEFLEX) 500 mg capsule Take 1 Cap by mouth four (4) times daily. 17   Kevin Latif MD   nebivolol (BYSTOLIC) 5 mg tablet Take 5 mg by mouth daily. Marino Lal MD   ergocalciferol (VITAMIN D2) 50,000 unit capsule Take 50,000 Units by mouth. Marino Lal MD   hydroCHLOROthiazide (HYDRODIURIL) 25 mg tablet Take 25 mg by mouth daily. Marino Lal MD   glimepiride (AMARYL) 2 mg tablet Take 2 mg by mouth every morning. Marino Lal MD   traMADol (ULTRAM) 50 mg tablet Take 50 mg by mouth every six (6) hours as needed for Pain. Marino Lal MD   loratadine (CLARITIN) 10 mg tablet 10 mg. 12   Historical Provider   losartan (COZAAR) 25 mg tablet Take 1 Tab by mouth every evening. 16   Yareli Britton MD   metoprolol succinate (TOPROL-XL) 25 mg XL tablet Take 1 Tab by mouth daily.  16   Yareli Britton MD     Current Facility-Administered Medications   Medication Dose Route Frequency    fluconazole (DIFLUCAN) 200mg/100 mL IVPB (premix)  200 mg IntraVENous Q24H    dextrose 5% lactated ringers infusion  75 mL/hr IntraVENous CONTINUOUS    vancomycin (VANCOCIN) 1,250 mg in 0.9% sodium chloride 250 mL IVPB  1,250 mg IntraVENous Q12H    chlorhexidine (PERIDEX) 0.12 % mouthwash 10 mL  10 mL Oral Q12H    piperacillin-tazobactam (ZOSYN) 3.375 g in  mL MBP ##EXTENDED 4 HRS INFUSION  3.375 g IntraVENous Q8H    albuterol-ipratropium (DUO-NEB) 2.5 MG-0.5 MG/3 ML  3 mL Nebulization Q6H RT    insulin lispro (HUMALOG) injection   SubCUTAneous Q6H    heparin (porcine) injection 5,000 Units  5,000 Units SubCUTAneous Q8H    pantoprazole (PROTONIX) 40 mg in sodium chloride 0.9 % 10 mL injection  40 mg IntraVENous DAILY         Central Venous Catheter:  Triple Lumen right ij cvl 02/10/17 Right Neck (Active)   Central Line Being Utilized Yes 2/10/2017  7:13 PM   Site Assessment Clean, dry, & intact 2/10/2017  7:13 PM   Dressing Status Clean, dry, & intact 2/10/2017  7:13 PM   Proximal Hub Color/Line Status White 2/10/2017  7:13 PM   Positive Blood Return (Medial Site) Yes 2/10/2017  7:13 PM   Medial Hub Color/Line Status Brown 2/10/2017  7:13 PM   Positive Blood Return (Lateral Site) Yes 2/10/2017  7:13 PM   Distal Hub Color/Line Status Blue 2/10/2017  7:13 PM   Positive Blood Return (Site #3) Yes 2/10/2017  7:13 PM          Objective:   Vital Signs:    Visit Vitals    /58    Pulse (!) 101    Temp 98.5 °F (36.9 °C)    Resp 26    Ht 5' 5\" (1.651 m)    Wt (!) 197.4 kg (435 lb 3 oz)    SpO2 100%    Breastfeeding No    BMI 72.42 kg/m2       O2 Device: Ventilator       Temp (24hrs), Av.7 °F (37.1 °C), Min:98.5 °F (36.9 °C), Max:98.8 °F (37.1 °C)       Intake/Output:   Last shift:       07 -  1900  In: 480 [I.V.:250]  Out: 180 [Urine:130; Drains:50]    Last 3 shifts:  1901 -  0700  In: 0125 [I.V.:6655]  Out: 1900 [Urine:1570; Drains:330]      Intake/Output Summary (Last 24 hours) at 17 1725  Last data filed at 17 1200   Gross per 24 hour   Intake             2455 ml   Output              845 ml   Net             1610 ml       Last 3 Recorded Weights in this Encounter    02/17/17 0546 02/18/17 0546 02/19/17 0600   Weight: (!) 193.1 kg (425 lb 11.2 oz) (!) 193.4 kg (426 lb 5.9 oz) (!) 197.4 kg (435 lb 3 oz)       Ventilator Settings:  Mode Rate Tidal Volume Pressure FiO2 PEEP   Pressure support, Spontaneous   400 ml  7 cm H2O 21 % 5 cm H20     Peak airway pressure: 14 cm H2O    Plateau pressure:     Tidal volume:    Minute ventilation: 14.7 l/min   SPO2      ARDS network Guidelines: Lung protective strategy and Plateau pressure goals less than or equal to 30      Physical Exam:     General/Neurology: A&A gaxzes and nods, FSC with hands   Head:   NCAT  Eye:   No scleral icterus, exudates; PERRL  Nose/Throat:  OETT in place  Neck:   Supple, R IJ  Lung:   Clear with acceptable mechanics  Heart:   Tachy, no m  Abdomen:  Soft, morbidly obese  :  Juarez in place.   Extremities:  4+BLE edema, ulceration BLE, significant chronic changes  Skin : tunneling sacral and gluteal area, muscle and bone exposed - see photos    Data:       Recent Results (from the past 24 hour(s))   GLUCOSE, POC    Collection Time: 02/18/17  5:40 PM   Result Value Ref Range    Glucose (POC) 133 (H) 70 - 110 mg/dL   SODIUM    Collection Time: 02/18/17  6:50 PM   Result Value Ref Range    Sodium 151 (H) 136 - 145 mmol/L   TYPE & CROSSMATCH    Collection Time: 02/18/17  8:30 PM   Result Value Ref Range    Crossmatch Expiration 02/21/2017     ABO/Rh(D) B POSITIVE     Antibody screen NEG     CALLED TO:       RUFINA SHEA RN EXT 4214 FOR BLOOD READY 2/19/17 00:38    Unit number [id number]     Blood component type RC LR AS1     Unit division 00     Status of unit TRANSFUSED     Crossmatch result Compatible    GLUCOSE, POC    Collection Time: 02/18/17 11:53 PM   Result Value Ref Range    Glucose (POC) 113 (H) 70 - 110 mg/dL   CBC WITH AUTOMATED DIFF    Collection Time: 02/19/17  5:00 AM   Result Value Ref Range    WBC 11.2 4.6 - 13.2 K/uL    RBC 2.67 (L) 4.20 - 5.30 M/uL    HGB 7.4 (L) 12.0 - 16.0 g/dL    HCT 22.9 (L) 35.0 - 45.0 %    MCV 85.8 74.0 - 97.0 FL    MCH 27.7 24.0 - 34.0 PG    MCHC 32.3 31.0 - 37.0 g/dL    RDW 16.4 (H) 11.6 - 14.5 %    PLATELET 513 382 - 202 K/uL    MPV 8.9 (L) 9.2 - 11.8 FL    NEUTROPHILS 68 42 - 75 %    LYMPHOCYTES 24 20 - 51 %    MONOCYTES 3 2 - 9 %    EOSINOPHILS 5 0 - 5 %    BASOPHILS 0 0 - 3 %    NRBC 4.0 (H) 0  WBC    ABS. NEUTROPHILS 7.6 1.8 - 8.0 K/UL    ABS. LYMPHOCYTES 2.7 0.8 - 3.5 K/UL    ABS. MONOCYTES 0.3 0 - 1.0 K/UL    ABS. EOSINOPHILS 0.6 (H) 0.0 - 0.4 K/UL    ABS. BASOPHILS 0.0 0.0 - 0.1 K/UL    RBC COMMENTS ANISOCYTOSIS  1+        RBC COMMENTS POLYCHROMASIA  1+        RBC COMMENTS HYPOCHROMIA  2+        DF MANUAL     METABOLIC PANEL, COMPREHENSIVE    Collection Time: 02/19/17  5:00 AM   Result Value Ref Range    Sodium 149 (H) 136 - 145 mmol/L    Potassium 3.9 3.5 - 5.5 mmol/L    Chloride 120 (H) 100 - 108 mmol/L    CO2 18 (L) 21 - 32 mmol/L    Anion gap 11 3.0 - 18 mmol/L    Glucose 112 (H) 74 - 99 mg/dL    BUN 19 (H) 7.0 - 18 MG/DL    Creatinine 0.76 0.6 - 1.3 MG/DL    BUN/Creatinine ratio 25 (H) 12 - 20      GFR est AA >60 >60 ml/min/1.73m2    GFR est non-AA >60 >60 ml/min/1.73m2    Calcium 8.1 (L) 8.5 - 10.1 MG/DL    Bilirubin, total 0.6 0.2 - 1.0 MG/DL    ALT (SGPT) 26 13 - 56 U/L    AST (SGOT) 32 15 - 37 U/L    Alk.  phosphatase 94 45 - 117 U/L    Protein, total 5.4 (L) 6.4 - 8.2 g/dL    Albumin 1.6 (L) 3.4 - 5.0 g/dL    Globulin 3.8 2.0 - 4.0 g/dL    A-G Ratio 0.4 (L) 0.8 - 1.7     GLUCOSE, POC    Collection Time: 02/19/17  6:06 AM   Result Value Ref Range    Glucose (POC) 119 (H) 70 - 110 mg/dL   GLUCOSE, POC    Collection Time: 02/19/17 11:16 AM   Result Value Ref Range    Glucose (POC) 112 (H) 70 - 110 mg/dL         Chemistry Recent Labs      02/19/17   0500  02/18/17   1850  02/18/17   1515  02/18/17   1045 02/18/17   0350   02/17/17   0536  02/16/17   1915   GLU  112*   --    --    --   136*   --   135*   --    NA  149*  151*   --   153*  153*   < >  155*  156*   K  3.9   --   3.8   --   3.9   --   4.2  4.0   CL  120*   --    --    --   124*   --   125*   --    CO2  18*   --    --    --   16*   --   17*   --    BUN  19*   --    --    --   19*   --   20*   --    CREA  0.76   --    --    --   0.67   --   0.72   --    CA  8.1*   --    --    --   7.9*   --   7.9*   --    MG   --    --    --    --   2.0   --    --   2.0   PHOS   --    --    --    --   2.3*   --    --   1.7*   AGAP  11   --    --    --   13   --   13   --    BUCR  25*   --    --    --   28*   --   28*   --    AP  94   --    --    --    --    --    --    --    TP  5.4*   --    --    --    --    --    --    --    ALB  1.6*   --    --    --    --    --    --    --    GLOB  3.8   --    --    --    --    --    --    --    AGRAT  0.4*   --    --    --    --    --    --    --     < > = values in this interval not displayed. Lactic Acid Lactic acid   Date Value Ref Range Status   02/11/2017 1.6 0.4 - 2.0 MMOL/L Final     No results for input(s): LAC in the last 72 hours. Liver Enzymes Protein, total   Date Value Ref Range Status   02/19/2017 5.4 (L) 6.4 - 8.2 g/dL Final     Albumin   Date Value Ref Range Status   02/19/2017 1.6 (L) 3.4 - 5.0 g/dL Final     Globulin   Date Value Ref Range Status   02/19/2017 3.8 2.0 - 4.0 g/dL Final     A-G Ratio   Date Value Ref Range Status   02/19/2017 0.4 (L) 0.8 - 1.7   Final     AST (SGOT)   Date Value Ref Range Status   02/19/2017 32 15 - 37 U/L Final     Alk.  phosphatase   Date Value Ref Range Status   02/19/2017 94 45 - 117 U/L Final     Recent Labs      02/19/17   0500   TP  5.4*   ALB  1.6*   GLOB  3.8   AGRAT  0.4*   SGOT  32   AP  94        CBC w/Diff Recent Labs      02/19/17   0500  02/18/17   0350  02/17/17   0536   WBC  11.2  11.3  10.4   RBC  2.67*  2.36*  2.50*   HGB  7.4*  6.6*  7.2*   HCT  22.9* 20.7*  21.6*   PLT  246  235  207   GRANS  68  63  69   LYMPH  24  22  19*   EOS  5  4  6*        Cardiac Enzymes No results found for: CPK, CKMMB, CKMB, RCK3, CKMBT, CKNDX, CKND1, ISAIAS, TROPT, TROIQ, ERNST, TROPT, TNIPOC, BNP, BNPP     BNP No results found for: BNP, BNPP, XBNPT     Coagulation No results for input(s): PTP, INR, APTT in the last 72 hours. No lab exists for component: Lian FELICIANO      Thyroid  Lab Results   Component Value Date/Time    TSH 2.41 02/10/2017 05:40 PM       No results found for: T4     Lipid Panel Lab Results   Component Value Date/Time    Cholesterol, total 167 05/05/2010 08:45 AM    HDL Cholesterol 65 05/05/2010 08:45 AM    LDL, calculated 88.4 05/05/2010 08:45 AM    VLDL, calculated 13.6 05/05/2010 08:45 AM    Triglyceride 68 05/05/2010 08:45 AM    CHOL/HDL Ratio 2.6 05/05/2010 08:45 AM        ABG Recent Labs      02/18/17   0952  02/17/17   0909   PHI  7.378  7.406   PCO2I  26.2*  26.9*   PO2I  104*  107*   HCO3I  15.5*  16.9*   FIO2I  21  21        Urinalysis Lab Results   Component Value Date/Time    Color DARK YELLOW 02/10/2017 06:15 PM    Appearance CLOUDY 02/10/2017 06:15 PM    Specific gravity 1.018 02/10/2017 06:15 PM    pH (UA) 5.0 02/10/2017 06:15 PM    Protein NEGATIVE  02/10/2017 06:15 PM    Glucose NEGATIVE  02/10/2017 06:15 PM    Ketone TRACE 02/10/2017 06:15 PM    Bilirubin SMALL 02/10/2017 06:15 PM    Urobilinogen 1.0 02/10/2017 06:15 PM    Nitrites NEGATIVE  02/10/2017 06:15 PM    Leukocyte Esterase TRACE 02/10/2017 06:15 PM    Epithelial cells 1+ 02/10/2017 06:15 PM    Bacteria 1+ 02/10/2017 06:15 PM    WBC 4 to 10 02/10/2017 06:15 PM    RBC NONE 02/10/2017 06:15 PM            ECHO  2/10/17  Left ventricle: Systolic function was normal. Ejection fraction was  estimated in the range of 55 % to 60 %. No obvious wall motion  abnormalities identified in the views obtained. There was mild concentric  hypertrophy. Mitral valve: There was no evidence for stenosis. There was no significant  regurgitation. Aortic valve: The valve was probably trileaflet. Leaflets exhibited mild  calcification and sclerosis. Not well visualized. Valve mean gradient was  8 mmHg. Tricuspid valve: There was no evidence for tricuspid stenosis. There was  trivial regurgitation. XR (Most Recent). CXR  reviewed by me and compared with previous CXR   Results from Hospital Encounter encounter on 02/10/17   XR CHEST PORT   Narrative Indication:  Endotracheal tube placement    Comparison:  02/15/17    Time of study - 0323    Findings:  AP supine portable chest    The cardiomediastinal silhouette is normal.  The endotracheal tube is in good  position about 5.5 cm above the kimberly. NG tube enters the stomach. The lungs  are clear. Impression IMPRESSION:     Stable chest and catheters. Administrative Addendum  1. *Time: includes bedside assessment, serial attendance, discussions, record review, decision making and documentation, but does not include separately documented procedures. In the ICU, \"Time\" noted above includes that spent on all aspects of critical care including initial daily evaluation & serial re-evaluation of the patient, reviewing data, rendering, discussing and documenting care (including daily multidisciplinary rounds), managing ventilator & hemodynamics; but excludes separately documented procedures. Serial bedside re-evaluation & management of cardiopulmonary status includes that necessary to determine suitability for liberation from mechanical ventilatory support &/or continued use of NIMV or other PAP device. Bedside attendance in the ICU frequently utilizes point-of-care crutucal care ultrasound, which is not separately billed. More than 50% of all time was spent at the bedside in attendance of the patient and/or family.     2.  Unless specifically contraindicated & discussed in the above note, the following standard procedures &/or best practices/ core measures are being applied to this patient in our ICU:    * Replace electrolytes per ICU electrolyte replacement protocol   * glucose containing IVF when pt cannot receive EN or sufficient parenteral calories; Glucose management per hospital protocols   * Ventilator patients:      (i) Ventilator bundle including Sedation protocol, Daily sedation holiday & assessment for SBT; Chlorhexidine mouth washes & routine oral care q4h; HOB 30 degree elevation unless AIS or other specific consideration. (ii) ARDS network guidelines: Lung protective strategy and Plateau pressure goals less than or equal to 30     (iii) Brenda tube to suction at 20-30 cm H2O, Maintain Prentiss tube with 5-10ml air injection every 4 hours    * All patients: Frequent pulmonary toileting; Incentive spirometry when appropriate    * DVT (VTE) prophylaxis & SUP GI prophylaxis  as indicated/ documented in Assessment & Plans &/or Active Orders. * Sepsis: protocol/bundle including lactic acid, BMP check, IV access, fluid resuscitation, initial antibiotic administered within 1hr-ICU and 3hrs ED; MAP > 65mmHg   * [place]bando bundle including remove obnado catheter according to nurse-driven protocol when not critically ill. * Central Line bundle including removal of femoral CVC within 24h if possible and all CVC when no longer needed. PICC when possible to remove any non-dialysis femoral CVCs. * Initial and daily Skin & wound assessment by RNs. * Local skin & wound care for diffuse edema and skin weeping as needed; special beds as needed; Wound care consultation for all complex concerns. * Weekly prealbumin & nutritional consult as needed for severe malnutrition   * PT/OT evaluation and treatment for all patients able to cooperate; OOB ASAP. * Antibiotics:  Follow cultures and sensitivities; daily review of abx choices with de-escalation and/or IV to PO switch when appropriate    * Maximal concentration of all IV Meds to prevent excessive fluid loading  (revisions: 6/13/2016; 8/22/2016; 11/26/2016; 2/18/2017 - charlene)       Regan Galdamez MD, [facility]  2/19/2017   Pager [phone]

## 2017-02-19 NOTE — PROGRESS NOTES
Rec'd pt resting in bed with eyes open. Appears comfortable, though does grimace with repositioning. Pt VSS. Mouth care completed, suctioning completed, has medium amount of secretions oral and via tube. 21:00 -22:00  New orders rec'd per Dr. Marybel Velarde including pt to receive 1 unit PRBC. Lab notified. New Type and crossmatch completed, and sent to lab.    23:30  Awaiting ok from lab when blood is ready.  verbaized running crossmatch twice. 01:00  PRBC up. Tolerated without difficulty, no trasfusion reaction observed. --Completed at 03:20.      05:00  Bath and linen change completed. Dressings to feet, inner thighs, sacrum , back replaced, changed/per protocol. Remains alert, will follow with eyes, some times responds to commands.

## 2017-02-19 NOTE — PROGRESS NOTES
Problem: Ventilator Management  Goal: *Adequate oxygenation and ventilation  Recd pt on vent:  AC VC+, rate=10, Qr=004, peep=5, Fio2=21%  Vent check completed, suction in line, scooby cleared 6cc  Administer Neb     0825- Begin SBT:  PSV=7  0920-SBT Results:  An=387, Ve=15.9, RR=24, Sat=100%, QI=256, RSBI=31  -Pt remains on SBT- will continue to monitor    1815-End SBT-return to full support to rest pt overnight    Resp Plan: Monitor pt on vent. Wean and/or titrate settings to maintain pt comfort & sats.  Provide suction and nebs as needed or scheduled.      Resp Goal: Successful SBT, extubation.

## 2017-02-20 NOTE — DIABETES MGMT
NUTRITIONAL RE-ASSESSMENT AND GLYCEMIC CONTROL PLAN OF CARE     Cindy Devlin           64 y.o.           2/10/2017                 1. Sepsis, due to unspecified organism ([facility] [address]    2. Septic shock ([facility] [address]    3. Sacral decubitus ulcer, unstageable ([facility] [address]    4. Acute respiratory failure, unspecified whether with hypoxia or hypercapnia ([facility] [address]    5. Debility    6. Morbid obesity, unspecified obesity type ([facility] [address]    Morbid Obesity  DM     INTERVENTIONS/PLAN:   Full strength Perative is infusing at 30 ml/hr. TF is providing 936 calories , 48 g protein/d. Recommend increasing  TF  as tolerated to goal rate of 60 ml/hr. TF at goal rate will provide 1872 calories, 96 g protein and 1.1 liter free water /d from TF plus 1200ml /24 hrs from water flushes (200ml q 4 hrs). ASSESSMENT:   Nutritional Status:  Pt is overweight related to excess caloric intake as evidenced by 309% ideal weight and BMI= 69.6kg/m2. Pt meets criteria for morbid . obesity. Pt with increased calorie and protein requirements related to multiple pressure ulcers. Diagnosis-Intake: Inadequate protein-energy intake related to respiratory failure AEB inability to take in oral nourishment and need for enteral feeding while on M.vent. Pt w/hypoalbuminemia as evidenced by albumin=1.4mg/dl on admission , now 1.6. Diabetes Management:   Recent blood glucose:   Lab Results   Component Value Date/Time    GLUCPOC 123 (H) 02/20/2017 06:02 AM    GLUCPOC 111 (H) 02/20/2017 12:21 AM    GLUCPOC 99 02/19/2017 05:43 PM     Within target range (non-ICU: <140; ICU<180): [x] Yes   []  No  Current Insulin regimen: corrective lispro  Home medication/insulin regimen: amaryl , not verified  HbA1c:5.5%  Adequate glycemic control PTA:  [x] Yes  [] No     SUBJECTIVE/OBJECTIVE:   Diet: Perative TF at 30 ml/hr plus 150ml free water q 6 hrs  No data found.     Medications: [x]                Reviewed    IVF:  D5 LR at 75 ml/hr, being discontinued  Labs:   Lab Results   Component Value Date/Time    Hemoglobin A1c 5.5 02/10/2017 05:40 PM     Lab Results   Component Value Date/Time    Sodium 149 02/19/2017 05:00 AM    Potassium 3.9 02/19/2017 05:00 AM    Chloride 120 02/19/2017 05:00 AM    CO2 18 02/19/2017 05:00 AM    Anion gap 11 02/19/2017 05:00 AM    Glucose 112 02/19/2017 05:00 AM    BUN 19 02/19/2017 05:00 AM    Creatinine 0.76 02/19/2017 05:00 AM    Calcium 8.1 02/19/2017 05:00 AM    Magnesium 2.0 02/18/2017 03:50 AM    Phosphorus 2.3 02/18/2017 03:50 AM    Albumin 1.6 02/19/2017 05:00 AM     Anthropometrics: IBW : 61.2 kg (135 lb), % IBW (Calculated): 309.13 %, BMI (calculated): 69.6  Wt Readings from Last 1 Encounters:   02/19/17 (!) 196.9 kg (434 lb 1.4 oz)      Ht Readings from Last 1 Encounters:   02/11/17 5' 5\" (1.651 m)     Estimated Nutrition Needs:  1840 Kcals/day, Protein (g): 92 g Fluid (ml): 1800 ml  Based on:   []          Actual BW    [x]          IBW   []            Adjusted BW    Nutrition Diagnoses:      As stated above. Nutrition Interventions: TF being advanced  Goal:   Blood glucose will be within target range of  mg/dL by2/13-met    Intake will meet >75% of energy and protein requirements for wound healing by 2/22 - on-going .     Nutrition Monitoring and Evaluation      []     Monitor po intake on meal rounds  [x]     Continue inpatient monitoring and intervention  [x]     Other: advance TF as tolerated to goal    Nutrition Risk:  [x]   High     []  Moderate    []  Minimal/Uncompromised    Gwen Cross RD   LR pager [phone]

## 2017-02-20 NOTE — PROGRESS NOTES
Rec'd pt resting in bed awake, focuses with eyes appears comfortable. Mouth care suctioning completed. Has multiple mepilex foam/dry dressing/ to bilateral heels, back sacrum with massive decubitus ulcer to sacral/ buttocks area. Heel boots in place, picc line flushed. 0100  Rec'd versed 2mg IV for grimacing/pain-also to assist with pain before dressing change. 04:00  Dressing change completed to sacral area/ thigh area. Pt unable to tolerate side positiing for dressing change without become tachypneic/ in distress after few minutes while on her side. Skin breakdown unmeasureable in some places. Remains more alert than previously.

## 2017-02-20 NOTE — PROGRESS NOTES
Assumed care of patient from night nurse. Received patient in bed, resting quietly. Easily awakened. Remains ventilated. Follows commands. HOB elevated, side rails up x 3. Bed in low position with wheels locked. See shift assessment for further information concerning patient. 1300- Wound care done. Patient tolerated without difficulty. 1915- Bedside and Verbal shift change report given to Donaldo Markham RN (oncoming nurse) by Carmela Loo RN   (offgoing nurse).  Report included the following information SBAR, Kardex, Intake/Output, MAR, Recent Results and Cardiac Rhythm ST.

## 2017-02-20 NOTE — PROGRESS NOTES
Progress Note      Patient: Saadia Barber               Sex: female          DOA: 2/10/2017       YOB: 1955      Age:  64 y.o.        LOS:  LOS: 10 days               Subjective:   Pt remains intubated . She is off pressors . The pt is septic from the  large sacral and gluteal ulcers . These are being treated by the wound care nurses. the wound is growing out enterococcus and the sputum is growing staph . The pt is on zosyn presently  As well as diflucan for the candida ,. The pt had an acute metabolic encephalopathy but is now obeying simple instructions    She is morbidly obese . the pt's wbc is 11.2. Jaime Plough her   h/h is 7.4 /22. 9.   the patient has been transfused in the past  . The pt continues to be tachycardic . Agree that she will need to be in a snf . .she has been intubated for 10 days and if not extubated in a few days she may need a trach     Objective:      Visit Vitals    /61    Pulse (!) 115    Temp 98.2 °F (36.8 °C)    Resp 25    Ht 5' 5\" (1.651 m)    Wt (!) 196.9 kg (434 lb 1.4 oz)    SpO2 100%    Breastfeeding No    BMI 72.24 kg/m2       Physical Exam:  Pt is awake but is intubated . Heart tachycardia . s1 and s2   Lungs scattered rhonchi bilaterally   Abdomen soft obese    Extremities chronic edema in the lower extremities   Neuro pt is awake and will obey simple instructions      Lab/Data Reviewed:  CMP: No results found for: NA, K, CL, CO2, AGAP, GLU, BUN, CREA, GFRAA, GFRNA, CA, MG, PHOS, ALB, TBIL, TP, ALB, GLOB, AGRAT, SGOT, ALT, GPT  CBC: No results found for: WBC, HGB, HGBEXT, HCT, HCTEXT, PLT, PLTEXT, HGBEXT, HCTEXT, PLTEXT        Assessment/Plan     Active Problems:    Septic shock (HCC) (2/10/2017)      Acute respiratory failure ([address] (2/13/2017)      Debility (2/13/2017)  Large necrotic  Sacral and gluteal ulcers .  These ae the source of the pt's sepsis  Metabolic encephalopathy   Anemia   Chronic edema of the lower extermities   Plan:weaning as per pulmonary . Continue the antibiotics ie zosyn . Keila Francois Transfuse as necessary . Continue to feed he pt .

## 2017-02-20 NOTE — PROGRESS NOTES
0730  Assumed care of pt. Pt is on special care bed. Auto rotation mode implemented. Pt awake, responsive, on ventilator at 21% FiO2, GsF0=947%. No pressors in use, no sedation in use,  VSS.

## 2017-02-20 NOTE — PROGRESS NOTES
Problem: Ventilator Management  Goal: *Adequate oxygenation and ventilation  Rec'd pt on vent:  AC VC+, Rate=10, Zv=435, Fio2=21%, Peep=5  Pt resting on vent--vent check completed  No SBT began at this time due to elevated HR--115-120  ETT 23 Lip- adj position of tube        Resp Plan: Monitor pt on vent. Wean and/or titrate settings to maintain pt comfort & sats. Provide suction and nebs as needed or scheduled.      Resp Goal: Successful SBT, extubation.

## 2017-02-20 NOTE — PROGRESS NOTES
[facility]   Pulmonary, Critical Care, and Sleep Medicine     ICU Patient Progress Note  Septic shock, likely source skin but also had signif funguria   * massive severe tunneling and necrotic sacral/gluteal wounds   Name: Garry Ferreira   : 1955   MRN: [id number]   Date: 2017    [x]I have reviewed the flowsheet and previous days notes. IMPRESSION:   · Septic shock, likely source skin but also had signif funguria   · Massive severe tunneling and necrotic sacral/gluteal wounds   · Acute respiratory failure on mech vent  · severe sepsis with high WOB  · Acute encephalopathy, likely metabolic, sepsis: resolved  · Anemia, s/p 1 unit PRBC   · Diarrhea = intentional = she needs to have liquid stools for FMS; do not give fiber, etc  ·  DM  · Morbid obesity  · Bedbug infestation at home (which is now condemned by report)   · Hx HTN  · Hypernatremia -improving. PLAN:     RS:No SBT today due to elevated 's-repeat CBC today if tachycardia due to low Hb, no visible external bleeding, last Hb 7.4 (). Intubated, Vent management, keep O2 sats 95% or above; keep plateau pressure <70 and weaning per protocol,  Duonebs q 6hrly; VAP bundle, aspiration precautions, HOB 30 degrees. ABG PRN, CXR daily while intubated . CVS:Tachycardic HR ranges 104-127, Telemetry on bedside monitoring shows ST low 100's. Off pressors, BP stable, continue to monitor HD. ID:Antibiotics- continue Zosyn. Vanco and diflucan d/c'd (),  Levaquin d/c'd (),  Urine cx +yeast. Wound cx: GPC pairs/EF/CK/CG/Diptheroids, trend temp and wbc, sepsis bundle  ENDO: Glycemic control, monitor glucose, SSI per protocol. GI: PPI, Increase TF as tolerated with free water flushes 150 ml Q6 hr, monitor residuals. RENAL/Metabolic:Monitor renal functio/creat/lytes-replaced per ICU protocol, obando bundle. Cr wnl. D/c IVF today. CNS:Open eyes and follow commands.   HEMATOLOGY: Monitor H/H and platelets or any active bleeding  MUSCULOSKELETAL:Moves spontaneously and follow commands. PAIN AND SEDATION: OFF sedation. ADVANCE DIRECTIVE:Full code, Await JFS guardianship(hearing ). FAMILY DISCUSSION:No family at bedside  Lines: Right neck central line,  PIV  Further recommendations will be based on the patient's response to recommended treatment and results of the investigation ordered. Quality Care: PPI, DVT prophylaxis, HOB elevated, Infection control all reviewed and addressed. Events and notes from last 24 hours reviewed. EKG No results found for this or any previous visit. ECHO echocardiogram     HPI:  Patient is a 64 y.o. female with PMHx significant for morbid obesity, chronic pain, DM, HTN, lymphedema who presented to the ED on 2/10/17 after she was found down x 3 days. PT was noted to be covered in feces and bed bugs. She was noted to have AMS and nonverbal. Pt noted to have WBC 28.7, LA 4.9 and large tunneling sacral and gluteal wounds. Pt started on sepsis bundle requiring pressor support. Pt seen in ED as consulted for sepsis and found to be tachypnic and poorly responsive, intubated for airway protection. She will be admitted to the ICU for further evaluation and management.     Subjective:     []The patient is critically ill on  Unable to obtain    [x]Mechanical ventilation []Pressors   []BiPAP []               Medication Review:  · Pressors - none  · Sedation - none  · Antibiotics -  zosyn  · Pain - none  · GI/ DVT - protonix  · Others (other gtts)    Safety Bundles: VAP Bundle/ CAUTI/ Severe Sepsis Protocol    ROS:  Unable to obtain, intubated    Vital Signs:    Visit Vitals    /64    Pulse (!) 109    Temp 98.9 °F (37.2 °C)    Resp 27    Ht 5' 5\" (1.651 m)    Wt (!) 196.9 kg (434 lb 1.4 oz)    SpO2 100%    Breastfeeding No    BMI 72.24 kg/m2       O2 Device: Ventilator, Endotracheal tube       Temp (24hrs), Av °F (37.2 °C), Min:98.9 °F (37.2 °C), Max:99.2 °F (37.3 °C)     Intake/Output:Last shift:      02/20 0701 - 02/20 1900  In: 550 [I.V.:550]  Out: 440 [Urine:390; Drains:50]Last 3 shifts: 02/18 1901 - 02/20 0700  In: 4980 [I.V.:3030]  Out: 1625 [ZQHSE:6083; Drains:250]    Intake/Output Summary (Last 24 hours) at 02/20/17 1410  Last data filed at 02/20/17 1200   Gross per 24 hour   Intake             2950 ml   Output             1185 ml   Net             1765 ml     Hemodynamics:   . @MAP     . @CVP       Ventilator Settings:  Ventilator  Mode: Assist control, VC+  Respiratory Rate  Resp Rate Observed: 24  Back-Up Rate: 10  Insp Time (sec): 1 sec  Insp Flow (l/min): 44 l/min  I:E Ratio: 1:2  Ventilator Volumes  Vt Set (ml): 400 ml  Vt Exhaled (Machine Breath) (ml): 604 ml  Vt Spont (ml): 585 ml  Ve Observed (l/min): 14.6 l/min  Ventilator Pressures  Pressure Support (cm H2O): 7 cm H2O  PIP Observed (cm H2O): 13 cm H2O  Plateau Pressure (cm H2O): 16 cm H2O  MAP (cm H2O): 8.5  PEEP/VENT (cm H2O): 5 cm H20  Auto PEEP Observed (cm H2O): 0 cm H2O    Mode Rate Tidal Volume Pressure FiO2 PEEP   Assist control, VC+   400 ml  7 cm H2O 21 % 5 cm H20     Peak airway pressure: 13 cm H2O    Minute ventilation: 14.6 l/min      ARDS network Guidelines: Lung protective strategy and Pl pressure goals<30    Objective:     Physical Exam:   General: Intubated, Appear comfortable, no acute distress  HEENT: pupils reactive, sclera anicteric, PERRL. Neck: Right IJ,  supple  CVS: Tachy, S1S2 no murmurs  RS: Mod AE bilaterally, no accessory muscle use, Clear bilateral on auscultation. Abd: Obese, soft,  Neuro:A&A gaxzes and nods, FSC with hands. Extrm: 4+BLE edema, ulceration BLE, significant chronic changes. Lymph node: No obvious palpable lymph node appreciated.   Skin: Tunneling sacral and gluteal area, muscle and bone exposed - see photos    DATA:   Current Facility-Administered Medications   Medication Dose Route Frequency    enoxaparin (LOVENOX) injection 40 mg  40 mg SubCUTAneous Q12H    0.9% sodium chloride infusion 250 mL  250 mL IntraVENous PRN    ELECTROLYTE REPLACEMENT PROTOCOL  1 Each Other PRN    ELECTROLYTE REPLACEMENT PROTOCOL  1 Each Other PRN    ELECTROLYTE REPLACEMENT PROTOCOL  1 Each Other PRN    ELECTROLYTE REPLACEMENT PROTOCOL  1 Each Other PRN    chlorhexidine (PERIDEX) 0.12 % mouthwash 10 mL  10 mL Oral Q12H    piperacillin-tazobactam (ZOSYN) 3.375 g in  mL MBP ##EXTENDED 4 HRS INFUSION  3.375 g IntraVENous Q8H    albuterol-ipratropium (DUO-NEB) 2.5 MG-0.5 MG/3 ML  3 mL Nebulization Q6H RT    sodium chloride (NS) flush 5-10 mL  5-10 mL IntraVENous PRN    insulin lispro (HUMALOG) injection   SubCUTAneous Q6H    glucose chewable tablet 16 g  4 Tab Oral PRN    glucagon (GLUCAGEN) injection 1 mg  1 mg IntraMUSCular PRN    dextrose (D50W) injection syrg 12.5-25 g  25-50 mL IntraVENous PRN    midazolam (VERSED) injection 2 mg  2 mg IntraVENous Q1H PRN    pantoprazole (PROTONIX) 40 mg in sodium chloride 0.9 % 10 mL injection  40 mg IntraVENous DAILY       Telemetry: []Sinus []A-flutter []Paced    []A-fib []Multiple PVCs     CBC w/Diff Recent Labs      02/19/17   0500  02/18/17   0350   WBC  11.2  11.3   RBC  2.67*  2.36*   HGB  7.4*  6.6*   HCT  22.9*  20.7*   PLT  246  235   GRANS  68  63   LYMPH  24  22   EOS  5  4        Chemistry Recent Labs      02/19/17   0500  02/18/17   1850  02/18/17   1515  02/18/17   1045  02/18/17   0350   GLU  112*   --    --    --   136*   NA  149*  151*   --   153*  153*   K  3.9   --   3.8   --   3.9   CL  120*   --    --    --   124*   CO2  18*   --    --    --   16*   BUN  19*   --    --    --   19*   CREA  0.76   --    --    --   0.67   CA  8.1*   --    --    --   7.9*   MG   --    --    --    --   2.0   PHOS   --    --    --    --   2.3*   AGAP  11   --    --    --   13   BUCR  25*   --    --    --   28*   AP  94   --    --    --    --    TP  5.4*   --    --    --    --    ALB  1.6*   --    --    --    --    GLOB  3.8   --    -- --    --    AGRAT  0.4*   --    --    --    --         Lactic Acid Lactic acid   Date Value Ref Range Status   02/11/2017 1.6 0.4 - 2.0 MMOL/L Final     No results for input(s): LAC in the last 72 hours. ABG Recent Labs      02/18/17   0952   PHI  7.378   PCO2I  26.2*   PO2I  104*   HCO3I  15.5*   FIO2I  21        Micro  No results for input(s): SDES, CULT in the last 72 hours. No results for input(s): CULT in the last 72 hours. Liver Enzymes Protein, total   Date Value Ref Range Status   02/19/2017 5.4 (L) 6.4 - 8.2 g/dL Final     Albumin   Date Value Ref Range Status   02/19/2017 1.6 (L) 3.4 - 5.0 g/dL Final     Globulin   Date Value Ref Range Status   02/19/2017 3.8 2.0 - 4.0 g/dL Final     A-G Ratio   Date Value Ref Range Status   02/19/2017 0.4 (L) 0.8 - 1.7   Final     AST (SGOT)   Date Value Ref Range Status   02/19/2017 32 15 - 37 U/L Final     Alk. phosphatase   Date Value Ref Range Status   02/19/2017 94 45 - 117 U/L Final     Recent Labs      02/19/17   0500   TP  5.4*   ALB  1.6*   GLOB  3.8   AGRAT  0.4*   SGOT  32   AP  94          Cardiac Enzymes No results found for: CPK, CKMMB, CKMB, RCK3, CKMBT, CKNDX, CKND1, ISAIAS, TROPT, TROIQ, ERNST, TROPT, TNIPOC, BNP, BNPP     BNP No results found for: BNP, BNPP, XBNPT     Coagulation No results for input(s): PTP, INR, APTT in the last 72 hours.     No lab exists for component: INREXT      Thyroid  Lab Results   Component Value Date/Time    TSH 2.41 02/10/2017 05:40 PM          Lipid Panel Lab Results   Component Value Date/Time    Cholesterol, total 167 05/05/2010 08:45 AM    HDL Cholesterol 65 05/05/2010 08:45 AM    LDL, calculated 88.4 05/05/2010 08:45 AM    VLDL, calculated 13.6 05/05/2010 08:45 AM    Triglyceride 68 05/05/2010 08:45 AM    CHOL/HDL Ratio 2.6 05/05/2010 08:45 AM          Urinalysis Lab Results   Component Value Date/Time    Color DARK YELLOW 02/10/2017 06:15 PM    Appearance CLOUDY 02/10/2017 06:15 PM    Specific gravity 1.018 02/10/2017 06:15 PM    pH (UA) 5.0 02/10/2017 06:15 PM    Protein NEGATIVE  02/10/2017 06:15 PM    Glucose NEGATIVE  02/10/2017 06:15 PM    Ketone TRACE 02/10/2017 06:15 PM    Bilirubin SMALL 02/10/2017 06:15 PM    Urobilinogen 1.0 02/10/2017 06:15 PM    Nitrites NEGATIVE  02/10/2017 06:15 PM    Leukocyte Esterase TRACE 02/10/2017 06:15 PM    Epithelial cells 1+ 02/10/2017 06:15 PM    Bacteria 1+ 02/10/2017 06:15 PM    WBC 4 to 10 02/10/2017 06:15 PM    RBC NONE 02/10/2017 06:15 PM        XR (Most Recent). CXR reviewed by me and compared with previous CXR   Results from Hospital Encounter encounter on 02/10/17   XR CHEST PORT   Narrative Indication:  Endotracheal tube placement    Comparison:  02/15/17    Time of study - 0323    Findings:  AP supine portable chest    The cardiomediastinal silhouette is normal.  The endotracheal tube is in good  position about 5.5 cm above the kimberly. NG tube enters the stomach. The lungs  are clear. Impression IMPRESSION:     Stable chest and catheters. CT (Most Recent) No results found for this or any previous visit. EKG No results found for this or any previous visit. ECHO No results found for this or any previous visit. PFT No flowsheet data found.      Other ASA reactivity:   Pre-albumin:   Ionized Calcium:   NH4:   T3, FT4:  Cortisol:  Urine Osm:  Urine Lytes:   HbA1c:      The patient is: [x] acutely ill Risk of deterioration: [] moderate    [x] critically ill  [] high     [x]See my orders for details    My assessment, plan of care, findings, medications, side effects etc were discussed with:  [x]nursing []PT/OT    [x]respiratory therapy []Dr. Claude Gonsalves []      Tabitha Sanchez NP  Pulmonary [address] [facility]   [address] [place] [address]  [phone]

## 2017-02-21 NOTE — INTERDISCIPLINARY ROUNDS
ICU rounds plan: attempt extubation today, transfuse 1 unit of PRBCs today, work with PT/OT to attempt chair position.

## 2017-02-21 NOTE — PROGRESS NOTES
2000 Nursing assessment,awake, nods head to questions. No distress noted. 0000 No change noted. Opens eyes to voice. 0200 Bath given and dressing change done. Some bleeding noted. 0600 No change noted.

## 2017-02-21 NOTE — PROGRESS NOTES
909: Patient on Spontaneous PS 7 Peep 5    1026: ABG's done and results given to Dr. Pb Mora   - Orders to extubate patient. 1036: Patient extubated and on 3 lpm via NC.   - No respiratory distress noted. Will continue to monitor patient closely.

## 2017-02-21 NOTE — PROGRESS NOTES
Problem: Mobility Impaired (Adult and Pediatric)  Goal: *Acute Goals and Plan of Care (Insert Text)  PHYSICAL THERAPY SHORT TERM GOALS :   1. Patient will perform/complete have head at 45 degrees in chair position of bed for 15 minutes with supervision/set-up with no signs of distress within 1 week(s). 2. Patient will perform/complete exercises with family guiding her for exercises with supervision/set-up within 1 week(s). 3. Patient will perform/complete initate rolling to one side w with moderate assistance Of 2 within 1 week(s). 4. Patient will participate in Upper and lower extremity therapeutic exercise/activities with moderate assistance for 10 minutes within 1 week(s). Therapist Maria Ines Jones, PT 2/20/2017  Time Calculation: 21 mins   Outcome: Not Progressing Towards Goal   PHYSICAL THERAPY TREATMENT     Patient: Jay Martinez (66 y.o. female)  Date: 2/21/2017  Diagnosis: Septic shock ([facility] [address]  ulcer decubitus <principal problem not specified>  Procedure(s) (LRB):  debridement large sacral decubitus ulcer  (N/A)    Precautions: Aspiration, Fall, Contact (ventilator )   Chart, physical therapy assessment, plan of care and goals were reviewed. ASSESSMENT:  Pt extubated this a.m. Pt very minimally participated in ROM TE. Performed limited ROM AP and elevated UEs L>R. Unable to assist with knee flexion/ext or hip add. Pt bed bound at home. Education:ROM   Progression toward goals:  [ ]      Improving appropriately and progressing toward goals  [ ]      Improving slowly and progressing toward goals  [X]      Not making progress toward goals and plan of care will be adjusted       PLAN:  Patient continues to benefit from skilled intervention to address the above impairments. Continue treatment per established plan of care. Discharge Recommendations:  LTC  Further Equipment Recommendations for Discharge:  TBD       SUBJECTIVE:   Patient stated I am cold.       OBJECTIVE DATA SUMMARY: Critical Behavior:  Neurologic State: Alert  Orientation Level: Unable to verbalize  Cognition: Follows commands  Safety/Judgement: Fall prevention  Therapeutic Exercises:   See assessment  Pain:  Pre 0  Post 0  Activity Tolerance:   poor  Please refer to the flowsheet for vital signs taken during this treatment.   After treatment:   [ ] Patient left in no apparent distress sitting up in chair  [X] Patient left in no apparent distress in bed  [X] Call bell left within reach  [X] Nursing notified  [ ] Caregiver present  [ ] Bed alarm activated      Ashwini Mcduffie PTA   Time Calculation: 27 mins

## 2017-02-21 NOTE — PROGRESS NOTES
Chart reviewed. Rounded on pt. Pt still intubated but weaning. Pt obeys commands. Guardianship court date Monday but telepysch to be completed after vent out. Discussed with Dr Randal Viramontes. Plan would be snf when medically stable instead of going back to live with son. Will continue to follow for needs.

## 2017-02-21 NOTE — PROGRESS NOTES
Patient Summary  Assumed care- VSS-Received 1 unit PRBCS today - Extubated at 2789 - doing well on NC 2l-using yankuer suction for oral secretions - Pt answering questions appropriately - Wound care done -

## 2017-02-21 NOTE — ROUTINE PROCESS
Bedside and Verbal shift change report given to kaylen Mackey (oncoming nurse) by Zhanna Solares RN   (offgoing nurse). Report included the following information SBAR, Kardex, Intake/Output, MAR and Recent Results.

## 2017-02-21 NOTE — CONSULTS
Patient Location:     [facility]. [place]   Psychiatrist Location:  [place]    Patient Name:  Popeye Calero  :    1955  Date & Time:   2017 @ 65 ET    Brief History:   64year-old female with multiple medical problems admitted to the hospital 11 days ago for septic shock after being found down by EMS. Psychiatry has been consulted to assess for medical decision-making capacity. Reports indicate that pt. was found down and covered in feces and bed bugs. She was found to have a UTI and extensive decubitus ulcers. Pt. was extubated today. Case d/w NP Ilir Romero, who reports that pt. is very weak and unable to participate in the telepsychiatry interview at this time but she has been cooperative, following commands and answering yes or no questions. She has not been refusing any medical treatments. Impression & Risk:            64year-old female with unknown psychiatric vs. neurological disorder i.e. dementia; if patient remains cooperative an unless pt. starts to refuse critical medical treatments or placements, it can be assumed she retains capacity for medical decision-making. Please re-consult psychiatry when she can meaningfully participate in the interview and/or if she refuses critical treatments or placement. Consider PT/OT evaluation and possible neuropsychology assessment. Thanks for inviting us to participate in the care of this patient.         Narayan Benjamin MD  [address] [place] [address]

## 2017-02-21 NOTE — PROGRESS NOTES
attempted to  Complete a follow up visit with patient but found patient still on total life support and unable to communicate.    Chaplains will continue to follow and will provide pastoral care on an as needed/requested basis    Chaplain Price James   Board Certified [address] [place] [address]   [phone]

## 2017-02-21 NOTE — PROGRESS NOTES
[facility]   Pulmonary, Critical Care, and Sleep Medicine     ICU Patient Progress Note  Septic shock, likely source skin but also had signif funguria   * massive severe tunneling and necrotic sacral/gluteal wounds   Name: Víctor Vicente   : 1955   MRN: [id number]   Date: 2017    [x]I have reviewed the flowsheet and previous days notes. IMPRESSION:   · Septic shock, likely source skin but also had signif funguria   · Massive severe tunneling and necrotic sacral/gluteal wounds   · Acute respiratory failure on mech vent  · severe sepsis with high WOB  · Acute encephalopathy, likely metabolic, sepsis: resolved  · Anemia, s/p 1 unit PRBC   · Diarrhea = intentional = she needs to have liquid stools for FMS; do not give fiber, etc  ·  DM  · Morbid obesity  · Bedbug infestation at home (which is now condemned by report)   · Hx HTN  · Hypernatremia -improving. PLAN:     RS:No SBT today due to elevated 's-repeat CBC today if tachycardia due to low Hb, no visible external bleeding, last Hb 7.4 (). Intubated, Vent management, keep O2 sats 95% or above; keep plateau pressure <67 and weaning per protocol,  Duonebs q 6hrly; VAP bundle, aspiration precautions, HOB 30 degrees. ABG PRN, CXR daily while intubated . CVS:Tachycardic HR ranges 104-127, Telemetry on bedside monitoring shows ST low 100's. Off pressors, BP stable, continue to monitor HD. ID:Antibiotics- continue Zosyn. Vanco and diflucan d/c'd (),  Levaquin d/c'd (),  Urine cx +yeast. Wound cx: GPC pairs/EF/CK/CG/Diptheroids, trend temp and wbc, sepsis bundle  ENDO: Glycemic control, monitor glucose, SSI per protocol. GI: PPI, Increase TF as tolerated with free water flushes 150 ml Q6 hr, monitor residuals. RENAL/Metabolic:Monitor renal functio/creat/lytes-replaced per ICU protocol, obando bundle. Cr wnl. D/c IVF today. CNS:Open eyes and follow commands.   HEMATOLOGY: Monitor H/H and platelets or any active bleeding  MUSCULOSKELETAL:Moves spontaneously and follow commands. PAIN AND SEDATION: OFF sedation. ADVANCE DIRECTIVE:Full code, Await JFS guardianship(hearing 2/27). FAMILY DISCUSSION:No family at bedside  Lines: Right neck central line,  PIV  Further recommendations will be based on the patient's response to recommended treatment and results of the investigation ordered. Quality Care: PPI, DVT prophylaxis, HOB elevated, Infection control all reviewed and addressed. Events and notes from last 24 hours reviewed. EKG No results found for this or any previous visit. ECHO echocardiogram     HPI:  Patient is a 64 y.o. female with PMHx significant for morbid obesity, chronic pain, DM, HTN, lymphedema who presented to the ED on 2/10/17 after she was found down x 3 days. PT was noted to be covered in feces and bed bugs. She was noted to have AMS and nonverbal. Pt noted to have WBC 28.7, LA 4.9 and large tunneling sacral and gluteal wounds. Pt started on sepsis bundle requiring pressor support. Pt seen in ED as consulted for sepsis and found to be tachypnic and poorly responsive, intubated for airway protection. She will be admitted to the ICU for further evaluation and management. Subjective:     Extubated today, doing well on O2/NC @ 3 Lpm 100%. Pt awake and alert, say ok and yes, still appear weak, able to move fingers and toes but unable to lift extremities. Tachycardic in low 100's- likely due to anemia Hb 7.2-1 unit PRBC today.     [] Medication Review:  · Pressors - none  · Sedation - none  · Antibiotics -  zosyn  · Pain - none  · GI/ DVT - protonix  · Others (other gtts)    Safety Bundles: VAP Bundle/ CAUTI/ Severe Sepsis Protocol    ROS:  Unable to obtain, intubated    Vital Signs:    Visit Vitals    /53 (BP 1 Location: Left arm, BP Patient Position: At rest)    Pulse (!) 109    Temp 97.5 °F (36.4 °C)    Resp 29    Ht 5' 5\" (1.651 m)    Wt (!) 196.9 kg (434 lb 1.4 oz)  SpO2 100%    Breastfeeding No    BMI 72.24 kg/m2       O2 Device: Nasal cannula   O2 Flow Rate (L/min): 3 l/min   Temp (24hrs), Av °F (37.2 °C), Min:97.5 °F (36.4 °C), Max:99.8 °F (37.7 °C)     Intake/Output:Last shift:       Last 3 shifts:  1901 -  0700  In: 9567 [I.V.:1875]  Out: 5451 [Urine:1520; Drains:190]    Intake/Output Summary (Last 24 hours) at 17 1215  Last data filed at 17 0600   Gross per 24 hour   Intake             1940 ml   Output              710 ml   Net             1230 ml     Objective:     Physical Exam:   General: Extubated, on O2/NC, Appear comfortable, no acute distress  HEENT: pupils reactive, sclera anicteric, PERRL. Neck: Right IJ,  supple  CVS: Tachy, S1S2 no murmurs  RS: Mod AE bilaterally, no accessory muscle use, Clear bilateral on auscultation. Abd: Obese, soft,  Neuro:A&A gaxzes and nods, FSC with hands. Extrm: 4+BLE edema, ulceration BLE, significant chronic changes. Lymph node: No obvious palpable lymph node appreciated.   Skin: Tunneling sacral and gluteal area, muscle and bone exposed - see photos    DATA:   Current Facility-Administered Medications   Medication Dose Route Frequency    0.9% sodium chloride infusion 250 mL  250 mL IntraVENous PRN    enoxaparin (LOVENOX) injection 40 mg  40 mg SubCUTAneous Q12H    0.9% sodium chloride infusion 250 mL  250 mL IntraVENous PRN    ELECTROLYTE REPLACEMENT PROTOCOL  1 Each Other PRN    ELECTROLYTE REPLACEMENT PROTOCOL  1 Each Other PRN    ELECTROLYTE REPLACEMENT PROTOCOL  1 Each Other PRN    ELECTROLYTE REPLACEMENT PROTOCOL  1 Each Other PRN    chlorhexidine (PERIDEX) 0.12 % mouthwash 10 mL  10 mL Oral Q12H    piperacillin-tazobactam (ZOSYN) 3.375 g in  mL MBP ##EXTENDED 4 HRS INFUSION  3.375 g IntraVENous Q8H    albuterol-ipratropium (DUO-NEB) 2.5 MG-0.5 MG/3 ML  3 mL Nebulization Q6H RT    sodium chloride (NS) flush 5-10 mL  5-10 mL IntraVENous PRN    insulin lispro (HUMALOG) injection   SubCUTAneous Q6H    glucose chewable tablet 16 g  4 Tab Oral PRN    glucagon (GLUCAGEN) injection 1 mg  1 mg IntraMUSCular PRN    dextrose (D50W) injection syrg 12.5-25 g  25-50 mL IntraVENous PRN    midazolam (VERSED) injection 2 mg  2 mg IntraVENous Q1H PRN    pantoprazole (PROTONIX) 40 mg in sodium chloride 0.9 % 10 mL injection  40 mg IntraVENous DAILY       Telemetry: []Sinus []A-flutter []Paced    []A-fib []Multiple PVCs     CBC w/Diff Recent Labs      02/21/17 0415 02/20/17   1755  02/19/17   0500   WBC  10.6  11.1  11.2   RBC  2.54*  2.84*  2.67*   HGB  7.2*  8.0*  7.4*   HCT  22.1*  24.6*  22.9*   PLT  220  292  246   GRANS  66  66  68   LYMPH  16*  17*  24   EOS  8*  8*  5        Chemistry Recent Labs      02/21/17 0415  02/20/17   1755 02/19/17   0500   GLU  117*  94  112*   NA  152*  152*  149*   K  4.0  3.9  3.9   CL  124*  125*  120*   CO2  18*  16*  18*   BUN  19*  18  19*   CREA  0.93  0.93  0.76   CA  8.0*  8.4*  8.1*   MG  2.1  2.1   --    PHOS  3.1   --    --    AGAP  10  11  11   BUCR  20  19  25*   AP  93   --   94   TP  5.4*   --   5.4*   ALB  1.6*   --   1.6*   GLOB  3.8   --   3.8   AGRAT  0.4*   --   0.4*        Lactic Acid Lactic acid   Date Value Ref Range Status   02/11/2017 1.6 0.4 - 2.0 MMOL/L Final     No results for input(s): LAC in the last 72 hours. ABG Recent Labs      02/21/17   1026   PHI  7.353   PCO2I  26.9*   PO2I  110*   HCO3I  14.8*   FIO2I  0.21        Micro  No results for input(s): SDES, CULT in the last 72 hours. No results for input(s): CULT in the last 72 hours.      Liver Enzymes Protein, total   Date Value Ref Range Status   02/21/2017 5.4 (L) 6.4 - 8.2 g/dL Final     Albumin   Date Value Ref Range Status   02/21/2017 1.6 (L) 3.4 - 5.0 g/dL Final     Globulin   Date Value Ref Range Status   02/21/2017 3.8 2.0 - 4.0 g/dL Final     A-G Ratio   Date Value Ref Range Status   02/21/2017 0.4 (L) 0.8 - 1.7   Final     AST (SGOT)   Date Value Ref Range Status   02/21/2017 31 15 - 37 U/L Final     Alk. phosphatase   Date Value Ref Range Status   02/21/2017 93 45 - 117 U/L Final     Recent Labs      02/21/17   0415  02/19/17   0500   TP  5.4*  5.4*   ALB  1.6*  1.6*   GLOB  3.8  3.8   AGRAT  0.4*  0.4*   SGOT  31  32   AP  93  94          Cardiac Enzymes No results found for: CPK, CKMMB, CKMB, RCK3, CKMBT, CKNDX, CKND1, ISAIAS, TROPT, TROIQ, ERNST, TROPT, TNIPOC, BNP, BNPP     BNP No results found for: BNP, BNPP, XBNPT     Coagulation No results for input(s): PTP, INR, APTT in the last 72 hours. No lab exists for component: INREXT, INREXT      Thyroid  Lab Results   Component Value Date/Time    TSH 2.41 02/10/2017 05:40 PM          Lipid Panel Lab Results   Component Value Date/Time    Cholesterol, total 167 05/05/2010 08:45 AM    HDL Cholesterol 65 05/05/2010 08:45 AM    LDL, calculated 88.4 05/05/2010 08:45 AM    VLDL, calculated 13.6 05/05/2010 08:45 AM    Triglyceride 68 05/05/2010 08:45 AM    CHOL/HDL Ratio 2.6 05/05/2010 08:45 AM          Urinalysis Lab Results   Component Value Date/Time    Color DARK YELLOW 02/10/2017 06:15 PM    Appearance CLOUDY 02/10/2017 06:15 PM    Specific gravity 1.018 02/10/2017 06:15 PM    pH (UA) 5.0 02/10/2017 06:15 PM    Protein NEGATIVE  02/10/2017 06:15 PM    Glucose NEGATIVE  02/10/2017 06:15 PM    Ketone TRACE 02/10/2017 06:15 PM    Bilirubin SMALL 02/10/2017 06:15 PM    Urobilinogen 1.0 02/10/2017 06:15 PM    Nitrites NEGATIVE  02/10/2017 06:15 PM    Leukocyte Esterase TRACE 02/10/2017 06:15 PM    Epithelial cells 1+ 02/10/2017 06:15 PM    Bacteria 1+ 02/10/2017 06:15 PM    WBC 4 to 10 02/10/2017 06:15 PM    RBC NONE 02/10/2017 06:15 PM        XR (Most Recent).  CXR reviewed by me and compared with previous CXR   Results from Hospital Encounter encounter on 02/10/17   XR CHEST PORT   Narrative Chest, single view    Indication: Intubated patient, follow-up examination    Comparison: Several prior exams, most recently 2/16/2017    Findings:  Portable upright AP view of the chest was obtained. There is an  endotracheal tube present proximal thigh 4.5 cm above the kimberly. A nasogastric  tube is present below the diaphragm, the side-port below the gastroesophageal  junction. A right internal jugular approach central venous catheter projects  over the superior cavoatrial junction. Degree of pulmonary inflation is similar to prior, mild bronchovascular crowding  noted, without substantial change. No pneumothorax, focal pneumonic opacity, or  pleural effusion. Cardiac size and mediastinal contours are stable. No acute  osseous abnormality. Bilateral shoulder girdle osteoarthritis present. Impression Impression:    1. Endotracheal tube, nasogastric tube, and right internal jugular approach  central venous catheter in position as above. 2. Mild pulmonary hypoinflation without superimposed acute radiographic  abnormality. CT (Most Recent) No results found for this or any previous visit. EKG No results found for this or any previous visit. ECHO No results found for this or any previous visit. PFT No flowsheet data found.      Other ASA reactivity:   Pre-albumin:   Ionized Calcium:   NH4:   T3, FT4:  Cortisol:  Urine Osm:  Urine Lytes:   HbA1c:      The patient is: [x] acutely ill Risk of deterioration: [] moderate    [x] critically ill  [] high     [x]See my orders for details    My assessment, plan of care, findings, medications, side effects etc were discussed with:  [x]nursing []PT/OT    [x]respiratory therapy [x]Dr.Amit Sherrell Richardson   []family []      Glorine Dancer, NP  Pulmonary [address], [address] [place] [address]  [phone]

## 2017-02-21 NOTE — PROGRESS NOTES
-0900-Received patient on ventilator ACVC+ rate-10 VT-400, PEEP-5, FIo2-21%. O2 Sats-100% HR-108, RR-25. ETT noted to be patent and secure. Pt placed on SBT at this time PS-7, PEEP-5, FIO2-100%. O2 Sats-100% HR-108, RR-22 RSBI-35. Respiratory will continue to monitor. -[address]

## 2017-02-21 NOTE — PROGRESS NOTES
Progress Note      Patient: Garry Ferreira               Sex: female          DOA: 2/10/2017       YOB: 1955      Age:  64 y.o.        LOS:  LOS: 11 days               Subjective:   Pt is awake today and smiled when she was addressed . The pt remains intubated due to acute respiratory failure as per pulmonary . Her h/h is 7.2/22.1 and she will probably require further transfusion with prbcs . The pt has sepsis due to the very large decubitus ulcers in the   sacral and gluteal locations . Pt is is on zosyn as the pt grew out enterococcus and staph both of wich are sensitive . The pt's metabolic encephalopathy is  less   acute today and she is quite responsive . The pt is now on a weaning protocol  And hopefully she can be extubated soon . There sodium is 152 and she will need more free water in the iv .her albumin is 1.6 and she is malnourished despite her obvious girth        Objective:      Visit Vitals    /53 (BP 1 Location: Left arm, BP Patient Position: At rest)    Pulse (!) 109    Temp 97.5 °F (36.4 °C)    Resp 29    Ht 5' 5\" (1.651 m)    Wt (!) 196.9 kg (434 lb 1.4 oz)    SpO2 100%    Breastfeeding No    BMI 72.24 kg/m2       Physical Exam:  Pt is intubated and will open her eyes to voice . Heart tachycardia s1 and s2   Lungs good breath sounds heard   Abdomen soft and obese . nontender   Extremities . large decubitus ulcers are covered    Neuro will respond to simple questions     Lab/Data Reviewed:  CMP:   Lab Results   Component Value Date/Time     (H) 02/21/2017 04:15 AM    K 4.0 02/21/2017 04:15 AM     (H) 02/21/2017 04:15 AM    CO2 18 (L) 02/21/2017 04:15 AM    AGAP 10 02/21/2017 04:15 AM     (H) 02/21/2017 04:15 AM    BUN 19 (H) 02/21/2017 04:15 AM    CREA 0.93 02/21/2017 04:15 AM    GFRAA >60 02/21/2017 04:15 AM    GFRNA >60 02/21/2017 04:15 AM    CA 8.0 (L) 02/21/2017 04:15 AM    MG 2.1 02/21/2017 04:15 AM    PHOS 3.1 02/21/2017 04:15 AM    ALB 1.6 (L) 02/21/2017 04:15 AM    TP 5.4 (L) 02/21/2017 04:15 AM    GLOB 3.8 02/21/2017 04:15 AM    AGRAT 0.4 (L) 02/21/2017 04:15 AM    SGOT 31 02/21/2017 04:15 AM    ALT 27 02/21/2017 04:15 AM     CBC:   Lab Results   Component Value Date/Time    WBC 10.6 02/21/2017 04:15 AM    HGB 7.2 (L) 02/21/2017 04:15 AM    HCT 22.1 (L) 02/21/2017 04:15 AM     02/21/2017 04:15 AM           Assessment/Plan     Active Problems:    Septic shock (HCC) (2/10/2017)source of infection is the very large ulcers in the sacral and gluteal locations . pt is on zosyn . Acute respiratory failure ([facility] [address] (2/13/2017)pt is on a weaning protocol       Debility (2/13/2017)        Plan:continue on the weaning protocol as per pulmonary continue on the zosyn . Pt will need to be seen by telepsychiatry .  She will need placement in a snf

## 2017-02-21 NOTE — PROGRESS NOTES
Problem: Mobility Impaired (Adult and Pediatric)  Goal: *Acute Goals and Plan of Care (Insert Text)  PHYSICAL THERAPY SHORT TERM GOALS :   1. Patient will perform/complete have head at 45 degrees in chair position of bed for 15 minutes with supervision/set-up with no signs of distress within 1 week(s). 2. Patient will perform/complete exercises with family guiding her for exercises with supervision/set-up within 1 week(s). 3. Patient will perform/complete initate rolling to one side w with moderate assistance Of 2 within 1 week(s). 4. Patient will participate in Upper and lower extremity therapeutic exercise/activities with moderate assistance for 10 minutes within 1 week(s). Therapist Mally Gonsales, PT 2/20/2017  Time Calculation: 21 mins  Outcome: Progressing Towards Goal  PHYSICAL THERAPY EVALUATION     Patient: Lenka Augustin (25 y.o. female)  Date: 2/20/2017  Primary Diagnosis: Septic shock ([address]  ulcer decubitus  Procedure(s) (LRB):  debridement large sacral decubitus ulcer  (N/A)     Precautions:   Aspiration, Fall, Contact (ventilator )      PROBLEM LIST:  Patient presents with the following problems:   Bed Mobility, Strength, Range of Motion, Home Exercise Program, Precautions, Skin Integrity/Hygiene, Increased Girth and breathing with ventilator  ASSESSMENT :   Patient requires  total assistance  for bed mobility,exercises and positioning  Patient has ventilator, tube feeding and  IV in place as well as prevalon boot on  And on a specialty bed with turning feature. patient able to open eyes to voice and following directions for aarom to elbows, feet , wrists, and fingers. Unable to lift Ue or LE against gravity at proximal joints  No pain reported with nodding yes or no . Son  present for evaluation and was also  instructed in ankle pumps, finger open/close, wrist movement, glut sets  Quad sets both need reinforcement for both left in bed with call light in reach.     Patient will benefit from skilled intervention to address the above impairments. Patients rehabilitation potential is considered to be Fair  Factors which may influence rehabilitation potential include:   [ ]         None noted  [ ]         Mental ability/status  [X]         Medical condition  [ ]         Home/family situation and support systems  [ ]         Safety awareness  [ ]         Pain tolerance/management  [ ]         Other:        PLAN :  Recommendations and Planned Interventions:  [X]           Bed Mobility Training             [X]    Neuromuscular Re-Education  [X]           Transfer Training                   [ ]    Orthotic/Prosthetic Training  [X]           Gait Training                          [ ]    Modalities  [X]           Therapeutic Exercises          [ ]    Edema Management/Control  [X]           Therapeutic Activities            [X]    Patient and Family Training/Education  [ ]           Other (comment):     Frequency/Duration: Patient will be followed by physical therapy 3-5 times a week to address goals.   Discharge Recommendations: Rehab, Jitendra Pak and To Be Determined  Further Equipment Recommendations for Discharge: tbd       SUBJECTIVE:   Patient stated .      OBJECTIVE DATA SUMMARY:       Past Medical History   Diagnosis Date    Anemia      Anxiety 2016    Arthritis      Cancer ([facility] [address]         pre cancerous cells in the uterus    Chronic pain         knees    Chronic pain of both knees 2016    Cigarette nicotine dependence in remission 2016    Diabetes ([address]      Fatty liver 2016    Headache      Hypertension      Liver disease         fatty liver    Lymphedema 2016    Morbid obesity ([address] 2016    Psychotic disorder         anxiety and panic attacks    Thyroid disease       Past Surgical History   Procedure Laterality Date    Hx cholecystectomy       Hx gyn   2006       hysterectomy    Hx  section        Barriers to Learning/Limitations: yes;  sensory deficits-vision/hearing/speech and physical  Compensate with: visual, verbal, tactile, kinesthetic cues/model     G CODES:Mobility  Current  CN= 100%   Goal  CM= 80-99%. The severity rating is based on the Other [facility] Sitting Balance Scale 0/5   [facility] Sitting Balance Scale 0/5  0: Pt performs 25% or less of sitting activity (Max assist) CN, 100% impaired. 1: Pt supports self with upper extremities but requires therapist assistance. Pt performs 25-50% of effort (Mod assist) CM, 80% to <100% impaired. 1+: Pt supports self with upper extremities but requires therapist assistance. Pt performs >50% effort. (Min assist). CL, 60% to <80% impaired. 2: Pt supports self independently with both upper extremities. CL, 60% to <80% impaired. 2+: Pt support self independently with 1 upper extremity. CK, 40% to <60% impaired. 3: Pt sits without upper extremity support for up to 30 seconds. CK, 40% to <60% impaired. 3+: Pt sits without upper extremity support for 30 seconds or greater. CJ, 20% to <40% impaired. 4: Pt moves and returns trunkal midpoint 1-2 inches in one plane. CJ, 20% to <40% impaired. 4+: Pt moves and returns trunkal midpoint 1-2 inches in multiple planes. CI, 1% to <20% impaired. 5: Pt moves and returns trunkal midpoint in all planes greater than 2 inches. CH, 0% impaired.         Eval Complexity: History: HIGH Complexity :3+ comorbidities / personal factors will impact the outcome/ POC Exam:LOW Complexity : 1-2 Standardized tests and measures addressing body structure, function, activity limitation and / or participation in recreation  Presentation: MEDIUM Complexity : Evolving with changing characteristics  Clinical Decision Making:Medium Complexity [facility] Sitting Balance Scale 0/5 Overall Complexity:LOW      Prior Level of Function/Home Situation:  Used wall or pushed chair for assist with gait needing assist for adl's Home Situation  Home Environment: Private residence  # Steps to Enter: 6  One/Two Story Residence: One story  Living Alone: No  Support Systems: Child(pau), Family member(s)  Patient Expects to be Discharged to[de-identified] Private residence  Current DME Used/Available at Home: None  Critical Behavior:  Neurologic State: Eyes open to stimulus  Orientation Level: Unable to verbalize  Cognition: Follows commands  Safety/Judgement: Fall prevention  Psychosocial  Patient Behaviors: Calm  Family  Behaviors: Calm; Cooperative  Purposeful Interaction: Yes  Pt Identified Daily Priority: Clinical issues (comment)  Caritas Process: Attend basic human needs  Caring Interventions: Therapeutic modalities  Reassure: Informing;Caring rounds  Therapeutic Modalities: Deep breathing  Family  Behaviors: Calm; Cooperative  Strength:    Strength: Grossly decreased, non-functional  Manual Muscle Testing (LE)                                                     R                     L                     Hip Flexion:                           0/5 0/5    Knee EXT:                            1/5 1/5                         Ankle DF:                           1/5 1/5  _________________________________________________   Tone & Sensation:   Tone: Normal  Sensation: Intact  Range Of Motion:  AROM: Grossly decreased, non-functional  PROM: Generally decreased, functional (able to initate movment but not lift against gravity )  Functional Mobility:  Bed Mobility:  Rolling:  (using rolling feature on bed every 10 minutes )  Therapeutic Exercises:   aarom to both elbows,, wrists, fingers ankle pumps and prom to both shoulders, hips and knees   Pain:  Pre treatment pain level:0  Post treatment pain level:0  Pain Scale 1: Adult Nonverbal Pain Scale  Activity Tolerance:   Fair   Please refer to the flowsheet for vital signs taken during this treatment.   After treatment:   [ ]         Patient left in no apparent distress sitting up in chair  [X]         Patient left in no apparent distress in bed  [X]         Call bell left within reach  [X]         Nursing notified  [X]         Caregiver present  [ ]         Bed alarm activated      COMMUNICATION/EDUCATION:   [X]         Fall prevention education was provided and the patient/caregiver indicated understanding. [ ]         Patient/family have participated as able in goal setting and plan of care. [X]         Patient/family agree to work toward stated goals and plan of care. [ ]         Patient understands intent and goals of therapy, but is neutral about his/her participation. [ ]         Patient is unable to participate in goal setting and plan of care. Patient educated on the role of physical therapy during the acute stay  and the importance of mobility.  Needs reinforcement      Thank you for this referral.  Phi Harris, PT   Time Calculation: 21 mins

## 2017-02-21 NOTE — PROGRESS NOTES
PCCM Addendum:     Spoke to Dr Amalia Dean (Tele-Psychiatrist)  Given updates with pt current condition. Pt limited verbally, answer yes and no to simple question, follows commands but still weak post extubation, hemodynamically stable. Per Dr Amalia Dean to evaluate capacity  Pt will need to verbalized basic understanding of current medical condition. Refusal of care, treatment or pending placement. Recommend to re-consult when pt verbally improved  D/w with RN and Dr Alaina Goddard.  Lavern Coe NP  Pulmonary Critical Care & Sleep Medicine  [address] [place] [address], [address] [place] [address]  [phone]

## 2017-02-22 NOTE — PROGRESS NOTES
2030 Awake, no distress noted, follows commands. 0000 Sleeping on and off, occasional cough, no distress noted. 0600 Dressing change done. Less bleeding noted, sacral wound still deep, wet to dry dressing applied.

## 2017-02-22 NOTE — PROGRESS NOTES
1352:  Pt sleeping sound at this time. Will follow up later. 1500:  Pt getting PICC line at this time. Will follow up.

## 2017-02-22 NOTE — PROCEDURES
PICC line placed RUE without difficulty Tip placement verified using 3CG technology.  Line ready to use and reported to [facility]

## 2017-02-22 NOTE — PROGRESS NOTES
Problem: Dysphagia (Adult)  Goal: *Acute Goals and Plan of Care (Insert Text)  Dysphagia Present: yes  Aspiration: at risk     Recommendations:  Diet: puree/thin liquids   Meds: one at a time  Aspiration Precautions  Oral Care TID  Other: one sips at a time/needs assistance    Goals: Patient will:  1. Tolerate PO trials with 0 s/s overt distress in 4/5 trials  2. Utilize compensatory swallow strategies/maneuvers (decrease bite/sip, size/rate, alt. liq/sol) with min cues in 4/5 trials  3. Perform oral-motor/laryngeal exercises to increase oropharyngeal swallow function with min cues  4. Complete an objective swallow study (i.e., MBSS) to assess swallow integrity, r/o aspiration, and determine of safest LRD, min A    Mian Betts, SLP Intern  Ph: [phone]  Outcome: Progressing Towards Goal  SPEECH LANGUAGE PATHOLOGY BEDSIDE SWALLOW EVALUATION     Patient: Beena Franklin (1 y.o. female)  Date: 2/22/2017  Primary Diagnosis: Septic shock ([facility] [address]  ulcer decubitus  Procedure(s) (LRB):  debridement large sacral decubitus ulcer  (N/A)     Precautions:   Aspiration, Fall, Contact (ventilator )      ASSESSMENT :  Based on the objective data described below, the patient presents with mild oropharyngeal dysphagia in the setting of respiratory failure status post extubation. Pt A&O x3. Pt presented with thin liquid + straw with 0 s/sx aspiration. Functional laryngeal elevation. Pt accepted puree with 0 s/sx aspiration or lingual residue. Solid accepted with delayed mastication and lingual residue; followed by liquid wash. SLP removed remaining solid. Recommending puree/thin liquid one sip at a time. Meds one at a time. ST to follow. D/w RN Jan.     Patient will benefit from skilled intervention to address the above impairments.   Patients rehabilitation potential is considered to be Fair  Factors which may influence rehabilitation potential include:   [ ]            None noted  [X]            Mental ability/status  [X] Medical condition  [ ]            Home/family situation and support systems  [ ]            Safety awareness  [ ]            Pain tolerance/management  [ ]            Other:        PLAN :  Recommendations and Planned Interventions:  Puree/thin liquid one sip at a time  Meds one at a time  Frequency/Duration: Patient will be followed by speech-language pathology 1-2 times per day/4-7 days per week to address goals. Discharge Recommendations: Skilled Nursing Facility       SUBJECTIVE:   Patient stated Kaylene Benders. OBJECTIVE:       Past Medical History:   Diagnosis Date    Anemia      Anxiety 2016    Arthritis      Cancer ([facility] [address]       pre cancerous cells in the uterus    Chronic pain       knees    Chronic pain of both knees 2016    Cigarette nicotine dependence in remission 2016    Diabetes ([facility] [address]      Fatty liver 2016    Headache      Hypertension      Liver disease       fatty liver    Lymphedema 2016    Morbid obesity ([facility] [address] 2016    Psychotic disorder       anxiety and panic attacks    Thyroid disease       Past Surgical History:   Procedure Laterality Date    HX  SECTION       HX CHOLECYSTECTOMY       HX GYN   2006     hysterectomy     Prior Level of Function/Home Situation: Private residence  Home Situation  Home Environment: Private residence  # Steps to Enter: 6  One/Two Story Residence: One story  Living Alone: No  Support Systems: Child(pau), Family member(s)  Patient Expects to be Discharged to[de-identified] Private residence  Current DME Used/Available at Home: None  Diet prior to admission: unknown     Current Diet:   puree/thin liquid one sip at a time  Cognitive and Communication Status:  Neurologic State: Alert  Orientation Level: Oriented to person, Oriented to place, Disoriented to situation  Cognition: Follows commands        Safety/Judgement: Fall prevention  Oral Assessment: See above     P.O.  Trials: See above        GCODESwallowing:  Swallow Current Status CJ= 20-39%   Swallow Goal Status CI= 1-19%     The severity rating is based on the following outcomes:  MURIEL Noms Swallow Level 5              Clinical Judgement     PAIN:  Start of Eval: 0  End of Eval: 0      After treatment:   [ ]            Patient left in no apparent distress sitting up in chair  [X]            Patient left in no apparent distress in bed  [X]            Call bell left within reach  [X]            Nursing notified  [ ]            Family present  [ ]            Caregiver present  [ ]            Bed alarm activated      COMMUNICATION/EDUCATION:   [X]            Aspiration precautions; swallow safety; compensatory techniques. [ ]            Patient/family have participated as able in goal setting and plan of care. [ ]            Patient/family agree to work toward stated goals and plan of care. [ ]            Patient understands intent and goals of therapy; neutral about participation. [X]            Patient unable to participate in goal setting/plan of care; educ ongoing with interdisciplinary staff  [ ]             Posted safety precautions in patient's room.      Thank you for this referral.  Polina Wadsworth   SLP Intern     Time Calculation: 25 mins

## 2017-02-22 NOTE — ROUTINE PROCESS
Bedside and Verbal shift change report given to randolph skinner rn (oncoming nurse) by Layo Tejeda RN   (offgoing nurse). Report included the following information SBAR, Kardex, Intake/Output, MAR and Recent Results.

## 2017-02-22 NOTE — PROGRESS NOTES
[facility]  Pulmonary, Critical Care, and Sleep Medicine    Name: Jihan Gorman MRN: [id number]   : 1955 Hospital: [address] [place] [address]   Date: 2017        [facility]                                              [x] I have reviewed the flowsheet and previous days notes. Events, vitals, medications and notes from last 24 hours reviewed. Care plan discussed with staff, patient, family and on multidisciplinary rounds. 2017  No overnight events  Awake and answering appropriately  HD stable, afebrile  C/o \"nightmares\"  Passed swallow eval  +strong cough, self suctioning    IMPRESSION:   · Septic shock, skin source with noted severe large tunneling sacral, gluteal wounds, and MSSA PNA, resolving  · Acute respiratory failure in setting of severe sepsis requiring MV, extubated   · Acute encephalopathy, likely metabolic, sepsis, appears improved  · Tachycardia, sinus, improved  · JOAQUIN on CKD likely due to sepsis, volume depletion, resolved  · Metabolic acidosis, improved  · Elevated ammonia, resolved  · Diarrhea, improved  · Anemia, stable  · Hypernatremia  · DM  · Morbid obesity  · Hx HTN    · Code status: FULL      RECOMMENDATIONS:   · CVS: monitor HD. Remain tachycardic low 100's. RIJ in place, PICC ordered for today. ECHO EF 55-60%. · Respiratory: wean O2nc as tolerated for O2 sat>95. Aspiration precautions, HOB 30 degrees. Duonebs. Continue zosyn for +sputum MSSA as below. IS.   · ID:  Continue zosyn x 2 more days. Sputum +MSSA  Trend temp and wbc. · Hematology/Oncology: Follow Hb, transfuse prn. Repeat CBC with plt 300-cont lovenox  · Renal/Lytes:  Monitor Cr and lytes. Cr wnl. Na remains elevated, encourage FW.    · GI/:  Continue obando-due to multiple ulcers. PPI. · Endocrine:  Nl TSH. Nl cortisol. Glycemic control. ISS prn, avoid hypoglycemia. · Neurology/Pain/Sedation:  Awake and follows simple commands, answers simple quuestions appropriately. · Skin/Wound/Musc: Wound care following. Surgery has signed off  · FEN: Speech passed for diet. Replace lytes prn per protocol. · Prophylaxis: GI Prophylaxis with protonix. DVT Prophylaxis with lovenox. · PT/OT  · OK to transfer to floor  ·   · Await Guardianship, Pt lives with son (mentally challenged- unable to make decisions). · Palliative following for care goals. ·                                                               ·   ·   · Glycemic Control. Glucose stabilizer per ICU protocol when on insulin drip. Maintain blood glucose 140-180. · Replace electrolytes per ICU electrolyte replacement protocol  · Ventilator bundle & Sedation protocol followed. Daily sedation holiday, assessment for readiness for SBT and then re-titrate if required. Chlorhexidine mouth washes. · HOB 30 degree elevation all the time. Aggressive pulmonary toileting. Incentive spirometry when appropriate. Aspiration precautions. · Sepsis bundle and protocol followed. Follow serial lactic acid, frequent BMP check, fluid resuscitation. Follow cultures. Deescalate antibiotic when appropriate. · [place] bundle followed, remove obando catheter when not critically ill. · Central Line bundle followed, remove when not needed. · Skin & Wound care. · Weekly prealbumin, nutritional consult. · PT/OT eval and treat. OOB when appropriate. · Further recommendations will be based on the patient's response to recommended treatment and results of the investigation ordered. · Quality Care: PPI, DVT prophylaxis, HOB elevated, Infection control all reviewed and addressed. · Events and notes from last 24 hours reviewed.  Care plan discussed with nursing       Allergies   Allergen Reactions    Ace Inhibitors Nausea and Vomiting    Hydralazine Nausea and Vomiting    Ibuprofen Shortness of Breath    Norvasc [Amlodipine] Swelling    Sulfa (Sulfonamide Antibiotics) Itching    Tramadol Shortness of Breath      Past Medical History: Diagnosis Date    Anemia     Anxiety 2016    Arthritis     Cancer ([facility] [address]     pre cancerous cells in the uterus    Chronic pain     knees    Chronic pain of both knees 2016    Cigarette nicotine dependence in remission 2016    Diabetes ([facility] [address]     Fatty liver 2016    Headache     Hypertension     Liver disease     fatty liver    Lymphedema 2016    Morbid obesity ([facility] [address] 2016    Psychotic disorder     anxiety and panic attacks    Thyroid disease       Past Surgical History:   Procedure Laterality Date    HX  SECTION      HX CHOLECYSTECTOMY      HX GYN  2006    hysterectomy      Social History   Substance Use Topics    Smoking status: Never Smoker    Smokeless tobacco: Never Used    Alcohol use No      Family History   Problem Relation Age of Onset    Diabetes Mother     Hypertension Mother     Lung Disease Father       Prior to Admission medications    Medication Sig Start Date End Date Taking? Authorizing Provider   cephALEXin (KEFLEX) 500 mg capsule Take 1 Cap by mouth four (4) times daily. 17   Mitch Gage MD   nebivolol (BYSTOLIC) 5 mg tablet Take 5 mg by mouth daily. Marino Lal MD   ergocalciferol (VITAMIN D2) 50,000 unit capsule Take 50,000 Units by mouth. Marino Lal MD   hydroCHLOROthiazide (HYDRODIURIL) 25 mg tablet Take 25 mg by mouth daily. Marino Lal MD   glimepiride (AMARYL) 2 mg tablet Take 2 mg by mouth every morning. Marino Lal MD   traMADol (ULTRAM) 50 mg tablet Take 50 mg by mouth every six (6) hours as needed for Pain. Marino Lal MD   loratadine (CLARITIN) 10 mg tablet 10 mg. 12   Historical Provider   losartan (COZAAR) 25 mg tablet Take 1 Tab by mouth every evening. 16   Mary Fonseca MD   metoprolol succinate (TOPROL-XL) 25 mg XL tablet Take 1 Tab by mouth daily.  16   Mary Fonseca MD     Current Facility-Administered Medications   Medication Dose Route Frequency    enoxaparin (LOVENOX) injection 40 mg  40 mg SubCUTAneous Q12H    chlorhexidine (PERIDEX) 0.12 % mouthwash 10 mL  10 mL Oral Q12H    piperacillin-tazobactam (ZOSYN) 3.375 g in  mL MBP ##EXTENDED 4 HRS INFUSION  3.375 g IntraVENous Q8H    albuterol-ipratropium (DUO-NEB) 2.5 MG-0.5 MG/3 ML  3 mL Nebulization Q6H RT    insulin lispro (HUMALOG) injection   SubCUTAneous Q6H    pantoprazole (PROTONIX) 40 mg in sodium chloride 0.9 % 10 mL injection  40 mg IntraVENous DAILY       Lines: All central lines examined by me. No signs of erythema, induration, discharge. Central Venous Catheter:  Triple Lumen right ij cvl 02/10/17 Right Neck (Active)   Central Line Being Utilized Yes 2/22/2017  8:00 AM   Criteria for Appropriate Use Limited/no vessel suitable for conventional peripheral access 2/22/2017  8:00 AM   Site Assessment Clean, dry, & intact 2/22/2017  8:00 AM   Infiltration Assessment 0 2/22/2017  8:00 AM   Affected Extremity/Extremities Color distal to insertion site pink (or appropriate for race); Pulses palpable 2/22/2017  8:00 AM   Date of Last Dressing Change 02/16/17 2/22/2017  8:00 AM   Dressing Status Clean, dry, & intact 2/22/2017  8:00 AM   Dressing Type Transparent;Disk with Chlorhexadine Gluconate (CHG) 2/22/2017  8:00 AM   Action Taken Open ports on tubing capped 2/22/2017  8:00 AM   Proximal Hub Color/Line Status White; Infusing 2/22/2017  8:00 AM   Positive Blood Return (Medial Site) No 2/22/2017  8:00 AM   Medial Hub Color/Line Status Patent; Flushed;Capped 2/22/2017  8:00 AM   Positive Blood Return (Lateral Site) No 2/22/2017  8:00 AM   Distal Hub Color/Line Status Patent; Flushed;Cap end changed 2/22/2017  8:00 AM   Positive Blood Return (Site #3) No 2/22/2017  8:00 AM   External Catheter Length (cm) 0 centimeters 2/22/2017  8:00 AM   Alcohol Cap Used Yes 2/22/2017  8:00 AM      Drain(s):  Fecal Management (Active)   Stool Consistency Liquid 2/21/2017  8:00 AM   Position of Indicator Line Visible 2017  8:00 AM   Signal Indicator Bubble Appropriate 2017  8:00 AM   Skin Assessment of the Anal Area Other (comment) 2017  8:00 AM   Drainage Bag Changed Not due to be changed 2017  8:00 AM   Balloon Deflated Yes 2017  8:00 AM   Tube Irrigated Yes 2017  8:00 AM   Output (ml) 25 ml 2017  6:00 PM           Telemetry: sinus tachycardia    Objective:   Vital Signs:    Visit Vitals    /55    Pulse (!) 114    Temp 99.2 °F (37.3 °C)    Resp 19    Ht 5' 5\" (1.651 m)    Wt (!) 194.2 kg (428 lb 2.1 oz)    SpO2 100%    Breastfeeding No    BMI 71.25 kg/m2       O2 Device: Nasal cannula   O2 Flow Rate (L/min): 1 l/min   Temp (24hrs), Av.8 °F (37.1 °C), Min:97.9 °F (36.6 °C), Max:99.8 °F (37.7 °C)       Intake/Output:   Last shift:       07 - 1900  In: 125 [I.V.:125]  Out: 150 [Urine:150]    Last 3 shifts:  190 -  0700  In: 2000 [I.V.:525]  Out: 1220 [Urine:1195; Drains:25]      Intake/Output Summary (Last 24 hours) at 17 1232  Last data filed at 17 0900   Gross per 24 hour   Intake              450 ml   Output              735 ml   Net             -285 ml       Last 3 Recorded Weights in this Encounter    17 0600 17 0800   Weight: (!) 197.4 kg (435 lb 3 oz) (!) 196.9 kg (434 lb 1.4 oz) (!) 194.2 kg (428 lb 2.1 oz)       Ventilator Settings:  Mode Rate Tidal Volume Pressure FiO2 PEEP   Pressure support (SBT started)   400 ml  7 cm H2O 24 % 5 cm H20     Peak airway pressure: 14 cm H2O    Plateau pressure:     Tidal volume:    Minute ventilation: 17.3 l/min   SPO2      ARDS network Guidelines: Lung protective strategy and Plateau pressure goals less than or equal to 30      Physical Exam:     General/Neurology: Awake and alert, follows simple commands, generalized weakness in all 4 ext  Head:   Normocephalic  Eye:   No scleral icterus  Nose/Throat:  Nc in place  Neck:   Supple, R IJ  Lung:   Adequate air entry bilateral equal, no rales, no rhonchi, no wheezing. Heart:   Tachy, no m  Abdomen:  Soft, morbidly obese  :  Juarez in place. Extremities:  4+BLE edema, ulceration BLE, significant chronic changes  Skin : tunneling sacral and gluteal area, muscle and bone exposed-see photos    Data:       Recent Results (from the past 24 hour(s))   GLUCOSE, POC    Collection Time: 02/21/17  1:26 PM   Result Value Ref Range    Glucose (POC) 108 70 - 110 mg/dL   GLUCOSE, POC    Collection Time: 02/21/17  5:52 PM   Result Value Ref Range    Glucose (POC) 84 70 - 110 mg/dL   GLUCOSE, POC    Collection Time: 02/21/17 11:37 PM   Result Value Ref Range    Glucose (POC) 89 70 - 110 mg/dL   CBC WITH AUTOMATED DIFF    Collection Time: 02/22/17  4:10 AM   Result Value Ref Range    WBC 6.7 4.6 - 13.2 K/uL    RBC 2.74 (L) 4.20 - 5.30 M/uL    HGB 7.7 (L) 12.0 - 16.0 g/dL    HCT 25.3 (L) 35.0 - 45.0 %    MCV 92.3 74.0 - 97.0 FL    MCH 28.1 24.0 - 34.0 PG    MCHC 30.4 (L) 31.0 - 37.0 g/dL    RDW 18.5 (H) 11.6 - 14.5 %    PLATELET 93 (L) 167 - 420 K/uL    MPV 9.1 (L) 9.2 - 11.8 FL    NEUTROPHILS 71 42 - 75 %    BAND NEUTROPHILS 5 0 - 5 %    LYMPHOCYTES 19 (L) 20 - 51 %    MONOCYTES 3 2 - 9 %    EOSINOPHILS 1 0 - 5 %    BASOPHILS 1 0 - 3 %    ABS. NEUTROPHILS 4.8 1.8 - 8.0 K/UL    ABS. LYMPHOCYTES 1.3 0.8 - 3.5 K/UL    ABS. MONOCYTES 0.2 0 - 1.0 K/UL    ABS. EOSINOPHILS 0.1 0.0 - 0.4 K/UL    ABS.  BASOPHILS 0.1 0.0 - 0.1 K/UL    PLATELET COMMENTS ADEQUATE PLATELETS      RBC COMMENTS ANISOCYTOSIS  1+        RBC COMMENTS MACROCYTOSIS  1+        RBC COMMENTS POLYCHROMASIA  1+        DF MANUAL     MAGNESIUM    Collection Time: 02/22/17  4:10 AM   Result Value Ref Range    Magnesium 2.0 1.8 - 2.4 mg/dL   METABOLIC PANEL, COMPREHENSIVE    Collection Time: 02/22/17  4:10 AM   Result Value Ref Range    Sodium 156 (H) 136 - 145 mmol/L    Potassium 3.8 3.5 - 5.5 mmol/L    Chloride 129 (H) 100 - 108 mmol/L    CO2 16 (L) 21 - 32 mmol/L    Anion gap 11 3.0 - 18 mmol/L    Glucose 76 74 - 99 mg/dL    BUN 19 (H) 7.0 - 18 MG/DL    Creatinine 1.00 0.6 - 1.3 MG/DL    BUN/Creatinine ratio 19 12 - 20      GFR est AA >60 >60 ml/min/1.73m2    GFR est non-AA 56 (L) >60 ml/min/1.73m2    Calcium 8.0 (L) 8.5 - 10.1 MG/DL    Bilirubin, total 0.6 0.2 - 1.0 MG/DL    ALT (SGPT) 26 13 - 56 U/L    AST (SGOT) 27 15 - 37 U/L    Alk. phosphatase 94 45 - 117 U/L    Protein, total 5.5 (L) 6.4 - 8.2 g/dL    Albumin 1.6 (L) 3.4 - 5.0 g/dL    Globulin 3.9 2.0 - 4.0 g/dL    A-G Ratio 0.4 (L) 0.8 - 1.7     GLUCOSE, POC    Collection Time: 02/22/17  5:16 AM   Result Value Ref Range    Glucose (POC) 88 70 - 110 mg/dL   CBC W/O DIFF    Collection Time: 02/22/17 10:20 AM   Result Value Ref Range    WBC 7.0 4.6 - 13.2 K/uL    RBC 2.77 (L) 4.20 - 5.30 M/uL    HGB 7.9 (L) 12.0 - 16.0 g/dL    HCT 24.8 (L) 35.0 - 45.0 %    MCV 89.5 74.0 - 97.0 FL    MCH 28.5 24.0 - 34.0 PG    MCHC 31.9 31.0 - 37.0 g/dL    RDW 18.0 (H) 11.6 - 14.5 %    PLATELET 316 984 - 499 K/uL    MPV 8.7 (L) 9.2 - 11.8 FL   GLUCOSE, POC    Collection Time: 02/22/17 11:12 AM   Result Value Ref Range    Glucose (POC) 71 70 - 110 mg/dL         Chemistry Recent Labs      02/22/17   0410  02/21/17   0415  02/20/17   1755   GLU  76  117*  94   NA  156*  152*  152*   K  3.8  4.0  3.9   CL  129*  124*  125*   CO2  16*  18*  16*   BUN  19*  19*  18   CREA  1.00  0.93  0.93   CA  8.0*  8.0*  8.4*   MG  2.0  2.1  2.1   PHOS   --   3.1   --    AGAP  11  10  11   BUCR  19  20  19   AP  94  93   --    TP  5.5*  5.4*   --    ALB  1.6*  1.6*   --    GLOB  3.9  3.8   --    AGRAT  0.4*  0.4*   --         Lactic Acid Lactic acid   Date Value Ref Range Status   02/11/2017 1.6 0.4 - 2.0 MMOL/L Final     No results for input(s): LAC in the last 72 hours.      Liver Enzymes Protein, total   Date Value Ref Range Status   02/22/2017 5.5 (L) 6.4 - 8.2 g/dL Final     Albumin   Date Value Ref Range Status   02/22/2017 1.6 (L) 3.4 - 5.0 g/dL Final     Globulin   Date Value Ref Range Status   02/22/2017 3.9 2.0 - 4.0 g/dL Final     A-G Ratio   Date Value Ref Range Status   02/22/2017 0.4 (L) 0.8 - 1.7   Final     AST (SGOT)   Date Value Ref Range Status   02/22/2017 27 15 - 37 U/L Final     Alk. phosphatase   Date Value Ref Range Status   02/22/2017 94 45 - 117 U/L Final     Recent Labs      02/22/17   0410  02/21/17   0415   TP  5.5*  5.4*   ALB  1.6*  1.6*   GLOB  3.9  3.8   AGRAT  0.4*  0.4*   SGOT  27  31   AP  94  93        CBC w/Diff Recent Labs      02/22/17   1020  02/22/17   0410  02/21/17   0415  02/20/17   1755   WBC  7.0  6.7  10.6  11.1   RBC  2.77*  2.74*  2.54*  2.84*   HGB  7.9*  7.7*  7.2*  8.0*   HCT  24.8*  25.3*  22.1*  24.6*   PLT  300  93*  220  292   GRANS   --   71  66  66   LYMPH   --   19*  16*  17*   EOS   --   1  8*  8*        Cardiac Enzymes No results found for: CPK, CKMMB, CKMB, RCK3, CKMBT, CKNDX, CKND1, ISAIAS, TROPT, TROIQ, ERNST, TROPT, TNIPOC, BNP, BNPP     BNP No results found for: BNP, BNPP, XBNPT     Coagulation No results for input(s): PTP, INR, APTT in the last 72 hours.     No lab exists for component: INREXT, Skip Mariana      Thyroid  Lab Results   Component Value Date/Time    TSH 2.41 02/10/2017 05:40 PM       No results found for: T4     Lipid Panel Lab Results   Component Value Date/Time    Cholesterol, total 167 05/05/2010 08:45 AM    HDL Cholesterol 65 05/05/2010 08:45 AM    LDL, calculated 88.4 05/05/2010 08:45 AM    VLDL, calculated 13.6 05/05/2010 08:45 AM    Triglyceride 68 05/05/2010 08:45 AM    CHOL/HDL Ratio 2.6 05/05/2010 08:45 AM        ABG Recent Labs      02/21/17   1026   PHI  7.353   PCO2I  26.9*   PO2I  110*   HCO3I  14.8*   FIO2I  0.21        Urinalysis Lab Results   Component Value Date/Time    Color DARK YELLOW 02/10/2017 06:15 PM    Appearance CLOUDY 02/10/2017 06:15 PM    Specific gravity 1.018 02/10/2017 06:15 PM    pH (UA) 5.0 02/10/2017 06:15 PM    Protein NEGATIVE  02/10/2017 06:15 PM    Glucose NEGATIVE  02/10/2017 06:15 PM    Ketone TRACE 02/10/2017 06:15 PM    Bilirubin SMALL 02/10/2017 06:15 PM    Urobilinogen 1.0 02/10/2017 06:15 PM    Nitrites NEGATIVE  02/10/2017 06:15 PM    Leukocyte Esterase TRACE 02/10/2017 06:15 PM    Epithelial cells 1+ 02/10/2017 06:15 PM    Bacteria 1+ 02/10/2017 06:15 PM    WBC 4 to 10 02/10/2017 06:15 PM    RBC NONE 02/10/2017 06:15 PM        Micro  No results for input(s): SDES, CULT in the last 72 hours. No results for input(s): CULT in the last 72 hours. EKG  No results found for this or any previous visit. PFT  No flowsheet data found. Other  ASA reactivity:   Pre-albumin:   Ionized Calcium:   NH4:   T3, FT4:  Cortisol:  Urine Osm:  Urine Lytes:   HbA1c:      Ultrasound       LE Doppler       ECHO  2/10/17  Left ventricle: Systolic function was normal. Ejection fraction was  estimated in the range of 55 % to 60 %. No obvious wall motion  abnormalities identified in the views obtained. There was mild concentric  hypertrophy. Mitral valve: There was no evidence for stenosis. There was no significant  regurgitation. Aortic valve: The valve was probably trileaflet. Leaflets exhibited mild  calcification and sclerosis. Not well visualized. Valve mean gradient was  8 mmHg. Tricuspid valve: There was no evidence for tricuspid stenosis. There was  trivial regurgitation. CT (Most Recent)  No results found for this or any previous visit. XR (Most Recent). CXR  reviewed by me and compared with previous CXR   Results from Hospital Encounter encounter on 02/10/17   XR CHEST PORT   Narrative Chest, single view    Indication: Intubated patient, follow-up examination    Comparison: Several prior exams, most recently 2/16/2017    Findings:  Portable upright AP view of the chest was obtained. There is an  endotracheal tube present proximal thigh 4.5 cm above the kimberly. A nasogastric  tube is present below the diaphragm, the side-port below the gastroesophageal  junction. A right internal jugular approach central venous catheter projects  over the superior cavoatrial junction. Degree of pulmonary inflation is similar to prior, mild bronchovascular crowding  noted, without substantial change. No pneumothorax, focal pneumonic opacity, or  pleural effusion. Cardiac size and mediastinal contours are stable. No acute  osseous abnormality. Bilateral shoulder girdle osteoarthritis present. Impression Impression:    1. Endotracheal tube, nasogastric tube, and right internal jugular approach  central venous catheter in position as above. 2. Mild pulmonary hypoinflation without superimposed acute radiographic  abnormality. Best practice : All below core measures reviewed and addressed:   [x] Antibiotic choice. [x] Severe Sepsis bundles follwed; SIRS screen met? Yes   [x] Fluids. [x] Lactic acid ordered- initial and repeat Q6hrs if elevated till normalized. [x] Cultures drawn. [x] Antibiotic administered within 1hr-ICU and 3hrs ED. [x] Large bore IV- 2 sites        R IJ  [x] Pressors aim MAP >65mmHg. [x] Steroids. [x] Glycemic control. [x] Infection control. [x] Mech. Ventilated patients- aim to keep peak plateau pressure 78-98EF H2O. [x] HOB at >= 30 degree. [x] Stress ulcer prophylaxis. [x] DVT prophylaxis. [x] Central Line Bundle Followed. [x] Juarez Bundle Followed. [x] Ventilator Bundle Followed. (Daily sedation holiday to assess readiness for weaning from ventilator & SBT trial starting at 6.00 am, HOB 30-degree elevation, Chlorhexidine mouth washes & Routine oral care every 4 hours, [place] tube to suction at 20-30 cm H2O, Maintain [place] tube with 5-10ml air every 4 hours, DVT prophylaxis, GI prophylaxis etc.).     The patient is: [x] acutely ill Risk of deterioration: [x] moderate    [] critically ill  [] high        Christian Caro  2/22/2017

## 2017-02-22 NOTE — ROUTINE PROCESS
Bedside and Verbal shift change report given to May rn (oncoming nurse) by Marilynn Cornejo RN   (offgoing nurse). Report included the following information SBAR, Kardex, ED Summary, Procedure Summary, Intake/Output, MAR, Accordion, Recent Results, Med Rec Status and Cardiac Rhythm S tach .

## 2017-02-23 NOTE — PROGRESS NOTES
Problem: Dysphagia (Adult)  Goal: *Acute Goals and Plan of Care (Insert Text)  Dysphagia Present: moderate    Recommendations:  Diet: puree/thin liquids   Meds: one at a time  Aspiration Precautions  Other: 1:1 feeding assistance, alternate solids/liquids    Goals: Patient will:  1. Tolerate PO trials with 0 s/s overt distress in 4/5 trials  2. Utilize compensatory swallow strategies/maneuvers (decrease bite/sip, size/rate, alt. liq/sol) with min cues in 4/5 trials  3. Perform oral-motor/laryngeal exercises to increase oropharyngeal swallow function with min cues           Outcome: Progressing Towards Goal  SPEECH LANGUAGE PATHOLOGY DYSPHAGIA TREATMENT     Patient: Jason Sotomayor (44 y.o. female)  Date: 2/23/2017  Diagnosis: Septic shock (HCC)  ulcer decubitus <principal problem not specified>  Procedure(s) (LRB):  debridement large sacral decubitus ulcer  (N/A)    Precautions:  Aspiration, Fall, Contact (ventilator )      ASSESSMENT:  Moderate oropharyngeal dysphagia     Dysphagia f/u completed with pt A&Ox3. Continues to demo low vocal quality though functional with cues. Diet tolerance f/u for puree and thin liquids +straw tolerated without event. Prolonged and incomplete mastication of mech soft fruit cup with moderate oral spread, required liquid wash to eventually clear. Inefficient intake of mech soft, recommend continue puree and thin liquid diet with 1:1 feeding assistance. ST to f/u for diet advancement as appropriate. Progression toward goals:  [ ]         Improving appropriately and progressing toward goals  [X]         Improving slowly and progressing toward goals  [ ]         Not making progress toward goals and plan of care will be adjusted       PLAN: puree and thin liquids, 1:1 feeding assistance  Recommendations and Planned Interventions:  ST to f/u for diet advancement, education as appropriate  Patient continues to benefit from skilled intervention to address the above impairments.  Continue treatment per established plan of care. Discharge Recommendations:  Skilled Nursing Facility       SUBJECTIVE:   Patient stated I have bad teeth. OBJECTIVE:   Cognitive and Communication Status:  Neurologic State: Alert  Orientation Level: Oriented to person, Oriented to place, Oriented to situation  Cognition: Follows commands  Perception: Appears intact  Perseveration: No perseveration noted  Safety/Judgement: Fall prevention  Dysphagia Treatment:  Oral Assessment:  Oral Assessment  Labial: No impairment  Dentition: Full  Oral Hygiene: good  Lingual: No impairment  Velum: No impairment  Mandible: No impairment  P.O. Trials:              Patient Position: [facility] 45              Vocal quality prior to P.O.: Low volume              Consistency Presented:  Thin liquid, Puree, Mechanical soft              How Presented: SLP-fed/presented, Spoon, Straw              How Much:  (x3oz thin, x1oz puree, x1 mech soft)              Bolus Acceptance: No impairment              Bolus Formation/Control: Impaired              Type of Impairment: Delayed, Mastication, Incomplete              Propulsion: Delayed (# of seconds)              Oral Residue: 10-50% of bolus, Lingual (mech soft)              Initiation of Swallow: No impairment              Laryngeal Elevation: Functional              Aspiration Signs/Symptoms: None              Pharyngeal Phase Characteristics: No impairment, issues, or problems               Effective Modifications: Small sips and bites, Alternate liquids/solids              Cues for Modifications: Minimal                                Oral Phase Severity: Moderate              Pharyngeal Phase Severity : Mild PAIN:  Start of Tx: 0  End of Tx: 0      After treatment:   [ ]              Patient left in no apparent distress sitting up in chair  [X]              Patient left in no apparent distress in bed  [X]              Call bell left within reach  [X]              Nursing notified  [ ]              Family present  [ ]              Caregiver present  [ ]              Bed alarm activated         COMMUNICATION/EDUCATION:   [X]        Aspiration precautions; swallow safety; compensatory techniques  [ ]        Patient unable to participate in education; education ongoing with staff  [ ]         Posted safety precautions in patient's room.   [ ]         Oral-motor/laryngeal strengthening exercises        Kassandra Rueda SLP  Time Calculation: 10 mins

## 2017-02-23 NOTE — ROUTINE PROCESS
1930 Bedside shift change report given to heriberto mckeon (oncoming nurse) by Nika Mora (offgoing nurse). Report included the following information SBAR, Kardex and Recent Results. Side rails up x 3, call light within reach. Hob elevated 30 degrees/ pt speaks softly, slow to respond. Flat affect. 2000 assessment completed oral and obando care provided. 2139 blood sugar 66, repeated 68. Given  4oz apple juice. 2150 blood sugar 74 given cranberry juice only drank 3 oz. 2200 repositioned in bed. Pt flat affect drinking  Juice. 2330 blood sugar 73. Drank 1 oz cranberry juice. 0000 assessment completed. Oral and obando care provided. 0200 wound care provide. Aid 2 nurses. 0400 assessment completed. Oral and obando care provided. 0600 resting in bed.   0730 Bedside shift change report given to [facility] rn (oncoming nurse) by heriberto clemons rn (offgoing nurse). Report included the following information SBAR, Kardex and Recent Results. Side rails up x 3, call light within reach. Hob elevated 30 degrees. Pt resting with eye closed.

## 2017-02-23 NOTE — PROGRESS NOTES
[facility]  Pulmonary, Critical Care, and Sleep Medicine    Name: Giorgi Matias MRN: [id number]   : 1955 Hospital: [address] [place] [address]   Date: 2017        [facility] Progress Notes                                              [x] I have reviewed the flowsheet and previous days notes. Events, vitals, medications and notes from last 24 hours reviewed. Care plan discussed with staff, patient, family and on multidisciplinary rounds. 2017   Brief episode 's overnight, resolved spontaneously, no further runs vtach  Glucose 60-70 overnight  HD stable, afebrile  Awake and alert, on RA  Denies SOB, CP/palpitations, pain  Na unchanged  Tolerating PO    IMPRESSION:   · Septic shock, skin source with noted severe large tunneling sacral, gluteal wounds, and MSSA PNA, resolving  · Acute respiratory failure in setting of severe sepsis requiring MV, extubated   · Acute encephalopathy, likely metabolic, sepsis, improving  · Tachycardia, sinus, improved  · JOAQUIN on CKD likely due to sepsis, volume depletion, resolved  · Metabolic acidosis, improved  · Elevated ammonia, resolved  · Diarrhea, improved  · Anemia, stable  · Hypernatremia, on d5  · DM  · Morbid obesity  · Hx HTN    · Code status: FULL      RECOMMENDATIONS:   · CVS: monitor HD. Remains tachycardic low 100's. Resume BB if BP permits. PICC placed. ECHO EF 55-60%. · Respiratory: Currently on RA. Aspiration precautions, HOB 30 degrees. Duonebs. Continue zosyn for +sputum MSSA as below. IS. Mobilize/chair position as tolerated  · ID:  Continue zosyn x 1 more day, d 13/14 today. Sputum +MSSA  Trend temp and wbc. · Hematology/Oncology: Follow Hb, transfuse prn. · Renal/Lytes:  Monitor Cr and lytes. Cr wnl. Na remains elevated, encourage FW, cont D5 for now. · GI/:  Continue obando-due to multiple ulcers. PPI. · Endocrine:  Nl TSH. Nl cortisol. Glycemic control. ISS prn, avoid hypoglycemia.    · Neurology/Pain/Sedation: Awake and follows simple commands, answers simple quuestions appropriately. Consider psych consult tomorrow (Fri). Pt likely has capacity. · Skin/Wound/Musc: Wound care following. Surgery has signed off  · FEN: Speech passed for diet-puree. Replace lytes prn per protocol. · Prophylaxis: GI Prophylaxis with protonix. DVT Prophylaxis with lovenox. · PT/OT  · OK to transfer to tele  · Palliative care following. · PCCM will sign off, please call for any questions or concerns                                                          ·   ·   · Glycemic Control. Glucose stabilizer per ICU protocol when on insulin drip. Maintain blood glucose 140-180. · Replace electrolytes per ICU electrolyte replacement protocol  · Ventilator bundle & Sedation protocol followed. Daily sedation holiday, assessment for readiness for SBT and then re-titrate if required. Chlorhexidine mouth washes. · HOB 30 degree elevation all the time. Aggressive pulmonary toileting. Incentive spirometry when appropriate. Aspiration precautions. · Sepsis bundle and protocol followed. Follow serial lactic acid, frequent BMP check, fluid resuscitation. Follow cultures. Deescalate antibiotic when appropriate. · Obando bundle followed, remove obando catheter when not critically ill. · Central Line bundle followed, remove when not needed. · Skin & Wound care. · Weekly prealbumin, nutritional consult. · PT/OT eval and treat. OOB when appropriate. · Further recommendations will be based on the patient's response to recommended treatment and results of the investigation ordered. · Quality Care: PPI, DVT prophylaxis, HOB elevated, Infection control all reviewed and addressed. · Events and notes from last 24 hours reviewed.  Care plan discussed with nursing       Allergies   Allergen Reactions    Ace Inhibitors Nausea and Vomiting    Hydralazine Nausea and Vomiting    Ibuprofen Shortness of Breath    Norvasc [Amlodipine] Swelling    Sulfa (Sulfonamide Antibiotics) Itching    Tramadol Shortness of Breath      Past Medical History:   Diagnosis Date    Anemia     Anxiety 2016    Arthritis     Cancer ([address]     pre cancerous cells in the uterus    Chronic pain     knees    Chronic pain of both knees 2016    Cigarette nicotine dependence in remission 2016    Diabetes ([address]     Fatty liver 2016    Headache     Hypertension     Liver disease     fatty liver    Lymphedema 2016    Morbid obesity ([address] 2016    Psychotic disorder     anxiety and panic attacks    Thyroid disease       Past Surgical History:   Procedure Laterality Date    HX  SECTION      HX CHOLECYSTECTOMY      HX GYN  2006    hysterectomy      Social History   Substance Use Topics    Smoking status: Never Smoker    Smokeless tobacco: Never Used    Alcohol use No      Family History   Problem Relation Age of Onset    Diabetes Mother     Hypertension Mother     Lung Disease Father       Prior to Admission medications    Medication Sig Start Date End Date Taking? Authorizing Provider   cephALEXin (KEFLEX) 500 mg capsule Take 1 Cap by mouth four (4) times daily. 17   Thao Harley MD   nebivolol (BYSTOLIC) 5 mg tablet Take 5 mg by mouth daily. Marino Lal MD   ergocalciferol (VITAMIN D2) 50,000 unit capsule Take 50,000 Units by mouth. Marino Lal MD   hydroCHLOROthiazide (HYDRODIURIL) 25 mg tablet Take 25 mg by mouth daily. Marino Lal MD   glimepiride (AMARYL) 2 mg tablet Take 2 mg by mouth every morning. Marino Lal MD   traMADol (ULTRAM) 50 mg tablet Take 50 mg by mouth every six (6) hours as needed for Pain. Marino Lal MD   loratadine (CLARITIN) 10 mg tablet 10 mg. 12   Historical Provider   losartan (COZAAR) 25 mg tablet Take 1 Tab by mouth every evening. 16   Robert Vaca MD   metoprolol succinate (TOPROL-XL) 25 mg XL tablet Take 1 Tab by mouth daily.  16   Robert Vaca MD Current Facility-Administered Medications   Medication Dose Route Frequency    potassium chloride 10 mEq in 100 ml IVPB  10 mEq IntraVENous Q1H    therapeutic multivitamin (THERAGRAN) tablet 1 Tab  1 Tab Oral DAILY    pantoprazole (PROTONIX) tablet 40 mg  40 mg Oral DAILY    insulin lispro (HUMALOG) injection   SubCUTAneous AC&HS    dextrose 5% infusion  75 mL/hr IntraVENous CONTINUOUS    sodium chloride (NS) flush 10 mL  10 mL InterCATHeter Q24H    sodium chloride (NS) flush 10-40 mL  10-40 mL InterCATHeter Q8H    enoxaparin (LOVENOX) injection 40 mg  40 mg SubCUTAneous Q12H    chlorhexidine (PERIDEX) 0.12 % mouthwash 10 mL  10 mL Oral Q12H    piperacillin-tazobactam (ZOSYN) 3.375 g in  mL MBP ##EXTENDED 4 HRS INFUSION  3.375 g IntraVENous Q8H    albuterol-ipratropium (DUO-NEB) 2.5 MG-0.5 MG/3 ML  3 mL Nebulization Q6H RT       Lines: All central lines examined by me. No signs of erythema, induration, discharge. Central Venous Catheter:  Triple Lumen right ij cvl 02/10/17 Right Neck (Active)   Central Line Being Utilized Yes 2/22/2017  8:00 AM   Criteria for Appropriate Use Limited/no vessel suitable for conventional peripheral access 2/22/2017  8:00 AM   Site Assessment Clean, dry, & intact 2/22/2017  8:00 AM   Infiltration Assessment 0 2/22/2017  8:00 AM   Affected Extremity/Extremities Color distal to insertion site pink (or appropriate for race); Pulses palpable 2/22/2017  8:00 AM   Date of Last Dressing Change 02/16/17 2/22/2017  8:00 AM   Dressing Status Clean, dry, & intact 2/22/2017  8:00 AM   Dressing Type Transparent;Disk with Chlorhexadine Gluconate (CHG) 2/22/2017  8:00 AM   Action Taken Open ports on tubing capped 2/22/2017  8:00 AM   Proximal Hub Color/Line Status White; Infusing 2/22/2017  8:00 AM   Positive Blood Return (Medial Site) No 2/22/2017  8:00 AM   Medial Hub Color/Line Status Patent; Flushed;Capped 2/22/2017  8:00 AM   Positive Blood Return (Lateral Site) No 2017  8:00 AM   Distal Hub Color/Line Status Patent; Flushed;Cap end changed 2017  8:00 AM   Positive Blood Return (Site #3) No 2017  8:00 AM   External Catheter Length (cm) 0 centimeters 2017  8:00 AM   Alcohol Cap Used Yes 2017  8:00 AM      Drain(s):  Fecal Management (Active)   Stool Consistency Liquid 2017  8:00 AM   Position of Indicator Line Visible 2017  8:00 AM   Signal Indicator Bubble Appropriate 2017  8:00 AM   Skin Assessment of the Anal Area Other (comment) 2017  8:00 AM   Drainage Bag Changed Not due to be changed 2017  8:00 AM   Balloon Deflated Yes 2017  8:00 AM   Tube Irrigated Yes 2017  8:00 AM   Output (ml) 25 ml 2017  6:00 PM           Telemetry: sinus tachycardia    Objective:   Vital Signs:    Visit Vitals    /55    Pulse (!) 110    Temp 98 °F (36.7 °C)    Resp 24    Ht 5' 5\" (1.651 m)    Wt (!) 168.9 kg (372 lb 5.7 oz)    SpO2 100%    Breastfeeding No    BMI 61.96 kg/m2       O2 Device: Room air   O2 Flow Rate (L/min): 1 l/min   Temp (24hrs), Av.1 °F (37.3 °C), Min:98 °F (36.7 °C), Max:99.9 °F (37.7 °C)       Intake/Output:   Last shift:      701 - 1900  In: 525 [I.V.:525]  Out: 85 [Urine:85]    Last 3 shifts: 1901 -  07  In: 2100 [P.O.:440; I.V.:1660]  Out: 1625 [Urine:1375; Drains:250]      Intake/Output Summary (Last 24 hours) at 17 1037  Last data filed at 17 1013   Gross per 24 hour   Intake             2275 ml   Output             1175 ml   Net             1100 ml       Last 3 Recorded Weights in this Encounter    17 0800 17 0708   Weight: (!) 196.9 kg (434 lb 1.4 oz) (!) 194.2 kg (428 lb 2.1 oz) (!) 168.9 kg (372 lb 5.7 oz)       Physical Exam:     General/Neurology: Awake and alert, follows simple commands, generalized weakness in all 4 ext  Head:   Normocephalic  Eye:   No scleral icterus  Neck:   Supple, R IJ removed  Lung:   Adequate air entry bilateral equal, no rales, no rhonchi, no wheezing. Heart:   Tachy, no m  Abdomen:  Soft, morbidly obese  :  Juarez in place.   Extremities:  4+BLE edema, ulceration BLE, significant chronic changes  Skin : tunneling sacral and gluteal area, muscle and bone exposed-see photos    Data:       Recent Results (from the past 24 hour(s))   GLUCOSE, POC    Collection Time: 02/22/17 11:12 AM   Result Value Ref Range    Glucose (POC) 71 70 - 110 mg/dL   GLUCOSE, POC    Collection Time: 02/22/17  4:49 PM   Result Value Ref Range    Glucose (POC) 75 70 - 132 mg/dL   METABOLIC PANEL, BASIC    Collection Time: 02/22/17  5:00 PM   Result Value Ref Range    Sodium 156 (H) 136 - 145 mmol/L    Potassium 3.7 3.5 - 5.5 mmol/L    Chloride 128 (H) 100 - 108 mmol/L    CO2 20 (L) 21 - 32 mmol/L    Anion gap 8 3.0 - 18 mmol/L    Glucose 72 (L) 74 - 99 mg/dL    BUN 20 (H) 7.0 - 18 MG/DL    Creatinine 1.08 0.6 - 1.3 MG/DL    BUN/Creatinine ratio 19 12 - 20      GFR est AA >60 >60 ml/min/1.73m2    GFR est non-AA 52 (L) >60 ml/min/1.73m2    Calcium 8.1 (L) 8.5 - 10.1 MG/DL   PHOSPHORUS    Collection Time: 02/22/17  5:00 PM   Result Value Ref Range    Phosphorus 3.8 2.5 - 4.9 MG/DL   MAGNESIUM    Collection Time: 02/22/17  5:00 PM   Result Value Ref Range    Magnesium 2.1 1.8 - 2.4 mg/dL   GLUCOSE, POC    Collection Time: 02/22/17  9:30 PM   Result Value Ref Range    Glucose (POC) 66 (L) 70 - 110 mg/dL   GLUCOSE, POC    Collection Time: 02/22/17  9:31 PM   Result Value Ref Range    Glucose (POC) 68 (L) 70 - 110 mg/dL   GLUCOSE, POC    Collection Time: 02/22/17  9:52 PM   Result Value Ref Range    Glucose (POC) 73 70 - 110 mg/dL   GLUCOSE, POC    Collection Time: 02/22/17 11:33 PM   Result Value Ref Range    Glucose (POC) 92 70 - 110 mg/dL   MAGNESIUM    Collection Time: 02/23/17  2:55 AM   Result Value Ref Range    Magnesium 2.2 1.8 - 2.4 mg/dL   CBC WITH AUTOMATED DIFF    Collection Time: 02/23/17  2:55 AM   Result Value Ref Range WBC 6.4 4.6 - 13.2 K/uL    RBC 2.60 (L) 4.20 - 5.30 M/uL    HGB 7.5 (L) 12.0 - 16.0 g/dL    HCT 23.2 (L) 35.0 - 45.0 %    MCV 89.2 74.0 - 97.0 FL    MCH 28.8 24.0 - 34.0 PG    MCHC 32.3 31.0 - 37.0 g/dL    RDW 19.0 (H) 11.6 - 14.5 %    PLATELET 903 517 - 921 K/uL    MPV 8.5 (L) 9.2 - 11.8 FL    NEUTROPHILS 60 40 - 73 %    LYMPHOCYTES 17 (L) 21 - 52 %    MONOCYTES 10 3 - 10 %    EOSINOPHILS 12 (H) 0 - 5 %    BASOPHILS 1 0 - 2 %    ABS. NEUTROPHILS 3.8 1.8 - 8.0 K/UL    ABS. LYMPHOCYTES 1.1 0.9 - 3.6 K/UL    ABS. MONOCYTES 0.6 0.05 - 1.2 K/UL    ABS. EOSINOPHILS 0.8 (H) 0.0 - 0.4 K/UL    ABS. BASOPHILS 0.1 (H) 0.0 - 0.06 K/UL    RBC COMMENTS ANISOCYTOSIS  1+        RBC COMMENTS MACROCYTOSIS  1+        RBC COMMENTS POLYCHROMASIA  1+        DF AUTOMATED     METABOLIC PANEL, COMPREHENSIVE    Collection Time: 02/23/17  2:55 AM   Result Value Ref Range    Sodium 156 (H) 136 - 145 mmol/L    Potassium 3.6 3.5 - 5.5 mmol/L    Chloride 128 (H) 100 - 108 mmol/L    CO2 20 (L) 21 - 32 mmol/L    Anion gap 8 3.0 - 18 mmol/L    Glucose 108 (H) 74 - 99 mg/dL    BUN 20 (H) 7.0 - 18 MG/DL    Creatinine 1.12 0.6 - 1.3 MG/DL    BUN/Creatinine ratio 18 12 - 20      GFR est AA 60 (L) >60 ml/min/1.73m2    GFR est non-AA 49 (L) >60 ml/min/1.73m2    Calcium 7.9 (L) 8.5 - 10.1 MG/DL    Bilirubin, total 0.6 0.2 - 1.0 MG/DL    ALT (SGPT) 24 13 - 56 U/L    AST (SGOT) 22 15 - 37 U/L    Alk.  phosphatase 92 45 - 117 U/L    Protein, total 5.6 (L) 6.4 - 8.2 g/dL    Albumin 1.6 (L) 3.4 - 5.0 g/dL    Globulin 4.0 2.0 - 4.0 g/dL    A-G Ratio 0.4 (L) 0.8 - 1.7     PHOSPHORUS    Collection Time: 02/23/17  2:55 AM   Result Value Ref Range    Phosphorus 3.5 2.5 - 4.9 MG/DL   EKG, 12 LEAD, SUBSEQUENT    Collection Time: 02/23/17  3:14 AM   Result Value Ref Range    Ventricular Rate 110 BPM    Atrial Rate 110 BPM    P-R Interval 132 ms    QRS Duration 88 ms    Q-T Interval 338 ms    QTC Calculation (Bezet) 457 ms    Calculated P Axis 77 degrees    Calculated R Axis 56 degrees    Calculated T Axis -63 degrees    Diagnosis       Sinus tachycardia  Nonspecific ST and T wave abnormality  Abnormal ECG  When compared with ECG of 15-FEB-2017 03:49,  No significant change was found  Confirmed by Justin Dubois ([id number]) on 2/23/2017 10:33:54 AM     GLUCOSE, POC    Collection Time: 02/23/17  7:17 AM   Result Value Ref Range    Glucose (POC) 109 70 - 110 mg/dL         Chemistry Recent Labs      02/23/17   0255  02/22/17   1700  02/22/17   0410  02/21/17   0415   GLU  108*  72*  76  117*   NA  156*  156*  156*  152*   K  3.6  3.7  3.8  4.0   CL  128*  128*  129*  124*   CO2  20*  20*  16*  18*   BUN  20*  20*  19*  19*   CREA  1.12  1.08  1.00  0.93   CA  7.9*  8.1*  8.0*  8.0*   MG  2.2  2.1  2.0  2.1   PHOS  3.5  3.8   --   3.1   AGAP  8  8  11  10   BUCR  18  19  19  20   AP  92   --   94  93   TP  5.6*   --   5.5*  5.4*   ALB  1.6*   --   1.6*  1.6*   GLOB  4.0   --   3.9  3.8   AGRAT  0.4*   --   0.4*  0.4*        Lactic Acid Lactic acid   Date Value Ref Range Status   02/11/2017 1.6 0.4 - 2.0 MMOL/L Final     No results for input(s): LAC in the last 72 hours. Liver Enzymes Protein, total   Date Value Ref Range Status   02/23/2017 5.6 (L) 6.4 - 8.2 g/dL Final     Albumin   Date Value Ref Range Status   02/23/2017 1.6 (L) 3.4 - 5.0 g/dL Final     Globulin   Date Value Ref Range Status   02/23/2017 4.0 2.0 - 4.0 g/dL Final     A-G Ratio   Date Value Ref Range Status   02/23/2017 0.4 (L) 0.8 - 1.7   Final     AST (SGOT)   Date Value Ref Range Status   02/23/2017 22 15 - 37 U/L Final     Alk.  phosphatase   Date Value Ref Range Status   02/23/2017 92 45 - 117 U/L Final     Recent Labs      02/23/17   0255  02/22/17   0410  02/21/17   0415   TP  5.6*  5.5*  5.4*   ALB  1.6*  1.6*  1.6*   GLOB  4.0  3.9  3.8   AGRAT  0.4*  0.4*  0.4*   SGOT  22  27  31   AP  92  94  93        CBC w/Diff Recent Labs      02/23/17   0255  02/22/17   1020  02/22/17   0410  02/21/17   0415   WBC  6.4 7.0  6.7  10.6   RBC  2.60*  2.77*  2.74*  2.54*   HGB  7.5*  7.9*  7.7*  7.2*   HCT  23.2*  24.8*  25.3*  22.1*   PLT  299  300  93*  220   GRANS  60   --   71  66   LYMPH  17*   --   19*  16*   EOS  12*   --   1  8*        Cardiac Enzymes No results found for: CPK, CKMMB, CKMB, RCK3, CKMBT, CKNDX, CKND1, ISAIAS, TROPT, TROIQ, ERNST, TROPT, TNIPOC, BNP, BNPP     BNP No results found for: BNP, BNPP, XBNPT     Coagulation No results for input(s): PTP, INR, APTT in the last 72 hours. No lab exists for component: INREXT, April Gordon      Thyroid  Lab Results   Component Value Date/Time    TSH 2.41 02/10/2017 05:40 PM       No results found for: T4     Lipid Panel Lab Results   Component Value Date/Time    Cholesterol, total 167 05/05/2010 08:45 AM    HDL Cholesterol 65 05/05/2010 08:45 AM    LDL, calculated 88.4 05/05/2010 08:45 AM    VLDL, calculated 13.6 05/05/2010 08:45 AM    Triglyceride 68 05/05/2010 08:45 AM    CHOL/HDL Ratio 2.6 05/05/2010 08:45 AM        ABG Recent Labs      02/21/17   1026   PHI  7.353   PCO2I  26.9*   PO2I  110*   HCO3I  14.8*   FIO2I  0.21        Urinalysis Lab Results   Component Value Date/Time    Color DARK YELLOW 02/10/2017 06:15 PM    Appearance CLOUDY 02/10/2017 06:15 PM    Specific gravity 1.018 02/10/2017 06:15 PM    pH (UA) 5.0 02/10/2017 06:15 PM    Protein NEGATIVE  02/10/2017 06:15 PM    Glucose NEGATIVE  02/10/2017 06:15 PM    Ketone TRACE 02/10/2017 06:15 PM    Bilirubin SMALL 02/10/2017 06:15 PM    Urobilinogen 1.0 02/10/2017 06:15 PM    Nitrites NEGATIVE  02/10/2017 06:15 PM    Leukocyte Esterase TRACE 02/10/2017 06:15 PM    Epithelial cells 1+ 02/10/2017 06:15 PM    Bacteria 1+ 02/10/2017 06:15 PM    WBC 4 to 10 02/10/2017 06:15 PM    RBC NONE 02/10/2017 06:15 PM        Micro  No results for input(s): SDES, CULT in the last 72 hours. No results for input(s): CULT in the last 72 hours. EKG  No results found for this or any previous visit. PFT  No flowsheet data found. Other  ASA reactivity:   Pre-albumin:   Ionized Calcium:   NH4:   T3, FT4:  Cortisol:  Urine Osm:  Urine Lytes:   HbA1c:      Ultrasound       LE Doppler       ECHO  2/10/17  Left ventricle: Systolic function was normal. Ejection fraction was  estimated in the range of 55 % to 60 %. No obvious wall motion  abnormalities identified in the views obtained. There was mild concentric  hypertrophy. Mitral valve: There was no evidence for stenosis. There was no significant  regurgitation. Aortic valve: The valve was probably trileaflet. Leaflets exhibited mild  calcification and sclerosis. Not well visualized. Valve mean gradient was  8 mmHg. Tricuspid valve: There was no evidence for tricuspid stenosis. There was  trivial regurgitation. CT (Most Recent)  No results found for this or any previous visit. XR (Most Recent). CXR  reviewed by me and compared with previous CXR   Results from Hospital Encounter encounter on 02/10/17   XR CHEST PORT   Narrative Chest, single view    Indication: Intubated patient, follow-up examination    Comparison: Several prior exams, most recently 2/16/2017    Findings:  Portable upright AP view of the chest was obtained. There is an  endotracheal tube present proximal thigh 4.5 cm above the kimberly. A nasogastric  tube is present below the diaphragm, the side-port below the gastroesophageal  junction. A right internal jugular approach central venous catheter projects  over the superior cavoatrial junction. Degree of pulmonary inflation is similar to prior, mild bronchovascular crowding  noted, without substantial change. No pneumothorax, focal pneumonic opacity, or  pleural effusion. Cardiac size and mediastinal contours are stable. No acute  osseous abnormality. Bilateral shoulder girdle osteoarthritis present. Impression Impression:    1.  Endotracheal tube, nasogastric tube, and right internal jugular approach  central venous catheter in position as above.  2. Mild pulmonary hypoinflation without superimposed acute radiographic  abnormality. Best practice : All below core measures reviewed and addressed:   [x] Antibiotic choice. [x] Severe Sepsis bundles follwed; SIRS screen met? Yes   [x] Fluids. [] Lactic acid ordered- initial and repeat Q6hrs if elevated till normalized. [] Cultures drawn.  [] Antibiotic administered within 1hr-ICU and 3hrs ED. [x] Large bore IV- 2 sites      PICC  [x] Pressors aim MAP >65mmHg.  [] Steroids. [x] Glycemic control. [x] Infection control. [] Mech. Ventilated patients- aim to keep peak plateau pressure 68-21DG H2O. [x] HOB at >= 30 degree. [x] Stress ulcer prophylaxis. [x] DVT prophylaxis. [] Central Line Bundle Followed. [x] Juarez Bundle Followed. [] Ventilator Bundle Followed. (Daily sedation holiday to assess readiness for weaning from ventilator & SBT trial starting at 6.00 am, HOB 30-degree elevation, Chlorhexidine mouth washes & Routine oral care every 4 hours, Preston tube to suction at 20-30 cm H2O, Maintain Brenda tube with 5-10ml air every 4 hours, DVT prophylaxis, GI prophylaxis etc.).      Silvino Mensah, [id number]  2/23/2017

## 2017-02-23 NOTE — PROGRESS NOTES
Problem: Mobility Impaired (Adult and Pediatric)  Goal: *Acute Goals and Plan of Care (Insert Text)  PHYSICAL THERAPY SHORT TERM GOALS :   1. Patient will perform/complete have head at 45 degrees in chair position of bed for 15 minutes with supervision/set-up with no signs of distress within 1 week(s). 2. Patient will perform/complete exercises with family guiding her for exercises with supervision/set-up within 1 week(s). 3. Patient will perform/complete initate rolling to one side w with moderate assistance Of 2 within 1 week(s). 4. Patient will participate in Upper and lower extremity therapeutic exercise/activities with moderate assistance for 10 minutes within 1 week(s). Therapist Nuria Matias, PT 2/20/2017  Time Calculation: 21 mins   Outcome: Progressing Towards Goal  PHYSICAL THERAPY TREATMENT     Patient: Bess Lawrence (78 y.o. female)  Date: 2/23/2017  Diagnosis: Septic shock (HCC)  ulcer decubitus <principal problem not specified>  Procedure(s) (LRB):  debridement large sacral decubitus ulcer  (N/A)    Precautions: Aspiration, Fall, Contact (ventilator )   Chart, physical therapy assessment, plan of care and goals were reviewed. ASSESSMENT:  Pt able to perform AP with very limited range, QS, cervical ROM with very limited range to the R, minimal elbow flex on the L, min to none on the R.       Education: cont to perform ROM independently  Progression toward goals:  [ ]      Improving appropriately and progressing toward goals  [X]      Improving slowly and progressing toward goals  [X]      Not making progress toward goals and plan of care will be adjusted       PLAN:  Patient continues to benefit from skilled intervention to address the above impairments. Continue treatment per established plan of care. Discharge Recommendations:  LTC  Further Equipment Recommendations for Discharge:  N/A       SUBJECTIVE:   Patient stated I'm cold.       OBJECTIVE DATA SUMMARY:   Critical Behavior:  Neurologic State: Alert  Orientation Level: Oriented to person, Oriented to place  Cognition: Follows commands  Safety/Judgement: Fall prevention  Therapeutic Exercises:   See assessment  Pain:  Pre 0  Post 0  Pain Scale 1: Numeric (0 - 10)  Activity Tolerance:   Poor  Please refer to the flowsheet for vital signs taken during this treatment.   After treatment:   [ ] Patient left in no apparent distress sitting up in chair  [X] Patient left in no apparent distress in bed  [X] Call bell left within reach  [X] Nursing notified  [ ] Caregiver present  [ ] Bed alarm activated      Esther Mcduffie PTA   Time Calculation: 23 mins

## 2017-02-23 NOTE — PROGRESS NOTES
Progress Note      Patient: Jihan Gorman               Sex: female          DOA: 2/10/2017       YOB: 1955      Age:  64 y.o.        LOS:  LOS: 13 days               Subjective:   Pt is extubated and keeps her head turned to the left . She will whisper answers to questions . Pt is alert and is oriented  . she appears to be depressed with a flat affect . She will need a further evaluation by telepsychiatry  In another week or so . the pt is on zosyn and she is afebrile . The source of her infection is the very large decubitus ulcers . na is 156      Objective:      Visit Vitals    /64    Pulse (!) 109    Temp 98.4 °F (36.9 °C)    Resp 29    Ht 5' 5\" (1.651 m)    Wt (!) 168.9 kg (372 lb 5.7 oz)    SpO2 100%    Breastfeeding No    BMI 61.96 kg/m2       Physical Exam:  Pt is awake and is now being transferred to the floor   Heart reg rate and rhythm   Lungs breath sounds are distant   Abdomen soft  Obese nontender   Extremities ulcers are covered with dressings   Neuro depressed       Lab/Data Reviewed:  CMP:   Lab Results   Component Value Date/Time     (H) 02/23/2017 02:55 AM    K 3.6 02/23/2017 02:55 AM     (H) 02/23/2017 02:55 AM    CO2 20 (L) 02/23/2017 02:55 AM    AGAP 8 02/23/2017 02:55 AM     (H) 02/23/2017 02:55 AM    BUN 20 (H) 02/23/2017 02:55 AM    CREA 1.12 02/23/2017 02:55 AM    GFRAA 60 (L) 02/23/2017 02:55 AM    GFRNA 49 (L) 02/23/2017 02:55 AM    CA 7.9 (L) 02/23/2017 02:55 AM    MG 2.2 02/23/2017 02:55 AM    PHOS 3.5 02/23/2017 02:55 AM    ALB 1.6 (L) 02/23/2017 02:55 AM    TP 5.6 (L) 02/23/2017 02:55 AM    GLOB 4.0 02/23/2017 02:55 AM    AGRAT 0.4 (L) 02/23/2017 02:55 AM    SGOT 22 02/23/2017 02:55 AM    ALT 24 02/23/2017 02:55 AM     CBC:   Lab Results   Component Value Date/Time    WBC 6.4 02/23/2017 02:55 AM    HGB 7.5 (L) 02/23/2017 02:55 AM    HCT 23.2 (L) 02/23/2017 02:55 AM     02/23/2017 02:55 AM           Assessment/Plan     Active Problems:    Septic shock ([facility] [address] (2/10/2017) source of infection is the large decubitus ulcers. the  sepsis has resolved . Acute respiratory failure ([facility] [address] (2/13/2017) pt is now extubated       Debility (2/13/2017)  decubitus ulcers   Obesity   Depression     Plan:move to the floor . Continue the antibiotics . Continue wound care. needs snf

## 2017-02-23 NOTE — PALLIATIVE CARE
Bedside visit with pt who was alert and awake. She thinks she is in the ED, in [place]. She states she has one son age 31yo. Pt immediately said she needed to go to the bathroom and I explained that she has a tube for urine and for feces and that she can go ahead and go. We repeated this exchange 5 times and she still didn't seem to grasp the facts. While she can answer some questions accurately, her orientation and confusion appear to wax and wane. And based on her condition for the last two admissions, I question her insight and ability to make complicated medical decisions. D/W Dr Soo Hale and Mo Byers RN and I suggest another psych consult tomorrow.

## 2017-02-23 NOTE — PROGRESS NOTES
Problem: Patient Education: Go to Patient Education Activity  Goal: Patient/Family Education  Outcome: Progressing Towards Goal  Pt very weak only only wiggle toes and fingers, nurse need to ift and move arms and legs.

## 2017-02-24 NOTE — PROGRESS NOTES
Problem: Dysphagia (Adult)  Goal: *Acute Goals and Plan of Care (Insert Text)  Dysphagia Present: moderate    Recommendations:  Diet: puree/thin liquids   Meds: one at a time  Aspiration Precautions  Other: 1:1 feeding assistance, alternate solids/liquids    Goals: Patient will:  1. Tolerate PO trials with 0 s/s overt distress in 4/5 trials  2. Utilize compensatory swallow strategies/maneuvers (decrease bite/sip, size/rate, alt. liq/sol) with min cues in 4/5 trials  3. Perform oral-motor/laryngeal exercises to increase oropharyngeal swallow function with min cues           Outcome: Progressing Towards Goal  SPEECH LANGUAGE PATHOLOGY DYSPHAGIA TREATMENT     Patient: Jean Claude Anderson (5 y.o. female)  Date: 2/24/2017  Diagnosis: Septic shock (HCC)  ulcer decubitus <principal problem not specified>  Procedure(s) (LRB):  debridement large sacral decubitus ulcer  (N/A)    Precautions:  Aspiration, Fall, Contact (ventilator )      ASSESSMENT:  Patient seen for swallowing therapy this day. Patient with mech soft trial with prolonged mastication and delayed a-p propulsion, delayed swallow noted. Minimal lingual residue noted. Patient refused further trials. Recommend cont puree, thin liquids with ST to follow. Patient educated on importance of safe swallowing guidelines (small bites/sips, decreased rate, alt solids/liquids, HOB >60 for all PO intake); verbalized comprehension. Progression toward goals:  [ ]         Improving appropriately and progressing toward goals  [X]         Improving slowly and progressing toward goals  [ ]         Not making progress toward goals and plan of care will be adjusted       PLAN:  Recommendations and Planned Interventions:  Puree, thin   Patient continues to benefit from skilled intervention to address the above impairments. Continue treatment per established plan of care. Discharge Recommendations:  Skilled Nursing Facility       SUBJECTIVE:   Patient stated I ate breakfast. OBJECTIVE:   Cognitive and Communication Status:  Neurologic State: Alert, Eyes open spontaneously  Orientation Level: Oriented to person, Oriented to place, Disoriented to situation, Disoriented to time  Cognition: Follows commands  Perception: Appears intact  Perseveration: No perseveration noted  Safety/Judgement: Fall prevention  Dysphagia Treatment:  Oral Assessment:  Oral Assessment  Labial: No impairment  Dentition: Full  Oral Hygiene: good  Lingual: No impairment  Velum: No impairment  Mandible: No impairment  P.O. Trials:              Patient Position: [facility] 45              Vocal quality prior to P.O.: Low volume              Consistency Presented:  Thin liquid, Puree, Mechanical soft              How Presented: SLP-fed/presented, Spoon, Straw              How Much:  (x3oz thin, x1oz puree, x1 mech soft)              Bolus Acceptance: No impairment              Bolus Formation/Control: Impaired              Type of Impairment: Delayed, Mastication, Incomplete              Propulsion: Delayed (# of seconds)              Oral Residue: 10-50% of bolus, Lingual (mech soft)              Initiation of Swallow: No impairment              Laryngeal Elevation: Functional              Aspiration Signs/Symptoms: None              Pharyngeal Phase Characteristics: No impairment, issues, or problems               Effective Modifications: Small sips and bites, Alternate liquids/solids              Cues for Modifications: Minimal                                Oral Phase Severity: Moderate              Pharyngeal Phase Severity : Mild     PAIN:  Start of Tx: 0  End of Tx: 0      After treatment:   [ ]              Patient left in no apparent distress sitting up in chair  [X]              Patient left in no apparent distress in bed  [X]              Call bell left within reach  [X]              Nursing notified  [ ]              Family present  [ ]              Caregiver present  [ ]              Bed alarm activated COMMUNICATION/EDUCATION:   [X]        Aspiration precautions; swallow safety; compensatory techniques  [ ]        Patient unable to participate in education; education ongoing with staff  [ ]         Posted safety precautions in patient's room.   [ ]         Oral-motor/laryngeal strengthening exercises        Brandon Hernandez [facility] SLP  Time Calculation: 30 mins

## 2017-02-24 NOTE — PROGRESS NOTES
Progress Note      Patient: Jennifer Claros               Sex: female          DOA: 2/10/2017       YOB: 1955      Age:  64 y.o.        LOS:  LOS: 14 days               Subjective:   Help of psychiatry greatly appreciated . At the present time the pt is felt not to have decision making capacity   She continues to be tachycardic intermittently . She has depression and is on lexapro . Her decubitus will continue to be treated . May need surgery to debride the decubitus . Discussed with the nursing staff      Objective:      Visit Vitals    /69    Pulse (!) 115    Temp 98 °F (36.7 °C)    Resp 26    Ht 5' 5\" (1.651 m)    Wt (!) 190.7 kg (420 lb 6.7 oz)    SpO2 94%    Breastfeeding No    BMI 69.96 kg/m2       Physical Exam:  Pt is awake and has a flat affect and speaks in a hushed voice .    Heart tachycardia s1 and s2   Lungs decreased breath sounds in bases   abdomen soft and obese and nontender  Neuro depressed       Lab/Data Reviewed:  CMP:   Lab Results   Component Value Date/Time     (H) 02/24/2017 07:15 AM    K 3.7 02/24/2017 07:15 AM     (H) 02/24/2017 07:15 AM    CO2 18 (L) 02/24/2017 07:15 AM    AGAP 9 02/24/2017 07:15 AM    GLU 97 02/24/2017 07:15 AM    BUN 22 (H) 02/24/2017 07:15 AM    CREA 1.25 02/24/2017 07:15 AM    GFRAA 53 (L) 02/24/2017 07:15 AM    GFRNA 44 (L) 02/24/2017 07:15 AM    CA 8.1 (L) 02/24/2017 07:15 AM    MG 2.2 02/24/2017 07:15 AM    PHOS 3.5 02/24/2017 07:15 AM    ALB 1.7 (L) 02/24/2017 07:15 AM    TP 5.9 (L) 02/24/2017 07:15 AM    GLOB 4.2 (H) 02/24/2017 07:15 AM    AGRAT 0.4 (L) 02/24/2017 07:15 AM    SGOT 19 02/24/2017 07:15 AM    ALT 23 02/24/2017 07:15 AM     CBC:   Lab Results   Component Value Date/Time    WBC 6.9 02/24/2017 07:15 AM    HGB 7.6 (L) 02/24/2017 07:15 AM    HCT 23.9 (L) 02/24/2017 07:15 AM     02/24/2017 07:15 AM           Assessment/Plan     Active Problems:    Septic shock (HCC) (2/10/2017)      Acute respiratory failure ([facility] [address] (2/13/2017)      Debility (2/13/2017)  Large decubitus ulcers   Obesity   Depression   Pt is not presently competent to make decisions    Plan:pt will need a snf .  Continue zosyn

## 2017-02-24 NOTE — PROGRESS NOTES
Problem: Mobility Impaired (Adult and Pediatric)  Goal: *Acute Goals and Plan of Care (Insert Text)  PHYSICAL THERAPY SHORT TERM GOALS :   1. Patient will perform/complete have head at 45 degrees in chair position of bed for 15 minutes with supervision/set-up with no signs of distress within 1 week(s). 2. Patient will perform/complete exercises with family guiding her for exercises with supervision/set-up within 1 week(s). 3. Patient will perform/complete initate rolling to one side w with moderate assistance Of 2 within 1 week(s). 4. Patient will participate in Upper and lower extremity therapeutic exercise/activities with moderate assistance for 10 minutes within 1 week(s). Therapist India Rosen, PT 2/20/2017  Time Calculation: 21 mins   Outcome: Progressing Towards Goal  PHYSICAL THERAPY TREATMENT     Patient: Sherri Rosales (11 y.o. female)  Date: 2/24/2017  Diagnosis: Septic shock (HCC)  ulcer decubitus <principal problem not specified>  Procedure(s) (LRB):  debridement large sacral decubitus ulcer  (N/A)    Precautions: Aspiration, Fall, Contact (ventilator )   Chart, physical therapy assessment, plan of care and goals were reviewed. ASSESSMENT:  Per nurse pt just received pain medicine due to LE pain. Pt agreed to cervical ROM stretches/exercises. Pt keeps head rotated and  C/S flexed to the L. AA/PROM performed 10x each holding for 10sec, rotation R/L holding to R, side bends R/L holding to R, flex/ext holding with both. Reviewed LE and UE exercises to work on independently, AP, QS, finger flex//ext, elbow flex/ext. Pt head supported in midline with neutral alignment with wedge and towel roll. Pt reports comfort.       Education:Cont ROM TE   Progression toward goals:  [ ]      Improving appropriately and progressing toward goals  [X]      Improving slowly and progressing toward goals  [ ]      Not making progress toward goals and plan of care will be adjusted       PLAN:  Patient continues to benefit from skilled intervention to address the above impairments. Continue treatment per established plan of care. Discharge Recommendations:  LTC  Further Equipment Recommendations for Discharge:  Bariatric hospital bed       SUBJECTIVE:   Patient stated .      OBJECTIVE DATA SUMMARY:   Critical Behavior:  Neurologic State: Alert  Orientation Level: Oriented to person  Cognition: Follows commands  Safety/Judgement: Fall prevention  Therapeutic Exercises:   See assessment  Pain:  Pre 0 in neck and head  Post 0 in neck and head  Pain Scale 1: Numeric (0 - 10)  Activity Tolerance:   Poor  Please refer to the flowsheet for vital signs taken during this treatment.   After treatment:   [ ] Patient left in no apparent distress sitting up in chair  [X] Patient left in no apparent distress in bed  [X] Call bell left within reach  [X] Nursing notified  [ ] Caregiver present  [ ] Bed alarm activated      Mela Mcduffie PTA   Time Calculation: 27 mins

## 2017-02-24 NOTE — H&P
Name: Adenike Hdz  Date: 2017  Time: 12:09 PM via telepsychiatry  : 1955  Reason for consult: depression, capacity  History of Present Illness: Adenike Hdz is a 64 y.o. woman with a history of anxiety and panic attacks admitted 2/10 after being found down. She has been treated for septic shock due to large decubitus ulcers. She was seen by tele-pscyhiatry  but was very recently extubated and unable to participate fully in interview. Guardianship is being pursued due to patient's questionable ability to comprehend some medical issues surrounding her care. For instance, she has a obando in place but has asked to get up to urinate even after the presence of the obando was pointed out. She is also noted to have a flat affect by staff. Patient initially denies feeling depressed but admits she probably needs to get back on her antidepressant. She has been on lexapro in the past but it has been over a year since she has taken this. Patient does have significant latency in response to questions and seems to have minimal motivation or interest in issues regarding her care. This may be due to depression but it could also be some residual delirium. Her cognitive status may continue to improve but, for now, she does not have the capacity to engage in more complicated discussions regarding her medical care.     SI/HI/Self harm/Violence: no SI/HI    Collateral: EMR, hospital staff: FRANTZ John  Psychiatric History/Treatment History: no past inpatient     Drug/Alcohol History: UDS (-)  Medical History:   Past Medical History:   Diagnosis Date    Anemia     Anxiety 2016    Arthritis     Cancer ([address]     pre cancerous cells in the uterus    Chronic pain     knees    Chronic pain of both knees 2016    Cigarette nicotine dependence in remission 2016    Diabetes ([address]     Fatty liver 2016    Headache     Hypertension     Liver disease     fatty liver    Lymphedema 2016    Morbid obesity ([facility] [address] 5/19/2016    Psychotic disorder     anxiety and panic attacks    Thyroid disease      Medications & Freq:   Prior to Admission medications    Medication Sig Start Date End Date Taking? Authorizing Provider   cephALEXin (KEFLEX) 500 mg capsule Take 1 Cap by mouth four (4) times daily. 1/22/17   Anastasia Richardson MD   nebivolol (BYSTOLIC) 5 mg tablet Take 5 mg by mouth daily. Marino Lal MD   ergocalciferol (VITAMIN D2) 50,000 unit capsule Take 50,000 Units by mouth. Marino Lal MD   hydroCHLOROthiazide (HYDRODIURIL) 25 mg tablet Take 25 mg by mouth daily. Marino Lal MD   glimepiride (AMARYL) 2 mg tablet Take 2 mg by mouth every morning. Marino Lal MD   traMADol (ULTRAM) 50 mg tablet Take 50 mg by mouth every six (6) hours as needed for Pain. Marino Lal MD   loratadine (CLARITIN) 10 mg tablet 10 mg. 12/30/12   Historical Provider   losartan (COZAAR) 25 mg tablet Take 1 Tab by mouth every evening. 5/19/16   Kiki Zambrano MD   metoprolol succinate (TOPROL-XL) 25 mg XL tablet Take 1 Tab by mouth daily. 5/19/16   Kiki Zambrano MD     Allergies:    Allergies   Allergen Reactions    Ace Inhibitors Nausea and Vomiting    Hydralazine Nausea and Vomiting    Ibuprofen Shortness of Breath    Norvasc [Amlodipine] Swelling    Sulfa (Sulfonamide Antibiotics) Itching    Tramadol Shortness of Breath     Family Psych History/History of suicide:   Family History   Problem Relation Age of Onset    Diabetes Mother     Hypertension Mother     Lung Disease Father      Social History:   Social History   Substance Use Topics    Smoking status: Never Smoker    Smokeless tobacco: Never Used    Alcohol use No      Living situation: lives with son Joy Garza who has ASD    Stressors: physical debility    Mental Status Exam:   Appearance and attire: disheveled  Attitude and behavior: lying very still in bed with minimal movement or engagement but generally cooperative  Speech: slow and soft with significant latency in response  Affect and mood: stably dysphoric \"good\"  Association and thought processes: linear, impoverished   Thought content: SI/HI: denies   Perception: AVH/Delusions: none  Sensorium and orientation: awake, oriented to person and place but not time   Memory and Intellectual functioning: impaired  Insight and judgment: fair to poor    Impression/Risk Assessment:   Francie Bravo is a 64 y.o. woman with anxiety admitted 2/10 after being found down. She has been treated for sepsis due to decubitus ulcers. She has been intermittently disoriented but is able to participate in interview today. She is amenable to getting back on an antidepressant. No SI, HI, or evidence of psychosis. Depression and delirium (and perhaps a more long term cognitive issue) are factors affecting her ability to engage in discussion of issues regarding her medical care. She exhibited some cognitive impairments when discussing her diagnoses, medications, hospital care, and disposition during interview today. She was also not oriented to time. Diagnosis: F05 Delirium F32.9 Unspecified depressive disorder  Treatment Recommendations:  1. Disposition: inpatient psychiatry is not indicated  2. Psychiatric medications: restart lexapro at 10mg daily  3. Patient exhibits impairment in medical decision making capacity today    The above were discussed with the patient and the referring provider; able parties stated understanding and agreement with the recommendations.

## 2017-02-24 NOTE — ANCILLARY DISCHARGE INSTRUCTIONS
[facility] Readmission Patient Sherine Davis    The following concerns the patient's last admission and the events following discharge from the hospital:      Date of last hospital discharge:  1/22/17    Date of Readmission:  2/10/17    Reason for Readmission:  Sepsis       UNABLE TO ANSWER, GUARDIAN    Were you kept informed about your diagnoses during your stay in the hospital and what was being done to further evaluate and treat them? [] None of time   [] Some of time   [] Most of time   [] All of time   At the time of your discharge, did someone talk to you about:  1. What your diagnoses were? 2. What tests or procedures needed to be done after you left? 3. What to watch out for regarding worsening of your disease? 4. What to do if you were experiencing worsening of your disease? 5. Who to contact (and how) if you were experiencing worsening of your disease? [] Yes  [] No  [] Not Sure   [] Yes  [] No  [] Not Sure   [] Yes  [] No  [] Not Sure   [] Yes  [] No  [] Not Sure   [] Yes  [] No  [] Not Sure   Were you asked about your understanding of these instructions? [] Yes  [] No  [] Not Sure   Were the discharge instructions written down and given to you before you left? [] Yes  [] No  [] Not Sure   Were the written discharge instructions and plans easy to read and understand? [] Yes  [] No  [] Not Sure   How confident were you about understanding these instructions? [] Very confident   [] Somewhat confident   [] Not confident   [] Not Sure   Do you have a regular doctor who takes care of you for most things? [] Yes  [] No  [] Not Sure   At the time of your discharge, did someone talk to you about which medications to take when you left and which ones to discontinue? [] Yes  [] No  [] Not Sure   Did you take your medications as they were prescribed? [] Yes  [] No  [] Not Sure   If not, what were the difficulties you experienced with taking your medications? Comment:          After you left the hospital, did you have an appointment with your doctor? [] Yes  [] No  [] Not Sure       If yes, who made the appointment? [] I did    [] My family   [] Hospital staff   [] Not Sure   Were you able to get to this appointment?    [] Yes  [] No  [] Not Sure   How do you think you became sick enough to be readmitted to the hospital?   Comment:          Source:  Modified from [facility], [place], [address] [place] [address] Drive

## 2017-02-24 NOTE — PROGRESS NOTES
1903- Bedside and Verbal shift change report given to Radha Quijano RN (oncoming nurse) by Thaddeus Parada(offgoing nurse). Report included the following information SBAR, Procedure Summary, Intake/Output, MAR, Recent Results. Bed locked and lowered, siderails up x3. Whiteboard updated with information. Call bell at bedside, will continue to monitor. Allergy band applied to wrist. Pt refused her dinner and has limited interaction with staff.    9404-- Shift reassessment, pt condition unchanged will continue to monitor. 0300 -- PT bathed, gown and bedding changed. Wound care was preformed on bilateral heels, thigh area, buttocks, upper shoulders, mid and lower back. To complete wound care associates Carmen LANDRY, Berenice LANDRY, Gallo Meza CNA and [employer] were key in keeping the pt calm and quickly doing the changes. Prior to care, pt refused pain medication or any beverages or food. Pt did not consume any food or beverage the entire shift.     0630-- Shift reassessment, pt condition unchanged will continue to monitor. 0700 -- Bedside and Verbal shift change report given to Enio Feliciano RN (oncoming nurse) by FRANTZ Barillas (offgoing nurse) Report included the following information SBAR, Procedure Summary, Intake/Output, MAR, Recent Results. Pt up in bed, no indication of pain or acute distress. Bed locked and lowered, siderails up x3. Call bell at bedside. Advised onccharles RN that pt would benefit greatly from a wound consult.

## 2017-02-24 NOTE — MANAGEMENT PLAN
Discharge Plan       to bedside. Updated on pt. Pt does not have capacity to make decisions. Guardian hearing Monday. Was asked to match pt to area SNF. Previous Care Manager had matched pt. She was intubated at time and most have declined her. [facility] is only accept at this time. Pt has Medicare, no Medicaid. Care Management will work on placement.  Updated notes in [facility] RN BSN  Outcomes Manager  Pager # [phone]

## 2017-02-24 NOTE — PROGRESS NOTES
NUTRITION follow-up/Plan of care    RECOMMENDATIONS:     1. Pureed diet, thin liquids per SLP  2. Ensure TID  3. Monitor weight, labs and PO intake  4. RD to follow    GOALS:     1. Ongoing: PO intake meets >75% of protein/calorie needs by 3/1  2. Ongoing: Maintain weight/Gradual weight loss (1-2 by 3/3)    ASSESSMENT:     Weight status is classified as obese per BMI of 70. Currently not meeting nutrition needs. Added Ensure TID for additional calories/protein. Labs noted. Nutrition recommendations listed. RD to follow. Nutrition Risk:  []   High [x]  Moderate [] Low    SUBJECTIVE/OBJECTIVE:     Transferred from ICU. Patient appears well nourished. Patient with multiple pressure ulcers per nursing documentation. Reviewed SLP note with recommendations for pureed diet and thin liquids. Patient refused dinner last night and only ate a few bites of breakfast this morning due to not being fond of pureed diet. Will add Ensure supplements to help meet nutrition needs. Will monitor. Information Obtained From:   [x] Chart Review  [x] Patient  [] Family/Caregiver  [x] Nurse/Physician   [] Patient Rounds/Interdisciplinary Meeting    Diet: Pureed diet  Patient Vitals for the past 100 hrs:   % Diet Eaten   02/23/17 1200 50 %   02/23/17 1046 25 %   02/22/17 1800 20 %   02/22/17 1200 10 %     Medications: [x] Reviewed   Encounter Diagnoses     ICD-10-CM ICD-9-CM   1. Sepsis, due to unspecified organism ([facility] A41.9 038.9     995.91   2. Septic shock (HCC) A41.9 038.9    R65.21 785.52     995.92   3. Sacral decubitus ulcer, unstageable ([facility]) L89.150 707.03     707.25   4. Acute respiratory failure, unspecified whether with hypoxia or hypercapnia ([facility]) J96.00 518.81   5. Debility R53.81 799.3   6.  Morbid obesity, unspecified obesity type ([facility] E66.01 278.01     Past Medical History:   Diagnosis Date    Anemia     Anxiety 5/19/2016    Arthritis     Cancer ([facility]     pre cancerous cells in the uterus    Chronic pain knees    Chronic pain of both knees 5/19/2016    Cigarette nicotine dependence in remission 5/19/2016    Diabetes ([facility]     Fatty liver 5/19/2016    Headache     Hypertension     Liver disease     fatty liver    Lymphedema 5/19/2016    Morbid obesity ([facility] 5/19/2016    Psychotic disorder     anxiety and panic attacks    Thyroid disease      Labs:  Lab Results   Component Value Date/Time    Sodium 154 02/24/2017 07:15 AM    Potassium 3.7 02/24/2017 07:15 AM    Chloride 127 02/24/2017 07:15 AM    CO2 18 02/24/2017 07:15 AM    Anion gap 9 02/24/2017 07:15 AM    Glucose 97 02/24/2017 07:15 AM    BUN 22 02/24/2017 07:15 AM    Creatinine 1.25 02/24/2017 07:15 AM    Calcium 8.1 02/24/2017 07:15 AM    Magnesium 2.2 02/24/2017 07:15 AM    Phosphorus 3.5 02/24/2017 07:15 AM    Albumin 1.7 02/24/2017 07:15 AM     Anthropometrics: BMI (calculated): 70 Last 3 Recorded Weights in this Encounter    02/22/17 0800 02/23/17 0708 02/24/17 0630   Weight: (!) 194.2 kg (428 lb 2.1 oz) (!) 168.9 kg (372 lb 5.7 oz) (!) 190.7 kg (420 lb 6.7 oz)    Ht Readings from Last 1 Encounters:   02/11/17 5' 5\" (1.651 m)     Estimated Nutrition Needs:   1840 Kcals/day  Protein (g): 92 g    Nutrition Problems Identified:   [x] Suboptimal PO intake   [] Food Allergies  [x] Difficulty chewing/swallowing/poor dentition  [] Constipation/Diarrhea   [] Nausea/Vomiting   [] None  [x] Other: Wound healing     Plan:   [x] Therapeutic Diet  []  Obtained/adjusted food preferences/tolerances and/or snacks options   [x]  Supplements added   [x] Occupational therapy following for feeding techniques  []  HS snack added   [x]  Modify diet texture   []  Modify diet for food allergies   []  Assist with menu selection   [x]  Monitor PO intake on meal rounds   [x]  Continue inpatient monitoring and intervention   []  Participated in discharge planning/Interdisciplinary rounds/Team meetings   []  Other:     Education Needs:   [] Not appropriate for teaching at this time due to:   [x] Identified and addressed    Nutrition Monitoring and Evaluation:   [x] Continue inpatient monitoring and interventions    [] Other:     Sary Sullivan

## 2017-02-24 NOTE — ROUTINE PROCESS
9064 Patient was seen during shift change, smiled when spoken to. Comfortable in the bed. Will monitor. 0930 Called Telepsych and informed of consult. 1238 Telepsychiatry consult done. 1300 Patient complaining of pain to her lower extrmities. Tylenol was given at 1109. Paged Dr Deanna Clemente and made aware of pain complaint. No new orders received. 1600 Wound dressing to sacral area done with help from staff. Cleanse with NS, pat dry and wet to dry dressing applied, covered with ABD pads. Some areas covered with xeroform and ABD pads. Heart rate up to 140's while doing dressing changes. Patient repositioned in the bed comfortably. [phone] Patient seen with eyes closed, equal and non labored breathing. 1830 Meds given. 2000 Bedside and Verbal shift change report given to Fabienne Garber RN (oncoming nurse) by Elizabet Fischer RN (offgoing nurse). Report included the following information SBAR, Kardex, Intake/Output, MAR, Recent Results and Cardiac Rhythm sinus tachycardia; HR up to 140's with dressing changes. [place]

## 2017-02-25 NOTE — PROGRESS NOTES
1900- Bedside and Verbal shift change report given to Rigoberto Chew RN (oncoming nurse) by Meena Holly, RN (offgoing nurse). Report included the following information SBAR, Kardex and MAR.      0230- Dressing change occurred. Bed bath and linen changed. 5450- Patient complains of pain. Tylenol 650 mg administered. Patient did not verbalize a numeric rating. Patient states' \"my butt hurts bad. \"     [id number]- Patient request juice. 240 ml of applejuice given. Patient states the tylenol relieved the pain. 0730- Bedside and Verbal shift change report given to Rigoberto Chew RN (oncoming nurse) by Meena Holly RN (offgoing nurse). Report included the following information SBAR, Kardex and Med Rec Status.

## 2017-02-25 NOTE — PROGRESS NOTES
1300 Wound care complete per order by Jennifer Smith RN, Alejandro Montes De Oca RN and Liliam Beltran RN.

## 2017-02-25 NOTE — PROGRESS NOTES
0730: Bedside shift change report given to Taylor Chun RN (oncoming nurse) by Benson Harris RN (offgoing nurse). Report included the following information SBAR, Kardex, Procedure Summary, Intake/Output and MAR.     1300: Wound Care complete. 1745: Wound care complete. 1800: Received an order for Percocet 5 mg PRN with dressing changes, telephone with read back from Dr. Blanka Tovar. Verified with pharmacy that the Percocet is okay to give with her tramadol allergy. 1900: Bedside shift change report given to Lyn Fairchild RN (oncoming nurse) by Taylor Chun RN (offgoing nurse). Report included the following information SBAR, Kardex, Procedure Summary, Intake/Output and MAR.

## 2017-02-26 NOTE — PROGRESS NOTES
Progress Note      Patient: Nicolas Mosley               Sex: female          DOA: 2/10/2017       YOB: 1955      Age:  64 y.o.        LOS:  LOS: 15 days               Subjective:   Discussed with nursing staff . Large decubitus wounds seen . Not sure whether to ask plastics or general surgery to see the pt . Pt will need pain meds when she has dressing changes       Objective:      Visit Vitals    /68    Pulse (!) 109    Temp 98 °F (36.7 °C)    Resp 20    Ht 5' 5\" (1.651 m)    Wt 145.3 kg (320 lb 5.3 oz)    SpO2 95%    Breastfeeding No    BMI 53.31 kg/m2       Physical Exam:  Obese female   Heart reg rate and rhythm  Lungs good breath sounds heard   Abdomen soft obese .   Neuro depressed         Lab/Data Reviewed:  CMP:   Lab Results   Component Value Date/Time     (H) 02/25/2017 05:55 AM    K 3.5 02/25/2017 05:55 AM     (H) 02/25/2017 05:55 AM    CO2 18 (L) 02/25/2017 05:55 AM    AGAP 10 02/25/2017 05:55 AM    GLU 80 02/25/2017 05:55 AM    BUN 21 (H) 02/25/2017 05:55 AM    CREA 1.21 02/25/2017 05:55 AM    GFRAA 55 (L) 02/25/2017 05:55 AM    GFRNA 45 (L) 02/25/2017 05:55 AM    CA 8.0 (L) 02/25/2017 05:55 AM    MG 2.1 02/25/2017 05:55 AM    ALB 1.5 (L) 02/25/2017 05:55 AM    TP 5.4 (L) 02/25/2017 05:55 AM    GLOB 3.9 02/25/2017 05:55 AM    AGRAT 0.4 (L) 02/25/2017 05:55 AM    SGOT 18 02/25/2017 05:55 AM    ALT 21 02/25/2017 05:55 AM     CBC:   Lab Results   Component Value Date/Time    WBC 6.2 02/25/2017 05:55 AM    HGB 7.3 (L) 02/25/2017 05:55 AM    HCT 22.7 (L) 02/25/2017 05:55 AM     02/25/2017 05:55 AM           Assessment/Plan     Active Problems:    Septic shock (HCC) (2/10/2017)now resolved       Acute respiratory failure ([address] (2/13/2017) resolved       Debility (2/13/2017)  Large decubitus ulcers which will need to be debrided  Depression   Pt is not competent to make decisions     Plan:pt will need to be seen by surgery and woundscare for her very large ulcers .

## 2017-02-26 NOTE — PROGRESS NOTES
0730- Assumed patient care from [facility]. Pt. Awake lying in bed no acute distress noted IVF infusing, fecal management system intact 1/3 full, obando cath draining clear yellow, call light within reach will cont. To monitor.

## 2017-02-26 NOTE — PROGRESS NOTES
1930 Bedside and Verbal shift change report given to THALIA Malone Ra, RN (oncoming nurse) by MIRELA Calvo RN and Santana Capone RN (offgoing nurse). Report included the following information SBAR, Kardex, MAR and Recent Results, and pt's cardiac rhythm is sinus tach. Patient in bed sleeping with no signs of distress. Call bell within reach. Will continue to monitor. 2041 Shift assessment completed. Patient is awake, a+ox3 and disoriented to time. Patient denies having any pain. Will continue to monitor. 2312 Administered Percocet 5-325 mg tablet for dressing change. 0010 Reassessment completed. No changes to previous assessment. 0400 Zoraida/obando care and dressing change completed. Patient tolerated procedure fairly. Patient repositioned up in bed.     0520 Blood drawn from red hub. Patient tolerated well. Will continue to monitor. 1750 Transferred patient from room 3035 to [phone] for bigger space. Telemetry box changed to #14.     [id number] Administered 1 tablet of percocet for pt's back and buttocks pain of 10/10. Will continue to monitor. 0740 Bedside and Verbal shift change report given to [address] [place] [address] (oncoming nurse) by Ryan Duarte RN (offgoing nurse). Report included the following information SBAR, Kardex, MAR and Recent Results.

## 2017-02-27 NOTE — PROGRESS NOTES
Progress Note      Patient: Garry Ferreira               Sex: female          DOA: 2/10/2017       YOB: 1955      Age:  64 y.o.        LOS:  LOS: 16 days               Subjective:   Pt continues to be tachycardic . Will add lopressor . She is markedly anxious . She is afebrile . Need to consider other reasons for her tachycardia . She does not appear to be septic presently . She will need surgery next week to look at her decubitus and debride her extensive decubitus ulcers . Pt is deemed not competent to make decisions for herself .glucose is high . normally is low      Objective:       Physical Exam:  Obese female   heart tachycardia   Lungs breath sounds are distant   Abdomen soft and obese   Neuro depressed       Lab/Data Reviewed:  CMP:   Lab Results   Component Value Date/Time     02/26/2017 05:12 AM    K 3.1 (L) 02/26/2017 05:12 AM     (H) 02/26/2017 05:12 AM    CO2 16 (L) 02/26/2017 05:12 AM    AGAP 12 02/26/2017 05:12 AM     (H) 02/26/2017 05:12 AM    BUN 18 02/26/2017 05:12 AM    CREA 1.25 02/26/2017 05:12 AM    GFRAA 53 (L) 02/26/2017 05:12 AM    GFRNA 44 (L) 02/26/2017 05:12 AM    CA 7.2 (L) 02/26/2017 05:12 AM    MG 1.8 02/26/2017 05:12 AM     CBC:   Lab Results   Component Value Date/Time    WBC 6.3 02/26/2017 05:12 AM    HGB 6.9 (L) 02/26/2017 05:12 AM    HCT 21.7 (L) 02/26/2017 05:12 AM     02/26/2017 05:12 AM           Assessment/Plan     Active Problems:    Septic shock (HCC) (2/10/2017)now resolved. source of sepsis was her decubitus ulcers . Acute respiratory failure ([address] (2/13/2017) now resolved       Debility (2/13/2017) the patient has not walked probably for months . Depression . Obesity   Huge decubitus ulcers . surgery will be needed to debride the ulcers       Plan:pt needs a guardian to be appointed . needs surgery on her decubitus once she has a guardian

## 2017-02-27 NOTE — PROGRESS NOTES
[facility] 3S CARDIAC TELE  [address] [place] [address]  [phone]  Colon and Rectal Surgery Progress Note      Patient: Gilma Mcdonald MRN: [id number]  SSN: [id number]    YOB: 1955  Age: 64 y.o. Sex: female      Admit Date: 2/10/2017    LOS: 17 days     Subjective:     Still intubated and on sedation    Objective:     Vitals:    02/27/17 0703 02/27/17 0825 02/27/17 1002 02/27/17 1240   BP: 133/77  147/83    Pulse: 88  90    Resp: 18  20    Temp: 98.2 °F (36.8 °C)  97.4 °F (36.3 °C)    SpO2: 100% 98% 99%    Weight:    (!) 193.9 kg (427 lb 6.4 oz)   Height:            Intake and Output:  Current Shift: 02/27 0701 - 02/27 1900  In: 240 [P.O.:240]  Out: 600 [Urine:300; Drains:300]  Last three shifts: 02/25 1901 - 02/27 0700  In: 2085 [P.O.:360; I.V.:1725]  Out: 900 [Urine:900]    Physical Exam:   GENERAL: intubated and sedated  ABDOMEN: soft, non-tender.  . No masses,  no organomegaly, dressing dry and intact, only bloody serous drainage output  EXTREMITIES:  extremities normal, atraumatic, no cyanosis or edema  SKIN: Normal.  NEUROLOGIC: unable to assess    Lab/Data Review:  BMP:   Lab Results   Component Value Date/Time     (H) 02/27/2017 05:07 AM    K 3.2 (L) 02/27/2017 05:07 AM     (H) 02/27/2017 05:07 AM    CO2 18 (L) 02/27/2017 05:07 AM    AGAP 9 02/27/2017 05:07 AM    GLU 81 02/27/2017 05:07 AM    BUN 18 02/27/2017 05:07 AM    CREA 1.14 02/27/2017 05:07 AM    GFRAA 59 (L) 02/27/2017 05:07 AM    GFRNA 48 (L) 02/27/2017 05:07 AM     CBC:   Lab Results   Component Value Date/Time    WBC 6.7 02/27/2017 05:07 AM    HGB 7.1 (L) 02/27/2017 05:07 AM    HCT 22.3 (L) 02/27/2017 05:07 AM     02/27/2017 05:07 AM     Recent Glucose Results:   Lab Results   Component Value Date/Time    GLU 81 02/27/2017 05:07 AM     ABG: No results found for: PH, PHI, PCO2, PCO2I, PO2, PO2I, HCO3, HCO3I, FIO2, FIO2I  COAGS: No results found for: APTT, PTP, INR  Liver Panel:   Lab Results   Component Value Date/Time    ALB 1.3 (L) 02/27/2017 05:07 AM    TP 4.9 (L) 02/27/2017 05:07 AM    GLOB 3.6 02/27/2017 05:07 AM    AGRAT 0.4 (L) 02/27/2017 05:07 AM    SGOT 18 02/27/2017 05:07 AM    ALT 18 02/27/2017 05:07 AM    AP 89 02/27/2017 05:07 AM            Assessment:     Active Problems:    Septic shock ([facility] (2/10/2017)      Acute respiratory failure ([facility] (2/13/2017)      Debility (2/13/2017)        Plan:     DNR/DNI    Signed By: Dru Leonard NP        February 27, 2017

## 2017-02-27 NOTE — PROGRESS NOTES
Problem: Mobility Impaired (Adult and Pediatric)  Goal: *Acute Goals and Plan of Care (Insert Text)  PHYSICAL THERAPY SHORT TERM GOALS :   New goals   PHYSICAL THERAPY SHORT TERM GOALS : To be complete 3-6-17  1. Patient will perform/complete move head and keep in midline for 3 minutes with supervision/set-up within 1 week(s). 2. Patient will perform/complete initiate movement in shoulder and hips in preparation for rolling with moderate assistance within 1 week(s). 3. Patient will perform/complete initate rolling to one side w with moderate assistance Of 2 within 1 week(s). 4 Patient will participate in lower extremity therapeutic exercise/activities with moderate assistance for 10 minutes within 1 week(s). Therapist William Tafoya, PT 2/27/2017  Time Calculation: 32 mins  1. Patient will perform/complete have head at 45 degrees in chair position of bed for 15 minutes with supervision/set-up with no signs of distress within 1 week(s). 2. Patient will perform/complete exercises with family guiding her for exercises with supervision/set-up within 1 week(s). 3. Patient will perform/complete initate rolling to one side w with moderate assistance Of 2 within 1 week(s). 4. Patient will participate in Upper and lower extremity therapeutic exercise/activities with moderate assistance for 10 minutes within 1 week(s). Therapist William Tafoya, PT 2/20/2017  Time Calculation: 21 mins   Outcome: Progressing Towards Goal  PHYSICAL THERAPY RE-EVALUATION AND TREATMENT     Patient: Marii Stewart (44 y.o. female)  Date: 2/27/2017  Diagnosis: Septic shock (HCC)  ulcer decubitus  septic shock <principal problem not specified>  Procedure(s) (LRB):  debridement large sacral decubitus ulcer  (N/A)    Precautions: Aspiration, Skin, Contact, Fall      ASSESSMENT:  Patient re-evaluated following one week of treatment  Patient is now out of ICU and ventilator still on isolation.     During re-evaluation patient  With  Roll head to midline but could not hold without towel support laterally, prom of all joints with max assist of one edema noted in distal joints  patient rolled with three people to sidelying for dressing change using hover mat to assist.  .  Skilled intervention continues to be required due to patient able to move in/out of bed prior to coming to hospital. .. PLAN:  Goals have been updated based on progression since last assessment. Patient continues to benefit from skilled intervention to address the above impairments. Continue to follow the patient 1-2 times per day/3-5 days per week to address goals. Planned Interventions:  [X]     Bed Mobility Training          [X]     Neuromuscular Re-Education  [X]     Transfer Training                [ ]    Orthotic/Prosthetic Training  [X]     Gait Training                       [ ]     Modalities  [X]     Therapeutic Exercises       [ ]     Edema Management/Control  [X]     Therapeutic Activities         [X]     Patient and Family Training/Education  [ ]     Other (comment):  Discharge Recommendations: Jitendra Pak  Further Equipment Recommendations for Discharge: hospital bed, wheelchair        SUBJECTIVE:   Patient stated .      OBJECTIVE DATA SUMMARY:   GCODES(GP):Mobility  Current  CM= 80-99%   Goal  CL= 60-79%. The severity rating is based on the Other [employer] Sitting Balance Scale1/5   [employer] Sitting Balance Scale1/5  0: Pt performs 25% or less of sitting activity (Max assist) CN, 100% impaired. 1: Pt supports self with upper extremities but requires therapist assistance. Pt performs 25-50% of effort (Mod assist) CM, 80% to <100% impaired. 1+: Pt supports self with upper extremities but requires therapist assistance. Pt performs >50% effort. (Min assist). CL, 60% to <80% impaired. 2: Pt supports self independently with both upper extremities. CL, 60% to <80% impaired.   2+: Pt support self independently with 1 upper extremity. CK, 40% to <60% impaired. 3: Pt sits without upper extremity support for up to 30 seconds. CK, 40% to <60% impaired. 3+: Pt sits without upper extremity support for 30 seconds or greater. CJ, 20% to <40% impaired. 4: Pt moves and returns trunkal midpoint 1-2 inches in one plane. CJ, 20% to <40% impaired. 4+: Pt moves and returns trunkal midpoint 1-2 inches in multiple planes. CI, 1% to <20% impaired. 5: Pt moves and returns trunkal midpoint in all planes greater than 2 inches. CH, 0% impaired. Critical Behavior:  Neurologic State: Alert  Orientation Level: Oriented to person  Cognition: Follows commands  Safety/Judgement: Fall prevention  Tone & Sensation:   Tone: Normal  Sensation: Intact  Range Of Motion:  AROM: Generally decreased, functional  PROM: Generally decreased, functional  Strength:    Strength: Grossly decreased, non-functional  Functional Mobility Training:  Bed Mobility:  Rolling: Maximum assistance; Total assistance (x3 using hover mat) able to use UE to assist with staying on side. Neuro Re-Education and Therapeutic Exercises:   Prom/aarom to hips knees shoulders, elbows and wrists fingers and toes. Pain:   pre ands post treatment 8/10  Nursing gave medication  In middle of session. Activity Tolerance:   fair  Please refer to the flowsheet for vital signs taken during this treatment. After treatment:   [ ]  Patient left in no apparent distress sitting up in chair  [X]  Patient left in no apparent distress in bed  [X]  Call bell left within reach  [X]  Nursing notified  [ ]  Caregiver present  [ ]  Bed alarm activated     Patient education:  Continuing to provide education to increase safety awareness and increased mobility needs reinforcement. Liz Disla, PT   Time Calculation: 32 mins     Patient is getting pureed food and reported she was able to feed self  Prior to hospitalization asked and got OT orders from Dr Mi Helm.

## 2017-02-27 NOTE — MANAGEMENT PLAN
Discharge Plan    Spoke with Angel Gannon from [facility]. Guardianship obtained. Given copy of Guardianship paperwork. Pt has property. Plan is long term care. He is hoping for [facility] and will contact them Notified [facility] was only accepting facility at this time. GENEVAS is checking on autistic son. Deonjen updated.  Guardianship paperwork in paper chart and [address] BSN  Outcomes Manager  Pager # [phone]

## 2017-02-27 NOTE — PROGRESS NOTES
NUTRITION follow-up/Plan of care    RECOMMENDATIONS:     1. Pureed diet, thin liquids per SLP  2. Ensure TID  3. Monitor weight, labs and PO intake  4. RD to follow    GOALS:     1. Ongoing: PO intake meets >75% of protein/calorie needs by 3/4  2. Met/Ongoing: Maintain weight/Gradual weight loss (1-2 by 3/5)    ASSESSMENT:     Weight status is classified as obese per BMI of 71.1. Poor PO intake. Continue Ensure TID for additional calories/protein. Labs noted. Nutrition recommendations listed. RD to follow. Nutrition Risk:  []   High [x]  Moderate [] Low    SUBJECTIVE/OBJECTIVE:     Patient appears well nourished. Patient with multiple pressure ulcers per nursing documentation. Reviewed SLP note with recommendations for pureed diet and thin liquids. Patient with poor intake of meals due to not being fond of pureed diet. 0% intake of lunch meal and 25% intake of breakfast tray per nursing. Discussed pureed diet and importance of nutrition with wound healing. Encouraged adequate intake. Per psych, patient lacks capacity and is seeking guardianship. Noted plan for surgery on decubitus once patient has guardian. Will monitor. Information Obtained From:   [x] Chart Review  [x] Patient  [] Family/Caregiver  [] Nurse/Physician   [] Patient Rounds/Interdisciplinary Meeting    Diet: Pureed diet  Patient Vitals for the past 100 hrs:   % Diet Eaten   02/27/17 1009 25 %   02/25/17 0950 10 %   02/24/17 1822 0 %   02/23/17 1200 50 %   02/23/17 1046 25 %     Medications: [x] Reviewed   Encounter Diagnoses     ICD-10-CM ICD-9-CM   1. Sepsis, due to unspecified organism ([facility] A41.9 038.9     995.91   2. Septic shock (HCC) A41.9 038.9    R65.21 785.52     995.92   3. Sacral decubitus ulcer, unstageable ([facility]) L89.150 707.03     707.25   4. Acute respiratory failure, unspecified whether with hypoxia or hypercapnia ([facility]) J96.00 518.81   5. Debility R53.81 799.3   6.  Morbid obesity, unspecified obesity type ([facility] E66.01 278.01 Past Medical History:   Diagnosis Date    Anemia     Anxiety 5/19/2016    Arthritis     Cancer ([facility]     pre cancerous cells in the uterus    Chronic pain     knees    Chronic pain of both knees 5/19/2016    Cigarette nicotine dependence in remission 5/19/2016    Diabetes ([facility]     Fatty liver 5/19/2016    Headache     Hypertension     Liver disease     fatty liver    Lymphedema 5/19/2016    Morbid obesity ([facility] 5/19/2016    Psychotic disorder     anxiety and panic attacks    Thyroid disease      Labs:    Lab Results   Component Value Date/Time    Sodium 149 02/27/2017 05:07 AM    Potassium 3.2 02/27/2017 05:07 AM    Chloride 122 02/27/2017 05:07 AM    CO2 18 02/27/2017 05:07 AM    Anion gap 9 02/27/2017 05:07 AM    Glucose 81 02/27/2017 05:07 AM    BUN 18 02/27/2017 05:07 AM    Creatinine 1.14 02/27/2017 05:07 AM    Calcium 7.8 02/27/2017 05:07 AM    Magnesium 1.9 02/27/2017 05:07 AM    Phosphorus 4.2 02/27/2017 05:07 AM    Albumin 1.3 02/27/2017 05:07 AM     Anthropometrics: BMI (calculated): 71.1   Last 3 Recorded Weights in this Encounter    02/25/17 1319 02/26/17 0614 02/27/17 1240   Weight: 145.3 kg (320 lb 5.3 oz) (!) 193.6 kg (426 lb 12.8 oz) (!) 193.9 kg (427 lb 6.4 oz)      Ht Readings from Last 1 Encounters:   02/25/17 5' 5\" (1.651 m)     Estimated Nutrition Needs:   1840 Kcals/day  Protein (g): 92 g    Nutrition Problems Identified:   [x] Suboptimal PO intake   [] Food Allergies  [x] Difficulty chewing/swallowing/poor dentition  [] Constipation/Diarrhea   [] Nausea/Vomiting   [] None  [x] Other: Wound healing     Plan:   [x] Therapeutic Diet  []  Obtained/adjusted food preferences/tolerances and/or snacks options   [x]  Continue supplements as prescribed  [x] Occupational therapy following for feeding techniques  []  HS snack added   [x]  Modify diet texture   []  Modify diet for food allergies   []  Assist with menu selection   [x]  Monitor PO intake on meal rounds   [x]  Continue inpatient monitoring and intervention   []  Participated in discharge planning/Interdisciplinary rounds/Team meetings   []  Other:     Education Needs:   [] Not appropriate for teaching at this time due to:   [x] Identified and addressed    Nutrition Monitoring and Evaluation:   [x] Continue inpatient monitoring and interventions    [] Other:     Fredis Carter

## 2017-02-27 NOTE — PROGRESS NOTES
[facility] 3S CARDIAC TELE  [address] [address]  [phone]  Colon and Rectal Surgery Progress Note      Patient: Donna Wong MRN: [id number]  SSN: [id number]    YOB: 1955  Age: 64 y.o. Sex: female      Admit Date: 2/10/2017    LOS: 17 days     Subjective:     Ms. Emma Mart is a 65 yo female who we are asked again to evaluate her decubitus wounds and to see if surgery is feasible. She is now off the ventilator and on the nursing unit. Guardianship to [address] [place] [address]  Was awarded today. Pt did not answer or make eye contact when we spoke to her. Objective:     Vitals:    02/27/17 0703 02/27/17 0825 02/27/17 1002 02/27/17 1240   BP: 133/77  147/83    Pulse: 88  90    Resp: 18  20    Temp: 98.2 °F (36.8 °C)  97.4 °F (36.3 °C)    SpO2: 100% 98% 99%    Weight:    (!) 193.9 kg (427 lb 6.4 oz)   Height:            Intake and Output:  Current Shift: 02/27 0701 - 02/27 1900  In: 240 [P.O.:240]  Out: 600 [Urine:300; Drains:300]  Last three shifts: 02/25 1901 - 02/27 0700  In: 2085 [P.O.:360; I.V.:1725]  Out: 900 [Urine:900]    Physical Exam:   GENERAL: distracted, no distress, appears stated age, morbidly obese, no response from patient when we talked with her  ABDOMEN: soft, non-tender.  Bowel sounds normal. No masses,  no organomegaly  EXTREMITIES:  extremities normal, atraumatic, no cyanosis or edema  SKIN: multiple decubitus ulcers as noted, large one of the buttocks surrounding the anus, rectal tube in place with liquid stool  NEUROLOGIC: Unable to evaluate    Lab/Data Review:  BMP:   Lab Results   Component Value Date/Time     (H) 02/27/2017 05:07 AM    K 3.2 (L) 02/27/2017 05:07 AM     (H) 02/27/2017 05:07 AM    CO2 18 (L) 02/27/2017 05:07 AM    AGAP 9 02/27/2017 05:07 AM    GLU 81 02/27/2017 05:07 AM    BUN 18 02/27/2017 05:07 AM    CREA 1.14 02/27/2017 05:07 AM    GFRAA 59 (L) 02/27/2017 05:07 AM    GFRNA 48 (L) 02/27/2017 05:07 AM     CBC:   Lab Results   Component Value Date/Time WBC 6.7 02/27/2017 05:07 AM    HGB 7.1 (L) 02/27/2017 05:07 AM    HCT 22.3 (L) 02/27/2017 05:07 AM     02/27/2017 05:07 AM     Recent Glucose Results:   Lab Results   Component Value Date/Time    GLU 81 02/27/2017 05:07 AM            Assessment:     Active Problems:    Septic shock ([facility] (2/10/2017)      Acute respiratory failure ([facility] (2/13/2017)      Debility (2/13/2017)        Plan: At this time the only treatment would be to give the patient a colostomy  I discussed the patient with Josh Jason (guardian),  who will discuss the surgery with the patient and will call me tomorrow to give me the answer on whether we do a colostomy or not.     Signed By: Jaz Meza NP        February 27, 2017

## 2017-02-27 NOTE — ROUTINE PROCESS
2125 Bedside and Verbal shift change  Received from Kieran Appiah, RN (outgoing nurse), to JAMEE Pollard (oncoming)  Pt. Is AOX self. IV patent and infusing well, Pt. denies  pain at this time. Report included the following information SBAR, Kardex, Procedure Summary, Intake/Output, MAR, Recent Lab Results, and  Cardiac Rhythm @ NSR/ST. Will resume care and monitor Pt. Condition. Pt. Educated on call bell when in need of help and assistance. Pt.  Adali Lu to comprehend understanding understanding. Pt. Head to toe Assessment Done and documented. 2315  Metoprolol given at this time. Crushed and given in apple sauce. Pt. Able to swallow meds well. 0000 changed dressings to all wounds per MDs order. Given complete bath. Pt. Able to tolerated procedure. X4 assists with dressing changed and turning.    0200  Pt. Resting in bed comfortably, no sign of distress. 0400  No complaints made. 0600  Pt. Able to rest well throughout the shift.

## 2017-02-27 NOTE — PROGRESS NOTES
2 [facility]  Hospitalist Division    Daily progress Note    Patient: Víctor Vicente MRN: [id number]  CSN: [id number]    YOB: 1955  Age: 64 y.o. Sex: female    DOA: 2/10/2017 LOS:  LOS: 17 days                    Subjective:     CC: sepsis   More alert and awake today, NAD      Objective:      Visit Vitals    /83 (BP 1 Location: Left arm)    Pulse 90    Temp 97.4 °F (36.3 °C)    Resp 20    Ht 5' 5\" (1.651 m)    Wt (!) 193.6 kg (426 lb 12.8 oz)    SpO2 99%    Breastfeeding No    BMI 71.02 kg/m2       Physical Exam:    NC/AT  NO JVD,TMG  S1/S2 RRR  CTAB, NO WHEEZING  NT,ND, NABS  +  EDEMA  Sacral and LE ulcers           Intake and Output:  Current Shift:  02/27 0701 - 02/27 1900  In: 240 [P.O.:240]  Out: -   Last three shifts:  02/25 1901 - 02/27 0700  In: 2085 [P.O.:360; I.V.:1725]  Out: 900 [Urine:900]    Recent Results (from the past 24 hour(s))   GLUCOSE, POC    Collection Time: 02/26/17  4:34 PM   Result Value Ref Range    Glucose (POC) 86 70 - 110 mg/dL   GLUCOSE, POC    Collection Time: 02/26/17 10:12 PM   Result Value Ref Range    Glucose (POC) 86 70 - 110 mg/dL   PHOSPHORUS    Collection Time: 02/27/17  5:07 AM   Result Value Ref Range    Phosphorus 4.2 2.5 - 4.9 MG/DL   MAGNESIUM    Collection Time: 02/27/17  5:07 AM   Result Value Ref Range    Magnesium 1.9 1.8 - 2.4 mg/dL   CBC WITH AUTOMATED DIFF    Collection Time: 02/27/17  5:07 AM   Result Value Ref Range    WBC 6.7 4.6 - 13.2 K/uL    RBC 2.45 (L) 4.20 - 5.30 M/uL    HGB 7.1 (L) 12.0 - 16.0 g/dL    HCT 22.3 (L) 35.0 - 45.0 %    MCV 91.0 74.0 - 97.0 FL    MCH 29.0 24.0 - 34.0 PG    MCHC 31.8 31.0 - 37.0 g/dL    RDW 20.7 (H) 11.6 - 14.5 %    PLATELET 627 996 - 772 K/uL    MPV 9.2 9.2 - 11.8 FL    NEUTROPHILS 46 40 - 73 %    LYMPHOCYTES 25 21 - 52 %    MONOCYTES 12 (H) 3 - 10 %    EOSINOPHILS 16 (H) 0 - 5 %    BASOPHILS 1 0 - 2 %    ABS. NEUTROPHILS 3.1 1.8 - 8.0 K/UL    ABS.  LYMPHOCYTES 1.7 0.9 - 3.6 K/UL    ABS. MONOCYTES 0.8 0.05 - 1.2 K/UL    ABS. EOSINOPHILS 1.1 (H) 0.0 - 0.4 K/UL    ABS. BASOPHILS 0.1 (H) 0.0 - 0.06 K/UL    DF AUTOMATED     METABOLIC PANEL, COMPREHENSIVE    Collection Time: 02/27/17  5:07 AM   Result Value Ref Range    Sodium 149 (H) 136 - 145 mmol/L    Potassium 3.2 (L) 3.5 - 5.5 mmol/L    Chloride 122 (H) 100 - 108 mmol/L    CO2 18 (L) 21 - 32 mmol/L    Anion gap 9 3.0 - 18 mmol/L    Glucose 81 74 - 99 mg/dL    BUN 18 7.0 - 18 MG/DL    Creatinine 1.14 0.6 - 1.3 MG/DL    BUN/Creatinine ratio 16 12 - 20      GFR est AA 59 (L) >60 ml/min/1.73m2    GFR est non-AA 48 (L) >60 ml/min/1.73m2    Calcium 7.8 (L) 8.5 - 10.1 MG/DL    Bilirubin, total 0.3 0.2 - 1.0 MG/DL    ALT (SGPT) 18 13 - 56 U/L    AST (SGOT) 18 15 - 37 U/L    Alk.  phosphatase 89 45 - 117 U/L    Protein, total 4.9 (L) 6.4 - 8.2 g/dL    Albumin 1.3 (L) 3.4 - 5.0 g/dL    Globulin 3.6 2.0 - 4.0 g/dL    A-G Ratio 0.4 (L) 0.8 - 1.7           Current Facility-Administered Medications:     potassium chloride 10 mEq in 100 ml IVPB, 10 mEq, IntraVENous, Q1H, Per Ventura MD    dextrose 5% with KCl 20 mEq/L infusion, , IntraVENous, CONTINUOUS, Per Ventura MD    metoprolol tartrate (LOPRESSOR) tablet 12.5 mg, 12.5 mg, Oral, Q12H, Tabatha Escobar MD, 12.5 mg at 02/27/17 1052    escitalopram oxalate (LEXAPRO) tablet 10 mg, 10 mg, Oral, DAILY, Tabatha Escobar MD, 10 mg at 02/27/17 1052    oxyCODONE-acetaminophen (PERCOCET) 5-325 mg per tablet 1 Tab, 1 Tab, Oral, Q6H PRN, Tabatha Escobar MD, 1 Tab at 02/27/17 0022    acetaminophen (TYLENOL) tablet 650 mg, 650 mg, Oral, Q4H PRN, Tabatha Escobar MD, 650 mg at 02/25/17 1720    therapeutic multivitamin (THERAGRAN) tablet 1 Tab, 1 Tab, Oral, DAILY, Ira Kehr, PA, 1 Tab at 02/27/17 1052    pantoprazole (PROTONIX) tablet 40 mg, 40 mg, Oral, DAILY, Ira Kehr, PA, 40 mg at 02/27/17 1052    insulin lispro (HUMALOG) injection, , SubCUTAneous, AC&HS, LIZETT Villalobos, Stopped at 02/22/17 1630    sodium chloride (NS) flush 10-30 mL, 10-30 mL, InterCATHeter, PRN, Zuly Granda MD    sodium chloride (NS) flush 10 mL, 10 mL, InterCATHeter, Q24H, Zuly Granda MD, 10 mL at 02/26/17 1602    sodium chloride (NS) flush 10 mL, 10 mL, InterCATHeter, PRN, Zuly Granda MD    sodium chloride (NS) flush 10-40 mL, 10-40 mL, InterCATHeter, Q8H, Zuly Granda MD, 10 mL at 02/27/17 0437    bacitracin 500 unit/gram packet 1 Packet, 1 Packet, Topical, PRN, Zuly Granda MD    0.9% sodium chloride infusion 250 mL, 250 mL, IntraVENous, PRN, Moi Jara NP    enoxaparin (LOVENOX) injection 40 mg, 40 mg, SubCUTAneous, Q12H, Wade Vasquez MD, 40 mg at 02/27/17 0555    0.9% sodium chloride infusion 250 mL, 250 mL, IntraVENous, PRN, Rebecca Colbert MD    albuterol-ipratropium (DUO-NEB) 2.5 MG-0.5 MG/3 ML, 3 mL, Nebulization, Q6H RT, LIZETT Villalobos, 3 mL at 02/27/17 0825    sodium chloride (NS) flush 5-10 mL, 5-10 mL, IntraVENous, PRN, Equilla Axe, DO    glucose chewable tablet 16 g, 4 Tab, Oral, PRN, Johnathon Rocha MD    glucagon [facility]) injection 1 mg, 1 mg, IntraMUSCular, PRN, Johnathon Rocha MD    dextrose (D50W) injection syrg 12.5-25 g, 25-50 mL, IntraVENous, PRN, Johnathon Rocha MD    Lab Results   Component Value Date/Time    Glucose 81 02/27/2017 05:07 AM    Glucose 351 02/26/2017 05:12 AM    Glucose 80 02/25/2017 05:55 AM    Glucose 97 02/24/2017 07:15 AM    Glucose 108 02/23/2017 02:55 AM        Assessment/Plan     Active Problems:    Septic shock ([address] (2/10/2017)      Acute respiratory failure (HCC) (2/13/2017)      Debility (2/13/2017)      Septic shock, resolved    Acute hypoxic resp failure 2/2 above, resolved   JOAQUIN on CKD 2/2 sepsis, reolved  acute metabolic encephalopathy, improved  Hypernatremia, cont  D5W   DM, stable   Morbid obesity  HTN stable  Fever, resolved   Anemia, stable Hypokalemia, add KCL   Multiple ulcer including B/L heels, large sacral ulcer, POA  Cont wound care   May need debridement, will d/w Dr Fany Buenrostro MD  2/27/2017, 11:39 AM

## 2017-02-27 NOTE — ROUTINE PROCESS
Verbal and bedside Shift changed report given to  JANEE Bruner RN (oncoming RN) on Pt. Condition. Report consisted of patients Situation, History, Activities, intake/output,  Background, Assessment and Recommendations(SBAR). Information from the following report(s) Kardex, order Summary, Lab results and MAR was reviewed with the receiving nurse. Opportunity for questions and clarification was provided.

## 2017-02-27 NOTE — PROGRESS NOTES
Speech Therapy Note:  SLP attempted swallowing therapy, however patent adamantly refusing 2/2 nausea. ST will attempt next day.   Bonita Nguyen M.S. [facility] SLP  Ph: [phone]  Pager: [phone]

## 2017-02-28 NOTE — ROUTINE PROCESS
Bedside and Verbal shift change report given to [address] [place] [address] (oncoming nurse) by Cassy Cole (offgoing nurse). Report included the following information SBAR, Kardex, Intake/Output, MAR and Recent Results, Heart Rhythm: Sinus Rhythm, Sinus Tach. 1200 Completed dressing changes with wound care team (See Notes & Flowsheet). Pt was given Percocet previous to dressing change and tolerated procedure fairly well. 1210 Performed oral care. Patient tolerated well.     1600 Pt resting in bed with no change to condition and no complaints of pain. Will continue to monitor. 1845 Per telemetry pt's heart rate in the 140's. Took a set of vital signs pt's heart rate 105. Will continue to monitor and inform oncoming nurse. Bedside and Verbal shift change report given to Ceferino SHAVER (oncoming nurse) by [address] [place] [address] (offgoing nurse). Report included the following information SBAR, Kardex, Intake/Output, MAR, Recent Results and Cardiac Rhythm sinus Tach.

## 2017-02-28 NOTE — PROGRESS NOTES
[facility] 3S CARDIAC TELE  [address] [place] [address]  [phone]  Colon and Rectal Surgery Progress Note      Patient: Cindy Devlin MRN: [id number]  SSN: [id number]    YOB: 1955  Age: 64 y.o. Sex: female      Admit Date: 2/10/2017    LOS: 18 days     Subjective:     Pt is more awake today and is able to converse with me  I discussed her having a colostomy done to bypass her stool from the large decubitus ulcer-Still waiting for JFS to give consent. Pt states she is willing to have a colostomy done. Objective:     Vitals:    02/28/17 0645 02/28/17 0836 02/28/17 0923 02/28/17 1046   BP: 132/62  150/67 137/80   Pulse: (!) 105  (!) 111 (!) 105   Resp: 20  18 18   Temp: 98.7 °F (37.1 °C)  98.5 °F (36.9 °C) 98.4 °F (36.9 °C)   SpO2: 100% 98% 98% 92%   Weight:       Height:            Intake and Output:  Current Shift:    Last three shifts: 02/26 1901 - 02/28 0700  In: 2997.5 [P.O.:900; I.V.:2097.5]  Out: 1500 [Urine:1200; Drains:300]    Physical Exam:   GENERAL: alert, cooperative, no distress, appears stated age, morbidly obese  ABDOMEN: soft, non-tender.  Bowel sounds normal. No masses,  no organomegaly  EXTREMITIES:  extremities normal, atraumatic, no cyanosis or edema  SKIN: multiple decubitus ulcers noted on heels, elbows and buttocks  NEUROLOGIC: negative    Lab/Data Review:  BMP:   Lab Results   Component Value Date/Time     (H) 02/28/2017 05:00 AM    K 3.7 02/28/2017 05:00 AM     (H) 02/28/2017 05:00 AM    CO2 17 (L) 02/28/2017 05:00 AM    AGAP 11 02/28/2017 05:00 AM    GLU 95 02/28/2017 05:00 AM    BUN 18 02/28/2017 05:00 AM    CREA 1.16 02/28/2017 05:00 AM    GFRAA 58 (L) 02/28/2017 05:00 AM    GFRNA 47 (L) 02/28/2017 05:00 AM     CBC: No results found for: WBC, HGB, HGBEXT, HCT, HCTEXT, PLT, PLTEXT, HGBEXT, HCTEXT, PLTEXT  Recent Glucose Results:   Lab Results   Component Value Date/Time    GLU 95 02/28/2017 05:00 AM    GLU 80 02/27/2017 01:35 PM            Assessment: Active Problems:    Septic shock ([facility] (2/10/2017)      Acute respiratory failure ([facility] (2/13/2017)      Debility (2/13/2017)        Plan:     Waiting for JFS to give consent for colostomy  Scheduled for Thurs with Dr. Clementina Montalvo By: Farshad Daugherty NP        February 28, 2017

## 2017-02-28 NOTE — PROGRESS NOTES
[facility]: [phone]  [facility]: [address] [place] [address] Way: [phone]    Patient Name: Ashutosh Desai  YOB: 1955    Date of Initial Consult: 2/13/2017   Reason for Consult: care decisions  Requesting Provider:  LIZETT Last  Primary Care Physician: Lucy Montalvo MD      SUMMARY:   Ashutosh Desai is a 64y.o. year old with a past history of diabetes, hypertension, morbid obesity, liver disease, lymphedema, who was admitted on 2/10/2017 from  home with a diagnosis of septic shock and respiratory distress requiring intubation . Current medical issues leading to Palliative Medicine involvement include: 64year old who was found down on floor at home covered with feces and bed bugs, with large decubitus ulcer, septic with shock, intubated due to acute respiratory distress. Her overall outlook is poor. Palliative medicine is consulted for care decisions. PALLIATIVE DIAGNOSES:   1. Decubitus ulcer  2. Acute respiratory failure   3. Septic shock   4. Debility          PLAN:   1. Patient seen at bedside, this am, she is alert, answers some questions for me appropriately. Does not recall time she was in ICU or being intubated. She can not tell me why she is in the hospital today. 2. Goals of care: currently full code. Guardianship awarded to [employer], Mr. Carlos Castillo is  ([phone]). Would recommend DNR/DNR for Ms. Joseph eRese considering her multiple chronic medical condition, bed bound state with potential poor healing and recovery from large decubitus ulcers. 3. Decubitus ulcer, wound care on going. She has a rectal tube in to prevent soiling from stool. Albumin 1.3. Healing of her decubitus wounds will be difficult despite all care due to her overall debilitated condition and poor nutritional status. 4. Septic shock has resolved.  She is at high risk for infection and return of sepsis due to her multiple medical conditions. 5. Initial consult note routed to primary continuity provider  6.  Communicated plan of care with: Palliative IDT, attending        GOALS OF CARE:     [====Goals of Care====]  GOALS OF CARE:  Patient / health care proxy stated goals:      TREATMENT PREFERENCES:   Code Status: Full Code    Advance Care Planning:  Advance Care Planning 2/13/2017   Patient's Healthcare Decision Maker is: Legal Next of Stalin Rodarte   Primary Decision Maker Name Annabelle Marie   Primary Decision Maker Phone Number [phone]   Primary Decision Maker Relationship to Patient Spouse   Confirm Advance Directive None   Patient Would Like to Complete Advance Directive No       Other:    The palliative care team has discussed with patient / health care proxy about goals of care / treatment preferences for patient.  [====Goals of Care====]    Advance Care Planning 2/13/2017   Patient's Healthcare Decision Maker is: Legal Next of Stalin Rodarte   Primary Decision Maker Name Annabelle Marie   Primary Decision Maker Phone Number [phone]   Primary Decision Maker Relationship to Patient Spouse   Confirm Advance Directive None   Patient Would Like to Complete Advance Directive No           HISTORY:     History obtained from: chart     CHIEF COMPLAINT: septic shock     HPI/SUBJECTIVE:    The patient is:   [] Verbal and participatory  [x] Non-participatory due to: does not engage in extensive questioning   Please see summary  2/28/2017 98.4 105 18 137/80 92 % on room air    2/14/2017 99 102 28 126/51 100% on FIO2 21%   Wbc 14.7 hgb 7.2, creat 0.67 BUN 29 ammonia 13   Clinical Pain Assessment (nonverbal scale for nonverbal patients): Pain: 0  Denies pain      FUNCTIONAL ASSESSMENT:     Palliative Performance Scale (PPS): 40%          PSYCHOSOCIAL/SPIRITUAL SCREENING:     Advance Care Planning:  Advance Care Planning 2/13/2017   Patient's Healthcare Decision Maker is: Legal Next of Stalin Rodarte   Primary Decision Maker Name Annabelle Marie Primary Decision Maker Phone Number [phone]   Primary Decision Maker Relationship to Patient Spouse   Confirm Advance Directive None   Patient Would Like to Complete Advance Directive No        Any spiritual / Anabaptism concerns:  [] Yes /  [x] No    Caregiver Burnout:  [] Yes /  [] No /  [x] No Caregiver Present      Anticipatory grief assessment:   [x] Normal  / [] Maladaptive       ESAS Anxiety:      ESAS Depression:          REVIEW OF SYSTEMS:     Positive and pertinent negative findings in ROS are noted above in HPI. The following systems were [] reviewed / [x] unable to be reviewed as noted in HPI  Other findings are noted below. Systems: constitutional, ears/nose/mouth/throat, respiratory, gastrointestinal, genitourinary, musculoskeletal, integumentary, neurologic, psychiatric, endocrine. Positive findings noted below. Modified ESAS Completed by: provider   Fatigue: 7 Drowsiness: 2     Pain: 0     Nausea: 5   Anorexia: 8 Dyspnea: 0     Constipation: No              PHYSICAL EXAM:     Wt Readings from Last 3 Encounters:   02/27/17 (!) 193.9 kg (427 lb 6.4 oz)   01/20/17 (!) 206.7 kg (455 lb 11.1 oz)   05/19/16 (!) 210.2 kg (463 lb 6.4 oz)     Blood pressure 137/80, pulse (!) 105, temperature 98.4 °F (36.9 °C), resp. rate 18, height 5' 5\" (1.651 m), weight (!) 193.9 kg (427 lb 6.4 oz), SpO2 92 %, not currently breastfeeding. Pain:  Pain Scale 1: Visual  Pain Intensity 1: 0  Pain Onset 1: acute  Pain Location 1: Back, Buttocks  Pain Orientation 1: Posterior  Pain Description 1: Aching  Pain Intervention(s) 1: Medication (see MAR)  Last bowel movement: FMS    Constitutional: chronically ill appearing, large obese female lying in bed in NAD  Respiratory: not labored   Neurologic: awake and alert, oriented x 2, voice very soft, at times difficult to understand.         HISTORY:     Active Problems:    Septic shock ([facility] [address] (2/10/2017)      Acute respiratory failure ([facility] [address] (2/13/2017)      Debility (2017)      Past Medical History:   Diagnosis Date    Anemia     Anxiety 2016    Arthritis     Cancer ([facility] [address]     pre cancerous cells in the uterus    Chronic pain     knees    Chronic pain of both knees 2016    Cigarette nicotine dependence in remission 2016    Diabetes ([facility] [address]     Fatty liver 2016    Headache     Hypertension     Liver disease     fatty liver    Lymphedema 2016    Morbid obesity ([facility] [address] 2016    Psychotic disorder     anxiety and panic attacks    Thyroid disease       Past Surgical History:   Procedure Laterality Date    HX  SECTION      HX CHOLECYSTECTOMY      HX GYN  2006    hysterectomy      Family History   Problem Relation Age of Onset    Diabetes Mother     Hypertension Mother     Lung Disease Father      History reviewed, no pertinent family history.   Social History   Substance Use Topics    Smoking status: Never Smoker    Smokeless tobacco: Never Used    Alcohol use No     Allergies   Allergen Reactions    Ace Inhibitors Nausea and Vomiting    Hydralazine Nausea and Vomiting    Ibuprofen Shortness of Breath    Norvasc [Amlodipine] Swelling    Sulfa (Sulfonamide Antibiotics) Itching    Tramadol Shortness of Breath      Current Facility-Administered Medications   Medication Dose Route Frequency    dextrose 5% with KCl 20 mEq/L infusion   IntraVENous CONTINUOUS    metoprolol tartrate (LOPRESSOR) tablet 12.5 mg  12.5 mg Oral Q12H    escitalopram oxalate (LEXAPRO) tablet 10 mg  10 mg Oral DAILY    oxyCODONE-acetaminophen (PERCOCET) 5-325 mg per tablet 1 Tab  1 Tab Oral Q6H PRN    acetaminophen (TYLENOL) tablet 650 mg  650 mg Oral Q4H PRN    therapeutic multivitamin (THERAGRAN) tablet 1 Tab  1 Tab Oral DAILY    pantoprazole (PROTONIX) tablet 40 mg  40 mg Oral DAILY    insulin lispro (HUMALOG) injection   SubCUTAneous AC&HS    sodium chloride (NS) flush 10-30 mL  10-30 mL InterCATHeter PRN    sodium chloride (NS) flush 10 mL  10 mL InterCATHeter Q24H    sodium chloride (NS) flush 10 mL  10 mL InterCATHeter PRN    sodium chloride (NS) flush 10-40 mL  10-40 mL InterCATHeter Q8H    bacitracin 500 unit/gram packet 1 Packet  1 Packet Topical PRN    0.9% sodium chloride infusion 250 mL  250 mL IntraVENous PRN    enoxaparin (LOVENOX) injection 40 mg  40 mg SubCUTAneous Q12H    0.9% sodium chloride infusion 250 mL  250 mL IntraVENous PRN    albuterol-ipratropium (DUO-NEB) 2.5 MG-0.5 MG/3 ML  3 mL Nebulization Q6H RT    sodium chloride (NS) flush 5-10 mL  5-10 mL IntraVENous PRN    glucose chewable tablet 16 g  4 Tab Oral PRN    glucagon (GLUCAGEN) injection 1 mg  1 mg IntraMUSCular PRN    dextrose (D50W) injection syrg 12.5-25 g  25-50 mL IntraVENous PRN        LAB AND IMAGING FINDINGS:     Lab Results   Component Value Date/Time    WBC 6.7 02/27/2017 05:07 AM    HGB 7.1 02/27/2017 05:07 AM    PLATELET 226 35/35/7949 05:07 AM     Lab Results   Component Value Date/Time    Sodium 147 02/28/2017 05:00 AM    Potassium 3.7 02/28/2017 05:00 AM    Chloride 119 02/28/2017 05:00 AM    CO2 17 02/28/2017 05:00 AM    BUN 18 02/28/2017 05:00 AM    Creatinine 1.16 02/28/2017 05:00 AM    Calcium 7.6 02/28/2017 05:00 AM    Magnesium 1.9 02/28/2017 05:00 AM    Phosphorus 4.2 02/27/2017 05:07 AM      Lab Results   Component Value Date/Time    AST (SGOT) 18 02/27/2017 05:07 AM    Alk.  phosphatase 89 02/27/2017 05:07 AM    Protein, total 4.9 02/27/2017 05:07 AM    Albumin 1.3 02/27/2017 05:07 AM    Globulin 3.6 02/27/2017 05:07 AM     Lab Results   Component Value Date/Time    INR 1.4 02/11/2017 11:16 AM    Prothrombin time 16.6 02/11/2017 11:16 AM      No results found for: IRON, FE, TIBC, IBCT, PSAT, FERR   No results found for: PH, PCO2, PO2  No components found for: [place]   Lab Results   Component Value Date/Time     02/10/2017 05:40 PM    CK - MB 5.4 02/10/2017 05:40 PM              Total time: 35 minutes   Counseling / coordination time: 25  minutes  > 50% counseling / coordination?: yes    Prolonged service was provided for  []30 min   []75 min in face to face time in the presence of the patient. Time Start:   Time End:   Note: this can only be billed with 12109 (initial) or 42173 (follow up). If multiple start / stop times, list each separately.

## 2017-02-28 NOTE — PROGRESS NOTES
completed follow up visit with patient and a Spiritual assessment of patient and offered  Pastoral care support once again.    Chaplains will continue to follow and will provide pastoral care on an as needed/requested basis    Chaplain Price James   Board Certified [address] [place] [address]   [phone]

## 2017-02-28 NOTE — PROGRESS NOTES
Bedside and Verbal shift change report received from Kandace Montemayor (offgoing nurse). Report included the following information SBAR, Kardex, Intake/Output, MAR and Recent Results. 2000-Shift assessment completed, pt alert, no distress noted, call bell within reach, will continue to monitor. 0000-Reassessment completed, no changes from previous, will continue to monitor. 0145-Dressing change completed as ordered, pt tolerated well, will continue to monitor. 0400-Reassessment completed, no changes from previous. 0430-Pt had 10 runs of vtach, pr sleeping, no distress noted, will continue to monitor. Bedside and Verbal shift change report given to Kandace Montemayor (oncoming nurse) by Juan Reeves RN (offgoing nurse). Report included the following information SBAR, Kardex, Intake/Output, MAR and Recent Results.

## 2017-02-28 NOTE — WOUND CARE
Received IP Wound Care Consult for sacral wound reassessment. Patient was previously seen by wound care team on 2/11/17 for same. This nurse, Omar Balbuena, [facility] (wound care), Fatoumata Zhou RN (wound care), Cristo Cruz RN (patient's current nurse), and Hanh Steele CNA at bedside to assist with turning for assessment and dressing of wound. Upon assessment some of the wounds that were more superficial at time of initial assessment have greatly improved. The large wound to the sacrum/buttocks appears to have pink/red tissue in the base, but still has a significant amount of necrotic tissue. Wound Care Team continues to recommend general surgery consult for surgical debridement of sacral wound and posterior left thigh wounds. Current dressing change order is wet to dry dressing to sacrum every 8 hours, wound care team recommends to continue this until surgical debridement of sacral wound. Reported all findings to Dr. Kurt Smith. Above photograph is of Left Heel not Right heel as labeled.

## 2017-02-28 NOTE — PROGRESS NOTES
2 [facility]  Hospitalist Division    Daily progress Note    Patient: Gilma Mcdonald MRN: [id number]  CSN: [id number]    YOB: 1955  Age: 64 y.o. Sex: female    DOA: 2/10/2017 LOS:  LOS: 18 days                    Subjective:     CC: sepsis   Alert and awake today, NAD, no pain       Objective:      Visit Vitals    /80    Pulse (!) 105    Temp 98.4 °F (36.9 °C)    Resp 18    Ht 5' 5\" (1.651 m)    Wt (!) 193.9 kg (427 lb 6.4 oz)    SpO2 98%    Breastfeeding No    BMI 71.12 kg/m2       Physical Exam:    NC/AT  NO JVD,TMG  S1/S2 RRR  CTAB, NO WHEEZING  NT,ND, NABS  +  EDEMA  Sacral and LE ulcers           Intake and Output:  Current Shift:     Last three shifts:  02/26 1901 - 02/28 0700  In: 2997.5 [P.O.:900;  I.V.:2097.5]  Out: 1500 [Urine:1200; Drains:300]    Recent Results (from the past 24 hour(s))   GLUCOSE, POC    Collection Time: 02/27/17  4:33 PM   Result Value Ref Range    Glucose (POC) 92 70 - 110 mg/dL   GLUCOSE, POC    Collection Time: 02/27/17  9:15 PM   Result Value Ref Range    Glucose (POC) 92 70 - 110 mg/dL   MAGNESIUM    Collection Time: 02/28/17  5:00 AM   Result Value Ref Range    Magnesium 1.9 1.8 - 2.4 mg/dL   METABOLIC PANEL, BASIC    Collection Time: 02/28/17  5:00 AM   Result Value Ref Range    Sodium 147 (H) 136 - 145 mmol/L    Potassium 3.7 3.5 - 5.5 mmol/L    Chloride 119 (H) 100 - 108 mmol/L    CO2 17 (L) 21 - 32 mmol/L    Anion gap 11 3.0 - 18 mmol/L    Glucose 95 74 - 99 mg/dL    BUN 18 7.0 - 18 MG/DL    Creatinine 1.16 0.6 - 1.3 MG/DL    BUN/Creatinine ratio 16 12 - 20      GFR est AA 58 (L) >60 ml/min/1.73m2    GFR est non-AA 47 (L) >60 ml/min/1.73m2    Calcium 7.6 (L) 8.5 - 10.1 MG/DL   GLUCOSE, POC    Collection Time: 02/28/17  7:58 AM   Result Value Ref Range    Glucose (POC) 93 70 - 110 mg/dL   GLUCOSE, POC    Collection Time: 02/28/17 11:46 AM   Result Value Ref Range    Glucose (POC) 99 70 - 110 mg/dL         Current Facility-Administered Medications:     ondansetron (ZOFRAN) injection 4 mg, 4 mg, IntraVENous, Q6H PRN, Alexandrea Rossi MD    dextrose 5% with KCl 20 mEq/L infusion, , IntraVENous, CONTINUOUS, Alexandrea Rossi MD, Last Rate: 75 mL/hr at 02/27/17 1401    metoprolol tartrate (LOPRESSOR) tablet 12.5 mg, 12.5 mg, Oral, Q12H, Cori Fountain MD, 12.5 mg at 02/28/17 5496    escitalopram oxalate (LEXAPRO) tablet 10 mg, 10 mg, Oral, DAILY, Cori Fountain MD, 10 mg at 02/28/17 0926    oxyCODONE-acetaminophen (PERCOCET) 5-325 mg per tablet 1 Tab, 1 Tab, Oral, Q6H PRN, Cori Fountain MD, 1 Tab at 02/28/17 1116    acetaminophen (TYLENOL) tablet 650 mg, 650 mg, Oral, Q4H PRN, Cori Fountain MD, 650 mg at 02/25/17 1720    therapeutic multivitamin (THERAGRAN) tablet 1 Tab, 1 Tab, Oral, DAILY, LIZETT Rodríguez, 1 Tab at 02/28/17 0926    pantoprazole (PROTONIX) tablet 40 mg, 40 mg, Oral, DAILY, LIZETT Rodríguez, 40 mg at 02/28/17 9740    insulin lispro (HUMALOG) injection, , SubCUTAneous, AC&HS, LIZETT Rodríguez, Stopped at 02/22/17 1630    sodium chloride (NS) flush 10-30 mL, 10-30 mL, InterCATHeter, PRN, Cori Fountain MD    sodium chloride (NS) flush 10 mL, 10 mL, InterCATHeter, Q24H, Cori Fountain MD, 10 mL at 02/27/17 1848    sodium chloride (NS) flush 10 mL, 10 mL, InterCATHeter, PRN, Cori Fountain MD    sodium chloride (NS) flush 10-40 mL, 10-40 mL, InterCATHeter, Q8H, Cori Fountain MD, 30 mL at 02/28/17 0600    bacitracin 500 unit/gram packet 1 Packet, 1 Packet, Topical, PRN, Cori Fountain MD    0.9% sodium chloride infusion 250 mL, 250 mL, IntraVENous, PRN, Julia Ek, NP    enoxaparin (LOVENOX) injection 40 mg, 40 mg, SubCUTAneous, Q12H, Wade Vasquez MD, 40 mg at 02/28/17 0616    0.9% sodium chloride infusion 250 mL, 250 mL, IntraVENous, PRN, Wade Vasquez MD    albuterol-ipratropium (DUO-NEB) 2.5 MG-0.5 MG/3 ML, 3 mL, Nebulization, Q6H RT, Stefania WELLER Christian Roca, 3 mL at 02/28/17 0836    sodium chloride (NS) flush 5-10 mL, 5-10 mL, IntraVENous, PRN, Sherrill Aas, DO    glucose chewable tablet 16 g, 4 Tab, Oral, PRN, Livier Duong MD    glucagon [facility]) injection 1 mg, 1 mg, IntraMUSCular, PRN, Livier Duong MD    dextrose (D50W) injection syrg 12.5-25 g, 25-50 mL, IntraVENous, PRN, Livier Duong MD    Lab Results   Component Value Date/Time    Glucose 95 02/28/2017 05:00 AM    Glucose 80 02/27/2017 01:35 PM    Glucose 81 02/27/2017 05:07 AM    Glucose 351 02/26/2017 05:12 AM    Glucose 80 02/25/2017 05:55 AM        Assessment/Plan     Active Problems:    Septic shock ([facility] [address] (2/10/2017)      Acute respiratory failure ([facility] [address] (2/13/2017)      Debility (2/13/2017)      Septic shock, resolved    Acute hypoxic resp failure 2/2 above, resolved   JOAQUIN on CKD 2/2 sepsis, reolved  acute metabolic encephalopathy, improved  Hypernatremia, improving  D5W   DM, stable   Morbid obesity  HTN stable  Fever, resolved   Anemia, stable   Hypokalemia, improved   Multiple ulcer including B/L heels, large sacral ulcer, POA  Cont wound care   May need colostomy, awaiting JFS decision.   Nnamdi Schumacher MD  2/28/2017, 11:39 AM

## 2017-02-28 NOTE — PROGRESS NOTES
Problem: Dysphagia (Adult)  Goal: *Acute Goals and Plan of Care (Insert Text)  Dysphagia Present: moderate    Recommendations:  Diet: puree/thin liquids   Meds: one at a time  Aspiration Precautions  Other: 1:1 feeding assistance, alternate solids/liquids    Goals: Patient will:  1. Tolerate PO trials with 0 s/s overt distress in 4/5 trials  2. Utilize compensatory swallow strategies/maneuvers (decrease bite/sip, size/rate, alt. liq/sol) with min cues in 4/5 trials  3. Perform oral-motor/laryngeal exercises to increase oropharyngeal swallow function with min cues           Outcome: Progressing Towards Goal  SPEECH LANGUAGE PATHOLOGY DYSPHAGIA TREATMENT     Patient: Francie Bravo (73 y.o. female)  Date: 2/28/2017  Diagnosis: Septic shock (HCC)  ulcer decubitus  septic shock <principal problem not specified>  Procedure(s) (LRB):  debridement large sacral decubitus ulcer  (N/A)    Precautions:  Aspiration, Skin, Contact, Fall      ASSESSMENT:  Pt seen for skilled meal assessment. Pt presents with mild oropharyngeal dysphagia in the setting of AMS. Pt A&Ox4. Unable to put HOB>30 due to pain. Pt accepted thin liquid + straw with no s/sx aspiration. Functional laryngeal elevation. Pt accepted puree with mild lingual residue cleared with liquid wash. 0x instances of aspiration noted. Pt refused mech soft trial. Will attempt following day. Recommend continuation of puree/thin liquid diet. ST to continue to follow 1-2 days for diet tolerance and education. Progression toward goals:  [ ]         Improving appropriately and progressing toward goals  [X]         Improving slowly and progressing toward goals  [ ]         Not making progress toward goals and plan of care will be adjusted       PLAN:  Recommendations and Planned Interventions:  puree/thin liquid diet  Patient continues to benefit from skilled intervention to address the above impairments. Continue treatment per established plan of care.   Discharge Recommendations:  Skilled Nursing Facility       SUBJECTIVE:   Patient stated Moy Aly. OBJECTIVE:   Cognitive and Communication Status:  Neurologic State: Alert  Orientation Level: Oriented X4  Cognition: Follows commands  Perception: Appears intact  Perseveration: No perseveration noted  Safety/Judgement: Fall prevention  Dysphagia Treatment:  Oral Assessment:  Oral Assessment  Labial: No impairment  Dentition: Full  Oral Hygiene: good  Lingual: No impairment  Velum: No impairment  Mandible: No impairment  P.O. Trials:              Patient Position: HOB30              Vocal quality prior to P.O.: Low volume              Consistency Presented: Puree, Thin liquid              How Presented: SLP-fed/presented              How Much:  (x3oz thin, x1oz puree, x1 mech soft)              Bolus Acceptance: No impairment              Bolus Formation/Control: Impaired              Type of Impairment: Delayed, Mastication              Propulsion: No impairment              Oral Residue: Lingual, Less than 10% of bolus              Initiation of Swallow: No impairment              Laryngeal Elevation: Functional              Aspiration Signs/Symptoms: None              Pharyngeal Phase Characteristics: No impairment, issues, or problems               Effective Modifications:  Alternate liquids/solids              Cues for Modifications: Minimal                                Oral Phase Severity: Mild              Pharyngeal Phase Severity : Mild                PAIN:  Start of Tx: 8  End of Tx: 8      After treatment:   [ ]              Patient left in no apparent distress sitting up in chair  [X]              Patient left in no apparent distress in bed  [X]              Call bell left within reach  [ ]              Nursing notified  [ ]              Family present  [ ]              Caregiver present  [ ]              Bed alarm activated         COMMUNICATION/EDUCATION:   [X]        Aspiration precautions; swallow safety; compensatory techniques  [ ]        Patient unable to participate in education; education ongoing with staff  [ ]         Posted safety precautions in patient's room.   [ ]         Oral-motor/laryngeal strengthening exercises        Magy Chaudhry   SLP Intern     Time Calculation: 18 mins

## 2017-02-28 NOTE — ROUTINE PROCESS
Bedside and Verbal shift change report given to [address] [place] [address] (oncoming nurse) by Ketan Esteban RN (offgoing nurse). Report included the following information SBAR, Kardex, Intake/Output, MAR, Recent Results and Cardiac Rhythm Sinus Rhythm/ Sinus Tach. 1030 Pt resting in bed with no complaints of pain and no change to condition. Robert 64 spoke with patient about guardianship.     1600 Pt resting in bed with no change to condition. Will continue to monitor. 1800 Dressing change completed as follows. Sacrum, Buttocks, Thighs (Bilaterally ) and feet (bilaterally). Bedside and Verbal shift change report given to Berenice DOZIER (oncoming nurse) by [address] [place] [address] (offgoing nurse). Report included the following information SBAR, Kardex, Intake/Output, MAR, Recent Results and Cardiac Rhythm Sinus Rhythm/ Sinus Tach.

## 2017-02-28 NOTE — PROGRESS NOTES
Problem: Self Care Deficits Care Plan (Adult)  Goal: *Acute Goals and Plan of Care (Insert Text)  Occupational Therapy Goals  Initiated 2/28/2017 within 7 day(s). 1. Patient will perform grooming tasks with minimal assistance/contact guard assist.  2. Patient will perform self-feeding with minimal assistance/contact guard assist, utilizing compensatory strategies, prn.  3. Patient will perform upper body dressing with moderate assistance . 4. Patient will participate in upper extremity therapeutic exercise/activities with moderate assistance for 8 minutes to increase strength/function of BUEs for ADLs. 5. Patient will utilize energy conservation techniques during functional activities with verbal cues. Outcome: Progressing Towards Goal  OCCUPATIONAL THERAPY EVALUATION     Patient: Milton William (47 y.o. female)  Date: 2/28/2017  Primary Diagnosis: Septic shock (HCC)  ulcer decubitus  septic shock  Procedure(s) (LRB):  debridement large sacral decubitus ulcer  (N/A)     Precautions:  Fall; contact      ASSESSMENT :  Based on the objective data described below, the patient presents with impairments with regard to bed mobility in preparation for ADLs, activity tolerance, BUE function/strength, and overall independence in ADLs. Patient seen at bed level secondary to significant deconditioning; patient non-interactive with flat affect t/o session. Patient initially evaluated by me on 1/20/17; at that time, patient was performing self-feeding and grooming tasks at bed level utilizing her RUE (specified in my note from 1/20/17). Currently, patient demonstrating no movement of RUE. Junito Ballard RN and Dr. Kalani Santos notified and verbalized understanding. Patient able to bring L hands towards mouth with support provided at elbow in preparation for self-feeding. Patient will benefit from skilled OT services to address UE strength/function in preparation for self- feeding and grooming tasks.  Patient left with needs within reach and BUEs positioned on pillows to decrease edema; Camila Felix RN notified. Patient will benefit from skilled intervention to address the above impairments. Patients rehabilitation potential is considered to be Fair  Factors which may influence rehabilitation potential include:   [ ]             None noted  [ ]             Mental ability/status  [X]             Medical condition  [ ]             Home/family situation and support systems  [ ]             Safety awareness  [ ]             Pain tolerance/management  [ ]             Other:      Recommendations for nursing: Position BUEs on pillows to reduce edema  Written on communication board: No  Verbally communicated to: Camila Felix RN          PLAN :  Recommendations and Planned Interventions:  [X]               Self Care Training                  [X]        Therapeutic Activities  [ ]               Functional Mobility Training    [ ]        Cognitive Retraining  [X]               Therapeutic Exercises           [X]        Endurance Activities  [X]               Balance Training                   [ ]        Neuromuscular Re-Education  [ ]               Visual/Perceptual Training     [ ]   Home Safety Training  [X]               Patient Education                 [X]        Family Training/Education  [ ]               Other (comment):     Frequency/Duration: Patient will be followed by occupational therapy 3-5 times a week to address goals. Discharge Recommendations: Skilled Nursing Facility  Further Equipment Recommendations for Discharge: TBD       SUBJECTIVE:   Patient stated I just don't eat\" (when asked how she eats if she's unable to feed herself).       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    Chronic pain       knees    Chronic pain of both knees 5/19/2016    Cigarette nicotine dependence in remission 5/19/2016  Diabetes ([facility] [address]      Fatty liver 2016    Headache      Hypertension      Liver disease       fatty liver    Lymphedema 2016    Morbid obesity ([facility] [address] 2016    Psychotic disorder       anxiety and panic attacks    Thyroid disease       Past Surgical History:   Procedure Laterality Date    HX  SECTION       HX CHOLECYSTECTOMY       HX GYN   2006     hysterectomy     Barriers to Learning/Limitations: None  Compensate with: visual, verbal, tactile, kinesthetic cues/model     GCODES:  Self Care  Current  CM= 80-99%   Goal  CJ= 20-39%. The severity rating is based on the Other Functional Assessment, MMT, ROM     Eval Complexity: History: MEDIUM Complexity : Expanded review of history including physical, cognitive and psychosocial  history ; Examination: MEDIUM Complexity : 3-5 performance deficits relating to physical, cognitive , or psychosocial skils that result in activity limitations and / or participation restrictions; Decision Making:HIGH Complexity : Patient presents with comorbidities that affect occupational performance. Signifigant modification of tasks or assistance (eg, physical or verbal) with assessment (s) is necessary to enable patient to complete evaluation      Prior Level of Function/Home Situation: Per patient report, she needs assistance with self-feeding, was sponge bathing at bed level and utilizing RW for functional mobility PTA.    Home Situation  Home Environment: Private residence  # Steps to Enter: 6  One/Two Story Residence: One story  Living Alone: No  Support Systems: Child(pau)  Patient Expects to be Discharged to[de-identified] Private residence  Current DME Used/Available at Home: None  Tub or Shower Type:  (Patient sponge bathes at bed level)  [X]  Right hand dominant           [ ]  Left hand dominant  Cognitive/Behavioral Status:  Neurologic State: Alert  Orientation Level: Oriented to person;Oriented to place  Cognition: Follows commands  Safety/Judgement: Fall prevention      Skin: Intact (BUEs)  Edema: Significant swelling noted in BUEs     Vision/Perceptual:    Acuity: Within Defined Limits       Coordination:  Coordination: Grossly decreased, non-functional (BUEs; RUE: grossly decreased; LUE: WFL)  Fine Motor Skills-Upper: Left Intact; Right Impaired    Gross Motor Skills-Upper: Left Intact; Right Impaired     Strength:  Strength: Grossly decreased, non-functional (BUEs; RUE: 0/5; LUE: approx 2+/5)     Tone & Sensation:  Tone: Abnormal (BUEs; flaccid LUE; RUE: normal)  Sensation: Intact (BUEs)     Range of Motion:  AROM: Grossly decreased, non-functional (BUEs; RUE: no active movement; LUE: full elbow flexion)  PROM: Generally decreased, functional (BUEs)     Functional Mobility and Transfers for ADLs:  Bed Mobility:  Rolling:  (not assessed; patient with fatigue/pain w/ movement of BUEs)  Supine to Sit:  (not assessed; patient seen at bed level)     Transfers:  Sit to Stand:  (not assessed; patient seen at bed level)              Toilet Transfer :  (not assessed; patient seen at bed level)                ADL Assessment:  Feeding: Moderate assistance  Oral Facial Hygiene/Grooming: Moderate assistance  Bathing: Total assistance  Upper Body Dressing: Maximum assistance  Lower Body Dressing: Total assistance  Toileting: Total assistance (obando catheter)     Cognitive Retraining  Safety/Judgement: Fall prevention     Therapeutic Activity:  While supine, patient educated on and required total assistance to position BUEs in preparation for engaging in functional tasks. With support provided at L elbow, patient able to perform purposeful movement and flex elbow approx 3/4 range and bring hand to mouth with additional time in preparation for self-feeding and grooming tasks.       Pain:  Pre treatment pain level: 0/10  Post treatment pain level: 0/10  Pain Scale 1: Numeric (0 - 10)     Activity Tolerance:  Poor  Please refer to the flowsheet for vital signs taken during this treatment. After treatment:   [ ] Patient left in no apparent distress sitting up in chair  [X] Patient left in no apparent distress in bed  [X] Call bell left within reach  [X] Nursing notified  [ ] Caregiver present  [ ] Bed alarm activated      COMMUNICATION/EDUCATION: Patient educated on role of OT and POC. She verbalized understanding; may need reinforcement. [ ] Home safety education was provided and the patient/caregiver indicated understanding. [X] Patient/family have participated as able in goal setting and plan of care. [X] Patient/family agree to work toward stated goals and plan of care. [ ] Patient understands intent and goals of therapy, but is neutral about his/her participation. [ ] Patient is unable to participate in goal setting and plan of care.      Thank you for this referral.     Vandana Vang MS OTR/L  Time Calculation: 23 mins

## 2017-03-01 NOTE — PROGRESS NOTES
Problem: Dysphagia (Adult)  Goal: *Acute Goals and Plan of Care (Insert Text)  Dysphagia Present: moderate    Recommendations:  Diet: puree/thin liquids   Meds: one at a time  Aspiration Precautions  Other: 1:1 feeding assistance, alternate solids/liquids    Goals: Patient will:  1. Tolerate PO trials with 0 s/s overt distress in 4/5 trials -goal met  2. Utilize compensatory swallow strategies/maneuvers (decrease bite/sip, size/rate, alt. liq/sol) with min cues in 4/5 trials - goal met  3. Perform oral-motor/laryngeal exercises to increase oropharyngeal swallow function with min cues - not indicated           Outcome: Progressing Towards Goal  SPEECH LANGUAGE PATHOLOGY DYSPHAGIA TREATMENT & DISCHARGE     Patient: Ashutosh Desai (33 y.o. female)  Date: 3/1/2017  Diagnosis: Septic shock ([facility] [address]  ulcer decubitus  septic shock <principal problem not specified>  Procedure(s) (LRB):  debridement large sacral decubitus ulcer  (N/A)    Precautions: aspiration Fall, Contact      ASSESSMENT:  Pt seen for skilled meal assessment. Pt continues to present with mild oropharyngeal dysphagia. Pt A&Ox4. Pt accepted thin liquid + straw with strong cough after 1st trial. Functional laryngeal elevation. Pt accepted puree with mild lingual residue; cleared with liquid wash. No s/s aspiration noted. Pt continues to refuse mech soft trials. SLP re-educated pt on importance of po intake for recovery and wound healing; pt verbalized comprehension but continues to refuse mech-soft. Accordingly, SLP recommends continuation of puree/thin liquid diet. Maximum therapeutic gains met; safest, least restrictive diet achieved in current in-patient/acute setting. Accordingly, SLP to d/c intervention at this time.    Progression toward goals:  [X]        Goals approximated  [ ]         Improving slowly and progressing toward goals  [ ]         Not making progress toward goals and plan of care will be adjusted       PLAN:  Recommendations and Planned Interventions:  puree/thin liquid diet. Maximum therapeutic gains met; safest, least restrictive diet achieved. D/C ST intervention at this time. Discharge Recommendations:  Skilled Nursing Facility       SUBJECTIVE:   Patient stated Bre Payne. OBJECTIVE:   Cognitive and Communication Status:  Neurologic State: Drowsy  Orientation Level: Oriented X4  Cognition: Follows commands  Perception: Appears intact  Perseveration: No perseveration noted  Safety/Judgement: Fall prevention  Dysphagia Treatment:  Oral Assessment:  Oral Assessment  Labial: No impairment  Dentition: Full  Oral Hygiene: good  Lingual: No impairment  Velum: No impairment  Mandible: No impairment  P.O. Trials:              Patient Position: [facility]              Vocal quality prior to P.O.: Low volume              Consistency Presented: Pudding, Thin liquid              How Presented: Cup/sip, Spoon, Straw, SLP-fed/presented              How Much:  (x3oz thin, x1oz puree, x1 mech soft)              Bolus Acceptance: No impairment              Bolus Formation/Control: Impaired              Type of Impairment: Incomplete, Mastication              Propulsion: No impairment              Oral Residue: Less than 10% of bolus, Lingual              Initiation of Swallow: No impairment              Laryngeal Elevation: Functional              Aspiration Signs/Symptoms: Strong cough              Pharyngeal Phase Characteristics: No impairment, issues, or problems               Effective Modifications:  Alternate liquids/solids              Cues for Modifications: Minimal                                Oral Phase Severity: Mild              Pharyngeal Phase Severity : Mild                PAIN:  Start of Tx: 0  End of Tx: 0      After treatment:   [ ]              Patient left in no apparent distress sitting up in chair  [X]              Patient left in no apparent distress in bed  [X]              Call bell left within reach  [ ]              Nursing notified  [ ]              Family present  [ ]              Caregiver present  [ ]              Bed alarm activated         COMMUNICATION/EDUCATION:   [X]        Aspiration precautions; swallow safety; compensatory techniques  [ ]        Patient unable to participate in education; education ongoing with staff  [ ]         Posted safety precautions in patient's room.   [ ]         Oral-motor/laryngeal strengthening exercises        Los Serve   SLP Intern     Time Calculation: 15 mins        Comments:

## 2017-03-01 NOTE — PROGRESS NOTES
0700: Bedside shift change report given to Marcelo Coughlin RN (oncoming nurse) by Vanesa Bean RN (offgoing nurse). Report included the following information SBAR, Kardex, Procedure Summary, Intake/Output and MAR.     1000: morning medications given, Pt. Did not want to take them, only took them after they dissolved in water. Pt. Alert and  Oriented x4. Not in pain at this time, only during dressing changes and for a while after dressing changes. 1900: Bedside shift change report given to Yamil Wong RN(oncoming nurse) by Marcelo Coughlin RN (offgoing nurse). Report included the following information SBAR, Kardex, Procedure Summary, Intake/Output and MAR.

## 2017-03-01 NOTE — PROGRESS NOTES
[facility] 3S CARDIAC TELE  [address] [place] [address]  [phone]  Colon and Rectal Surgery Progress Note      Patient: Ashutosh Desai MRN: [id number]  SSN: [id number]    YOB: 1955  Age: 64 y.o. Sex: female      Admit Date: 2/10/2017    LOS: 19 days     Subjective:     Resting comfortably in bed  No C/O  Told her that her surgery would be tomorrow    Objective:     Vitals:    03/01/17 0208 03/01/17 0526 03/01/17 0557 03/01/17 0807   BP: 132/60  126/58    Pulse: (!) 105  (!) 103    Resp: 22  22    Temp: 98.4 °F (36.9 °C)  98.8 °F (37.1 °C)    SpO2: 99%  100% 98%   Weight:  (!) 161.9 kg (357 lb)     Height:            Intake and Output:  Current Shift:    Last three shifts: 02/27 1901 - 03/01 0700  In: 3456.7 [P.O.:570; I.V.:2886.7]  Out: 1075 [Urine:1025; Drains:50]    Physical Exam:   GENERAL: alert, cooperative, no distress, appears stated age, morbidly obese  ABDOMEN: soft, non-tender. Bowel sounds normal. No masses,  no organomegaly  EXTREMITIES:  extremities normal, atraumatic, no cyanosis or edema  SKIN: multiple decubitus ulcers  NEUROLOGIC: negative    Lab/Data Review:  BMP:   Lab Results   Component Value Date/Time     03/01/2017 05:10 AM    K 3.9 03/01/2017 05:10 AM     (H) 03/01/2017 05:10 AM    CO2 16 (L) 03/01/2017 05:10 AM    AGAP 13 03/01/2017 05:10 AM    GLU 87 03/01/2017 05:10 AM    BUN 18 03/01/2017 05:10 AM    CREA 1.29 03/01/2017 05:10 AM    GFRAA 51 (L) 03/01/2017 05:10 AM    GFRNA 42 (L) 03/01/2017 05:10 AM     CBC:   Lab Results   Component Value Date/Time    WBC 9.6 03/01/2017 12:33 AM    HGB 8.0 (L) 03/01/2017 12:33 AM    HCT 24.8 (L) 03/01/2017 12:33 AM     03/01/2017 12:33 AM     Recent Glucose Results:   Lab Results   Component Value Date/Time    GLU 87 03/01/2017 05:10 AM            Assessment:     Active Problems:    Septic shock ([facility] [address] (2/10/2017)      Acute respiratory failure ([facility] [address] (2/13/2017)      Debility (2/13/2017)        Plan:      For Robotic diverting colostomy , possible open colostomy, debridement of decubitus ulcer by Dr. Kermit Vines  Pt made NPO after midnight  Consent written, Guardian from [facility] to sign today    Signed By: Petr Gonzalez NP        March 1, 2017

## 2017-03-01 NOTE — PROGRESS NOTES
Problem: Self Care Deficits Care Plan (Adult)  Goal: *Acute Goals and Plan of Care (Insert Text)  Occupational Therapy Goals  Initiated 2/28/2017 within 7 day(s). 1. Patient will perform grooming tasks with minimal assistance/contact guard assist.  2. Patient will perform self-feeding with minimal assistance/contact guard assist, utilizing compensatory strategies, prn.  3. Patient will perform upper body dressing with moderate assistance . 4. Patient will participate in upper extremity therapeutic exercise/activities with moderate assistance for 8 minutes to increase strength/function of BUEs for ADLs. 5. Patient will utilize energy conservation techniques during functional activities with verbal cues. Outcome: Progressing Towards Goal  OCCUPATIONAL THERAPY TREATMENT     Patient: Francie Bravo (1 y.o. female)  Date: 3/1/2017  Diagnosis: Septic shock ([address]  ulcer decubitus  septic shock <principal problem not specified>  Procedure(s) (LRB):  debridement large sacral decubitus ulcer  (N/A)    Precautions: Fall, Contact  Chart, occupational therapy assessment, plan of care, and goals were reviewed. ASSESSMENT:  Pt w/flat affect, agreeable to OT. BUE edematous R > L. Performed ROM and retrograde massage. Pt w/active ROM LUE, requires assist to achieve full ROM. Provided resistive \"glove ball\". BUE elevated at end of session. EDUCATION Pt educated on importance of UE TherEx and encouraged to perform throughout the day. Progression toward goals:  [ ]          Improving appropriately and progressing toward goals  [X]          Improving slowly and progressing toward goals  [ ]          Not making progress toward goals and plan of care will be adjusted       PLAN:  Patient continues to benefit from skilled intervention to address the above impairments. Continue treatment per established plan of care.   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       OBJECTIVE DATA SUMMARY:         Cognitive/Behavioral Status:  Neurologic State: Alert  Orientation Level: Oriented X4  Cognition: Decreased attention/concentration, Follows commands  Safety/Judgement: Fall prevention  Functional Mobility and Transfers for ADLs:  ADL Intervention:  Grooming  Washing Face: Maximum assistance (w/hand over hand assist)  Washing Hands: Maximum assistance     Therapeutic Exercises:   PROM > AAROM RUE shoulder/elbow/wrist flexion/extension  AAROM > AROM LUE all planes     Pain:  Pre Treatment:0  Post Treatment:0     Activity Tolerance:    Poor     Please refer to the flowsheet for vital signs taken during this treatment.   After treatment:   [ ]  Patient left in no apparent distress sitting up in chair  [X]  Patient left in no apparent distress in bed  [X]  Call bell left within reach  [X]  Nursing, [address], notified  [ ]  Caregiver present  [ ]  Bed alarm activated     KEEGAN Rajan  Time Calculation: 30 mins

## 2017-03-01 NOTE — PROGRESS NOTES
2200 - Paged Dr. Palomo Son to inform HR 140s. Pt denies pain when asked. HR normally 110s  2205 - Spoke with Dr. Palomo Son. EKG ordered and morphine 2mg q4H PRN. 2216 - Paged Dr. Palomo Son to inform of EKG results. 2232 - Morphine given  2325 -   0015 - Notified by Monitor tech HR back to 140s.  0023 - Paged Dr. Palomo Son to inform. Ordered CBC to check H/H  0128 - Paged Dr. Palomo Son to inform of CBC. H/H 8.0/24.8. Ordered to increase metoprolol. 200 - Med given. Premedicated with pain medication for dressing change. 0300 - Dressing change completed with 4 additional staff members. During dressing change, notified by monitor tech pt had an 8 beat and 24 beat run of Vtach. Certain wound areas pt moans out and tenses up.   0425 - Pt sleeping quietly, .

## 2017-03-01 NOTE — PROGRESS NOTES
2 [facility]  Hospitalist Division    Daily progress Note    Patient: Kimani Roberts MRN: [id number]  CSN: [id number]    YOB: 1955  Age: 64 y.o. Sex: female    DOA: 2/10/2017 LOS:  LOS: 19 days                    Subjective:     CC: sepsis   Alert and awake today, NAD, no pain   Poor po intake       Objective:      Visit Vitals    /59    Pulse 99    Temp 98.7 °F (37.1 °C)    Resp 18    Ht 5' 5\" (1.651 m)    Wt (!) 161.9 kg (357 lb)    SpO2 98%    Breastfeeding No    BMI 59.41 kg/m2       Physical Exam:    NC/AT  NO JVD,TMG  S1/S2 RRR  CTAB, NO WHEEZING  NT,ND, NABS  +  EDEMA  Sacral and LE ulcers   Decrease MS pn Rt and B/L LE         Intake and Output:  Current Shift:     Last three shifts:  02/27 1901 - 03/01 0700  In: 3456.7 [P.O.:570;  I.V.:2886.7]  Out: 1075 [Urine:1025; Drains:50]    Recent Results (from the past 24 hour(s))   GLUCOSE, POC    Collection Time: 02/28/17  5:04 PM   Result Value Ref Range    Glucose (POC) 95 70 - 110 mg/dL   GLUCOSE, POC    Collection Time: 02/28/17  9:26 PM   Result Value Ref Range    Glucose (POC) 98 70 - 110 mg/dL   EKG, 12 LEAD, SUBSEQUENT    Collection Time: 02/28/17 10:11 PM   Result Value Ref Range    Ventricular Rate 140 BPM    Atrial Rate 140 BPM    P-R Interval 140 ms    QRS Duration 86 ms    Q-T Interval 288 ms    QTC Calculation (Bezet) 439 ms    Calculated P Axis 29 degrees    Calculated R Axis 45 degrees    Calculated T Axis -97 degrees    Diagnosis       Sinus tachycardia  Nonspecific ST and T wave abnormality  Abnormal ECG  When compared with ECG of 23-FEB-2017 03:14,  No significant change was found     CBC WITH AUTOMATED DIFF    Collection Time: 03/01/17 12:33 AM   Result Value Ref Range    WBC 9.6 4.6 - 13.2 K/uL    RBC 2.73 (L) 4.20 - 5.30 M/uL    HGB 8.0 (L) 12.0 - 16.0 g/dL    HCT 24.8 (L) 35.0 - 45.0 %    MCV 90.8 74.0 - 97.0 FL    MCH 29.3 24.0 - 34.0 PG    MCHC 32.3 31.0 - 37.0 g/dL    RDW 21.2 (H) 11.6 - 14.5 %    PLATELET 762 702 - 589 K/uL    MPV 9.2 9.2 - 11.8 FL    NEUTROPHILS 48 40 - 73 %    LYMPHOCYTES 26 21 - 52 %    MONOCYTES 13 (H) 3 - 10 %    EOSINOPHILS 12 (H) 0 - 5 %    BASOPHILS 1 0 - 2 %    ABS. NEUTROPHILS 4.7 1.8 - 8.0 K/UL    ABS. LYMPHOCYTES 2.5 0.9 - 3.6 K/UL    ABS. MONOCYTES 1.2 0.05 - 1.2 K/UL    ABS. EOSINOPHILS 1.1 (H) 0.0 - 0.4 K/UL    ABS.  BASOPHILS 0.1 (H) 0.0 - 0.06 K/UL    DF AUTOMATED     MAGNESIUM    Collection Time: 03/01/17  5:10 AM   Result Value Ref Range    Magnesium 1.8 1.8 - 2.4 mg/dL   METABOLIC PANEL, BASIC    Collection Time: 03/01/17  5:10 AM   Result Value Ref Range    Sodium 145 136 - 145 mmol/L    Potassium 3.9 3.5 - 5.5 mmol/L    Chloride 116 (H) 100 - 108 mmol/L    CO2 16 (L) 21 - 32 mmol/L    Anion gap 13 3.0 - 18 mmol/L    Glucose 87 74 - 99 mg/dL    BUN 18 7.0 - 18 MG/DL    Creatinine 1.29 0.6 - 1.3 MG/DL    BUN/Creatinine ratio 14 12 - 20      GFR est AA 51 (L) >60 ml/min/1.73m2    GFR est non-AA 42 (L) >60 ml/min/1.73m2    Calcium 7.8 (L) 8.5 - 10.1 MG/DL   GLUCOSE, POC    Collection Time: 03/01/17  8:20 AM   Result Value Ref Range    Glucose (POC) 95 70 - 110 mg/dL   GLUCOSE, POC    Collection Time: 03/01/17 11:41 AM   Result Value Ref Range    Glucose (POC) 105 70 - 110 mg/dL         Current Facility-Administered Medications:     metoprolol tartrate (LOPRESSOR) tablet 25 mg, 25 mg, Oral, Q12H, Javan Juan MD, 25 mg at 03/01/17 0941    ondansetron (ZOFRAN) injection 4 mg, 4 mg, IntraVENous, Q6H PRN, Shaheen Brown MD    morphine injection 2 mg, 2 mg, IntraVENous, Q4H PRN, Javan Juan MD, 2 mg at 03/01/17 1214    dextrose 5% with KCl 20 mEq/L infusion, , IntraVENous, CONTINUOUS, Javan Juan MD, Last Rate: 100 mL/hr at 03/01/17 0850    escitalopram oxalate (LEXAPRO) tablet 10 mg, 10 mg, Oral, DAILY, Riccardo Narvaez MD, 10 mg at 03/01/17 0941    oxyCODONE-acetaminophen (PERCOCET) 5-325 mg per tablet 1 Tab, 1 Tab, Oral, Q6H PRN, Genny Mathews MD, 1 Tab at 02/28/17 1116    acetaminophen (TYLENOL) tablet 650 mg, 650 mg, Oral, Q4H PRN, Genny Mathews MD, 650 mg at 02/25/17 1720    therapeutic multivitamin (THERAGRAN) tablet 1 Tab, 1 Tab, Oral, DAILY, Lorrain Console, [place], 1 Tab at 03/01/17 0942    pantoprazole (PROTONIX) tablet 40 mg, 40 mg, Oral, DAILY, Lorrain Console, PA, 40 mg at 03/01/17 0941    insulin lispro (HUMALOG) injection, , SubCUTAneous, AC&HS, Lorrain Console, PA, Stopped at 02/22/17 1630    sodium chloride (NS) flush 10-30 mL, 10-30 mL, InterCATHeter, PRN, Genny Mathews MD    sodium chloride (NS) flush 10 mL, 10 mL, InterCATHeter, Q24H, Genny Mathews MD, 10 mL at 02/28/17 1909    sodium chloride (NS) flush 10 mL, 10 mL, InterCATHeter, PRN, Genny Mathews MD    sodium chloride (NS) flush 10-40 mL, 10-40 mL, InterCATHeter, Q8H, Genny Mathews MD, 10 mL at 03/01/17 5146    bacitracin 500 unit/gram packet 1 Packet, 1 Packet, Topical, PRN, Genny Mathews MD    0.9% sodium chloride infusion 250 mL, 250 mL, IntraVENous, PRN, Sandy Gorman NP    enoxaparin (LOVENOX) injection 40 mg, 40 mg, SubCUTAneous, Q12H, Wade Vasquez MD, 40 mg at 03/01/17 0643    0.9% sodium chloride infusion 250 mL, 250 mL, IntraVENous, PRN, Wade Vasquez MD    albuterol-ipratropium (DUO-NEB) 2.5 MG-0.5 MG/3 ML, 3 mL, Nebulization, Q6H RT, Lorrain Mahnazole, PA, 3 mL at 03/01/17 0807    sodium chloride (NS) flush 5-10 mL, 5-10 mL, IntraVENous, PRN, Sakina Elias DO    glucose chewable tablet 16 g, 4 Tab, Oral, PRN, Leonie Saha MD    glucagon [facility]) injection 1 mg, 1 mg, IntraMUSCular, PRN, Leonie Saha MD    dextrose (D50W) injection syrg 12.5-25 g, 25-50 mL, IntraVENous, PRN, Leonie Saha MD    Lab Results   Component Value Date/Time    Glucose 87 03/01/2017 05:10 AM    Glucose 95 02/28/2017 05:00 AM    Glucose 80 02/27/2017 01:35 PM    Glucose 81 02/27/2017 05:07 AM    Glucose 351 02/26/2017 05:12 AM        Assessment/Plan     Active Problems:    Septic shock (HCC) (2/10/2017)      Acute respiratory failure (HCC) (2/13/2017)      Debility (2/13/2017)      Septic shock, resolved    Acute hypoxic resp failure 2/2 above, resolved   JOAQUIN on CKD 2/2 sepsis, reolved  acute metabolic encephalopathy, improved  Hypernatremia,resolved with   D5W   DM, stable   Morbid obesity  HTN stable  Fever, resolved   Anemia, stable   Hypokalemia, resolved   Multiple ulcer including B/L heels, large sacral ulcer, POA  Cont wound care   jesusita for diverting colostomy and debridement on Thursday          Marlene Alonso MD  3/1/2017, 11:39 AM

## 2017-03-01 NOTE — PROGRESS NOTES
Problem: Pain  Goal: *Control of Pain  Outcome: Progressing Towards Goal  Additional pain medication ordered, medicated prior to wound care

## 2017-03-02 NOTE — ANESTHESIA POSTPROCEDURE EVALUATION
Post-Anesthesia Evaluation and Assessment    Patient: Yadira Peñaloza MRN: [id number]  SSN: [id number]    YOB: 1955  Age: 64 y.o. Sex: female       Cardiovascular Function/Vital Signs  Visit Vitals    /74    Pulse (!) 133    Temp 36.9 °C (98.4 °F)    Resp 24    Ht 5' 5\" (1.651 m)    Wt (!) 161.9 kg (357 lb)    SpO2 100%    Breastfeeding No    BMI 59.41 kg/m2       Patient is status post general anesthesia for Procedure(s):  davinci colostomy XI ROBOTIC ASSISTED  debrudement of large decubitus ulcer sacral.    Nausea/Vomiting: None    Postoperative hydration reviewed and adequate. Pain:  Pain Scale 1: Numeric (0 - 10) (03/02/17 0649)  Pain Intensity 1: 0 (03/02/17 0649)   Managed    Neurological Status:   Neuro  Neurologic State: Alert (03/01/17 2020)  Orientation Level: Oriented to person;Oriented to situation;Oriented to place; Disoriented to time (03/01/17 2020)  Cognition: Follows commands (03/01/17 2020)  Speech: Clear (03/01/17 2020)  Assessment L Pupil: Brisk (03/01/17 2020)  Size L Pupil (mm): 3 (02/28/17 0802)  Assessment R Pupil: Brisk (03/01/17 2020)  Size R Pupil (mm): 3 (02/28/17 0802)  LUE Motor Response: Weak (03/01/17 2020)  LLE Motor Response: Weak (03/01/17 2020)  RUE Motor Response: Weak (03/01/17 2020)  RLE Motor Response: Weak (03/01/17 2020)   At baseline    Mental Status and Level of Consciousness: Alert and oriented     Pulmonary Status:   O2 Device: Oxygen mask;Oral airway (03/02/17 1039)   Adequate oxygenation and airway patent    Complications related to anesthesia: None    Post-anesthesia assessment completed.  No concerns    Signed By: Lin Santa MD     March 2, 2017

## 2017-03-02 NOTE — PERIOP NOTES
1037:  Patient received from OR on a bed. Oral airway in place. Breathing appears to be labored. Air being exchanged. Anesthesia aware. Attached to monitor. Sinus tach. Anesthesia aware. Attached to oxygen via oxygen mask at 10L. OR report, anesthesia report and MAR acknowledged. Will continue to monitor. 1111:  Oral airway removed.

## 2017-03-02 NOTE — PROGRESS NOTES
2020 Bedside and Verbal shift change report given to THALIA Dodson RN (oncoming nurse) by MIRELA Calvo RN (offgoing nurse). Report included the following information SBAR, Kardex, MAR and Recent Results. Shift assessment completed. Patient denies having any pain. Call light within reach, patient offered ice water. Will continue to monitor. 2050 Notified by tele tech pt's heart rate is 149. Patient in bed awake, denies any chest pain or discomfort. Head of bed lowered, pt's heart is 109. Will continue to monitor. 0145 Administered 2 mg Morphine prn prior to dressing change. Patient's current pain score is 8/10.     0400 Dressing change, obando care, kwaku care, and pt's hygiene completed. Repositioned patient up in bed. Patient tolerated procedures fairly. Administered prn Percocet 5-325 mg tab PO after dressing change. Patient's current pain score is 9/10.    0430 Blood drawn from PICC line for lab.     0549 Administered 2 mg Morphine prn. Patient's current pain score is 7/10. Will continue to monitor. 3934 Notified by Janice Hughes RN to have blood sugar and vitals done at 8 a.m. before debridement surgery. FRANTZ Riggs made aware. 0740 Bedside and Verbal shift change report given to Rene Riggs RN (oncoming nurse) by Melany Boast RN (offgoing nurse). Report included the following information SBAR, Kardex, MAR and Recent Results.

## 2017-03-02 NOTE — BRIEF OP NOTE
BRIEF OPERATIVE NOTE    Date of Procedure: 3/2/2017   Preoperative Diagnosis: septic shock  Postoperative Diagnosis: septic shock    Procedure(s):  davinci colostomy XI ROBOTIC ASSISTED  debrudement of large decubitus ulcer sacral  Surgeon(s) and Role:     * Jeyson Morton MD - Primary            Surgical Staff:  Circ-1: Juan M Ruby RN  Scrub Tech-1: Katelynn You  Surg Asst-1: Lisa Morrell  Surg Asst-2: Skyler Schuster Time In   Incision Start 0901   Incision Close 1026     Anesthesia: General   Estimated Blood Loss: Minimal  Specimens:   ID Type Source Tests Collected by Time Destination   1 : 0905 [facility]   Jeyson Morton MD 3/2/2017 1017 Pathology      Findings: stage 4 huge decubitus ulcer   Complications: none  Implants: * No implants in log *

## 2017-03-02 NOTE — PROGRESS NOTES
Patient seen today. Will proceed with diverting colostomy for a huge decubitus ulcer. Long discussion with the family and care givers and patient about the risk of surgery. As well as the possibility of colostomy complications including ischemia, retraction, wound infection, and even necrotizing fascitis. I may have to do a transverse colostomy because the patient is extremely obese. Will proceed with robotic diversion colostomy and attempt to debride the decubitus ulcer.

## 2017-03-02 NOTE — PERIOP NOTES
TRANSFER - IN REPORT:    Verbal report received from [address] [facility] on Marii Stewart  being received from [phone] for routine progression of care      Report consisted of patients Situation, Background, Assessment and   Recommendations(SBAR). Information from the following report(s) MAR, labs, was reviewed with the receiving nurse. Need VS, blood sugar preop. To send bag of LR with pt. Opportunity for questions and clarification was provided. Assessment completed in patient's room.

## 2017-03-02 NOTE — PROGRESS NOTES
OT withheld 2/2 pt off the floor for colostomy. Will f/u 3/3/17 for re-eval s/p surgical procedure.     KEEGAN Woodward/L

## 2017-03-02 NOTE — PROGRESS NOTES
0930: Assumed care of pt. Pt. In Surgery. 1300: Pt. Back from PACU. Stable and Responding to voice. Tele monitor on.    1900: Bedside shift change report given to Johnnie Rosales RN (oncoming nurse) by Brittni De Luna RN (offgoing nurse). Report included the following information SBAR, Kardex, Procedure Summary, Intake/Output and MAR.

## 2017-03-02 NOTE — ANESTHESIA PREPROCEDURE EVALUATION
Anesthetic History   No history of anesthetic complications            Review of Systems / Medical History  Patient summary reviewed and pertinent labs reviewed    Pulmonary  Within defined limits                 Neuro/Psych   Within defined limits           Cardiovascular    Hypertension              Exercise tolerance: >4 METS     GI/Hepatic/Renal  Within defined limits              Endo/Other    Diabetes: poorly controlled, type 2  Hypothyroidism  Morbid obesity     Other Findings   Comments: Current Smoker? NO       Elective Surgery? Yes       Abstained from smoking 24 hours prior to anesthesia? N/A    Risk Factors for Postoperative nausea/vomiting:       History of postoperative nausea/vomiting? NO       Female? YES       Motion sickness? NO       Intended opioid administration for postoperative analgesia?   YES           Physical Exam    Airway  Mallampati: IV  TM Distance: 4 - 6 cm  Neck ROM: normal range of motion   Mouth opening: Normal     Cardiovascular  Regular rate and rhythm,  S1 and S2 normal,  no murmur, click, rub, or gallop  Rhythm: regular  Rate: normal         Dental    Dentition: Poor dentition     Pulmonary  Breath sounds clear to auscultation               Abdominal  GI exam deferred       Other Findings            Anesthetic Plan    ASA: 3  Anesthesia type: general          Induction: Intravenous  Anesthetic plan and risks discussed with: Healthcare power of       Consent by Flynn Johnson S by phone at [facility] C[facility] [place]

## 2017-03-02 NOTE — PROGRESS NOTES
1405 - Pt back from PACU, resting comfortably in bed. Vitals stable. Follows command to squeeze with R hand; nods \"no\" when asked if in pain. ~ 1515 - Update on pt provided for Ginger Roberson and Tena Florence RN. [place] FRANTZ Wheeler) also left with pt. Pt handed off in stable condition.

## 2017-03-02 NOTE — PROGRESS NOTES
2 [facility]  Hospitalist Division    Daily progress Note    Patient: Mark Shock MRN: [id number]  CSN: [id number]    YOB: 1955  Age: 64 y.o.   Sex: female    DOA: 2/10/2017 LOS:  LOS: 20 days                    Subjective:     CC: sepsis   Alert and awake, min verbal today   S/p diverting colostomy and debridement       Objective:      Visit Vitals    /78 (BP 1 Location: Left arm, BP Patient Position: At rest)    Pulse 98    Temp 98 °F (36.7 °C)    Resp 18    Ht 5' 5\" (1.651 m)    Wt (!) 166.6 kg (367 lb 3.2 oz)    SpO2 99%    Breastfeeding No    BMI 61.11 kg/m2       Physical Exam:    NC/AT  NO JVD,TMG  S1/S2 RRR  CTAB, NO WHEEZING  NT,ND, NABS  +  EDEMA  Sacral and LE ulcers   Decrease MS pn Rt and B/L LE         Intake and Output:  Current Shift:  03/02 0701 - 03/02 1900  In: 300 [I.V.:300]  Out: -   Last three shifts:  02/28 1901 - 03/02 0700  In: 3503.3 [I.V.:3503.3]  Out: 1225 [Urine:1125; Drains:100]    Recent Results (from the past 24 hour(s))   GLUCOSE, POC    Collection Time: 03/01/17  5:18 PM   Result Value Ref Range    Glucose (POC) 99 70 - 110 mg/dL   GLUCOSE, POC    Collection Time: 03/01/17 10:00 PM   Result Value Ref Range    Glucose (POC) 107 70 - 110 mg/dL   PHOSPHORUS    Collection Time: 03/02/17  4:30 AM   Result Value Ref Range    Phosphorus 3.9 2.5 - 4.9 MG/DL   MAGNESIUM    Collection Time: 03/02/17  4:30 AM   Result Value Ref Range    Magnesium 1.9 1.8 - 2.4 mg/dL   GLUCOSE, POC    Collection Time: 03/02/17  7:59 AM   Result Value Ref Range    Glucose (POC) 97 70 - 110 mg/dL   TYPE & CROSSMATCH    Collection Time: 03/02/17  8:10 AM   Result Value Ref Range    Crossmatch Expiration 03/05/2017     ABO/Rh(D) B POSITIVE     Antibody screen POS     Antibody ID NONSPECIFIC ANTIBODY     Unit number [id number]     Blood component type RC LR AS1     Unit division 00     Status of unit ALLOCATED     Crossmatch result Compatible Unit number [id number]     Blood component type RC LR AS1     Unit division 00     Status of unit ALLOCATED     Crossmatch result Compatible    GLUCOSE, POC    Collection Time: 03/02/17 10:56 AM   Result Value Ref Range    Glucose (POC) 103 70 - 110 mg/dL         Current Facility-Administered Medications:     0.9% sodium chloride infusion 250 mL, 250 mL, IntraVENous, PRN, Madelyn Gutierrez MD    metoprolol tartrate (LOPRESSOR) tablet 25 mg, 25 mg, Oral, Q12H, Garret Ochoa MD, Stopped at 03/02/17 0900    aspirin chewable tablet 81 mg, 81 mg, Oral, DAILY, Trip Red MD, Stopped at 03/02/17 0900    ondansetron (ZOFRAN) injection 4 mg, 4 mg, IntraVENous, Q6H PRN, Trip Red MD    morphine injection 2 mg, 2 mg, IntraVENous, Q4H PRN, Garret Ochoa MD, 2 mg at 03/02/17 0549    dextrose 5% with KCl 20 mEq/L infusion, , IntraVENous, CONTINUOUS, Javan Juan MD, Last Rate: 100 mL/hr at 03/02/17 1301    escitalopram oxalate (LEXAPRO) tablet 10 mg, 10 mg, Oral, DAILY, Bree Villasenor MD, Stopped at 03/02/17 0900    oxyCODONE-acetaminophen (PERCOCET) 5-325 mg per tablet 1 Tab, 1 Tab, Oral, Q6H PRN, Bree Villasenor MD, 1 Tab at 03/02/17 0408    acetaminophen (TYLENOL) tablet 650 mg, 650 mg, Oral, Q4H PRN, Bree Villasenor MD, 650 mg at 02/25/17 1720    therapeutic multivitamin (THERAGRAN) tablet 1 Tab, 1 Tab, Oral, DAILY, LIZETT Villareal, Stopped at 03/02/17 0900    pantoprazole (PROTONIX) tablet 40 mg, 40 mg, Oral, DAILY, LIZETT Villareal, Stopped at 03/02/17 0900    insulin lispro (HUMALOG) injection, , SubCUTAneous, AC&HS, LIZETT Villareal, Stopped at 02/22/17 1630    sodium chloride (NS) flush 10-30 mL, 10-30 mL, InterCATHeter, PRN, Bree Villasenor MD    sodium chloride (NS) flush 10 mL, 10 mL, InterCATHeter, Q24H, Bree Villasenor MD, 10 mL at 03/01/17 1700    sodium chloride (NS) flush 10 mL, 10 mL, InterCATHeter, PRN, Bree Villasenor MD  [facility] sodium chloride (NS) flush 10-40 mL, 10-40 mL, InterCATHeter, Q8H, Isacc Quinones MD, 10 mL at 03/02/17 0600    bacitracin 500 unit/gram packet 1 Packet, 1 Packet, Topical, PRN, Isacc Quinones MD    0.9% sodium chloride infusion 250 mL, 250 mL, IntraVENous, PRN, Bandar Glynn NP    enoxaparin (LOVENOX) injection 40 mg, 40 mg, SubCUTAneous, Q12H, Wade Vasquez MD, 40 mg at 03/02/17 0515    0.9% sodium chloride infusion 250 mL, 250 mL, IntraVENous, PRN, Wade Vasquez MD    albuterol-ipratropium (DUO-NEB) 2.5 MG-0.5 MG/3 ML, 3 mL, Nebulization, Q6H RT, LIZETT Browning, 3 mL at 03/02/17 1330    sodium chloride (NS) flush 5-10 mL, 5-10 mL, IntraVENous, PRN, Naz De Jesus DO    glucose chewable tablet 16 g, 4 Tab, Oral, PRN, Mason Rand MD    glucagon [facility]) injection 1 mg, 1 mg, IntraMUSCular, PRN, Mason Rand MD    dextrose (D50W) injection syrg 12.5-25 g, 25-50 mL, IntraVENous, PRN, Mason Rand MD    Lab Results   Component Value Date/Time    Glucose 87 03/01/2017 05:10 AM    Glucose 95 02/28/2017 05:00 AM    Glucose 80 02/27/2017 01:35 PM    Glucose 81 02/27/2017 05:07 AM    Glucose 351 02/26/2017 05:12 AM        Assessment/Plan     Active Problems:    Septic shock ([facility] [address] (2/10/2017)      Acute respiratory failure ([facility] [address] (2/13/2017)      Debility (2/13/2017)      Septic shock, resolved    Acute hypoxic resp failure 2/2 above, resolved   JOAQUIN on CKD 2/2 sepsis, reolved  acute metabolic encephalopathy, improved  Hypernatremia,resolved  DM, stable   Morbid obesity  HTN stable  Fever, resolved   Anemia, repeat CBC   Hypokalemia, resolved   Multiple ulcer including B/L heels, large sacral ulcer, POA  S/p  diverting colostomy and debridement  Today   Cont wound care       Deondre Love MD  3/2/2017, 11:39 AM

## 2017-03-02 NOTE — PERIOP NOTES
TRANSFER - OUT REPORT:    Verbal report given to Florinda Orr RN on Jihan Gorman  being transferred to [facility]([address] [place] [address]) for routine progression of care       Report consisted of patients Situation, Background, Assessment and   Recommendations(SBAR). Information from the following report(s) SBAR, Kardex, Procedure Summary, MAR, Recent Results and Med Rec Status was reviewed with the receiving nurse. Lines:   PICC Double Lumen 02/22/17 Right;Brachial (Active)   Central Line Being Utilized Yes 3/2/2017  7:00 AM   Criteria for Appropriate Use Long term IV/antibiotic administration 3/2/2017  7:00 AM   Site Assessment Clean, dry, & intact 3/2/2017  7:00 AM   Phlebitis Assessment 0 3/2/2017  7:00 AM   Infiltration Assessment 0 3/2/2017  7:00 AM   Date of Last Dressing Change 03/01/17 3/2/2017  4:30 AM   Dressing Status Clean, dry, & intact 3/2/2017  4:30 AM   Action Taken Blood drawn 3/2/2017  4:30 AM   External Catheter Length (cm) 0 centimeters 2/28/2017  8:36 AM   Dressing Type Disk with Chlorhexadine Gluconate (CHG); Transparent 3/2/2017  7:00 AM   Hub Color/Line Status Red; Infusing 3/2/2017  7:00 AM   Positive Blood Return (Site #1) Yes 3/2/2017  4:30 AM   Hub Color/Line Status Purple;Flushed;Patent; Infusing 3/1/2017  8:20 PM   Positive Blood Return (Site #2) Yes 3/1/2017  8:20 PM   Alcohol Cap Used Yes 3/2/2017  4:30 AM        Opportunity for questions and clarification was provided.       Patient transported with:   Registered Nurse  Tech

## 2017-03-03 NOTE — MANAGEMENT PLAN
1130Discussed pt on Interdisciplinary rounds. Pt possible to discharge today. Facility will need to order Bariatric Speciality bed which i have been notified takes six hours for delivery. [facility] accepting facility. [facility] no long term beds. Have asked [facility] to reopen case. 1245:Called nicki Florence at [facility] [employer] [employer]. He would like [facility] if possible, otherwise pt will go to [facility]. [place] and spoke with  [employer] She will review. 1300: Notified by Dr Yancy Goddard that pt needs rectal tube till Monday. Notified [facility] and [facility] in [facility]. 1430: [facility] has declined. Spoke with Eryn Sepulveda at [facility]. They can accept on Monday with rectal tube out. He is ordering bed today and will have availble for Monday. 1530: [address] and notified.       Darnell Tinoco RN BSN  Outcomes Manager  Pager # [phone]

## 2017-03-03 NOTE — PROGRESS NOTES
2 [facility]  Hospitalist Division    Daily progress Note    Patient: Martir Oviedo MRN: [id number]  CSN: [id number]    YOB: 1955  Age: 64 y.o. Sex: female    DOA: 2/10/2017 LOS:  LOS: 21 days                    Subjective:     CC: sepsis     Alert and awake, min verbal today, NAD   S/p diverting colostomy and debridement       Objective:      Visit Vitals    /56    Pulse 95    Temp 98.4 °F (36.9 °C)    Resp 17    Ht 5' 5\" (1.651 m)    Wt (!) 166.6 kg (367 lb 3.2 oz)    SpO2 100%    Breastfeeding No    BMI 61.11 kg/m2       Physical Exam:    NC/AT  NO JVD,TMG  S1/S2 RRR  CTAB, NO WHEEZING  NT,ND, NABS  +  EDEMA  Sacral and LE ulcers   Decrease MS pn Rt and B/L LE         Intake and Output:  Current Shift:     Last three shifts:  03/01 1901 - 03/03 0700  In: 1980 [P.O.:480; I.V.:1500]  Out: 1925 [Urine:1750]    Recent Results (from the past 24 hour(s))   GLUCOSE, POC    Collection Time: 03/02/17  4:57 PM   Result Value Ref Range    Glucose (POC) 87 70 - 110 mg/dL   GLUCOSE, POC    Collection Time: 03/02/17  9:16 PM   Result Value Ref Range    Glucose (POC) 90 70 - 110 mg/dL   MAGNESIUM    Collection Time: 03/03/17  5:40 AM   Result Value Ref Range    Magnesium 1.7 (L) 1.8 - 2.4 mg/dL   CBC WITH AUTOMATED DIFF    Collection Time: 03/03/17  5:40 AM   Result Value Ref Range    WBC 12.5 4.6 - 13.2 K/uL    RBC 2.74 (L) 4.20 - 5.30 M/uL    HGB 8.0 (L) 12.0 - 16.0 g/dL    HCT 24.8 (L) 35.0 - 45.0 %    MCV 90.5 74.0 - 97.0 FL    MCH 29.2 24.0 - 34.0 PG    MCHC 32.3 31.0 - 37.0 g/dL    RDW 20.8 (H) 11.6 - 14.5 %    PLATELET 856 375 - 587 K/uL    MPV 9.3 9.2 - 11.8 FL    NEUTROPHILS 63 40 - 73 %    LYMPHOCYTES 15 (L) 21 - 52 %    MONOCYTES 12 (H) 3 - 10 %    EOSINOPHILS 10 (H) 0 - 5 %    BASOPHILS 0 0 - 2 %    ABS. NEUTROPHILS 8.0 1.8 - 8.0 K/UL    ABS. LYMPHOCYTES 1.8 0.9 - 3.6 K/UL    ABS. MONOCYTES 1.4 (H) 0.05 - 1.2 K/UL    ABS.  EOSINOPHILS 1.2 (H) 0.0 - 0.4 K/UL    ABS.  BASOPHILS 0.0 0.0 - 0.06 K/UL    DF AUTOMATED     METABOLIC PANEL, BASIC    Collection Time: 03/03/17  5:40 AM   Result Value Ref Range    Sodium 142 136 - 145 mmol/L    Potassium 4.3 3.5 - 5.5 mmol/L    Chloride 115 (H) 100 - 108 mmol/L    CO2 16 (L) 21 - 32 mmol/L    Anion gap 11 3.0 - 18 mmol/L    Glucose 76 74 - 99 mg/dL    BUN 16 7.0 - 18 MG/DL    Creatinine 0.99 0.6 - 1.3 MG/DL    BUN/Creatinine ratio 16 12 - 20      GFR est AA >60 >60 ml/min/1.73m2    GFR est non-AA 57 (L) >60 ml/min/1.73m2    Calcium 7.9 (L) 8.5 - 10.1 MG/DL   GLUCOSE, POC    Collection Time: 03/03/17  7:52 AM   Result Value Ref Range    Glucose (POC) 77 70 - 110 mg/dL   GLUCOSE, POC    Collection Time: 03/03/17 12:07 PM   Result Value Ref Range    Glucose (POC) 76 70 - 110 mg/dL         Current Facility-Administered Medications:     0.9% sodium chloride infusion 250 mL, 250 mL, IntraVENous, PRN, Shellie Shah MD    metoprolol tartrate (LOPRESSOR) tablet 25 mg, 25 mg, Oral, Q12H, Javan Juan MD, 25 mg at 03/03/17 1006    aspirin chewable tablet 81 mg, 81 mg, Oral, DAILY, Yvonne Phillip MD, 81 mg at 03/03/17 1005    ondansetron (ZOFRAN) injection 4 mg, 4 mg, IntraVENous, Q6H PRN, Yvonne Phillip MD    morphine injection 2 mg, 2 mg, IntraVENous, Q4H PRN, Kam Vazquez MD, 2 mg at 03/02/17 0549    escitalopram oxalate (LEXAPRO) tablet 10 mg, 10 mg, Oral, DAILY, Shmuel Floyd MD, 10 mg at 03/03/17 1005    oxyCODONE-acetaminophen (PERCOCET) 5-325 mg per tablet 1 Tab, 1 Tab, Oral, Q6H PRN, Shmuel Floyd MD, 1 Tab at 03/02/17 0408    acetaminophen (TYLENOL) tablet 650 mg, 650 mg, Oral, Q4H PRN, Shmuel Floyd MD, 650 mg at 02/25/17 1720    therapeutic multivitamin (THERAGRAN) tablet 1 Tab, 1 Tab, Oral, DAILY, LIZETT Baxter, 1 Tab at 03/03/17 1006    pantoprazole (PROTONIX) tablet 40 mg, 40 mg, Oral, DAILY, LIZETT Baxter, 40 mg at 03/03/17 1006    insulin lispro (HUMALOG) injection, , SubCUTAneous, AC&HS, Octavia Kaminski [place], Stopped at 02/22/17 1630    sodium chloride (NS) flush 10-30 mL, 10-30 mL, InterCATHeter, PRN, Lisa Morgan MD    sodium chloride (NS) flush 10 mL, 10 mL, InterCATHeter, Q24H, Lisa Morgan MD, 10 mL at 03/02/17 1802    sodium chloride (NS) flush 10 mL, 10 mL, InterCATHeter, PRN, Lisa Morgan MD    sodium chloride (NS) flush 10-40 mL, 10-40 mL, InterCATHeter, Q8H, Lisa Morgan MD, 10 mL at 03/03/17 0606    bacitracin 500 unit/gram packet 1 Packet, 1 Packet, Topical, PRN, Lisa Morgan MD    0.9% sodium chloride infusion 250 mL, 250 mL, IntraVENous, PRN, Louann Galan NP    enoxaparin (LOVENOX) injection 40 mg, 40 mg, SubCUTAneous, Q12H, Wade Vasquez MD, 40 mg at 03/03/17 0602    0.9% sodium chloride infusion 250 mL, 250 mL, IntraVENous, PRN, Wade Vasquez MD    albuterol-ipratropium (DUO-NEB) 2.5 MG-0.5 MG/3 ML, 3 mL, Nebulization, Q6H RT, LIZETT Francois, 3 mL at 03/03/17 0810    sodium chloride (NS) flush 5-10 mL, 5-10 mL, IntraVENous, PRN, Diantliseth Contras, DO    glucose chewable tablet 16 g, 4 Tab, Oral, PRN, iLnda Allan MD    glucagon [facility]) injection 1 mg, 1 mg, IntraMUSCular, PRN, Linda Allan MD    dextrose (D50W) injection syrg 12.5-25 g, 25-50 mL, IntraVENous, PRN, Linda Allan MD    Lab Results   Component Value Date/Time    Glucose 76 03/03/2017 05:40 AM    Glucose 87 03/01/2017 05:10 AM    Glucose 95 02/28/2017 05:00 AM    Glucose 80 02/27/2017 01:35 PM    Glucose 81 02/27/2017 05:07 AM        Assessment/Plan     Active Problems:    Septic shock ([facility] [address] (2/10/2017)      Acute respiratory failure ([facility] [address] (2/13/2017)      Debility (2/13/2017)      Septic shock, resolved    Acute hypoxic resp failure 2/2 above, resolved   JOAQUIN on CKD 2/2 sepsis, reolved  acute metabolic encephalopathy, improved  Hypernatremia,resolved  DM, stable   Morbid obesity  HTN stable  Fever, resolved   Anemia, stable   Hypokalemia, resolved   Multiple ulcer including B/L heels, large sacral ulcer, POA  S/p  diverting colostomy and debridement 3/2  Cont local wound care   Per surgery keep rectal  tube in till Monday  SNF on Monday           Kristel Razo MD  3/3/2017, 11:39 AM

## 2017-03-03 NOTE — PROGRESS NOTES
[facility] 3S CARDIAC TELE  [address] [place] [address]  [phone]  Colon and Rectal Surgery Progress Note      Patient: Martir Oviedo MRN: [id number]  SSN: [id number]    YOB: 1955  Age: 64 y.o. Sex: female      Admit Date: 2/10/2017    LOS: 21 days     Subjective:   POD #1  Davinci colostomy XI ROBOTIC ASSISTED  Debridement of large decubitus ulcer sacral    Doing well, minimal pain  Ostomy working well    Objective:     Vitals:    03/02/17 2158 03/03/17 0225 03/03/17 0540 03/03/17 1000   BP: 141/55 139/57 137/61 144/56   Pulse: 100 95 93 95   Resp:  18 18 17   Temp:  98.4 °F (36.9 °C) 98.3 °F (36.8 °C) 98.4 °F (36.9 °C)   SpO2:  100% 100% 100%   Weight:       Height:            Intake and Output:  Current Shift:    Last three shifts: 03/01 1901 - 03/03 0700  In: 1980 [P.O.:480; I.V.:1500]  Out: 1925 [Urine:1750]    Physical Exam:   GENERAL: alert, cooperative, no distress, appears stated age, morbidly obese  ABDOMEN: soft, non-tender.  Bowel sounds normal. No masses,  no organomegaly, ostomy pink with good stool output  EXTREMITIES:  extremities normal, atraumatic, no cyanosis, +  edema  SKIN: Multiple decubitus-sacral, heels, elbows  NEUROLOGIC: normal    Lab/Data Review:  BMP:   Lab Results   Component Value Date/Time     03/03/2017 05:40 AM    K 4.3 03/03/2017 05:40 AM     (H) 03/03/2017 05:40 AM    CO2 16 (L) 03/03/2017 05:40 AM    AGAP 11 03/03/2017 05:40 AM    GLU 76 03/03/2017 05:40 AM    BUN 16 03/03/2017 05:40 AM    CREA 0.99 03/03/2017 05:40 AM    GFRAA >60 03/03/2017 05:40 AM    GFRNA 57 (L) 03/03/2017 05:40 AM     CBC:   Lab Results   Component Value Date/Time    WBC 12.5 03/03/2017 05:40 AM    HGB 8.0 (L) 03/03/2017 05:40 AM    HCT 24.8 (L) 03/03/2017 05:40 AM     03/03/2017 05:40 AM     Recent Glucose Results:   Lab Results   Component Value Date/Time    GLU 76 03/03/2017 05:40 AM            Assessment:     Active Problems:    Septic shock ([address] (2/10/2017)      Acute respiratory failure ([facility] [address] (2/13/2017)      Debility (2/13/2017)        Plan:     Wound care consulted for management of decubitus  Keep rectal tube in place until Mon    Signed By: Dru Leonard NP        March 3, 2017

## 2017-03-03 NOTE — PROGRESS NOTES
1920 Bedside and Verbal shift change report given to THALIA Thompson RN (oncoming nurse) by Alejo Perez RN (offgoing nurse). Report included the following information SBAR, Kardex, MAR and Recent Results. Call bell within reach, and patient offered no complains. Will continue to monitor. 2043 Shift assessment completed. Patient denies any pain. Will continue to monitor. 3565 Blood drawn from red hub of PICC line for lab. Emptied colostomy bag with an output of 150 ml brown liquid. Patient tolerated procedures well. Will continue to monitor. 0720 Bedside and Verbal shift change report given to Errol Contreras RN (oncoming nurse) by Britany Mclian RN (offgoing nurse). Report included the following information SBAR, Kardex, MAR and Recent Results.

## 2017-03-03 NOTE — PROGRESS NOTES
1230 - With 4-person assistance, basic wound care (wet-to-dry) provided to buttocks. Juarez care also provided and skin folds in groin area were cleaned and basic wound care provided there. Pt denies pain today. FMS left in, per Beatris Roach, surgical NP. Colostomy bag intact, with small amount of brown stool noted in it. 1535 - Bedside and Verbal shift change report given to Murphy Inman RN (oncoming nurse) by Ivan Murphy RN (offgoing nurse). Report included the following information SBAR, Kardex, Intake/Output, MAR and Recent Results. Pt handed off in stable condition.

## 2017-03-03 NOTE — OP NOTES
Jitendra Byers    Name:  Kacy Caballero  MR#:  [id number]  :  1955  Account #:  [de-identified]  Date of Adm:  02/10/2017  Date of Surgery:  2017      PREOPERATIVE DIAGNOSIS: Large infected stage IV sacral  decubitus ulcer. POSTOPERATIVE DIAGNOSIS: Large infected stage IV sacral  decubitus ulcer. PROCEDURES PERFORMED  1. Robotic transverse colon colostomy. 2. Debridement of large infected sacral decubitus ulcer, all the way to  the bone. ANESTHESIA: General.    COMPLICATIONS: None. SPECIMENS REMOVED: Necrotic tissue from sacral decubitus ulcer. ESTIMATED BLOOD LOSS: Minimal.    DESCRIPTION OF PROCEDURE: The patient was brought to the  operating room. She was morbidly obese and we decided to keep  her on her own bed. The patient was intubated. Scrubbing and draping  of the abdomen was done in the usual manner. A timeout was  performed. A skin incision was performed in the left upper quadrant. Veress needle was inserted, abdomen was insufflated, a 5 mm  port with Optiview was inserted under direct vision. The abdomen was  entered. the patient was extremely obese. Her lower abdomen was  very thick and it seemed to be impossible to perform a sigmoid  colostomy, so at this point, attention was paid to the transverse colon  and 3 ports were placed in the abdominal wall, one just in the  supraumbilical area and one in the left lower quadrant and one in the  right upper quadrant. The robot was docked. The omentum was  inspected cephalad. The transverse colon was identified. At this point,  we went distal as possible on the transverse colon up to the splenic  flexure. There was adhesion from what seems to the previous surgery. At this point, the colon was divided using a robotic stapler, purple load,  and this was fired to divide the colon.  Two staple loads were used and  then the mesocolon was divided under direct vision to make sure that  there was good blood supply to the proximal and distal ends. At this  point, the omentum was gently divided to mobilize the colon. At this  point, I clear that it was almost impossible even before division of the  colon that I cannot do a loop colostomy because of the abdominal wall. We decided to do an end transverse colostomy. At this point, an area  in the abdomen in the left upper quadrant was identified after  desufflating the abdomen from 15 to 8 to identify what is the best place  for the colostomy placement. This was marked on the skin with a  marking pen. At this point, a skin incision was performed in this area  and deepened through the skin and subcutaneous tissue and  the fascia was identified. It was opened in a cruciate fashion and  then the muscle was split and the colon was identified. The proximal  colon where we divided it was identified. This was pulled up into the  skin and there was no tension, and it laid nicely with no evidence of  any obstruction. At this point, all the skin incisions were closed with 4-0  Monocryl, and then the colostomy was fashioned in the regular way  with interrupted 3-0 Vicryl suture and a colostomy bag was placed. Hemostasis was well secured and then the patient was turned on the  lateral side and a debridement of necrotic tissue was performed from  the sacral ulcer are all the way to the bone. These were sent for  pathology and then a wet-to-dry Kerlix was placed. The patient was  extubated and transferred to the recovery room. Addendum: The area excised was about 30 by 20 cm (600 cm squares).     MD KELLEN Whitney / YANIRA  D:  03/02/2017   19:26  T:  03/02/2017   23:10  Job #:  [id number]

## 2017-03-03 NOTE — PROGRESS NOTES
NUTRITION follow-up/Plan of care    RECOMMENDATIONS:     1. Pureed diet, thin liquids when diet resumes  2. Ensure TID  3. Monitor weight, labs and PO intake  4. RD to follow    GOALS:     1. Ongoing: PO intake meets >75% of protein/calorie needs by 3/6  2. Ongoing: Maintain weight/Gradual weight loss (1-2 by 3/10)    ASSESSMENT:     Weight status is classified as obese per BMI of 61.1. Currently not meeting nutrition needs. Continue Ensure TID for additional calories/protein when diet reusmes. Labs noted. Nutrition recommendations listed. RD to follow. Nutrition Risk:  [x]   High []  Moderate [] Low    SUBJECTIVE/OBJECTIVE:     Patient appears well nourished. Patient with multiple pressure ulcers per nursing documentation. Reviewed SLP note with recommendations for pureed diet and thin liquids. Currently NPO for robotic transverse colon colostomy and debridement of large infected sacral decubitus ulcer. Patient with poor intake of meals prior to NPO status due to not being fond of pureed diet. Per psych, patient lacks capacity. Will monitor. Information Obtained From:   [x] Chart Review  [x] Patient  [] Family/Caregiver  [] Nurse/Physician   [] Patient Rounds/Interdisciplinary Meeting    Diet: NPO  Patient Vitals for the past 100 hrs:   % Diet Eaten   02/28/17 1800 0 %   02/28/17 1130 0 %   02/28/17 0930 10 %   02/27/17 1800 20 %   02/27/17 1345 25 %   02/27/17 1009 25 %   02/27/17 0930 25 %     Medications: [x] Reviewed   Encounter Diagnoses     ICD-10-CM ICD-9-CM   1. Sepsis, due to unspecified organism ([facility] A41.9 038.9     995.91   2. Septic shock (HCC) A41.9 038.9    R65.21 785.52     995.92   3. Sacral decubitus ulcer, unstageable ([facility]) L89.150 707.03     707.25   4. Acute respiratory failure, unspecified whether with hypoxia or hypercapnia ([facility]) J96.00 518.81   5. Debility R53.81 799.3   6.  Morbid obesity, unspecified obesity type ([facility] E66.01 278.01     Past Medical History:   Diagnosis Date    Anemia     Anxiety 5/19/2016    Arthritis     Cancer (HCC)     pre cancerous cells in the uterus    Chronic pain     knees    Chronic pain of both knees 5/19/2016    Cigarette nicotine dependence in remission 5/19/2016    Diabetes ([facility] [address]     Fatty liver 5/19/2016    Headache     Hypertension     Liver disease     fatty liver    Lymphedema 5/19/2016    Morbid obesity ([facility] [address] 5/19/2016    Psychotic disorder     anxiety and panic attacks    Thyroid disease      Labs:    Lab Results   Component Value Date/Time    Sodium 142 03/03/2017 05:40 AM    Potassium 4.3 03/03/2017 05:40 AM    Chloride 115 03/03/2017 05:40 AM    CO2 16 03/03/2017 05:40 AM    Anion gap 11 03/03/2017 05:40 AM    Glucose 76 03/03/2017 05:40 AM    BUN 16 03/03/2017 05:40 AM    Creatinine 0.99 03/03/2017 05:40 AM    Calcium 7.9 03/03/2017 05:40 AM    Magnesium 1.7 03/03/2017 05:40 AM    Phosphorus 3.9 03/02/2017 04:30 AM    Albumin 1.3 02/27/2017 05:07 AM     Anthropometrics: BMI (calculated): 61.1   Last 3 Recorded Weights in this Encounter    02/27/17 1240 03/01/17 0526 03/02/17 1256   Weight: (!) 193.9 kg (427 lb 6.4 oz) (!) 161.9 kg (357 lb) (!) 166.6 kg (367 lb 3.2 oz)      Ht Readings from Last 1 Encounters:   02/25/17 5' 5\" (1.651 m)     Estimated Nutrition Needs:   1840 Kcals/day  Protein (g): 92 g    Nutrition Problems Identified:   [x] Suboptimal PO intake   [] Food Allergies  [x] Difficulty chewing/swallowing/poor dentition  [] Constipation/Diarrhea   [] Nausea/Vomiting   [] None  [x] Other: Wound healing     Plan:   [x] Therapeutic Diet  []  Obtained/adjusted food preferences/tolerances and/or snacks options   [x]  Continue supplements as prescribed  [x] Occupational therapy following for feeding techniques  []  HS snack added   [x]  Modify diet texture   []  Modify diet for food allergies   []  Assist with menu selection   [x]  Monitor PO intake on meal rounds   [x]  Continue inpatient monitoring and intervention   [] Participated in discharge planning/Interdisciplinary rounds/Team meetings   []  Other:     Education Needs:   [] Not appropriate for teaching at this time due to:   [x] Identified and addressed    Nutrition Monitoring and Evaluation:   [x] Continue inpatient monitoring and interventions    [] Other:     Jade Billy

## 2017-03-03 NOTE — WOUND CARE
General Progress Note    Patient: Merlin Boys MRN: [id number] LOS: 24     YOB: 1955  Age: 64 y.o. Sex: female        Admit Date: 2/10/2017      Subjective:   Patient did not verbalize pain or discomfort with wound treatment. Patient turned with 4 people assist.  Please see wound assessment below. Report to Leonardo Duron. Orders received, Dr. Nay Tang    Objective:     Vitals  Patient Vitals for the past 8 hrs:   BP Temp Pulse Resp SpO2   03/03/17 1418 139/59 98.5 °F (36.9 °C) 99 16 99 %   03/03/17 1000 144/56 98.4 °F (36.9 °C) 95 17 100 %       I/O Current Shift       I/O Last Three Shifts  03/01 1901 - 03/03 0700  In: 1980 [P.O.:480; I.V.:1500]  Out: 1925 [Urine:1750]      Data Review  Recent Results (from the past 24 hour(s))   GLUCOSE, POC    Collection Time: 03/02/17  4:57 PM   Result Value Ref Range    Glucose (POC) 87 70 - 110 mg/dL   GLUCOSE, POC    Collection Time: 03/02/17  9:16 PM   Result Value Ref Range    Glucose (POC) 90 70 - 110 mg/dL   MAGNESIUM    Collection Time: 03/03/17  5:40 AM   Result Value Ref Range    Magnesium 1.7 (L) 1.8 - 2.4 mg/dL   CBC WITH AUTOMATED DIFF    Collection Time: 03/03/17  5:40 AM   Result Value Ref Range    WBC 12.5 4.6 - 13.2 K/uL    RBC 2.74 (L) 4.20 - 5.30 M/uL    HGB 8.0 (L) 12.0 - 16.0 g/dL    HCT 24.8 (L) 35.0 - 45.0 %    MCV 90.5 74.0 - 97.0 FL    MCH 29.2 24.0 - 34.0 PG    MCHC 32.3 31.0 - 37.0 g/dL    RDW 20.8 (H) 11.6 - 14.5 %    PLATELET 877 346 - 515 K/uL    MPV 9.3 9.2 - 11.8 FL    NEUTROPHILS 63 40 - 73 %    LYMPHOCYTES 15 (L) 21 - 52 %    MONOCYTES 12 (H) 3 - 10 %    EOSINOPHILS 10 (H) 0 - 5 %    BASOPHILS 0 0 - 2 %    ABS. NEUTROPHILS 8.0 1.8 - 8.0 K/UL    ABS. LYMPHOCYTES 1.8 0.9 - 3.6 K/UL    ABS. MONOCYTES 1.4 (H) 0.05 - 1.2 K/UL    ABS. EOSINOPHILS 1.2 (H) 0.0 - 0.4 K/UL    ABS.  BASOPHILS 0.0 0.0 - 0.06 K/UL    DF AUTOMATED     METABOLIC PANEL, BASIC    Collection Time: 03/03/17  5:40 AM   Result Value Ref Range    Sodium 142 136 - 145 mmol/L    Potassium 4.3 3.5 - 5.5 mmol/L    Chloride 115 (H) 100 - 108 mmol/L    CO2 16 (L) 21 - 32 mmol/L    Anion gap 11 3.0 - 18 mmol/L    Glucose 76 74 - 99 mg/dL    BUN 16 7.0 - 18 MG/DL    Creatinine 0.99 0.6 - 1.3 MG/DL    BUN/Creatinine ratio 16 12 - 20      GFR est AA >60 >60 ml/min/1.73m2    GFR est non-AA 57 (L) >60 ml/min/1.73m2    Calcium 7.9 (L) 8.5 - 10.1 MG/DL   GLUCOSE, POC    Collection Time: 03/03/17  7:52 AM   Result Value Ref Range    Glucose (POC) 77 70 - 110 mg/dL   GLUCOSE, POC    Collection Time: 03/03/17 12:07 PM   Result Value Ref Range    Glucose (POC) 76 70 - 110 mg/dL             Assessment:     Wound Presentation:       03/03/17 1516   Wound Sacral/coccyx Posterior;Left;Right   Date First Assessed/Time First Assessed: 02/11/17 0230   Wound Type: Pressure ulcer;Moisture Assoc. Skin Damage  Location: Sacral/coccyx  Orientation: Posterior;Left;Right   DRESSING STATUS Removed   DRESSING TYPE Other (Comment)  (ABD, Xeroform, Kerlix)   Pressure Ulcer Stage lV   Wound Length (cm) 32.3 cm   Wound Width (cm) 33 cm   Wound Depth (cm) 12.5   Wound Surface area (cm^3) 08860.75 cm^2   Condition of Base Bone exposed;Pink;Slough   Condition of Edges Open   Tissue Type Pink;Yellow   Tissue Type Percent Pink 50   Tissue Type Percent Yellow 50   Drainage Amount  Large   Drainage Color Serosanguinous   Wound Odor None   Periwound Skin Condition Other (comment)  (Open superficial)   Cleansing and Cleansing Agents  Normal saline   Dressing Type Applied Silver products;Gauze wrap (matthew);ABD pad  (Aquacel AG, Kerlix, Heavy Drain Pack, Hypafix Tape)   Procedure Tolerated Fair                 Active Problems:    Septic shock (HCC) (2/10/2017)      Acute respiratory failure (HCC) (2/13/2017)      Debility (2/13/2017)         bed rails up x3, bed low and locked, lights dimmed for comfort, and call bell within patient's reach.     Patient verbalized understanding to all instructions provided but needs additional instruction.     Plan:   Nursing to Perform Daily  Wound Care/Dressing change to Sacrum: Cleanse wound with normal saline only (do not use dermal wound cleanser due to it inactivates santyl), apply no sting skin prep to periwound skin then Convatec Zinc Oxide Cream, apply santyl nickel-thickness to Saline Moist Mesalt Sheets and cover base of wound, fill Tunneled area (11 o'clock) with saline moist mesalt rope with santyl, fill remaining wound space with Saline Moist Kerlix, cover all with 4x4s and Heavy Drain Pack, secure with hypafix tape.                     Ayesha Thomas RN, [address] [place] [address],[address]

## 2017-03-04 NOTE — PROGRESS NOTES
0148- Assumed patient care from [facility]. Pt. Lying in bed resting quietly no acute distress noted. Fecal mgt system intac, obando draining clear yellow urine, will cont. To monitor. 0930- Medicated with 2mg of morphine for generalized pain. 1123- Significant other visiting at bedside no change in patient status. 1230- Refused lunch. 1400- Repositioned in bed for comfort. 3658- Patient refuse dinner. 1930-Bedside and Verbal shift change report given to Nicole Carroll (oncoming nurse) by Joon Patton RN (offgoing nurse). Report included the following information SBAR, Kardex, MAR and Recent Results.

## 2017-03-04 NOTE — PROGRESS NOTES
Problem: Mobility Impaired (Adult and Pediatric)  Goal: *Acute Goals and Plan of Care (Insert Text)  PHYSICAL THERAPY SHORT TERM GOALS : patient had surgery on 3-2 goals still appropriate for 7 more days (3-10-17)   New goals   PHYSICAL THERAPY SHORT TERM GOALS : To be complete 3-6-17  1. Patient will perform/complete move head and keep in midline for 3 minutes with supervision/set-up within 1 week(s). 2. Patient will perform/complete initiate movement in shoulder and hips in preparation for rolling with moderate assistance within 1 week(s). 3. Patient will perform/complete initate rolling to one side w with moderate assistance Of 2 within 1 week(s). 4 Patient will participate in lower extremity therapeutic exercise/activities with moderate assistance for 10 minutes within 1 week(s). Therapist Anthony Judd, PT 2/27/2017  Time Calculation: 32 mins  1. Patient will perform/complete have head at 45 degrees in chair position of bed for 15 minutes with supervision/set-up with no signs of distress within 1 week(s). 2. Patient will perform/complete exercises with family guiding her for exercises with supervision/set-up within 1 week(s). 3. Patient will perform/complete initate rolling to one side w with moderate assistance Of 2 within 1 week(s). 4. Patient will participate in Upper and lower extremity therapeutic exercise/activities with moderate assistance for 10 minutes within 1 week(s).        Therapist Anthony Judd, PT 2/20/2017  Time Calculation: 21 mins   Outcome: Progressing Towards Goal  PHYSICAL THERAPY RE-EVALUATION AND TREATMENT     Patient: Giorgi Matias (2 y.o. female)  Date: 3/3/2017  Diagnosis: Septic shock ([address]  ulcer decubitus  septic shock <principal problem not specified>  Procedure(s) (LRB):  davinci colostomy XI ROBOTIC ASSISTED (N/A)  debrudement of large decubitus ulcer sacral (N/A) 1 Day Post-Op  Precautions: Contact, Fall      ASSESSMENT:  Patient re-evaluated following surgery on 3-2.    During re-evaluation patient  With  Able to initiate Ue movement to attempt to rotate trunk for mobility needing mod to max A. Actively move feet and ankle  Mod to max A to lift leg at hip and knee flex against gravity. Pain reported in back of 8/10 nursing notified need for medication. Left in bed with turning  Feature on bed on. Call light in reach. Nursing informed , Anna Jackson RN.  education on continuing to work on exercises with son  When he is present verbalized understanding needs reinforcement. Patient has experienced a delay in progress due to medical complications. The following measures have been taken to improve response to treatment: goals continued for one more week. Sydney Clyde PLAN:  Goals have been updated based on progression since last assessment. Patient continues to benefit from skilled intervention to address the above impairments. Continue to follow the patient 3-5 times a week to address goals. Planned Interventions:  [X]     Bed Mobility Training          [X]     Neuromuscular Re-Education  [X]     Transfer Training                [ ]    Orthotic/Prosthetic Training  [X]     Gait Training                       [ ]     Modalities  [X]     Therapeutic Exercises       [ ]     Edema Management/Control  [X]     Therapeutic Activities         [X]     Patient and Family Training/Education  [ ]     Other (comment):  Discharge Recommendations: Rehab and [facility]  Further Equipment Recommendations for Discharge: hospital bed       SUBJECTIVE:   Patient stated my back hurts .       OBJECTIVE DATA SUMMARY:   GCODES(GP):Mobility  Current  CN= 100%   Goal  CL= 60-79%. The severity rating is based on the Other [employer] Sitting Balance Scale0/5   [employer] Sitting Balance Scale0/5  0: Pt performs 25% or less of sitting activity (Max assist) CN, 100% impaired. 1: Pt supports self with upper extremities but requires therapist assistance.  Pt performs 25-50% of effort (Mod assist) CM, 80% to <100% impaired. 1+: Pt supports self with upper extremities but requires therapist assistance. Pt performs >50% effort. (Min assist). CL, 60% to <80% impaired. 2: Pt supports self independently with both upper extremities. CL, 60% to <80% impaired. 2+: Pt support self independently with 1 upper extremity. CK, 40% to <60% impaired. 3: Pt sits without upper extremity support for up to 30 seconds. CK, 40% to <60% impaired. 3+: Pt sits without upper extremity support for 30 seconds or greater. CJ, 20% to <40% impaired. 4: Pt moves and returns trunkal midpoint 1-2 inches in one plane. CJ, 20% to <40% impaired. 4+: Pt moves and returns trunkal midpoint 1-2 inches in multiple planes. CI, 1% to <20% impaired. 5: Pt moves and returns trunkal midpoint in all planes greater than 2 inches. CH, 0% impaired. Critical Behavior:  Neurologic State: Alert  Orientation Level: Oriented to person, Oriented to place  Cognition: Follows commands  Safety/Judgement: Fall prevention  Tone & Sensation:   Tone: Normal  Sensation: Impaired  Range Of Motion:  AROM: Grossly decreased, non-functional  PROM: Generally decreased, functional  Strength:   2-/ hip and knee movement s and 2/5 on ankle df/pf  Strength: Grossly decreased, non-functional  Functional Mobility Training:  Bed Mobility:  Rolling:  (initated rolling using shoulder hort add)  Scooting: Total assistance;Assist x2; Additional time  Neuro Re-Education and Therapeutic Exercises:   aarom of both LE's and both  active ankle movement initiating trun rotation with shoulder hort add with head following movement. Pain: 8/10 pre and post   Pain Scale 1: Numeric (0 - 10)  Activity Tolerance:   Fair minus   Please refer to the flowsheet for vital signs taken during this treatment.   After treatment:   [ ]  Patient left in no apparent distress sitting up in chair  [X]  Patient left in no apparent distress in bed  [X]  Call bell left within reach  [X]  Nursing notified  [ ]  Caregiver present  [ ]  Bed alarm activated     Patient education:  Continuing to provide education to increase  Exercise for all extremities and position changing needs reinforcement.         Mally Nip, PT   Time Calculation: 33 mins

## 2017-03-04 NOTE — PROGRESS NOTES
2 [facility]  Hospitalist Division    Daily progress Note    Patient: Donna Wong MRN: [id number]  CSN: [id number]    YOB: 1955  Age: 64 y.o.   Sex: female    DOA: 2/10/2017 LOS:  LOS: 22 days                    Subjective:     CC: sepsis     A little somnolent but arousable, min verbal, NAD   S/p diverting colostomy and debridement       Objective:      Visit Vitals    /73 (BP 1 Location: Left arm, BP Patient Position: Standing)    Pulse (!) 103    Temp 98 °F (36.7 °C)    Resp 18    Ht 5' 5\" (1.651 m)    Wt (!) 193.9 kg (427 lb 6.4 oz)    SpO2 100%    Breastfeeding No    BMI 71.12 kg/m2       Physical Exam:    NC/AT  NO JVD,TMG  S1/S2 RRR  CTAB, NO WHEEZING  NT,ND, NABS  +  EDEMA  Sacral and LE ulcers   Decrease MS pn Rt and B/L LE         Intake and Output:  Current Shift:     Last three shifts:  03/02 1901 - 03/04 0700  In: 480 [P.O.:480]  Out: 3075 [Urine:2400; Drains:300]    Recent Results (from the past 24 hour(s))   GLUCOSE, POC    Collection Time: 03/03/17  4:51 PM   Result Value Ref Range    Glucose (POC) 97 70 - 110 mg/dL   GLUCOSE, POC    Collection Time: 03/03/17  9:16 PM   Result Value Ref Range    Glucose (POC) 80 70 - 110 mg/dL   MAGNESIUM    Collection Time: 03/04/17  5:15 AM   Result Value Ref Range    Magnesium 2.1 1.8 - 2.4 mg/dL   GLUCOSE, POC    Collection Time: 03/04/17  8:23 AM   Result Value Ref Range    Glucose (POC) 86 70 - 110 mg/dL   GLUCOSE, POC    Collection Time: 03/04/17 11:33 AM   Result Value Ref Range    Glucose (POC) 101 70 - 110 mg/dL         Current Facility-Administered Medications:     collagenase (SANTYL) 250 unit/gram ointment, , Topical, DAILY, Fredi Kearns MD    0.9% sodium chloride infusion 250 mL, 250 mL, IntraVENous, PRN, Lucian Johnson MD    metoprolol tartrate (LOPRESSOR) tablet 25 mg, 25 mg, Oral, Q12H, Ifeanyichukwu G Chukwuma, MD, 25 mg at 03/04/17 0949    aspirin chewable tablet 81 mg, 81 mg, Oral, DAILY, Kristel Razo MD, 81 mg at 03/04/17 0949    ondansetron (ZOFRAN) injection 4 mg, 4 mg, IntraVENous, Q6H PRN, Kristel Razo MD    morphine injection 2 mg, 2 mg, IntraVENous, Q4H PRN, Yeni Harris MD, 2 mg at 03/04/17 0949    escitalopram oxalate (LEXAPRO) tablet 10 mg, 10 mg, Oral, DAILY, Mariette Dancer, MD, 10 mg at 03/04/17 0949    oxyCODONE-acetaminophen (PERCOCET) 5-325 mg per tablet 1 Tab, 1 Tab, Oral, Q6H PRN, Mariette Dancer, MD, 1 Tab at 03/04/17 0016    acetaminophen (TYLENOL) tablet 650 mg, 650 mg, Oral, Q4H PRN, Mariette Dancer, MD, 650 mg at 02/25/17 1720    therapeutic multivitamin (THERAGRAN) tablet 1 Tab, 1 Tab, Oral, DAILY, LIZETT Red, 1 Tab at 03/04/17 0949    pantoprazole (PROTONIX) tablet 40 mg, 40 mg, Oral, DAILY, LIZETT Red, 40 mg at 03/04/17 0949    insulin lispro (HUMALOG) injection, , SubCUTAneous, AC&HS, LIZETT Red, Stopped at 02/22/17 1630    sodium chloride (NS) flush 10-30 mL, 10-30 mL, InterCATHeter, PRN, Mariette Dancer, MD    sodium chloride (NS) flush 10 mL, 10 mL, InterCATHeter, Q24H, Mariette Dancer, MD, 10 mL at 03/03/17 1816    sodium chloride (NS) flush 10 mL, 10 mL, InterCATHeter, PRN, Mariette Dancer, MD    sodium chloride (NS) flush 10-40 mL, 10-40 mL, InterCATHeter, Q8H, Mariette Dancer, MD, 20 mL at 03/04/17 0648    bacitracin 500 unit/gram packet 1 Packet, 1 Packet, Topical, PRN, Mariette Dancer, MD    0.9% sodium chloride infusion 250 mL, 250 mL, IntraVENous, PRN, Kimberly Husbands, NP    enoxaparin (LOVENOX) injection 40 mg, 40 mg, SubCUTAneous, Q12H, Wade Vasquez MD, 40 mg at 03/04/17 0648    0.9% sodium chloride infusion 250 mL, 250 mL, IntraVENous, PRN, Wade Vasquez MD    albuterol-ipratropium (DUO-NEB) 2.5 MG-0.5 MG/3 ML, 3 mL, Nebulization, Q6H RT, LIZETT Red, 3 mL at 03/04/17 1524    sodium chloride (NS) flush 5-10 mL, 5-10 mL, IntraVENous, PRN, Valeri Ramires DO    glucose chewable tablet 16 g, 4 Tab, Oral, PRN, Miracle Anders MD    glucagon [facility]) injection 1 mg, 1 mg, IntraMUSCular, PRN, Miracle Anders MD    dextrose (D50W) injection syrg 12.5-25 g, 25-50 mL, IntraVENous, PRN, Miracle Anders MD    Lab Results   Component Value Date/Time    Glucose 76 03/03/2017 05:40 AM    Glucose 87 03/01/2017 05:10 AM    Glucose 95 02/28/2017 05:00 AM    Glucose 80 02/27/2017 01:35 PM    Glucose 81 02/27/2017 05:07 AM        Assessment/Plan     Active Problems:    Septic shock (HCC) (2/10/2017)      Acute respiratory failure (HCC) (2/13/2017)      Debility (2/13/2017)      Septic shock, resolved    Acute hypoxic resp failure 2/2 above, resolved   JOAQUIN on CKD 2/2 sepsis, reolved  acute metabolic encephalopathy, improved  Hypernatremia,resolved  DM, stable   Morbid obesity  HTN increase lopressor to 50 mg BID   Fever, resolved   Anemia, stable   Hypokalemia, resolved   Multiple ulcer including B/L heels, large sacral ulcer, POA  S/p  diverting colostomy and debridement 3/2  Cont local wound care   Repeat AM labs   Per surgery keep rectal  tube in till Monday  SNF on Monday           Michael Patel MD  3/4/2017, 11:39 AM

## 2017-03-04 NOTE — ROUTINE PROCESS
[address]y 58 for wound care. 1600 Pain med given for complaint of back pain. HR in 130's. Will monitor closely. 1940 Bedside and Verbal shift change report given to Staci Fraser RN (oncoming nurse) by Belle Jhaveri RN (offgoing nurse). Report included the following information SBAR, Kardex, Intake/Output, MAR, Recent Results and Cardiac Rhythm NSR.

## 2017-03-04 NOTE — PROGRESS NOTES
Problem: Self Care Deficits Care Plan (Adult)  Goal: *Acute Goals and Plan of Care (Insert Text)  Occupational Therapy Goals  Initiated 2/28/2017; re-evaluated on 3/4/17 within 7 day(s). Modified assistance levels for goals 1, 2, 3 secondary to slow progress. 1. Patient will perform grooming tasks with moderate assistance. 2. Patient will perform self-feeding with moderate assistance, utilizing compensatory strategies, prn.  3. Patient will perform upper body dressing with moderate assistance . 4. Patient will participate in upper extremity therapeutic exercise/activities with maximum assistance for 8 minutes to increase strength/function of BUEs for ADLs. 5. Patient will utilize energy conservation techniques during functional activities with verbal cues. Outcome: Progressing Towards Goal  OCCUPATIONAL THERAPY REEVALUATION     Patient: Marii Stewart (23 y.o. female)  Date: 3/4/2017  Diagnosis: Septic shock ([address]  ulcer decubitus  septic shock <principal problem not specified>  Procedure(s) (LRB):  davinci colostomy XI ROBOTIC ASSISTED (N/A)  debrudement of large decubitus ulcer sacral (N/A) 2 Days Post-Op  Precautions: Contact, Fall      ASSESSMENT :  Based on the objective data described below, the patient continues to present with impairments with regard to bed mobility in preparation for ADLs, activity tolerance, BUE function/strength, and overall independence in ADLs. Patient has not progressed towards functional goals secondary to recent debridement procedure and overall deconditioning. Patient demonstrated flat affect t/o session but was cooperative. Friend present and educated on postioning of BUEs to prevent edema; she verbalized understanding. Patient tolerated PROM to LUE with no c/o pain; c/o pain with PROM of RUE.  Patient will continue to benefit from skilled OT services during acute care stay to maximize safety during functional mobility, increase activity tolerance, and facilitate independence in ADLs. Salima Blackman RN present t/o session. Patient left with needs within reach and friend present. Patient will benefit from skilled intervention to address the above impairments. Patients rehabilitation potential is considered to be Fair  Factors which may influence rehabilitation potential include:   [ ]                None noted  [ ]                Mental ability/status  [X]                Medical condition  [ ]                Home/family situation and support systems  [ ]                Safety awareness  [ ]                Pain tolerance/management  [ ]                Other:           PLAN :  Recommendations and Planned Interventions:  [X]                  Self Care Training                  [X]           Therapeutic Activities  [X]                  Functional Mobility Training    [ ]           Cognitive Retraining  [X]                  Therapeutic Exercises           [X]           Endurance Activities  [X]                  Balance Training                   [ ]           Neuromuscular Re-Education  [ ]                  Visual/Perceptual Training     [X]      Home Safety Training  [X]                  Patient Education                 [X]           Family Training/Education  [ ]                  Other (comment):     Frequency/Duration: Patient will be followed by occupational therapy 3 times a week to address goals. Discharge Recommendations: Skilled Nursing Facility  Further Equipment Recommendations for Discharge: TBD at next level of care       SUBJECTIVE:   Patient stated Guy Klinefelter in there. \"      OBJECTIVE DATA SUMMARY:   Hospital course since last seen and reason for reevaluation: Patient has had debridement for pressure ulcer located on back; patient has not been seen for OT secondary to procedure; re-evaluated to determine current level of function. Patient has not progressed towards functional goals; goals downgraded due to patient's current functional level.      G CODES:  Self Care  Current  CM= 80-99%   Goal  CJ= 20-39%. The severity rating is based on the Quick DASH and Other Functional Assessment, MMT, ROM      Eval Complexity: History: MEDIUM Complexity : Expanded review of history including physical, cognitive and psychosocial  history ; Examination: MEDIUM Complexity : 3-5 performance deficits relating to physical, cognitive , or psychosocial skils that result in activity limitations and / or participation restrictions; Decision Making:HIGH Complexity : Patient presents with comorbidities that affect occupational performance.  Signifigant modification of tasks or assistance (eg, physical or verbal) with assessment (s) is necessary to enable patient to complete evaluation      Cognitive/Behavioral Status:  Neurologic State: Alert  Orientation Level: Oriented to place, Oriented to person  Cognition: Follows commands  Safety/Judgement: Fall prevention     Skin: Intact (BUEs)  Edema: Significant edema in R hand; slight edema in L hand     Vision/Perceptual:    Acuity: Within Defined Limits       Coordination:  Coordination: Grossly decreased, non-functional (BUEs; LUE generally decreased; RUE grossly decreased)  Fine Motor Skills-Upper: Right Impaired;Left Intact    Gross Motor Skills-Upper: Right Impaired;Left Intact      Balance:  Sitting:  (patient seen at bed level)      Strength:  Strength: Grossly decreased, non-functional (BUEs; LUE: 3/5 elbow flex; RUE: approx 2/5 (wrist ext/flex))     Tone & Sensation:  Tone: Normal (BUEs)  Sensation: Intact (BUEs)     Range of Motion:  AROM: Grossly decreased, non-functional (BUEs; LUE full elbow flex; RUE: intiates hand movement)  PROM: Generally decreased, functional (BUEs)     Functional Mobility and Transfers for ADLs:  Bed Mobility:  Rolling:  (patient seen at bed level)     Transfers:  Sit to Stand:  (patient seen at bed level)     ADL Assessment:  Feeding: Maximum assistance  Oral Facial Hygiene/Grooming: Maximum assistance  Bathing: Total assistance  Upper Body Dressing: Maximum assistance  Lower Body Dressing: Total assistance  Toileting: Total assistance (obando catheter)     Cognitive Retraining  Safety/Judgement: Fall prevention     Pain:   Pre treatment pain level: 0/10  Post treatment pain level:  0/10  Pain Scale 1: Visual  Pain Intensity 1: 0  Pain Location 1: Back;Leg;Buttocks  Pain Orientation 1: Posterior; Lower  Pain Description 1: Aching  Pain Intervention(s) 1: Medication (see MAR)      Activity Tolerance:  Poor  Please refer to the flowsheet for vital signs taken during this treatment. After treatment:   [ ] Patient left in no apparent distress sitting up in chair  [X] Patient left in no apparent distress in bed  [X] Call bell left within reach  [X] Nursing notified  [X] Caregiver present  [ ] Bed alarm activated      COMMUNICATION/EDUCATION: Patient/friend educated on role of OT and continued POC. They verbalized understanding.   [X]    Home safety education was provided and the patient/caregiver indicated understanding. [X]    Patient/family have participated as able in goal setting and plan of care. [X]    Patient/family agree to work toward stated goals and plan of care. [ ]    Patient understands intent and goals of therapy, but is neutral about his/her participation. [ ]    Patient is unable to participate in goal setting and plan of care. This patients plan of care is appropriate for delegation to [facility].      Thank you for this referral.     Ar العلي MS OTR/L  Time Calculation: 14 mins

## 2017-03-04 NOTE — PROGRESS NOTES
2035- Received bedside shift report from [facility], RN. Patient resting in bed without apparent distress. Shift assessment completed at this time. Juarez catheter and fecal management system are both in place, patent, draining, and hanging on side of bed. Patient colostomy intact and draining as well. Nasal cannula in place, set to 2L of oxygen. Patient denies pain. 2115- Blood glucose: 80. Insulin held. 0015- Due meds given. PRN pain medication administered at this time. Numeric scale utilized to rate pain. Meds crushed and administered with applesauce. 0250- Breathing treatment administered by RT. Patient tolerates well. Will continue to monitor. 1254- Bed bath given, linen changed, mouth care, colostomy care, and dressing changed performed. Patient tolerates all cares well. 2496- Platelets: 009. Lovenox administered subQ in abdomen. 4746- Bedside and Verbal shift change report given to Indu Sharif RN (oncoming nurse) by Jackie Dee RN (offgoing nurse). Report included the following information SBAR, Kardex, Procedure Summary, Intake/Output, MAR and Med Rec Status.

## 2017-03-05 NOTE — PROGRESS NOTES
Bedside and Verbal shift change report received from Abeba Asher (offgoing nurse). Report included the following information SBAR, Kardex, Intake/Output, MAR and Recent Results. 2015-Shift assessment completed, no distress noted, will continue to monitor. 0015-Reassessment completed, no changes from previous. 0415-Reassessment completed, prn percocet administered. 0530-Dressing changes to sacrum and feet changed, pt tolerated well, will continue to monitor. Bedside and Verbal shift change report given to Whitney Dailey (oncoming nurse) by Poli Bright RN (offgoing nurse). Report included the following information SBAR, Kardex, Intake/Output, MAR and Recent Results.

## 2017-03-05 NOTE — PROGRESS NOTES
2 [facility]  Hospitalist Division    Daily progress Note    Patient: Cindy Devlin MRN: [id number]  CSN: [id number]    YOB: 1955  Age: 64 y.o. Sex: female    DOA: 2/10/2017 LOS:  LOS: 23 days                    Subjective:     CC: sepsis     Awake, min verbal, NAD   S/p diverting colostomy and debridement       Objective:      Visit Vitals    /65 (BP 1 Location: Left arm)    Pulse 98    Temp 98.3 °F (36.8 °C)    Resp 20    Ht 5' 5\" (1.651 m)    Wt (!) 193.9 kg (427 lb 6.4 oz)    SpO2 100%    Breastfeeding No    BMI 71.12 kg/m2       Physical Exam:    NC/AT  NO JVD,TMG  S1/S2 RRR  CTAB, NO WHEEZING  NT,ND, NABS  +  EDEMA  Sacral and LE ulcers   Decrease MS pn Rt  UE and B/L LE         Intake and Output:  Current Shift:     Last three shifts:  03/03 1901 - 03/05 0700  In: -   Out: 800 [Urine:300; Drains:300]    Recent Results (from the past 24 hour(s))   GLUCOSE, POC    Collection Time: 03/04/17  9:20 PM   Result Value Ref Range    Glucose (POC) 73 70 - 110 mg/dL   PHOSPHORUS    Collection Time: 03/05/17  5:10 AM   Result Value Ref Range    Phosphorus 4.2 2.5 - 4.9 MG/DL   MAGNESIUM    Collection Time: 03/05/17  5:10 AM   Result Value Ref Range    Magnesium 2.2 1.8 - 2.4 mg/dL   CBC WITH AUTOMATED DIFF    Collection Time: 03/05/17  5:10 AM   Result Value Ref Range    WBC 13.7 (H) 4.6 - 13.2 K/uL    RBC 2.72 (L) 4.20 - 5.30 M/uL    HGB 7.8 (L) 12.0 - 16.0 g/dL    HCT 24.3 (L) 35.0 - 45.0 %    MCV 89.3 74.0 - 97.0 FL    MCH 28.7 24.0 - 34.0 PG    MCHC 32.1 31.0 - 37.0 g/dL    RDW 20.3 (H) 11.6 - 14.5 %    PLATELET 592 (H) 635 - 420 K/uL    MPV 9.2 9.2 - 11.8 FL    NEUTROPHILS 73 42 - 75 %    BAND NEUTROPHILS 1 0 - 5 %    LYMPHOCYTES 15 (L) 20 - 51 %    MONOCYTES 7 2 - 9 %    EOSINOPHILS 4 0 - 5 %    BASOPHILS 0 0 - 3 %    ABS. NEUTROPHILS 10.0 (H) 1.8 - 8.0 K/UL    ABS. LYMPHOCYTES 2.1 0.8 - 3.5 K/UL    ABS. MONOCYTES 1.0 0 - 1.0 K/UL    ABS.  EOSINOPHILS 0.5 (H) 0.0 - 0.4 K/UL    ABS.  BASOPHILS 0.0 0.0 - 0.1 K/UL    PLATELET COMMENTS INCREASED PLATELETS      RBC COMMENTS ANISOCYTOSIS  1+        RBC COMMENTS POIKILOCYTOSIS  1+        RBC COMMENTS        TARGET CELLS  OCCASIONAL  TEARDROP CELLS  OCCASIONAL      DF MANUAL     METABOLIC PANEL, BASIC    Collection Time: 03/05/17  5:10 AM   Result Value Ref Range    Sodium 147 (H) 136 - 145 mmol/L    Potassium 4.3 3.5 - 5.5 mmol/L    Chloride 118 (H) 100 - 108 mmol/L    CO2 18 (L) 21 - 32 mmol/L    Anion gap 11 3.0 - 18 mmol/L    Glucose 75 74 - 99 mg/dL    BUN 18 7.0 - 18 MG/DL    Creatinine 1.05 0.6 - 1.3 MG/DL    BUN/Creatinine ratio 17 12 - 20      GFR est AA >60 >60 ml/min/1.73m2    GFR est non-AA 53 (L) >60 ml/min/1.73m2    Calcium 8.2 (L) 8.5 - 10.1 MG/DL   GLUCOSE, POC    Collection Time: 03/05/17  8:21 AM   Result Value Ref Range    Glucose (POC) 75 70 - 110 mg/dL   GLUCOSE, POC    Collection Time: 03/05/17 12:36 PM   Result Value Ref Range    Glucose (POC) 77 70 - 110 mg/dL         Current Facility-Administered Medications:     metoprolol tartrate (LOPRESSOR) tablet 50 mg, 50 mg, Oral, Q12H, Julia Christopher MD, 50 mg at 03/05/17 1143    collagenase (SANTYL) 250 unit/gram ointment, , Topical, DAILY, Julia Christopher MD    0.9% sodium chloride infusion 250 mL, 250 mL, IntraVENous, PRN, Nishi Serna MD    aspirin chewable tablet 81 mg, 81 mg, Oral, DAILY, Julia Christopher MD, 81 mg at 03/05/17 1143    ondansetron (ZOFRAN) injection 4 mg, 4 mg, IntraVENous, Q6H PRN, Julia Christopher MD    morphine injection 2 mg, 2 mg, IntraVENous, Q4H PRN, Javan Juan MD, 2 mg at 03/05/17 1158    escitalopram oxalate (LEXAPRO) tablet 10 mg, 10 mg, Oral, DAILY, Lizbeth Craft MD, 10 mg at 03/05/17 1142    oxyCODONE-acetaminophen (PERCOCET) 5-325 mg per tablet 1 Tab, 1 Tab, Oral, Q6H PRN, Lizbeth Craft MD, 1 Tab at 03/05/17 0453    acetaminophen (TYLENOL) tablet 650 mg, 650 mg, Oral, Q4H PRN, Gloria Harrison Daly Garcia MD, 650 mg at 02/25/17 1720    therapeutic multivitamin (THERAGRAN) tablet 1 Tab, 1 Tab, Oral, DAILY, Corean Boeck, PA, 1 Tab at 03/05/17 1143    pantoprazole (PROTONIX) tablet 40 mg, 40 mg, Oral, DAILY, Corean Boeck, PA, 40 mg at 03/05/17 1143    insulin lispro (HUMALOG) injection, , SubCUTAneous, AC&HS, Corean Boeck, PA, Stopped at 02/22/17 1630    sodium chloride (NS) flush 10-30 mL, 10-30 mL, InterCATHeter, PRN, Liv Canales MD    sodium chloride (NS) flush 10 mL, 10 mL, InterCATHeter, Q24H, Liv Canales MD, 10 mL at 03/04/17 1700    sodium chloride (NS) flush 10 mL, 10 mL, InterCATHeter, PRN, Liv Canales MD    sodium chloride (NS) flush 10-40 mL, 10-40 mL, InterCATHeter, Q8H, Liv Canales MD, 10 mL at 03/05/17 0558    bacitracin 500 unit/gram packet 1 Packet, 1 Packet, Topical, PRN, Liv Canales MD    0.9% sodium chloride infusion 250 mL, 250 mL, IntraVENous, PRN, Javid Larsen NP    enoxaparin (LOVENOX) injection 40 mg, 40 mg, SubCUTAneous, Q12H, Wade Vasquez MD, 40 mg at 03/05/17 0557    0.9% sodium chloride infusion 250 mL, 250 mL, IntraVENous, PRN, Wade Vasquez MD    albuterol-ipratropium (DUO-NEB) 2.5 MG-0.5 MG/3 ML, 3 mL, Nebulization, Q6H RT, Corean Boeck, PA, 3 mL at 03/05/17 1352    sodium chloride (NS) flush 5-10 mL, 5-10 mL, IntraVENous, PRN, Colton Hdez DO    glucose chewable tablet 16 g, 4 Tab, Oral, PRN, Tish Smith MD    glucagon [facility]) injection 1 mg, 1 mg, IntraMUSCular, PRN, Tish Smith MD    dextrose (D50W) injection syrg 12.5-25 g, 25-50 mL, IntraVENous, PRN, Tish Smith MD    Lab Results   Component Value Date/Time    Glucose 75 03/05/2017 05:10 AM    Glucose 76 03/03/2017 05:40 AM    Glucose 87 03/01/2017 05:10 AM    Glucose 95 02/28/2017 05:00 AM    Glucose 80 02/27/2017 01:35 PM        Assessment/Plan     Active Problems:    Septic shock ([address] (2/10/2017)      Acute respiratory failure ([facility] [address] (2/13/2017)      Debility (2/13/2017)      Septic shock, resolved    Acute hypoxic resp failure 2/2 above, resolved   JOAQUIN on CKD 2/2 sepsis, reolved  acute metabolic encephalopathy, improved  Hypernatremia,resolved  DM, stable   Morbid obesity  HTN better controlled with increasing  lopressor to 50 mg BID   Fever, resolved   Anemia, H/H stable   Hypokalemia, resolved   Multiple ulcer including B/L heels, large sacral ulcer, POA  S/p  diverting colostomy and debridement on 3/2  Cont local wound care   Repeat AM labs noted   Per surgery keep rectal  tube in till Monday  DC rectal tube in AM   SNF on Monday          Jaymie Nuñez MD  3/5/2017, 11:39 AM

## 2017-03-06 NOTE — PROGRESS NOTES
[facility]: [phone]  [facility]: [address] [place] [address] Way: [phone]    Patient Name: Francie Bravo  YOB: 1955    Date of Initial Consult: 2/13/2017   Reason for Consult: care decisions  Requesting Provider:  Rikki Cockayne, PA  Primary Care Physician: Tyra Mandujano MD      SUMMARY:   Francie Bravo is a 64y.o. year old with a past history of diabetes, hypertension, morbid obesity, liver disease, lymphedema, who was admitted on 2/10/2017 from  home with a diagnosis of septic shock and respiratory distress requiring intubation . Current medical issues leading to Palliative Medicine involvement include: 64year old who was found down on floor at home covered with feces and bed bugs, with large decubitus ulcer, septic with shock, intubated due to acute respiratory distress. Her overall outlook is poor. Palliative medicine is consulted for care decisions. PALLIATIVE DIAGNOSES:   1. Decubitus ulcer  2. Acute respiratory failure   3. Septic shock   4. Debility          PLAN:   1. Patient seen at bedside, this am, she is alert, fatigued appearing, voice soft, drink 3/4 of ensure while at bedside. Knows she is in the hospital, denies pain   2. Goals of care: GENEVAS guardianship, have discussed with Mr. Fidelia Paulson, , who reports he has discussed with patient who asked to remain full code for now. He will re visit issue with patient and family, if not here in the hospital at the nursing facility. 3. Decubitus ulcer, wound care on going. S/p colostomy and decub debridement, denies pain this am.   4. Septic shock has resolved. 5. Initial consult note routed to primary continuity provider  6. Communicated plan of care with: Palliative RODRIGUE MCCORMICK       GOALS OF CARE:     [====Goals of Care====]  GOALS OF CARE:  Patient / health care proxy stated goals: continuation of full aggressive care.        TREATMENT PREFERENCES:   Code Status: Full Code    Advance Care Planning:  Advance Care Planning 2/13/2017   Patient's Healthcare Decision Maker is: Legal Next of Stalin Rodarte   Primary Decision Maker Name Mariann Torres   Primary Decision Maker Phone Number [phone]   Primary Decision Maker Relationship to Patient Spouse   Confirm Advance Directive None   Patient Would Like to Complete Advance Directive No       Other:    The palliative care team has discussed with patient / health care proxy about goals of care / treatment preferences for patient.  [====Goals of Care====]    Advance Care Planning 2/13/2017   Patient's Healthcare Decision Maker is: Legal Next of Stalin Rodarte   Primary Decision Maker Name Mariann Torres   Primary Decision Maker Phone Number [phone]   Primary Decision Maker Relationship to Patient Spouse   Confirm Advance Directive None   Patient Would Like to Complete Advance Directive No           HISTORY:     History obtained from: chart     CHIEF COMPLAINT: septic shock     HPI/SUBJECTIVE:    The patient is:   [] Verbal and participatory  [x] Non-participatory due to: does not engage in extensive questioning   Please see summary  3/6 s/p diverting colostomy and decubitus debridement on 3/2  98.3 96 18 133/52 100% on 2 liters of nasal cannula  2/28/2017 98.4 105 18 137/80 92 % on room air    2/14/2017 99 102 28 126/51 100% on FIO2 21%   Wbc 14.7 hgb 7.2, creat 0.67 BUN 29 ammonia 13   Clinical Pain Assessment (nonverbal scale for nonverbal patients): Pain: 0  Denies pain      FUNCTIONAL ASSESSMENT:     Palliative Performance Scale (PPS): 40%          PSYCHOSOCIAL/SPIRITUAL SCREENING:     Advance Care Planning:  Advance Care Planning 2/13/2017   Patient's Healthcare Decision Maker is: Legal Next of Stalin Rodarte   Primary Decision Maker Name Mariann Torres   Primary Decision Maker Phone Number [phone]   Primary Decision Maker Relationship to Patient Spouse   Confirm Advance Directive None   Patient Would Like to Complete Advance Directive No        Any spiritual / Yarsanism concerns:  [] Yes /  [x] No    Caregiver Burnout:  [] Yes /  [] No /  [x] No Caregiver Present      Anticipatory grief assessment:   [x] Normal  / [] Maladaptive       ESAS Anxiety: Anxiety: 0    ESAS Depression:          REVIEW OF SYSTEMS:     Positive and pertinent negative findings in ROS are noted above in HPI. The following systems were [] reviewed / [x] unable to be reviewed as noted in HPI  Other findings are noted below. Systems: constitutional, ears/nose/mouth/throat, respiratory, gastrointestinal, genitourinary, musculoskeletal, integumentary, neurologic, psychiatric, endocrine. Positive findings noted below. Modified ESAS Completed by: provider   Fatigue: 7 Drowsiness: 1     Pain: 0   Anxiety: 0 Nausea: 0   Anorexia: 5 Dyspnea: 0     Constipation: No              PHYSICAL EXAM:     Wt Readings from Last 3 Encounters:   03/05/17 (!) 189.1 kg (416 lb 14.4 oz)   01/20/17 (!) 206.7 kg (455 lb 11.1 oz)   05/19/16 (!) 210.2 kg (463 lb 6.4 oz)     Blood pressure 133/52, pulse 96, temperature 98.3 °F (36.8 °C), resp. rate 18, height 5' 5\" (1.651 m), weight (!) 189.1 kg (416 lb 14.4 oz), SpO2 100 %, not currently breastfeeding.   Pain:  Pain Scale 1: Visual  Pain Intensity 1: 0  Pain Onset 1: chronic  Pain Location 1: Generalized  Pain Orientation 1: Posterior, Lower  Pain Description 1: Aching  Pain Intervention(s) 1: Medication (see MAR)  Last bowel movement: FMS    Constitutional: chronically ill appearing, large obese female lying in bed in NAD, voice soft, smiles at times  Respiratory: not labored   Neurologic: awake and alert, oriented x 2, fatigued appearing        HISTORY:     Active Problems:    Septic shock (HCC) (2/10/2017)      Acute respiratory failure ([facility] [address] (2/13/2017)      Debility (2/13/2017)      Past Medical History:   Diagnosis Date    Anemia     Anxiety 5/19/2016    Arthritis     Cancer (HCC)     pre cancerous cells in the uterus    Chronic pain     knees    Chronic pain of both knees 2016    Cigarette nicotine dependence in remission 2016    Diabetes ([facility] [address]     Fatty liver 2016    Headache     Hypertension     Liver disease     fatty liver    Lymphedema 2016    Morbid obesity ([facility] [address] 2016    Psychotic disorder     anxiety and panic attacks    Thyroid disease       Past Surgical History:   Procedure Laterality Date    HX  SECTION      HX CHOLECYSTECTOMY      HX GYN  2006    hysterectomy      Family History   Problem Relation Age of Onset    Diabetes Mother     Hypertension Mother     Lung Disease Father      History reviewed, no pertinent family history.   Social History   Substance Use Topics    Smoking status: Never Smoker    Smokeless tobacco: Never Used    Alcohol use No     Allergies   Allergen Reactions    Ace Inhibitors Nausea and Vomiting    Hydralazine Nausea and Vomiting    Ibuprofen Shortness of Breath    Norvasc [Amlodipine] Swelling    Sulfa (Sulfonamide Antibiotics) Itching    Tramadol Shortness of Breath      Current Facility-Administered Medications   Medication Dose Route Frequency    dextrose 5% and 0.9% NaCl infusion  25 mL/hr IntraVENous CONTINUOUS    metoprolol tartrate (LOPRESSOR) tablet 50 mg  50 mg Oral Q12H    collagenase (SANTYL) 250 unit/gram ointment   Topical DAILY    0.9% sodium chloride infusion 250 mL  250 mL IntraVENous PRN    aspirin chewable tablet 81 mg  81 mg Oral DAILY    ondansetron (ZOFRAN) injection 4 mg  4 mg IntraVENous Q6H PRN    morphine injection 2 mg  2 mg IntraVENous Q4H PRN    escitalopram oxalate (LEXAPRO) tablet 10 mg  10 mg Oral DAILY    oxyCODONE-acetaminophen (PERCOCET) 5-325 mg per tablet 1 Tab  1 Tab Oral Q6H PRN    acetaminophen (TYLENOL) tablet 650 mg  650 mg Oral Q4H PRN    therapeutic multivitamin (THERAGRAN) tablet 1 Tab  1 Tab Oral DAILY    pantoprazole (PROTONIX) tablet 40 mg  40 mg Oral DAILY    insulin lispro (HUMALOG) injection   SubCUTAneous AC&HS    sodium chloride (NS) flush 10-30 mL  10-30 mL InterCATHeter PRN    sodium chloride (NS) flush 10 mL  10 mL InterCATHeter Q24H    sodium chloride (NS) flush 10 mL  10 mL InterCATHeter PRN    sodium chloride (NS) flush 10-40 mL  10-40 mL InterCATHeter Q8H    bacitracin 500 unit/gram packet 1 Packet  1 Packet Topical PRN    0.9% sodium chloride infusion 250 mL  250 mL IntraVENous PRN    enoxaparin (LOVENOX) injection 40 mg  40 mg SubCUTAneous Q12H    0.9% sodium chloride infusion 250 mL  250 mL IntraVENous PRN    albuterol-ipratropium (DUO-NEB) 2.5 MG-0.5 MG/3 ML  3 mL Nebulization Q6H RT    sodium chloride (NS) flush 5-10 mL  5-10 mL IntraVENous PRN    glucose chewable tablet 16 g  4 Tab Oral PRN    glucagon (GLUCAGEN) injection 1 mg  1 mg IntraMUSCular PRN    dextrose (D50W) injection syrg 12.5-25 g  25-50 mL IntraVENous PRN        LAB AND IMAGING FINDINGS:     Lab Results   Component Value Date/Time    WBC 13.7 03/05/2017 05:10 AM    HGB 7.8 03/05/2017 05:10 AM    PLATELET 959 85/41/6387 05:10 AM     Lab Results   Component Value Date/Time    Sodium 147 03/05/2017 05:10 AM    Potassium 4.3 03/05/2017 05:10 AM    Chloride 118 03/05/2017 05:10 AM    CO2 18 03/05/2017 05:10 AM    BUN 18 03/05/2017 05:10 AM    Creatinine 1.05 03/05/2017 05:10 AM    Calcium 8.2 03/05/2017 05:10 AM    Magnesium 2.1 03/06/2017 05:00 AM    Phosphorus 4.2 03/05/2017 05:10 AM      Lab Results   Component Value Date/Time    AST (SGOT) 18 02/27/2017 05:07 AM    Alk.  phosphatase 89 02/27/2017 05:07 AM    Protein, total 4.9 02/27/2017 05:07 AM    Albumin 1.3 02/27/2017 05:07 AM    Globulin 3.6 02/27/2017 05:07 AM     Lab Results   Component Value Date/Time    INR 1.4 02/11/2017 11:16 AM    Prothrombin time 16.6 02/11/2017 11:16 AM      No results found for: IRON, FE, TIBC, IBCT, PSAT, FERR   No results found for: PH, PCO2, PO2  No components found for: [place]   Lab Results Component Value Date/Time     02/10/2017 05:40 PM    CK - MB 5.4 02/10/2017 05:40 PM              Total time: 15 minutes   Counseling / coordination time: 10  minutes  > 50% counseling / coordination?: yes    Prolonged service was provided for  []30 min   []75 min in face to face time in the presence of the patient. Time Start:   Time End:   Note: this can only be billed with 07835 (initial) or 74099 (follow up). If multiple start / stop times, list each separately.

## 2017-03-06 NOTE — MED STUDENT NOTES
Frandy Hall  : 1955    Subjective:    Patient is POD 4 from a colostomy and debridement of decubitus ulcer. She is awake, minimal verbal response, NAD. She states she has no pain today and is otherwise feeling well. No concerning overnight events per patient. Denies HA/F/N/V. No CP or dyspnea. No abdominal pain. Objective:     General: Afebrile, NAD, A&Ox3  HEENT: PERRLA, EOM intact bilat. Nonicteric sclera bilat  CV: RRR, no murmurs, gallops, or rubs. Pulses palpable 2+ in BUE & BLE  Lungs: CTAB, no wheezes, rhonchi, or rales   GI: Normal BS. Nondistended, nontender abdomen  Skin: Large sacral ulcer and multiple BLE ulcers. +2 LE edema bilat. Neuro: No focal deficits noted   Labs/imaging: H&H 7.8/24. 3. Biopsy of debrided tissue shows necrotic tissue with bacterial overgrowth, without any evidence of malignancy. Assessment/Plan: 61yo F 4 days s/p diverting colostomy and debridement of sacral and bilat LE ulcers. Plan is to continue monitoring vitals and labs, continue wound care, remove rectal rube today, and transfer to SNF. Daryl Garcias, MS-3  17  7:07am    *ATTENTION:  This note has been created by a medical student for educational purposes only. Please do not refer to the content of this note for clinical decision-making, billing, or other purposes. Please see attending physicians note to obtain clinical information on this patient. *

## 2017-03-06 NOTE — MANAGEMENT PLAN
IDR rounds. Pt had run of 170s heart rate. Dr Mary Grace Cosme to up Metoprolol and patient can discharge to [facility] tomorrow. Notified [facility].  They are holding bed      Cindy Dos Santos RN BSN  Outcomes Manager  Pager # [phone]

## 2017-03-06 NOTE — PROGRESS NOTES
18- Patient has burst of SVT for 6 seconds, no s/s dystress. 0830- Patient tolerated her po medications crushed and mixed with apple sauce.

## 2017-03-06 NOTE — WOUND CARE
Ostomy Progress Note    Patient: Milton William MRN: [id number]  March 6, 2017       YOB: 1955  Age: 64 y.o. Sex: female        Ms. Chrissy Alcantara is a 64y.o. year old female who is being seen for Ostomy Care and Education Consult on 3/6/2017 at 9:13 AM.    Stoma Location:  Left Upper Abdomen    Her peristomal skin is: normal     Her stoma is: moist, pink    Stoma measures:  1 1/4 in diameter with transverse oblong shape, 3mm protrusion    There is no mucocutaneous junction separation. Appears to be small area of trauma in kwaku-stomal skin, possibly a site of a previous suture. There is no drainage from this area and no redness. Old barrier/pouch gently removed with adhesive remover. Stoma and peristomal skin cleansed with normal saline/gauze, no sting skin prep to peristomal skin, Erickson seal applied to peristomal skin. 2 1/4in barrier applied, pouch clamped, barrier held for 30-60 seconds to ensure seal/no leakage. Instructions were given to Ms. Chrissy Alcantara who is unable to verbalize understanding at this time. Plan:  Change barrier/pouch twice weekly and PRN leakage. Use adhesive remover to remove old barrier, cleanse stoma and kwaku-stomal skin with normal saline and gauze, apply no-sting skin prep to kwaku-stomal skin, apply Erickson seal around stoma, place 2 1/4 inch barrier (cut to fit, leaving 1/8 inch space between barrier opening and barrier) and pouch over stoma, hold barrier for 30-60 seconds to ensure seal.  Empty pouch when 1/3 to 1/2 full and record output in I/O. Assist patient and/or family with educational material left by Ostomy Nurse at bedside.     Oh Hansen BSN, RN, Julián Ole  March 6, 2017

## 2017-03-06 NOTE — PROGRESS NOTES
0700- Assumed patient care. 1000- Pt. Refused breakfast.    1300- pt. Resting quietly in bed no change in status. 1610- Dressing changed to the sacrum as per ordered. Pt. tolerated the procedure. 1830- Blood sugar 67, Dr. Chayito Coyle notified new order given for D5NS at 25 ml/hr. 2045-Bedside and Verbal shift change report given to [address] [place] [address] (oncoming nurse) by Abe Cuba RN (offgoing nurse). Report included the following information SBAR, Kardex, ED Summary, [facility] and Recent Results.

## 2017-03-06 NOTE — PROGRESS NOTES
Progress Note      Patient: Nicolas Mosley               Sex: female          DOA: 2/10/2017       YOB: 1955      Age:  64 y.o.        LOS:  LOS: 24 days               Subjective:   Pt 's heart rate went up to 170 as per the nursing staff . Will increase the lopressor to 75 mg po bid and watch the blood pressure closely. the pt has had her rectal tube dc'd today and she will be ready for dc in the am . The pt will go to a snf . Objective:      Visit Vitals    /51 (BP 1 Location: Left arm)    Pulse 85    Temp 98.9 °F (37.2 °C)    Resp 20    Ht 5' 5\" (1.651 m)    Wt (!) 189.1 kg (416 lb 14.4 oz)    SpO2 99%    Breastfeeding No    BMI 69.38 kg/m2       Physical Exam:  Pt appears depressed today. the lexapro dose will be increased to 20 mg daily   Heart reg rate and rhythm   Lungs  Decreased breath sounds in the bases   Abdomen soft and nontender . Obese   Back  Not examined . \extremities chronic venous stasis changes noted   Neuro depressed       Lab/Data Reviewed:  CMP:   Lab Results   Component Value Date/Time    MG 2.1 03/06/2017 05:00 AM     CBC: No results found for: WBC, HGB, HGBEXT, HCT, HCTEXT, PLT, PLTEXT, HGBEXT, HCTEXT, PLTEXT        Assessment/Plan     Active Problems:    Septic shock ([address] (2/10/2017)      Acute respiratory failure ([address] (2/13/2017)      Debility (2/13/2017)  large decubitus ulcers   Depression     Plan:pt will be on an increased dose of lopressor and also lexapro . dc in the am

## 2017-03-06 NOTE — PROGRESS NOTES
NUTRITION follow-up/Plan of care    RECOMMENDATIONS:     1. Pureed diet, thin liquids per SLP  2. Ensure TID  3. Monitor weight, labs and PO intake  4. RD to follow    GOALS:     1. Ongoing: PO intake meets >75% of protein/calorie needs by 3/9  2. Ongoing: Maintain weight/Gradual weight loss (1-2 by 3/13)    ASSESSMENT:     Weight status is classified as obese per BMI of 69.4. Poor PO intake. Continue Ensure TID for additional calories/protein. Labs noted. Nutrition recommendations listed. RD to follow. Nutrition Risk:  [x]   High []  Moderate [] Low    SUBJECTIVE/OBJECTIVE:     Patient appears well nourished. Patient with multiple pressure ulcers per nursing documentation. Reviewed SLP note with recommendations for pureed diet and thin liquids. S/P robotic transverse colon colostomy and debridement of large infected sacral decubitus ulcer. Patient with poor intake of meals due to not being fond of pureed diet. Per psych, patient lacks capacity. Will monitor. Information Obtained From:   [x] Chart Review  [x] Patient  [] Family/Caregiver  [] Nurse/Physician   [] Patient Rounds/Interdisciplinary Meeting    Diet: Pureed diet  No data found. Medications: [x] Reviewed   Encounter Diagnoses     ICD-10-CM ICD-9-CM   1. Sepsis, due to unspecified organism ([facility] A41.9 038.9     995.91   2. Septic shock (HCC) A41.9 038.9    R65.21 785.52     995.92   3. Sacral decubitus ulcer, unstageable (HCC) L89.150 707.03     707.25   4. Acute respiratory failure, unspecified whether with hypoxia or hypercapnia ([facility]) J96.00 518.81   5. Debility R53.81 799.3   6.  Morbid obesity, unspecified obesity type ([facility] E66.01 278.01     Past Medical History:   Diagnosis Date    Anemia     Anxiety 5/19/2016    Arthritis     Cancer ([facility]     pre cancerous cells in the uterus    Chronic pain     knees    Chronic pain of both knees 5/19/2016    Cigarette nicotine dependence in remission 5/19/2016    Diabetes ([facility]     Fatty liver 5/19/2016    Headache     Hypertension     Liver disease     fatty liver    Lymphedema 5/19/2016    Morbid obesity ([address] 5/19/2016    Psychotic disorder     anxiety and panic attacks    Thyroid disease      Labs:    Lab Results   Component Value Date/Time    Sodium 147 03/05/2017 05:10 AM    Potassium 4.3 03/05/2017 05:10 AM    Chloride 118 03/05/2017 05:10 AM    CO2 18 03/05/2017 05:10 AM    Anion gap 11 03/05/2017 05:10 AM    Glucose 75 03/05/2017 05:10 AM    BUN 18 03/05/2017 05:10 AM    Creatinine 1.05 03/05/2017 05:10 AM    Calcium 8.2 03/05/2017 05:10 AM    Magnesium 2.1 03/06/2017 05:00 AM    Phosphorus 4.2 03/05/2017 05:10 AM    Albumin 1.3 02/27/2017 05:07 AM     Anthropometrics: BMI (calculated): 69.4   Last 3 Recorded Weights in this Encounter    03/02/17 1256 03/03/17 1553 03/05/17 2251   Weight: (!) 166.6 kg (367 lb 3.2 oz) (!) 193.9 kg (427 lb 6.4 oz) (!) 189.1 kg (416 lb 14.4 oz)      Ht Readings from Last 1 Encounters:   02/25/17 5' 5\" (1.651 m)     Estimated Nutrition Needs:   1840 Kcals/day  Protein (g): 92 g    Nutrition Problems Identified:   [x] Suboptimal PO intake   [] Food Allergies  [x] Difficulty chewing/swallowing/poor dentition  [] Constipation/Diarrhea   [] Nausea/Vomiting   [] None  [x] Other: Wound healing     Plan:   [x] Therapeutic Diet  []  Obtained/adjusted food preferences/tolerances and/or snacks options   [x]  Continue supplements as prescribed  [x] Occupational therapy following for feeding techniques  []  HS snack added   [x]  Modify diet texture   []  Modify diet for food allergies   []  Assist with menu selection   [x]  Monitor PO intake on meal rounds   [x]  Continue inpatient monitoring and intervention   []  Participated in discharge planning/Interdisciplinary rounds/Team meetings   []  Other:     Education Needs:   [] Not appropriate for teaching at this time due to:   [x] Identified and addressed    Nutrition Monitoring and Evaluation:   [x] Continue inpatient monitoring and interventions    [] Other:     Denece Alley

## 2017-03-06 NOTE — PROGRESS NOTES
2055 -- Bedside and Verbal shift change report given to Anamaria Parr RN (oncoming nurse) by Juan R Cleaning RN (offgoing nurse). Report included the following information SBAR, Procedure Summary, Intake/Output, MAR, Recent Results. Bed locked and lowered, siderails up x3. Whiteboard updated with information. Call bell at bedside, will continue to monitor.      0016-- Shift reassessment, pt condition unchanged will continue to monitor.      0404-- Shift reassessment, pt condition unchanged will continue to monitor. 0500 -- Wound care and dressing change completed, included gown and linen.      0724-- Bedside and Verbal shift change report given to Whitney Dailey (oncoming nurse) by Bernardo Bhatia RN (offgoing nurse) Report included the following information SBAR, Procedure Summary, Intake/Output, MAR, Recent Results. Pt up in bed, no indication of pain or acute distress. Bed locked and lowered, siderails up x3.  Call bell at bedside.

## 2017-03-06 NOTE — PROGRESS NOTES
Patient is unable to communicate at this time.  offered prayer and left Spiritual Care brochure. Chaplains will continue to follow and will provide pastoral care on an as needed/requested basis.     [facility]   Staff [address] [place] [address]   [phone]

## 2017-03-06 NOTE — MANAGEMENT PLAN
Discharge Plan    Notified by [facility] that the bed is at facility and they have an isolation room ready for pt today      Dinorah Stockton RN BSN  Outcomes Manager  Pager # [phone]

## 2017-03-07 NOTE — ROUTINE PROCESS
Bedside and Verbal shift change report given to Rahel BENEDICT RN (oncoming nurse) by Kaitlynn Vazquez (offgoing nurse). Report included the following information SBAR, Kardex, Intake/Output and MAR. Pt awake in bed. 2017 Shift assessment completed. Pt awake in bed. 5401  Shift reassessment. No change to previous assessment. 3032  Wound care and dressing change completed, bed linens and gown changed. Pt tolerated well. Bedside and Verbal shift change report given to Yao Portillo RN (oncoming nurse) by Jonathan Corbett (offgoing nurse). Report included the following information SBAR, Kardex, Intake/Output and MAR.

## 2017-03-07 NOTE — DISCHARGE SUMMARY
Discharge Summary    Patient: Eitan Nino               Sex: female          DOA: 2/10/2017         YOB: 1955      Age:  64 y.o.        LOS:  LOS: 25 days                Admit Date: 2/10/2017    Discharge Date: 3/7/2017    Admission Diagnoses: Septic shock ([facility]  ulcer decubitus  septic shock    Discharge Diagnoses:    Problem List as of 3/7/2017  Date Reviewed: 5/19/2016          Codes Class Noted - Resolved    Acute respiratory failure ([facility] ICD-10-CM: J96.00  ICD-9-CM: 518.81  2/13/2017 - Present        Debility ICD-10-CM: R53.81  ICD-9-CM: 799.3  2/13/2017 - Present        Septic shock ([facility] ICD-10-CM: A41.9, R65.21  ICD-9-CM: 038.9, 785.52, 995.92  2/10/2017 - Present        Rhabdomyolysis ICD-10-CM: M62.82  ICD-9-CM: 728.88  1/20/2017 - Present        Fatty liver ICD-10-CM: K76.0  ICD-9-CM: 571.8  5/19/2016 - Present        Morbid obesity ([facility] ICD-10-CM: E66.01  ICD-9-CM: 278.01  5/19/2016 - Present        Chronic pain of both knees ICD-10-CM: M25.561, M25.562, G89.29  ICD-9-CM: 719.46, 338.29  5/19/2016 - Present        Cigarette nicotine dependence in remission ICD-10-CM: F17.211  ICD-9-CM: V15.82  5/19/2016 - Present        Anxiety ICD-10-CM: F41.9  ICD-9-CM: 300.00  5/19/2016 - Present        Lymphedema ICD-10-CM: I89.0  ICD-9-CM: 457.1  5/19/2016 - Present              Discharge Condition:  Improved    Hospital Course: pt is a 64year old obese female who was admitted to the hospital because she was found lying on the floor at home . she has morbid obesity and weighs 408 lks . She has depression a,d is cared for by her son who is mentally challenged . The pt was found to be covered in feces  . She has two very large decubitus ilcers and has marked lymphedema of the lower extremities . the pt was dehydrated and tachycardic and  Had an altered mental status . She was admitted to the icu and she was intubated as she had  Acute hypoxia .  She was followed bt the icu team . The pt was septic due to infection from her large decubitus ulcers . The pt was found also to be anemic and she was transfused with prbcs . The pt 's recovery and weaning off the ventilator took a considerable period of time . Eventually her sepsis was brought under control and she was extubated . Wound care saw the pt and the large decubitus ulcers witl continue to be treated in the snf . Eventually the pt became somewhat more responsive and she had a telepsychiatry consult  And it was felt that she should go to a snf. pt was felt not to have the capacity to direct her medical care . Given that the pt had an appointed guardian and she will be sent to a snf for treatment and rehab . Consults:    Treatment Team: Attending Provider: Zuly Granda MD; Consulting Provider: Johnathon Rocha MD; Consulting Provider: Jorge A Friend MD; Care Manager: Belkis Matias; Utilization Review: Emma Youssef; Consulting Provider: Hernan Bustos MD; Nurse Practitioner: Dru Leonard NP; Physical Therapist: Dimple Huang PT    Significant Diagnostic Studies:     Discharge Medications:     Current Discharge Medication List      CONTINUE these medications which have NOT CHANGED    Details   cephALEXin (KEFLEX) 500 mg capsule Take 1 Cap by mouth four (4) times daily. Qty: 28 Cap, Refills: 0      nebivolol (BYSTOLIC) 5 mg tablet Take 5 mg by mouth daily. ergocalciferol (VITAMIN D2) 50,000 unit capsule Take 50,000 Units by mouth. hydroCHLOROthiazide (HYDRODIURIL) 25 mg tablet Take 25 mg by mouth daily. glimepiride (AMARYL) 2 mg tablet Take 2 mg by mouth every morning. traMADol (ULTRAM) 50 mg tablet Take 50 mg by mouth every six (6) hours as needed for Pain.      loratadine (CLARITIN) 10 mg tablet 10 mg.      losartan (COZAAR) 25 mg tablet Take 1 Tab by mouth every evening.   Qty: 90 Tab, Refills: 3    Associated Diagnoses: Essential hypertension with goal blood pressure less than 130/80      metoprolol succinate (TOPROL-XL) 25 mg XL tablet Take 1 Tab by mouth daily. Qty: 90 Tab, Refills: 3    Associated Diagnoses: Essential hypertension with goal blood pressure less than 130/80             Activity: as tolerated     Diet:regular  Wound Care: large decubitus . needs wound care    Follow-up: with pcp and with psychiatry

## 2017-03-07 NOTE — MANAGEMENT PLAN
Discharge Plan    Patient discharging to [facility]. Called [employer] and left voicemail notifying. Called [facility]. Medical transport set up for 5pm. PCS form and envelope at nursing station. RN to call report to [phone].         Alexis Barrios RN BSN  Outcomes Manager  Pager # [phone]

## 2017-03-07 NOTE — DISCHARGE INSTRUCTIONS
DISCHARGE SUMMARY from Nurse    The following personal items are in your possession at time of discharge:    Dental Appliances: None  Visual Aid: None     Home Medications: None  Jewelry: None  Clothing: None  Other Valuables: None  Personal Items Sent to Safe:  (no)          PATIENT INSTRUCTIONS:    After general anesthesia or intravenous sedation, for 24 hours or while taking prescription Narcotics:  · Limit your activities  · Do not drive and operate hazardous machinery  · Do not make important personal or business decisions  · Do  not drink alcoholic beverages  · If you have not urinated within 8 hours after discharge, please contact your surgeon on call. Report the following to your surgeon:  · Excessive pain, swelling, redness or odor of or around the surgical area  · Temperature over 100.5  · Nausea and vomiting lasting longer than 4 hours or if unable to take medications  · Any signs of decreased circulation or nerve impairment to extremity: change in color, persistent  numbness, tingling, coldness or increase pain  · Any questions        What to do at Home:  Recommended activity: Activity as tolerated,     If you experience any of the following symptoms chest pain, shortness of breath, please follow up with your primary care provider or go to the nearest emergency room. *  Please give a list of your current medications to your Primary Care Provider. *  Please update this list whenever your medications are discontinued, doses are      changed, or new medications (including over-the-counter products) are added. *  Please carry medication information at all times in case of emergency situations. These are general instructions for a healthy lifestyle:    No smoking/ No tobacco products/ Avoid exposure to second hand smoke    Surgeon General's Warning:  Quitting smoking now greatly reduces serious risk to your health.     Obesity, smoking, and sedentary lifestyle greatly increases your risk for illness    A healthy diet, regular physical exercise & weight monitoring are important for maintaining a healthy lifestyle    You may be retaining fluid if you have a history of heart failure or if you experience any of the following symptoms:  Weight gain of 3 pounds or more overnight or 5 pounds in a week, increased swelling in our hands or feet or shortness of breath while lying flat in bed. Please call your doctor as soon as you notice any of these symptoms; do not wait until your next office visit. Recognize signs and symptoms of STROKE:    F-face looks uneven    A-arms unable to move or move unevenly    S-speech slurred or non-existent    T-time-call 911 as soon as signs and symptoms begin-DO NOT go       Back to bed or wait to see if you get better-TIME IS BRAIN. Warning Signs of HEART ATTACK     Call 911 if you have these symptoms:   Chest discomfort. Most heart attacks involve discomfort in the center of the chest that lasts more than a few minutes, or that goes away and comes back. It can feel like uncomfortable pressure, squeezing, fullness, or pain.  Discomfort in other areas of the upper body. Symptoms can include pain or discomfort in one or both arms, the back, neck, jaw, or stomach.  Shortness of breath with or without chest discomfort.  Other signs may include breaking out in a cold sweat, nausea, or lightheadedness. Don't wait more than five minutes to call 911 - MINUTES MATTER! Fast action can save your life. Calling 911 is almost always the fastest way to get lifesaving treatment. Emergency Medical Services staff can begin treatment when they arrive -- up to an hour sooner than if someone gets to the hospital by car. The discharge information has been reviewed with the patient. The patient verbalized understanding. Discharge medications reviewed with the patient and appropriate educational materials and side effects teaching were provided.

## 2017-03-07 NOTE — ROUTINE PROCESS
Care Management Interventions  PCP Verified by CM:  Yes  Palliative Care Consult (Criteria: CHF and RRAT>21): Yes  Mode of Transport at Discharge: BLS  Transition of Care Consult (CM Consult): Discharge Planning, SNF  Current Support Network: Lives Alone, Own Home  Confirm Follow Up Transport: Other (see comment)  Plan discussed with Pt/Family/Caregiver: Yes  Discharge Location  Discharge Placement: Skilled nursing facility [facility] for long term care)

## 2017-03-08 NOTE — PROGRESS NOTES
Patient admitted to [facility]/[address] on 2/10/17-3/7/17 for Septic Shock. Patient has Medical History:       Past Medical History:   Diagnosis Date    Anemia     Anxiety 5/19/2016    Arthritis     Cancer ([facility]     pre cancerous cells in the uterus    Chronic pain     knees    Chronic pain of both knees 5/19/2016    Cigarette nicotine dependence in remission 5/19/2016    Diabetes ([facility]     Fatty liver 5/19/2016    Headache     Hypertension     Liver disease     fatty liver    Lymphedema 5/19/2016    Morbid obesity ([facility] 5/19/2016    Psychotic disorder     anxiety and panic attacks    Thyroid disease      Attempt to contact [facility] for Hospital follow up and to identify patient's admission to the facility. [facility] states that the patient is admitted to their facility yesterday 3/7/17. Patient will be in [facility] for long term care and in house physician will be patient's PCP as per Ms. [facility]RO. Will Notify Dr. Zhanna Arnold.

## 2017-03-11 NOTE — PROGRESS NOTES
Response to query . The debridement was done by others .  My understanding was that there was extensive debridement

## 2017-03-22 ENCOUNTER — PATIENT OUTREACH (OUTPATIENT)
Dept: PULMONOLOGY | Age: 62
End: 2017-03-22

## 2017-03-22 NOTE — PROGRESS NOTES
Community Care Team Documentation for Patient in [facility]     Patient discharged from [place] to [facility], on 3/8/2017. Hospital Discharge diagnosis:  Sepsis 2nd to stage 3-4 decubitus ulcers    SNF Attending Provider:      Anticipated discharge date from SNF:        PCP : No primary care provider on file. Nurse Navigator:       Per facility patient passed away on 3/11/2017.

## 2021-02-02 NOTE — ED NOTES
Received care of patient from [facility]. Pt appears restless on the stretcher and is tachypneic at 47 resp per min.  and 99% on NRB. Pt is restless and lifting her arms but unresponsive to verbal commands. Yes

## 2023-03-17 NOTE — PROGRESS NOTES
5991 Bedside and Verbal shift change report given to Angely Gutierrez RN (oncoming nurse) by Deb You RN (offgoing nurse). Report included the following information SBAR, Procedure Summary, Intake/Output, MAR, Recent Results and Cardiac Rhythm NSR/ST. Pt resting in bed, NAD. Bed locked and lowered, siderails up x3. Call bell at bedside, will continue to monitor. 1217 Pt resting in bed, NAD. Scheduled meds provided, assessment completed. Call bell at bedside. 1325 Wound care provided. Pt turned, pulled up, and repositioned. 1515 Pt resting in bed, pt's legs repositioned per request. Call bell at bedside, will continue to monitor. 1818 Scheduled meds provided, No other needs at this time, will continue to monitor. 1945 Bedside and Verbal shift change report given to Jose Armando Cruz RN (oncoming nurse) by Angely Gutierrez RN (offgoing nurse). Report included the following information SBAR, Procedure Summary, Intake/Output, MAR, Recent Results and Cardiac Rhythm ST. Pt sleeping in bed, NAD. Bed locked and lowered, siderails up. Call bell at bedside. normal

## 2023-04-24 NOTE — PROGRESS NOTES
Progress Note      Patient: Alex Wyatt               Sex: female          DOA: 2/10/2017       YOB: 1955      Age:  64 y.o.        LOS:  LOS: 12 days               Subjective:   Pt was extubated yesterday . She is on nasal o2 .she did respond to questions but in a very low hushed voice difficult to understand . she did not want to answer how long she lay in bed  without getting up . She has a flat affect and undoubtedly has significant depression . the patient was evaluated by telepsychiatry yesterday. she is tachycardic  Presently but is afebrile. pt has very large ducubitus ulcers . Sodium is high at 156 and needs free water       Objective:      Visit Vitals    /55    Pulse (!) 114    Temp 99.2 °F (37.3 °C)    Resp 19    Ht 5' 5\" (1.651 m)    Wt (!) 194.2 kg (428 lb 2.1 oz)    SpO2 100%    Breastfeeding No    BMI 71.25 kg/m2       Physical Exam:  Pt is awaqke   Heart tachycardic s1 and s2   Lungs breath sounds are distant .    Abdomen soft obese   Extremities decubitus ulcers have dressings and were not ezxamined   Neuro probable depression     Lab/Data Reviewed:  CMP:   Lab Results   Component Value Date/Time     (H) 02/22/2017 04:10 AM    K 3.8 02/22/2017 04:10 AM     (H) 02/22/2017 04:10 AM    CO2 16 (L) 02/22/2017 04:10 AM    AGAP 11 02/22/2017 04:10 AM    GLU 76 02/22/2017 04:10 AM    BUN 19 (H) 02/22/2017 04:10 AM    CREA 1.00 02/22/2017 04:10 AM    GFRAA >60 02/22/2017 04:10 AM    GFRNA 56 (L) 02/22/2017 04:10 AM    CA 8.0 (L) 02/22/2017 04:10 AM    MG 2.0 02/22/2017 04:10 AM    ALB 1.6 (L) 02/22/2017 04:10 AM    TP 5.5 (L) 02/22/2017 04:10 AM    GLOB 3.9 02/22/2017 04:10 AM    AGRAT 0.4 (L) 02/22/2017 04:10 AM    SGOT 27 02/22/2017 04:10 AM    ALT 26 02/22/2017 04:10 AM     CBC:   Lab Results   Component Value Date/Time    WBC 7.0 02/22/2017 10:20 AM    HGB 7.9 (L) 02/22/2017 10:20 AM    HCT 24.8 (L) 02/22/2017 10:20 AM     02/22/2017 10:20 AM Assessment/Plan     Active Problems:    Septic shock ([facility] (2/10/2017) resoved . The source id the pt's large decubitus ulcers       Acute respiratory failure ([facility] (2/13/2017)      Debility (2/13/2017)  Obesity   sleep apnea   Depression     Plan:put ion the floor and continue to treat the decubitus ulcers and continue the inhalers and antibiotics . probably needs snf Unna Boot Text: An Unna boot was placed to help immobilize the limb and facilitate more rapid healing.

## 2023-10-19 NOTE — DISCHARGE SUMMARY
Discharge Summary    Patient: Victor Hugo Paige               Sex: female          DOA: 1/20/2017         YOB: 1955      Age:  64 y.o.        LOS:  LOS: 2 days                Admit Date: 1/20/2017    Discharge Date: 1/22/2017    Discharge Medications:     Current Discharge Medication List      START taking these medications    Details   cephALEXin (KEFLEX) 500 mg capsule Take 1 Cap by mouth four (4) times daily. Qty: 28 Cap, Refills: 0         CONTINUE these medications which have NOT CHANGED    Details   nebivolol (BYSTOLIC) 5 mg tablet Take 5 mg by mouth daily. ergocalciferol (VITAMIN D2) 50,000 unit capsule Take 50,000 Units by mouth. hydroCHLOROthiazide (HYDRODIURIL) 25 mg tablet Take 25 mg by mouth daily. glimepiride (AMARYL) 2 mg tablet Take 2 mg by mouth every morning. traMADol (ULTRAM) 50 mg tablet Take 50 mg by mouth every six (6) hours as needed for Pain.      metoprolol succinate (TOPROL-XL) 25 mg XL tablet Take 1 Tab by mouth daily. Qty: 90 Tab, Refills: 3    Associated Diagnoses: Essential hypertension with goal blood pressure less than 130/80      loratadine (CLARITIN) 10 mg tablet 10 mg.      losartan (COZAAR) 25 mg tablet Take 1 Tab by mouth every evening. Qty: 90 Tab, Refills: 3    Associated Diagnoses: Essential hypertension with goal blood pressure less than 130/80         STOP taking these medications       furosemide (LASIX) 20 mg tablet Comments:   Reason for Stopping:                Follow-up: pcp    Discharge Condition: Stable    Activity: Activity as tolerated    Diet: Resume previous diet    Labs:  Labs: Results:       Chemistry Recent Labs      01/22/17   0400  01/21/17   0600  01/20/17   1426   GLU  75  82  100*   NA  142  146*  140   K  2.9*  3.1*  4.1   CL  106  110*  105   CO2  26  27  23   BUN  5*  7  12   CREA  0.48*  0.49*  0.68   CA  7.3*  7.8*  8.3*   AGAP  10  9  12   BUCR  10*  14  18   AP   --    --   83   TP   --    --   6.9   ALB   --    -- 2.5*   GLOB   --    --   4.4*   AGRAT   --    --   0.6*      CBC w/Diff Recent Labs      01/21/17   0600  01/20/17   1426   WBC  10.0  14.2*   RBC  3.43*  4.03*   HGB  10.0*  11.9*   HCT  29.1*  33.8*   PLT  243  274   GRANS  69  82*   LYMPH  16*  9*   EOS  7*  2      Cardiac Enzymes Recent Labs      01/22/17   0400  01/21/17   0600  01/20/17   1426   CPK  5313*  9269*  9190*   CKND1   --    --   0.2      Coagulation No results for input(s): PTP, INR, APTT in the last 72 hours. No lab exists for component: INREXT    Lipid Panel Lab Results   Component Value Date/Time    Cholesterol, total 167 05/05/2010 08:45 AM    HDL Cholesterol 65 05/05/2010 08:45 AM    LDL, calculated 88.4 05/05/2010 08:45 AM    VLDL, calculated 13.6 05/05/2010 08:45 AM    Triglyceride 68 05/05/2010 08:45 AM    CHOL/HDL Ratio 2.6 05/05/2010 08:45 AM      BNP No results for input(s): BNPP in the last 72 hours.    Liver Enzymes Recent Labs      01/20/17   1426   TP  6.9   ALB  2.5*   AP  83   SGOT  250*      Thyroid Studies Lab Results   Component Value Date/Time    TSH 2.36 01/21/2017 06:00 AM          Consults: None    Treatment Team: Treatment Team: Attending Provider: Yaw Good MD; Consulting Provider: Kathy Jackson MD; Care Manager: Carol Muñoz    Significant Diagnostic Studies: labs:   Recent Results (from the past 24 hour(s))   GLUCOSE, POC    Collection Time: 01/21/17  5:04 PM   Result Value Ref Range    Glucose (POC) 109 70 - 110 mg/dL   GLUCOSE, POC    Collection Time: 01/21/17  9:13 PM   Result Value Ref Range    Glucose (POC) 109 70 - 259 mg/dL   METABOLIC PANEL, BASIC    Collection Time: 01/22/17  4:00 AM   Result Value Ref Range    Sodium 142 136 - 145 mmol/L    Potassium 2.9 (LL) 3.5 - 5.5 mmol/L    Chloride 106 100 - 108 mmol/L    CO2 26 21 - 32 mmol/L    Anion gap 10 3.0 - 18 mmol/L    Glucose 75 74 - 99 mg/dL    BUN 5 (L) 7.0 - 18 MG/DL    Creatinine 0.48 (L) 0.6 - 1.3 MG/DL    BUN/Creatinine ratio 10 (L) 12 - 20      GFR est AA >60 >60 ml/min/1.73m2    GFR est non-AA >60 >60 ml/min/1.73m2    Calcium 7.3 (L) 8.5 - 10.1 MG/DL   CK    Collection Time: 01/22/17  4:00 AM   Result Value Ref Range    CK 5313 (H) 26 - 192 U/L   GLUCOSE, POC    Collection Time: 01/22/17  7:52 AM   Result Value Ref Range    Glucose (POC) 81 70 - 110 mg/dL   GLUCOSE, POC    Collection Time: 01/22/17 12:09 PM   Result Value Ref Range    Glucose (POC) 109 70 - 110 mg/dL         Discharge diagnoses:    Problem List as of 1/22/2017  Date Reviewed: 5/19/2016          Codes Class Noted - Resolved    Rhabdomyolysis ICD-10-CM: M62.82  ICD-9-CM: 728.88  1/20/2017 - Present        Fatty liver ICD-10-CM: K76.0  ICD-9-CM: 571.8  5/19/2016 - Present        Morbid obesity ([address] ICD-10-CM: E66.01  ICD-9-CM: 278.01  5/19/2016 - Present        Chronic pain of both knees ICD-10-CM: M25.561, M25.562, G89.29  ICD-9-CM: 719.46, 338.29  5/19/2016 - Present        Cigarette nicotine dependence in remission ICD-10-CM: F17.211  ICD-9-CM: V15.82  5/19/2016 - Present        Anxiety ICD-10-CM: F41.9  ICD-9-CM: 300.00  5/19/2016 - Present        Lymphedema ICD-10-CM: I89.0  ICD-9-CM: 457.1  5/19/2016 - Present            Hospital Course:   Major issues addressed during hospitalization outlined  below.        Toshia Pedraza is a 64 y.o. female who is admitted for traumatic rhabdomyolysis. Patient is bed bound due to physical deconditioning and morbid obesity . She claims she fell out of bed 4 days ago and was unable to get up. She lives with her son who has Asperger's. She was unable to get adequate help and stayed on floor all these days. Denies hitting head or LOC. Denies fever, myalgia, nausea, vomiting. In the ED she had XR humerus no fracture. CK 9100. Patient will be admitted for IVF management and further treatment.        Patients CK and bmp monitored for renal function. Renal function remained baseline.   CK on 2nd day improved and patient was able to be discharged home. Instructed to keep up PO intake of water for her rhabdo.       Ugo Rodriges MD  January 22, 2017        Total time spent 25 minutes No

## 2025-01-17 NOTE — ANCILLARY DISCHARGE INSTRUCTIONS
[facility]  Discharge Phone Call       After-Care Discharge Phone Call Questions: no answer    Were you able to get your prescriptions filled? Comment:      [] Yes  []No    Comment if answer is \"No\"   Are you taking your medication(s) as your doctor ordered? Do you understand the purpose of your medications? Comment:    [] Yes  []No    Comment if answer is \"No\"   Are you taking any other medications that are not on the list?  Comment:      [] Yes  []No    Comment if answer is \"Yes\"   Do you have any questions about your medications? Comment:    [] Yes  []No    Comment if answer is \"Yes\"   Did you make your follow-up appointments (if the hospital did not do this before  discharge)? Comment:    [] Yes  []No    Comment if answer is \"No\"   Is there any reason you might not be able to keep your follow-up appointments? Comment:     [] Yes  []No    Comment if answer is \"Yes\"   Do you have any questions about your care plan? Comment:    [] Yes  []No    Comment if answer is \"Yes\"   Do you have a good understanding of how you should manage your health? Comment:    [] Yes  []No    Comment if answer is \"Yes\"   Do you know which symptoms to watch for that would mean you would need to call your doctor right away? Comment:      [] Yes  []No    Comment if answer is \"No\"   Do you have any questions about the follow up process or any instructions that we have provided? Comment:    [] Yes  []No    Comment if answer is \"Yes\"   Did staff take your preferences into account?
- - -

## 2025-03-21 NOTE — DIABETES MGMT
NUTRITIONAL ASSESSMENT AND GLYCEMIC CONTROL PLAN OF CARE     Bess Harley           64 y.o.           2/10/2017                 1. Sepsis, due to unspecified organism ([facility] [address]    2. Septic shock ([facility] [address]    3. Sacral decubitus ulcer, unstageable ([facility] [address]    Morbid Obesity  DM     INTERVENTIONS/PLAN:    Osmolite 1 calorie tube feeding ordered at  10 ml/hour without tolerance problems. Tube feeding will  Provide 256 calories, 10 g protein/d. With multiple pressure ulcers and morbid obesity recommend hgher protein formula with lower calorie:nitrogen ratio. Recommend full strength Perative at 10 ml/hr. Advance as tolerated to goal rate of 60 ml/hr. TF at goal rate will provide 1872 calories, 96 g protein and 1.1 liter free water /d from TF plus 400ml /24 hrs from water flushes. ASSESSMENT:   Nutritional Status:  Pt is overweight related to excess caloric intake as evidenced by 309% ideal weight and BMI= 69.6kg/m2. Pt meets criteria for morbid . obesity. Pt with increased calorie and protein requirements related to multiple pressure ulcers. Diagnosis-Intake: Inadequate protein-energy intake related to respiratory failure AEB inability to take in oral nourishment and need for enteral feeding while on M.vent. .  Pt w/hypoalbuminemia as evidenced by albumin=1.4mg/dl. Altered nutrition related lab values r/t  JOAQUIN on CKD  AEB elevated  BUN, Cr, Mg and Phos.    Diabetes Management:   Recent blood glucose:   Lab Results   Component Value Date/Time     (H) 02/11/2017 04:25 AM     (H) 02/10/2017 05:40 PM    GLUCPOC 96 02/11/2017 11:34 AM    GLUCPOC 107 02/11/2017 06:04 AM    GLUCPOC 104 02/11/2017 01:24 AM    GLUCPOC 150 (H) 02/10/2017 05:52 PM     Within target range (non-ICU: <140; ICU<180): [x] Yes   []  No  Current Insulin regimen: corrective lispro  Home medication/insulin regimen: amaryl , not verified  HbA1c:5.5%  Adequate glycemic control PTA:  [x] Yes  [] No     SUBJECTIVE/OBJECTIVE:   Diet: Osmolite 1 calorie TF at 10 ml/hr  No data found. Medications: [x]                Reviewed      Labs:   Lab Results   Component Value Date/Time    Hemoglobin A1c 5.5 02/10/2017 05:40 PM     Lab Results   Component Value Date/Time    Sodium 148 02/11/2017 04:25 AM    Potassium 4.9 02/11/2017 04:25 AM    Chloride 119 02/11/2017 04:25 AM    CO2 13 02/11/2017 04:25 AM    Anion gap 16 02/11/2017 04:25 AM    Glucose 116 02/11/2017 04:25 AM    BUN 86 02/11/2017 04:25 AM    Creatinine 1.29 02/11/2017 04:25 AM    Calcium 8.0 02/11/2017 04:25 AM    Magnesium 2.7 02/11/2017 04:25 AM    Phosphorus 6.8 02/11/2017 04:25 AM    Albumin 1.4 02/11/2017 04:25 AM     Anthropometrics: IBW : 61.2 kg (135 lb), % IBW (Calculated): 309.13 %, BMI (calculated): 69.6  Wt Readings from Last 1 Encounters:   02/11/17 (!) 189.3 kg (417 lb 5.3 oz)      Ht Readings from Last 1 Encounters:   02/11/17 5' 5\" (1.651 m)     Estimated Nutrition Needs:  1840 Kcals/day, Protein (g): 92 g Fluid (ml): 1800 ml  Based on:   []          Actual BW    [x]          IBW   []            Adjusted BW    Nutrition Diagnoses:      As stated above. Nutrition Interventions: TF recommendations  Goal:   Blood glucose will be within target range of  mg/dL by2/13-met    Intake will meet >75% of energy and protein requirements for wound healing by 2/16 .     Nutrition Monitoring and Evaluation      []     Monitor po intake on meal rounds  [x]     Continue inpatient monitoring and intervention  []     Other: advance TF as tolerated to goal    Nutrition Risk:  [x]   High     []  Moderate    []  Minimal/Uncompromised    Rob Huitron RD [facility]

## 2025-05-08 NOTE — PROGRESS NOTES
Problem: Mobility Impaired (Adult and Pediatric)  Goal: *Acute Goals and Plan of Care (Insert Text)  PHYSICAL THERAPY SHORT TERM GOALS :   New goals   PHYSICAL THERAPY SHORT TERM GOALS : To be complete 3-6-17  1. Patient will perform/complete move head and keep in midline for 3 minutes with supervision/set-up within 1 week(s). 2. Patient will perform/complete initiate movement in shoulder and hips in preparation for rolling with moderate assistance within 1 week(s). 3. Patient will perform/complete initate rolling to one side w with moderate assistance Of 2 within 1 week(s). 4 Patient will participate in lower extremity therapeutic exercise/activities with moderate assistance for 10 minutes within 1 week(s). Therapist Searcy Angelucci, PT 2/27/2017  Time Calculation: 32 mins  1. Patient will perform/complete have head at 45 degrees in chair position of bed for 15 minutes with supervision/set-up with no signs of distress within 1 week(s). 2. Patient will perform/complete exercises with family guiding her for exercises with supervision/set-up within 1 week(s). 3. Patient will perform/complete initate rolling to one side w with moderate assistance Of 2 within 1 week(s). 4. Patient will participate in Upper and lower extremity therapeutic exercise/activities with moderate assistance for 10 minutes within 1 week(s). Therapist Searcy Angelucci, PT 2/20/2017  Time Calculation: 21 mins   Outcome: Not Met  PHYSICAL THERAPY TREATMENT     Patient: Gilma Mcdonald (88 y.o. female)  Date: 2/28/2017  Diagnosis: Septic shock ([address]  ulcer decubitus  septic shock <principal problem not specified>  Procedure(s) (LRB):  debridement large sacral decubitus ulcer  (N/A)    Precautions: Aspiration, Skin, Contact, Fall  Chart, physical therapy assessment, plan of care and goals were reviewed. ASSESSMENT:  Pt presents in bed, very weak, however willing to participate.  Pt seen for skilled PT session that included BLE PROM and bed mobility. Limited progress to date 2/2 inability to actively participate. Pt was positioned to increase skin integrity and for pressure relief at this time. Continue to see pt for current deficits that include weakness, bed mobility and balance. Progression toward goals:  [ ]      Improving appropriately and progressing toward goals  [X]      Improving slowly and progressing toward goals  [ ]      Not making progress toward goals and plan of care will be adjusted       PLAN:  Patient continues to benefit from skilled intervention to address the above impairments. Continue treatment per established plan of care. Discharge Recommendations: To Be Determined  Further Equipment Recommendations for Discharge:  N/A       G-CODES:      Mobility  Current  CN= 100%. The severity rating is based on the Level of Assistance required for Functional Mobility and ADLs. SUBJECTIVE:   Patient stated I am not hungry.       OBJECTIVE DATA SUMMARY:   Critical Behavior:  Neurologic State: Alert  Orientation Level: Oriented X4  Cognition: Follows commands  Safety/Judgement: Fall prevention  Functional Mobility Training:     Therapeutic Exercises:   PROM-B ankles, hips x 15 reps each, 1 set. Pt has multiple areas of decreased skin integrity B LEs that appear to be treated with some type of cream as areas are covered with it. Pain:  Pt reports /10 pain or discomfort prior to treatment. (Grimaces with most movement, does not quantify pain number)  Pt reports /10 pain or discomfort post treatment. Activity Tolerance:   Pt with poor tolerance to skilled PT session today. She was limited in mobility and grimaces with most movements. Continue to see until goals met. Please refer to the flowsheet for vital signs taken during this treatment.   After treatment:   [ ] Patient left in no apparent distress sitting up in chair  [X] Patient left in no apparent distress in bed  [X] Call bell left within reach (OTR retrieved call bell that was hanging on wall unit of 02 upon entrance into the room)  Pt is left with call bell upon therapist's exit from room  [ ] Nursing notified  [ ] Caregiver present  [ ] Bed alarm activated      Gil Helm   Time Calculation: 11 mins done

## (undated) DEVICE — DERMABOND SKIN ADH 0.7ML -- DERMABOND ADVANCED 12/BX

## (undated) DEVICE — KENDALL SCD EXPRESS SLEEVES, KNEE LENGTH, MEDIUM: Brand: KENDALL SCD

## (undated) DEVICE — 1200 GUARD II KIT W/5MM TUBE W/O VAC TUBE: Brand: GUARDIAN

## (undated) DEVICE — PREP SKN CHLRAPRP 26ML TNT -- CONVERT TO ITEM 373320

## (undated) DEVICE — UNIVERSAL FIXATION CANNULA: Brand: VERSAONE

## (undated) DEVICE — REM POLYHESIVE ADULT PATIENT RETURN ELECTRODE: Brand: VALLEYLAB

## (undated) DEVICE — Device

## (undated) DEVICE — COLUMN DRAPE

## (undated) DEVICE — KERLIX BANDAGE ROLL: Brand: KERLIX

## (undated) DEVICE — SUTURE VCRL SZ 2-0 L12X18IN ABSRB UD POLYGLACTIN 910 BRAID J911T

## (undated) DEVICE — TIP COVER ACCESSORY

## (undated) DEVICE — BLADELESS OBTURATOR

## (undated) DEVICE — MEDI-VAC SUCTION HANDLE REGULAR CAPACITY: Brand: CARDINAL HEALTH

## (undated) DEVICE — KIT OST L12IN FLNG DIA45MM ST TRNSPAR TWO PC MOLD TECHNOLOGY

## (undated) DEVICE — SEAL UNIV 5-8MM DISP BX/10 -- DA VINCI XI - SNGL USE

## (undated) DEVICE — SUTURE VCRL SZ 2-0 L27IN ABSRB UD L26MM SH 1/2 CIR J417H

## (undated) DEVICE — DEVICE WND CLSR V-LOC 3-0 CV-23 90

## (undated) DEVICE — BSHR MAJOR BASIN PACK-LF: Brand: MEDLINE INDUSTRIES, INC.

## (undated) DEVICE — STERILE POLYISOPRENE POWDER-FREE SURGICAL GLOVES: Brand: PROTEXIS

## (undated) DEVICE — LIGHT HANDLE: Brand: DEVON

## (undated) DEVICE — DRESSING,GAUZE,XEROFORM,CURAD,5"X9",ST: Brand: CURAD

## (undated) DEVICE — TOWEL SURG W17XL27IN STD BLU COT NONFENESTRATED PREWASHED

## (undated) DEVICE — SOL IRR NACL 0.9% 500ML POUR --

## (undated) DEVICE — DISPOSABLE SUCTION/IRRIGATOR TUBE SET WITH TIP: Brand: AHTO

## (undated) DEVICE — DRAPE,UTILITY,TAPE,15X26,STERILE: Brand: MEDLINE

## (undated) DEVICE — INTENDED FOR TISSUE SEPARATION, AND OTHER PROCEDURES THAT REQUIRE A SHARP SURGICAL BLADE TO PUNCTURE OR CUT.: Brand: BARD-PARKER ® CARBON RIB-BACK BLADES

## (undated) DEVICE — HEAVY DRAINAGE PACK: Brand: CURITY

## (undated) DEVICE — Z INACTIVE PER BARD TRAY URO DIA16FR INF CTRL F COMPLT CARE 350ML URIN M

## (undated) DEVICE — ARM DRAPE

## (undated) DEVICE — SOL ANTI-FOG 6ML MEDC -- MEDICHOICE - CONVERT TO 358427

## (undated) DEVICE — DRAPE THER FLUID WARMING 66X44 IN FLAT SLUSH DBL DISC ORS

## (undated) DEVICE — SHEET,DRAPE,70X100,STERILE: Brand: MEDLINE

## (undated) DEVICE — SHEARS: Brand: ENDO SHEARS